# Patient Record
Sex: MALE | Race: WHITE | Employment: OTHER | URBAN - NONMETROPOLITAN AREA
[De-identification: names, ages, dates, MRNs, and addresses within clinical notes are randomized per-mention and may not be internally consistent; named-entity substitution may affect disease eponyms.]

---

## 2017-11-21 ENCOUNTER — HOSPITAL ENCOUNTER (INPATIENT)
Age: 52
LOS: 16 days | Discharge: NURSING, REHAB OR LONG-TERM CARE | DRG: 231 | End: 2017-12-07
Attending: INTERNAL MEDICINE | Admitting: INTERNAL MEDICINE
Payer: MEDICAID

## 2017-11-21 ENCOUNTER — APPOINTMENT (OUTPATIENT)
Dept: CT IMAGING | Age: 52
DRG: 231 | End: 2017-11-21
Payer: MEDICAID

## 2017-11-21 DIAGNOSIS — R77.8 ELEVATED TROPONIN: ICD-10-CM

## 2017-11-21 DIAGNOSIS — D64.9 ANEMIA, UNSPECIFIED TYPE: ICD-10-CM

## 2017-11-21 DIAGNOSIS — I10 ESSENTIAL HYPERTENSION: ICD-10-CM

## 2017-11-21 DIAGNOSIS — E87.5 HYPERKALEMIA: ICD-10-CM

## 2017-11-21 DIAGNOSIS — I10 HYPERTENSION, UNSPECIFIED TYPE: Primary | ICD-10-CM

## 2017-11-21 PROBLEM — I21.4 NSTEMI (NON-ST ELEVATED MYOCARDIAL INFARCTION) (HCC): Status: ACTIVE | Noted: 2017-11-21

## 2017-11-21 LAB
ABO: NORMAL
ALBUMIN SERPL-MCNC: 2.9 G/DL (ref 3.5–5.1)
ALP BLD-CCNC: 110 U/L (ref 38–126)
ALT SERPL-CCNC: 17 U/L (ref 11–66)
ANION GAP SERPL CALCULATED.3IONS-SCNC: 7 MEQ/L (ref 8–16)
ANISOCYTOSIS: ABNORMAL
ANTIBODY SCREEN: NORMAL
AST SERPL-CCNC: 14 U/L (ref 5–40)
BASOPHILIA: SLIGHT
BASOPHILS # BLD: 0.9 %
BASOPHILS ABSOLUTE: 0.1 THOU/MM3 (ref 0–0.1)
BILIRUB SERPL-MCNC: < 0.2 MG/DL (ref 0.3–1.2)
BUN BLDV-MCNC: 51 MG/DL (ref 7–22)
CALCIUM SERPL-MCNC: 7.8 MG/DL (ref 8.5–10.5)
CHLORIDE BLD-SCNC: 107 MEQ/L (ref 98–111)
CO2: 24 MEQ/L (ref 23–33)
CREAT SERPL-MCNC: 1.5 MG/DL (ref 0.4–1.2)
EOSINOPHIL # BLD: 1.8 %
EOSINOPHILS ABSOLUTE: 0.2 THOU/MM3 (ref 0–0.4)
GFR SERPL CREATININE-BSD FRML MDRD: 49 ML/MIN/1.73M2
GLUCOSE BLD-MCNC: 136 MG/DL (ref 70–108)
HCT VFR BLD CALC: 23.8 % (ref 42–52)
HEMOCCULT STL QL: NEGATIVE
HEMOGLOBIN: 7 GM/DL (ref 14–18)
HYPOCHROMIA: ABNORMAL
LYMPHOCYTES # BLD: 6.4 %
LYMPHOCYTES ABSOLUTE: 0.7 THOU/MM3 (ref 1–4.8)
MCH RBC QN AUTO: 20.7 PG (ref 27–31)
MCHC RBC AUTO-ENTMCNC: 29.4 GM/DL (ref 33–37)
MCV RBC AUTO: 70.5 FL (ref 80–94)
MICROCYTES: ABNORMAL
MONOCYTES # BLD: 5.7 %
MONOCYTES ABSOLUTE: 0.6 THOU/MM3 (ref 0.4–1.3)
NUCLEATED RED BLOOD CELLS: 0 /100 WBC
OSMOLALITY CALCULATION: 291.4 MOSMOL/KG (ref 275–300)
PDW BLD-RTO: 17 % (ref 11.5–14.5)
PLATELET # BLD: 200 THOU/MM3 (ref 130–400)
PLATELET ESTIMATE: ADEQUATE
PMV BLD AUTO: 9.3 MCM (ref 7.4–10.4)
POIKILOCYTES: ABNORMAL
POTASSIUM SERPL-SCNC: 5.6 MEQ/L (ref 3.5–5.2)
RBC # BLD: 3.37 MILL/MM3 (ref 4.7–6.1)
RETICULOCYTE ABSOLUTE COUNT: 1.3 % (ref 0.5–2)
RH FACTOR: NORMAL
SCAN OF BLOOD SMEAR: NORMAL
SEG NEUTROPHILS: 85.2 %
SEGMENTED NEUTROPHILS ABSOLUTE COUNT: 9.6 THOU/MM3 (ref 1.8–7.7)
SODIUM BLD-SCNC: 138 MEQ/L (ref 135–145)
TOTAL PROTEIN: 5.2 G/DL (ref 6.1–8)
TROPONIN T: 0.04 NG/ML
TROPONIN T: 0.04 NG/ML
TSH SERPL DL<=0.05 MIU/L-ACNC: 1.51 UIU/ML (ref 0.4–4.2)
WBC # BLD: 11.3 THOU/MM3 (ref 4.8–10.8)

## 2017-11-21 PROCEDURE — 84443 ASSAY THYROID STIM HORMONE: CPT

## 2017-11-21 PROCEDURE — 83540 ASSAY OF IRON: CPT

## 2017-11-21 PROCEDURE — 86900 BLOOD TYPING SEROLOGIC ABO: CPT

## 2017-11-21 PROCEDURE — 84466 ASSAY OF TRANSFERRIN: CPT

## 2017-11-21 PROCEDURE — 86850 RBC ANTIBODY SCREEN: CPT

## 2017-11-21 PROCEDURE — 2580000003 HC RX 258: Performed by: INTERNAL MEDICINE

## 2017-11-21 PROCEDURE — 86901 BLOOD TYPING SEROLOGIC RH(D): CPT

## 2017-11-21 PROCEDURE — 70450 CT HEAD/BRAIN W/O DYE: CPT

## 2017-11-21 PROCEDURE — 82272 OCCULT BLD FECES 1-3 TESTS: CPT

## 2017-11-21 PROCEDURE — 82728 ASSAY OF FERRITIN: CPT

## 2017-11-21 PROCEDURE — 84484 ASSAY OF TROPONIN QUANT: CPT

## 2017-11-21 PROCEDURE — 36430 TRANSFUSION BLD/BLD COMPNT: CPT

## 2017-11-21 PROCEDURE — 82746 ASSAY OF FOLIC ACID SERUM: CPT

## 2017-11-21 PROCEDURE — 99285 EMERGENCY DEPT VISIT HI MDM: CPT

## 2017-11-21 PROCEDURE — 93005 ELECTROCARDIOGRAM TRACING: CPT

## 2017-11-21 PROCEDURE — 36415 COLL VENOUS BLD VENIPUNCTURE: CPT

## 2017-11-21 PROCEDURE — 80053 COMPREHEN METABOLIC PANEL: CPT

## 2017-11-21 PROCEDURE — 84238 ASSAY NONENDOCRINE RECEPTOR: CPT

## 2017-11-21 PROCEDURE — 82607 VITAMIN B-12: CPT

## 2017-11-21 PROCEDURE — P9016 RBC LEUKOCYTES REDUCED: HCPCS

## 2017-11-21 PROCEDURE — 87804 INFLUENZA ASSAY W/OPTIC: CPT

## 2017-11-21 PROCEDURE — 85045 AUTOMATED RETICULOCYTE COUNT: CPT

## 2017-11-21 PROCEDURE — 86923 COMPATIBILITY TEST ELECTRIC: CPT

## 2017-11-21 PROCEDURE — 2140000000 HC CCU INTERMEDIATE R&B

## 2017-11-21 PROCEDURE — 85025 COMPLETE CBC W/AUTO DIFF WBC: CPT

## 2017-11-21 RX ORDER — ONDANSETRON 2 MG/ML
4 INJECTION INTRAMUSCULAR; INTRAVENOUS EVERY 6 HOURS PRN
Status: DISCONTINUED | OUTPATIENT
Start: 2017-11-21 | End: 2017-11-27

## 2017-11-21 RX ORDER — MORPHINE SULFATE 2 MG/ML
2 INJECTION, SOLUTION INTRAMUSCULAR; INTRAVENOUS
Status: ACTIVE | OUTPATIENT
Start: 2017-11-21 | End: 2017-11-22

## 2017-11-21 RX ORDER — METHYLPREDNISOLONE SODIUM SUCCINATE 40 MG/ML
40 INJECTION, POWDER, LYOPHILIZED, FOR SOLUTION INTRAMUSCULAR; INTRAVENOUS ONCE
Status: DISCONTINUED | OUTPATIENT
Start: 2017-11-21 | End: 2017-11-21

## 2017-11-21 RX ORDER — METOPROLOL TARTRATE 50 MG/1
50 TABLET, FILM COATED ORAL 2 TIMES DAILY
Status: DISCONTINUED | OUTPATIENT
Start: 2017-11-22 | End: 2017-11-22

## 2017-11-21 RX ORDER — ASPIRIN 325 MG
325 TABLET ORAL DAILY
Status: DISCONTINUED | OUTPATIENT
Start: 2017-11-22 | End: 2017-11-22

## 2017-11-21 RX ORDER — SIMVASTATIN 20 MG
20 TABLET ORAL NIGHTLY
Status: DISCONTINUED | OUTPATIENT
Start: 2017-11-22 | End: 2017-11-27

## 2017-11-21 RX ORDER — FAMOTIDINE 20 MG/1
20 TABLET, FILM COATED ORAL 2 TIMES DAILY
Status: DISCONTINUED | OUTPATIENT
Start: 2017-11-22 | End: 2017-11-27

## 2017-11-21 RX ORDER — NITROGLYCERIN 20 MG/100ML
5 INJECTION INTRAVENOUS CONTINUOUS
Status: DISCONTINUED | OUTPATIENT
Start: 2017-11-21 | End: 2017-11-22

## 2017-11-21 RX ORDER — SODIUM CHLORIDE 9 MG/ML
INJECTION, SOLUTION INTRAVENOUS CONTINUOUS
Status: DISCONTINUED | OUTPATIENT
Start: 2017-11-21 | End: 2017-11-21

## 2017-11-21 RX ORDER — POTASSIUM CHLORIDE 20 MEQ/1
40 TABLET, EXTENDED RELEASE ORAL PRN
Status: DISCONTINUED | OUTPATIENT
Start: 2017-11-21 | End: 2017-12-07 | Stop reason: HOSPADM

## 2017-11-21 RX ORDER — AMLODIPINE BESYLATE 10 MG/1
10 TABLET ORAL DAILY
Status: DISCONTINUED | OUTPATIENT
Start: 2017-11-22 | End: 2017-11-28

## 2017-11-21 RX ORDER — SODIUM CHLORIDE 0.9 % (FLUSH) 0.9 %
10 SYRINGE (ML) INJECTION PRN
Status: DISCONTINUED | OUTPATIENT
Start: 2017-11-21 | End: 2017-11-27 | Stop reason: SDUPTHER

## 2017-11-21 RX ORDER — POTASSIUM CHLORIDE 7.45 MG/ML
10 INJECTION INTRAVENOUS PRN
Status: DISCONTINUED | OUTPATIENT
Start: 2017-11-21 | End: 2017-12-07 | Stop reason: HOSPADM

## 2017-11-21 RX ORDER — POTASSIUM CHLORIDE 20MEQ/15ML
40 LIQUID (ML) ORAL PRN
Status: DISCONTINUED | OUTPATIENT
Start: 2017-11-21 | End: 2017-12-07 | Stop reason: HOSPADM

## 2017-11-21 RX ORDER — 0.9 % SODIUM CHLORIDE 0.9 %
250 INTRAVENOUS SOLUTION INTRAVENOUS ONCE
Status: COMPLETED | OUTPATIENT
Start: 2017-11-21 | End: 2017-11-22

## 2017-11-21 RX ORDER — DIPHENHYDRAMINE HYDROCHLORIDE 50 MG/ML
25 INJECTION INTRAMUSCULAR; INTRAVENOUS ONCE
Status: DISCONTINUED | OUTPATIENT
Start: 2017-11-21 | End: 2017-11-21

## 2017-11-21 RX ORDER — ACETAMINOPHEN 325 MG/1
650 TABLET ORAL EVERY 4 HOURS PRN
Status: DISCONTINUED | OUTPATIENT
Start: 2017-11-21 | End: 2017-11-27

## 2017-11-21 RX ORDER — LISINOPRIL 10 MG/1
10 TABLET ORAL DAILY
Status: ON HOLD | COMMUNITY
End: 2017-12-07 | Stop reason: HOSPADM

## 2017-11-21 RX ORDER — SODIUM CHLORIDE 0.9 % (FLUSH) 0.9 %
10 SYRINGE (ML) INJECTION EVERY 12 HOURS SCHEDULED
Status: DISCONTINUED | OUTPATIENT
Start: 2017-11-21 | End: 2017-11-27 | Stop reason: SDUPTHER

## 2017-11-21 RX ORDER — GLIMEPIRIDE 2 MG/1
2 TABLET ORAL 2 TIMES DAILY
Status: DISCONTINUED | OUTPATIENT
Start: 2017-11-22 | End: 2017-11-23

## 2017-11-21 RX ORDER — FUROSEMIDE 40 MG/1
40 TABLET ORAL DAILY
Status: ON HOLD | COMMUNITY
End: 2017-12-07

## 2017-11-21 RX ORDER — METOPROLOL TARTRATE 50 MG/1
50 TABLET, FILM COATED ORAL 2 TIMES DAILY
Status: ON HOLD | COMMUNITY
End: 2018-03-22 | Stop reason: HOSPADM

## 2017-11-21 RX ADMIN — SODIUM CHLORIDE: 9 INJECTION, SOLUTION INTRAVENOUS at 21:18

## 2017-11-21 RX ADMIN — Medication 10 ML: at 22:30

## 2017-11-21 RX ADMIN — SODIUM CHLORIDE 250 ML: 9 INJECTION, SOLUTION INTRAVENOUS at 20:45

## 2017-11-21 ASSESSMENT — ENCOUNTER SYMPTOMS
NAUSEA: 0
EYE REDNESS: 0
WHEEZING: 0
RHINORRHEA: 0
COUGH: 0
VOMITING: 0
DIARRHEA: 0
SHORTNESS OF BREATH: 0
BACK PAIN: 0
EYE DISCHARGE: 0
ABDOMINAL PAIN: 0
PHOTOPHOBIA: 0
EYE PAIN: 0
SORE THROAT: 0
BLOOD IN STOOL: 0

## 2017-11-21 NOTE — ED PROVIDER NOTES
[facility]  eMERGENCY dEPARTMENT eNCOUnter          CHIEF COMPLAINT       Chief Complaint   Patient presents with    Hypertension    Dizziness       Nurses Notes reviewed and I agree except as noted in the HPI. HISTORY OF PRESENT ILLNESS    Rafy De Oliveira is a 50 y.o. male who presents to the Emergency Department for the evaluation of hypertension. He has a history of hypertension for the last 3 years. Patient states he was given new blood pressure medication from his PCP. For the past 3 days, the patiens blood pressure remained elevated. The patient states that while he was at work today, he began feeling dizzy and clammy. He had his blood pressure tested in the nurse's station at work and the patient states that is was 207. His employer called EMS, which brought him to the emergency department. His blood pressure has decreased since arrival to the emergency department. He denies, wheezing, dizziness, chest pain, shortness of breath, black/tarry stools, hematuria, leg swelling and headache. He does state that he feels tired. The patient does not smoke. The patient has a history of diabetes. The HPI was provided by the patient. REVIEW OF SYSTEMS     Review of Systems   Constitutional: Positive for fatigue. Negative for appetite change, chills and fever. HENT: Negative for congestion, ear pain, rhinorrhea and sore throat. Eyes: Negative for photophobia, pain, discharge, redness and visual disturbance. Respiratory: Negative for cough, shortness of breath and wheezing. Cardiovascular: Negative for chest pain, palpitations and leg swelling. Hypertension. Gastrointestinal: Negative for abdominal pain, blood in stool, diarrhea, nausea and vomiting. Genitourinary: Negative for decreased urine volume, difficulty urinating, dysuria and hematuria. Musculoskeletal: Negative for arthralgias, back pain, joint swelling and neck pain. Skin: Negative for pallor and rash. Allergic/Immunologic: Negative for environmental allergies. Neurological: Negative for dizziness, seizures, syncope, weakness, light-headedness and headaches. Hematological: Negative for adenopathy. Psychiatric/Behavioral: Negative for agitation, confusion, dysphoric mood and suicidal ideas. The patient is not nervous/anxious. PAST MEDICAL HISTORY    has a past medical history of Diabetes mellitus ([address] Hyperlipidemia; Hypertension; and Thyroid disease. SURGICAL HISTORY      has no past surgical history on file. CURRENT MEDICATIONS       Previous Medications    AMLODIPINE (NORVASC) 10 MG TABLET    Take 1 tablet by mouth daily    BETAMETHASONE VALERATE (VALISONE) 0.1 % OINTMENT    Apply topically 3 times daily. Do not apply to face    BLOOD PRESSURE MONITOR KIT    CHECK BP TWICE DAILY IF SBP >140 CALL PCP    FAMOTIDINE (PEPCID) 20 MG TABLET    Take 1 tablet by mouth 2 times daily    GLIMEPIRIDE (AMARYL) 2 MG TABLET    Take 2 mg by mouth 2 times daily    HYDROCORTISONE 2.5 % CREAM    Apply topically 3 times daily. Apply to face    HYDROXYZINE (ATARAX) 50 MG TABLET    Take 1 tablet by mouth 4 times daily as needed for Itching (rash) Caution not at work will cause drowsiness    LABETALOL (NORMODYNE) 200 MG TABLET    Take 1 tablet by mouth every 12 hours    MUPIROCIN (BACTROBAN) 2 % OINTMENT    Apply topically 3 times daily. SIMVASTATIN (ZOCOR) 20 MG TABLET    Take 20 mg by mouth nightly       ALLERGIES     has No Known Allergies. FAMILY HISTORY     indicated that his mother is alive. He indicated that his father is alive. He indicated that his sister is alive. family history includes Cancer in his father; Heart Disease in his father; High Blood Pressure in his mother. SOCIAL HISTORY      reports that he has never smoked. He has never used smokeless tobacco. He reports that he does not drink alcohol or use drugs.     PHYSICAL EXAM     INITIAL VITALS:  oral temperature is 98.3 °F (36.8 °C). His pulse is 79. His respiration is 20 and oxygen saturation is 95%. Physical Exam   Constitutional: He is oriented to person, place, and time. Vital signs are normal. He appears well-developed and well-nourished. No distress. HENT:   Head: Normocephalic and atraumatic. Right Ear: External ear normal.   Left Ear: External ear normal.   Eyes: Conjunctivae are normal. Right eye exhibits no discharge. Left eye exhibits no discharge. No scleral icterus. Neck: Normal range of motion. Neck supple. No JVD present. Cardiovascular: Normal rate, regular rhythm and normal heart sounds. Exam reveals no gallop and no friction rub. No murmur heard. Pulmonary/Chest: Effort normal and breath sounds normal. No respiratory distress. He has no decreased breath sounds. He has no wheezes. He has no rhonchi. He has no rales. He exhibits no tenderness. Abdominal: Soft. He exhibits no distension and no mass. There is no tenderness. There is no rebound and no guarding. Musculoskeletal: Normal range of motion. He exhibits no edema. Right lower leg: He exhibits no swelling. Left lower leg: He exhibits no swelling. Neurological: He is alert and oriented to person, place, and time. He exhibits normal muscle tone. He displays no seizure activity. GCS eye subscore is 4. GCS verbal subscore is 5. GCS motor subscore is 6. Skin: Skin is warm and dry. No rash noted. He is not diaphoretic. Psychiatric: He has a normal mood and affect. His behavior is normal. Thought content normal.   Nursing note and vitals reviewed. DIAGNOSTIC RESULTS     EKG: All EKG's are interpreted by the Emergency Department Physician who either signs or Co-signs this chart in the absence of a cardiologist.  EKG interpreted by Marina Oliver MD:        RADIOLOGY: non-plain film images(s) such as CT, Ultrasound and MRI are read by the radiologist.    [address]   Final Result   1. Unremarkable noncontrast CT head.

## 2017-11-21 NOTE — ED NOTES
Bed: 012A  Expected date: 11/21/17  Expected time:   Means of arrival: [facility]  Comments:     Love Bodily  11/21/17 8225

## 2017-11-22 LAB
ANION GAP SERPL CALCULATED.3IONS-SCNC: 9 MEQ/L (ref 8–16)
AVERAGE GLUCOSE: 114 MG/DL (ref 70–126)
BUN BLDV-MCNC: 49 MG/DL (ref 7–22)
CALCIUM SERPL-MCNC: 7.9 MG/DL (ref 8.5–10.5)
CHLORIDE BLD-SCNC: 110 MEQ/L (ref 98–111)
CHOLESTEROL, TOTAL: 155 MG/DL (ref 100–199)
CO2: 21 MEQ/L (ref 23–33)
CREAT SERPL-MCNC: 1.5 MG/DL (ref 0.4–1.2)
EKG ATRIAL RATE: 69 BPM
EKG ATRIAL RATE: 80 BPM
EKG P AXIS: 51 DEGREES
EKG P AXIS: 58 DEGREES
EKG P-R INTERVAL: 116 MS
EKG P-R INTERVAL: 140 MS
EKG Q-T INTERVAL: 368 MS
EKG Q-T INTERVAL: 380 MS
EKG QRS DURATION: 88 MS
EKG QRS DURATION: 92 MS
EKG QTC CALCULATION (BAZETT): 407 MS
EKG QTC CALCULATION (BAZETT): 424 MS
EKG R AXIS: 23 DEGREES
EKG R AXIS: 4 DEGREES
EKG T AXIS: 45 DEGREES
EKG T AXIS: 98 DEGREES
EKG VENTRICULAR RATE: 69 BPM
EKG VENTRICULAR RATE: 80 BPM
FERRITIN: 19 NG/ML (ref 22–322)
FLU A ANTIGEN: NEGATIVE
FLU B ANTIGEN: NEGATIVE
FOLATE: 6.6 NG/ML (ref 4.8–24.2)
GFR SERPL CREATININE-BSD FRML MDRD: 49 ML/MIN/1.73M2
GLUCOSE BLD-MCNC: 111 MG/DL (ref 70–108)
GLUCOSE BLD-MCNC: 57 MG/DL (ref 70–108)
HBA1C MFR BLD: 5.8 % (ref 4.4–6.4)
HCT VFR BLD CALC: 24.3 % (ref 42–52)
HCT VFR BLD CALC: 25.8 % (ref 42–52)
HDLC SERPL-MCNC: 42 MG/DL
HEMOCCULT STL QL: POSITIVE
HEMOGLOBIN: 7.3 GM/DL (ref 14–18)
HEMOGLOBIN: 8 GM/DL (ref 14–18)
IRON: 16 UG/DL (ref 65–195)
LDL CHOLESTEROL CALCULATED: 99 MG/DL
LV EF: 50 %
LVEF MODALITY: NORMAL
MCH RBC QN AUTO: 21.7 PG (ref 27–31)
MCHC RBC AUTO-ENTMCNC: 30.2 GM/DL (ref 33–37)
MCV RBC AUTO: 71.9 FL (ref 80–94)
PDW BLD-RTO: 17.9 % (ref 11.5–14.5)
PLATELET # BLD: 188 THOU/MM3 (ref 130–400)
PMV BLD AUTO: 9.6 MCM (ref 7.4–10.4)
POTASSIUM SERPL-SCNC: 5.2 MEQ/L (ref 3.5–5.2)
RBC # BLD: 3.37 MILL/MM3 (ref 4.7–6.1)
SODIUM BLD-SCNC: 140 MEQ/L (ref 135–145)
TRIGL SERPL-MCNC: 71 MG/DL (ref 0–199)
TROPONIN T: 0.04 NG/ML
TROPONIN T: 0.05 NG/ML
VITAMIN B-12: 558 PG/ML (ref 211–911)
WBC # BLD: 10.2 THOU/MM3 (ref 4.8–10.8)

## 2017-11-22 PROCEDURE — 6370000000 HC RX 637 (ALT 250 FOR IP): Performed by: INTERNAL MEDICINE

## 2017-11-22 PROCEDURE — P9016 RBC LEUKOCYTES REDUCED: HCPCS

## 2017-11-22 PROCEDURE — 2140000000 HC CCU INTERMEDIATE R&B

## 2017-11-22 PROCEDURE — 82948 REAGENT STRIP/BLOOD GLUCOSE: CPT

## 2017-11-22 PROCEDURE — 93306 TTE W/DOPPLER COMPLETE: CPT

## 2017-11-22 PROCEDURE — 2500000003 HC RX 250 WO HCPCS: Performed by: INTERNAL MEDICINE

## 2017-11-22 PROCEDURE — 80048 BASIC METABOLIC PNL TOTAL CA: CPT

## 2017-11-22 PROCEDURE — 84484 ASSAY OF TROPONIN QUANT: CPT

## 2017-11-22 PROCEDURE — 2580000003 HC RX 258: Performed by: INTERNAL MEDICINE

## 2017-11-22 PROCEDURE — 99222 1ST HOSP IP/OBS MODERATE 55: CPT | Performed by: INTERNAL MEDICINE

## 2017-11-22 PROCEDURE — 80061 LIPID PANEL: CPT

## 2017-11-22 PROCEDURE — 93005 ELECTROCARDIOGRAM TRACING: CPT

## 2017-11-22 PROCEDURE — 85018 HEMOGLOBIN: CPT

## 2017-11-22 PROCEDURE — 82272 OCCULT BLD FECES 1-3 TESTS: CPT

## 2017-11-22 PROCEDURE — 85027 COMPLETE CBC AUTOMATED: CPT

## 2017-11-22 PROCEDURE — 85014 HEMATOCRIT: CPT

## 2017-11-22 PROCEDURE — 36415 COLL VENOUS BLD VENIPUNCTURE: CPT

## 2017-11-22 PROCEDURE — 36430 TRANSFUSION BLD/BLD COMPNT: CPT

## 2017-11-22 PROCEDURE — 93306 TTE W/DOPPLER COMPLETE: CPT | Performed by: INTERNAL MEDICINE

## 2017-11-22 PROCEDURE — 83036 HEMOGLOBIN GLYCOSYLATED A1C: CPT

## 2017-11-22 RX ORDER — FUROSEMIDE 40 MG/1
40 TABLET ORAL DAILY
Status: DISCONTINUED | OUTPATIENT
Start: 2017-11-22 | End: 2017-11-26

## 2017-11-22 RX ORDER — NICOTINE POLACRILEX 4 MG
15 LOZENGE BUCCAL PRN
Status: DISCONTINUED | OUTPATIENT
Start: 2017-11-22 | End: 2017-12-07 | Stop reason: HOSPADM

## 2017-11-22 RX ORDER — LISINOPRIL 10 MG/1
10 TABLET ORAL 2 TIMES DAILY
Status: DISCONTINUED | OUTPATIENT
Start: 2017-11-22 | End: 2017-11-26

## 2017-11-22 RX ORDER — 0.9 % SODIUM CHLORIDE 0.9 %
250 INTRAVENOUS SOLUTION INTRAVENOUS ONCE
Status: COMPLETED | OUTPATIENT
Start: 2017-11-22 | End: 2017-11-23

## 2017-11-22 RX ORDER — DEXTROSE MONOHYDRATE 50 MG/ML
100 INJECTION, SOLUTION INTRAVENOUS PRN
Status: DISCONTINUED | OUTPATIENT
Start: 2017-11-22 | End: 2017-12-07 | Stop reason: HOSPADM

## 2017-11-22 RX ORDER — HYDRALAZINE HYDROCHLORIDE 20 MG/ML
10 INJECTION INTRAMUSCULAR; INTRAVENOUS EVERY 4 HOURS PRN
Status: DISCONTINUED | OUTPATIENT
Start: 2017-11-22 | End: 2017-12-07 | Stop reason: HOSPADM

## 2017-11-22 RX ORDER — ISOSORBIDE MONONITRATE 30 MG/1
30 TABLET, EXTENDED RELEASE ORAL DAILY
Status: DISCONTINUED | OUTPATIENT
Start: 2017-11-22 | End: 2017-11-22

## 2017-11-22 RX ORDER — ASPIRIN 325 MG
325 TABLET ORAL DAILY
Status: DISCONTINUED | OUTPATIENT
Start: 2017-11-24 | End: 2017-11-27

## 2017-11-22 RX ORDER — NITROGLYCERIN 20 MG/100ML
5 INJECTION INTRAVENOUS CONTINUOUS
Status: DISCONTINUED | OUTPATIENT
Start: 2017-11-22 | End: 2017-11-27 | Stop reason: SDUPTHER

## 2017-11-22 RX ORDER — METOPROLOL TARTRATE 50 MG/1
50 TABLET, FILM COATED ORAL 3 TIMES DAILY
Status: DISCONTINUED | OUTPATIENT
Start: 2017-11-22 | End: 2017-11-25

## 2017-11-22 RX ORDER — DEXTROSE MONOHYDRATE 25 G/50ML
12.5 INJECTION, SOLUTION INTRAVENOUS PRN
Status: DISCONTINUED | OUTPATIENT
Start: 2017-11-22 | End: 2017-12-07 | Stop reason: HOSPADM

## 2017-11-22 RX ADMIN — GLIMEPIRIDE 2 MG: 2 TABLET ORAL at 20:10

## 2017-11-22 RX ADMIN — FAMOTIDINE 20 MG: 20 TABLET, FILM COATED ORAL at 09:18

## 2017-11-22 RX ADMIN — LISINOPRIL 10 MG: 10 TABLET ORAL at 20:10

## 2017-11-22 RX ADMIN — SIMVASTATIN 20 MG: 20 TABLET, FILM COATED ORAL at 00:53

## 2017-11-22 RX ADMIN — METOPROLOL TARTRATE 50 MG: 50 TABLET, FILM COATED ORAL at 15:43

## 2017-11-22 RX ADMIN — METOPROLOL TARTRATE 50 MG: 50 TABLET, FILM COATED ORAL at 09:18

## 2017-11-22 RX ADMIN — SODIUM CHLORIDE 250 ML: 9 INJECTION, SOLUTION INTRAVENOUS at 13:09

## 2017-11-22 RX ADMIN — NITROGLYCERIN 5 MCG/MIN: 20 INJECTION INTRAVENOUS at 17:08

## 2017-11-22 RX ADMIN — LISINOPRIL 10 MG: 10 TABLET ORAL at 13:14

## 2017-11-22 RX ADMIN — FUROSEMIDE 40 MG: 40 TABLET ORAL at 13:15

## 2017-11-22 RX ADMIN — SIMVASTATIN 20 MG: 20 TABLET, FILM COATED ORAL at 20:10

## 2017-11-22 RX ADMIN — METOPROLOL TARTRATE 50 MG: 50 TABLET, FILM COATED ORAL at 21:23

## 2017-11-22 RX ADMIN — FAMOTIDINE 20 MG: 20 TABLET, FILM COATED ORAL at 00:53

## 2017-11-22 RX ADMIN — FAMOTIDINE 20 MG: 20 TABLET, FILM COATED ORAL at 20:10

## 2017-11-22 RX ADMIN — AMLODIPINE BESYLATE 10 MG: 10 TABLET ORAL at 09:18

## 2017-11-22 RX ADMIN — ASPIRIN 325 MG: 325 TABLET, COATED ORAL at 09:18

## 2017-11-22 RX ADMIN — Medication 10 ML: at 20:08

## 2017-11-22 RX ADMIN — METOPROLOL TARTRATE 50 MG: 50 TABLET, FILM COATED ORAL at 00:52

## 2017-11-22 RX ADMIN — GLIMEPIRIDE 2 MG: 2 TABLET ORAL at 09:18

## 2017-11-22 RX ADMIN — Medication 10 ML: at 09:19

## 2017-11-22 ASSESSMENT — PAIN SCALES - GENERAL
PAINLEVEL_OUTOF10: 0

## 2017-11-22 NOTE — CONSULTS
above.   GENITOURINARY:  No increased frequency with urination. NEUROPSYCHIATRIC:   No mood change or depression. MUSCULOSKELETAL:  No kyphoscoliosis. SKIN:   Without desquamation. PHYSICAL EXAMINATION:  GENERAL:  The patient appears stated age, not in any acute distress. VITAL SIGNS:  Blood pressure was 204/91, heart rate was 78, respiration was  16, temperature 98.1, and saturation 96%. HEENT:  Head is normocephalic and atraumatic. Eyes: Without erythematous  changes. Mouth:  No thrush. Nose:  No polyps. Ears:  No cerumen is  noted. CHEST CAVITY:  No biventricular heave. No right ventricular heave. No  thrills. HEART:  Regular rate and rhythm. S1 and S2 are normal.  S3 and S4 heart  sounds are absent. ABDOMEN:  Nondistended and there is no organomegaly. EXTREMITIES:  Pulses are symmetric and intact for radial, carotid, and  femoral.  SKIN:  Moist without lesion. MUSCULOSKELETAL:  No kyphoscoliosis. NEUROLOGIC:  Cranial nerves II-XII intact. ASSESSMENT AND PLAN:  1. Mildly elevated troponin probably stress-induced myocardial infarction  in particular due to demand ischemia. 2.  Hypertension. 3.  Anemia. PLAN:  1. At this time, my goal is to offer the patient a stress test to exclude  ischemia and if the stress test is positive, further workup will be  recommended, if the stress test is negative may discharge from cardiology,  however, the patient notes that there is a more possibility of myocardial  infarction, so therefore if there is any change or symptom change to return  to be followed immediately. 2.  Get a 2-D echo. 3.  _____ 30 mg a day for antihypertension effects. 4.  Lopressor continue at 50 mg b.i.d.         Ranjith Barron D.O.    D: 11/22/2017 13:15:10       T: 11/22/2017 14:19:09     [email or web address]  Job#: [id number]     Doc#: [id number]    CC:

## 2017-11-22 NOTE — PLAN OF CARE
Problem: Safety:  Goal: Free from accidental physical injury  Free from accidental physical injury   Outcome: Ongoing  Patient reminded to use call light for assistance, call light within reach, non skid socks on, patient states understanding

## 2017-11-22 NOTE — FLOWSHEET NOTE
Sat with patient while giving him blood. Discussed that he should report any pain, pressure,sweating or shortness of breath, or just not feeling right to the nurse. Also discussed that he has lots of swelling in his legs and he can help prevent this by:  1. Taking his diuretic  2. Limiting the amount of salt that he eats  3. Putting his feet up when he is sitting in the chair      I discussed the importance of being compliant with his medications.

## 2017-11-22 NOTE — FLOWSHEET NOTE
Patient arrived per cart to 3B. Heart monitor applied and vitals taken. Admission paperwork completed. Explained to patient that [facility] is not responsible for any lost or stolen items. Patient verbalized understanding. Oriented to room and use of call light and bed controls. Patient denies pain or needs. No signs of distress noted. Bed locked & in low position, side-rails up x2. Call light in reach. Reminded patient to call nurse if any needs arise. Call in to physician for additional order clarification. Welcome to 3B folder given to patient which includes the following handouts:  1. Medication Side Effects  2. A Patient's Guide to Managing Pain  3. Patent Safety brochure  4. Welcome Letter  5. Employee Recognition Card    Spoke with patient's mom to discuss home meds. Mother and patient didn't have much understanding of patient's meds. Mother did state that patient has needed refills on: lisinopril, furosemide, amlodipine and victoza but he has not obtained the refills so he is not taking them. She wasn't sure of dosages or frequencies of any of these meds. She also states that \"sometimes he takes his medicine, sometimes he doesn't, he does what he wants. \"

## 2017-11-22 NOTE — FLOWSHEET NOTE
11/21/17 2125   Provider Notification   Reason for Communication New orders; Evaluate   Provider Name Dr. Grace Dorsey   Provider Notification Physician   Method of Communication Page   Response Waiting for response   Notification Time 2125

## 2017-11-22 NOTE — H&P
BP (!) 194/88   Pulse 79   Temp 98 °F (36.7 °C) (Oral)   Resp 16   Ht 5' 9\" (1.753 m)   Wt 230 lb 4.8 oz (104.5 kg)   SpO2 96%   BMI 34.01 kg/m²   CONSTITUTIONAL:  awake, alert, cooperative, no apparent distress, and appears stated age  EYES:  extra-ocular muscles intact and pallor  ENT:  normocepalic, without obvious abnormality  NECK:  supple, symmetrical, trachea midline  LUNGS:  No increased work of breathing, good air exchange, clear to auscultation bilaterally, no crackles or wheezing  CARDIOVASCULAR:  normal S1 and S2 and no edema  ABDOMEN:  No scars, normal bowel sounds, soft, non-distended, non-tender, no masses palpated, no hepatosplenomegally  MUSCULOSKELETAL:  there is no redness, warmth, or swelling of the joints  NEUROLOGIC:  Mental Status Exam:  Level of Alertness:   awake  Orientation:   person, place, time  Memory:   normal  Fund of Knowledge:  abnormal - limited-special learning capability  Cranial Nerves:  cranial nerves II-XII are grossly intact  Motor Exam:  Motor exam is symmetrical 5 out of 5 all extremities bilaterally  SKIN:  normal skin color, texture, turgor      CBC:   Recent Labs      11/21/17   1800  11/22/17   0453   WBC  11.3*  10.2   HGB  7.0*  7.3*   PLT  200  188     BMP:    Recent Labs      11/21/17 1858 11/22/17   0453   NA  138  140   K  5.6*  5.2   CL  107  110   CO2  24  21*   BUN  51*  49*   CREATININE  1.5*  1.5*   GLUCOSE  136*  111*     Hepatic:   Recent Labs      11/21/17 1858   AST  14   ALT  17   BILITOT  <0.2*   ALKPHOS  110     Lipids:   Recent Labs      11/22/17 0453   CHOL  155   HDL  42      Ref. Range 11/22/2017 04:53   LDL Calculated Latest Units: mg/dL 99        Ref. Range 11/22/2017 02:05 11/22/2017 02:40 11/22/2017 04:53 11/22/2017 05:43 11/22/2017 06:26   Troponin T Latest Units: ng/ml 0.041 (A)    0.051 (A)     EKG: NSR 80 bpm, AMADA, NI    Assessment and Plan   1. Acute blood loss anemia with hemoccult positive stool  2.  Elevated troponin prob

## 2017-11-22 NOTE — PLAN OF CARE
Problem: Safety:  Goal: Free from accidental physical injury  Free from accidental physical injury   Outcome: Ongoing  Assessment & interventions provided throughout shift. Bed locked & in low position, call light in reach, side-rails up x2, non-slip socks on when ambulating, bed alarm on at night, reminded patient to use call light to call for assistance. Problem: Daily Care:  Goal: Daily care needs are met  Daily care needs are met   Outcome: Ongoing  Patient able to complete ADLs. Assistance provided as needed. Problem: Pain:  Goal: Patient's pain/discomfort is manageable  Patient's pain/discomfort is manageable   Outcome: Ongoing  Patient denies pain so far this shift. Reminded patient to report any pain, pressure, or shortness of breath to the nurse. Will continue to monitor. Problem: Discharge Planning:  Goal: Patients continuum of care needs are met  Patients continuum of care needs are met   Outcome: Ongoing  Patient lives at home alone. He needs lots of teaching about meds and may need home health to be complaint with medications. His mother confirms that he hasn't refilled 4 medications and takes them \"when he feels like it\". Comments: Care plan reviewed with patient. Patient verbalizes understanding of the care plan and contributed to goal setting.

## 2017-11-23 LAB
ANION GAP SERPL CALCULATED.3IONS-SCNC: 11 MEQ/L (ref 8–16)
BUN BLDV-MCNC: 50 MG/DL (ref 7–22)
CALCIUM SERPL-MCNC: 8.3 MG/DL (ref 8.5–10.5)
CHLORIDE BLD-SCNC: 111 MEQ/L (ref 98–111)
CO2: 22 MEQ/L (ref 23–33)
CREAT SERPL-MCNC: 1.6 MG/DL (ref 0.4–1.2)
GFR SERPL CREATININE-BSD FRML MDRD: 46 ML/MIN/1.73M2
GLUCOSE BLD-MCNC: 148 MG/DL (ref 70–108)
GLUCOSE BLD-MCNC: 46 MG/DL (ref 70–108)
GLUCOSE BLD-MCNC: 49 MG/DL (ref 70–108)
GLUCOSE BLD-MCNC: 75 MG/DL (ref 70–108)
HCT VFR BLD CALC: 27.9 % (ref 42–52)
HCT VFR BLD CALC: 28 % (ref 42–52)
HEMOGLOBIN: 8.6 GM/DL (ref 14–18)
HEMOGLOBIN: 8.6 GM/DL (ref 14–18)
POTASSIUM SERPL-SCNC: 4.4 MEQ/L (ref 3.5–5.2)
SODIUM BLD-SCNC: 144 MEQ/L (ref 135–145)
SOLUBLE TRANSFERRIN RECEPT: 13.5 MG/L (ref 2.2–5)
TRANSFERRIN: 291 MG/DL (ref 200–400)

## 2017-11-23 PROCEDURE — 85014 HEMATOCRIT: CPT

## 2017-11-23 PROCEDURE — 85018 HEMOGLOBIN: CPT

## 2017-11-23 PROCEDURE — 86677 HELICOBACTER PYLORI ANTIBODY: CPT

## 2017-11-23 PROCEDURE — 82948 REAGENT STRIP/BLOOD GLUCOSE: CPT

## 2017-11-23 PROCEDURE — 6360000002 HC RX W HCPCS: Performed by: INTERNAL MEDICINE

## 2017-11-23 PROCEDURE — 6370000000 HC RX 637 (ALT 250 FOR IP): Performed by: INTERNAL MEDICINE

## 2017-11-23 PROCEDURE — 36415 COLL VENOUS BLD VENIPUNCTURE: CPT

## 2017-11-23 PROCEDURE — 88305 TISSUE EXAM BY PATHOLOGIST: CPT

## 2017-11-23 PROCEDURE — 2580000003 HC RX 258: Performed by: INTERNAL MEDICINE

## 2017-11-23 PROCEDURE — 3609012400 HC EGD TRANSORAL BIOPSY SINGLE/MULTIPLE: Performed by: INTERNAL MEDICINE

## 2017-11-23 PROCEDURE — 99152 MOD SED SAME PHYS/QHP 5/>YRS: CPT | Performed by: INTERNAL MEDICINE

## 2017-11-23 PROCEDURE — 80048 BASIC METABOLIC PNL TOTAL CA: CPT

## 2017-11-23 PROCEDURE — 2140000000 HC CCU INTERMEDIATE R&B

## 2017-11-23 RX ORDER — GLIMEPIRIDE 2 MG/1
2 TABLET ORAL 2 TIMES DAILY
Status: DISCONTINUED | OUTPATIENT
Start: 2017-11-25 | End: 2017-11-25

## 2017-11-23 RX ORDER — MIDAZOLAM HYDROCHLORIDE 1 MG/ML
INJECTION INTRAMUSCULAR; INTRAVENOUS PRN
Status: DISCONTINUED | OUTPATIENT
Start: 2017-11-23 | End: 2017-11-23 | Stop reason: HOSPADM

## 2017-11-23 RX ORDER — POLYETHYLENE GLYCOL 3350 17 G/17G
17 POWDER, FOR SOLUTION ORAL
Status: DISCONTINUED | OUTPATIENT
Start: 2017-11-23 | End: 2017-11-24

## 2017-11-23 RX ORDER — FENTANYL CITRATE 50 UG/ML
INJECTION, SOLUTION INTRAMUSCULAR; INTRAVENOUS PRN
Status: DISCONTINUED | OUTPATIENT
Start: 2017-11-23 | End: 2017-11-23 | Stop reason: HOSPADM

## 2017-11-23 RX ADMIN — POLYETHYLENE GLYCOL 3350 17 G: 17 POWDER, FOR SOLUTION ORAL at 20:18

## 2017-11-23 RX ADMIN — POLYETHYLENE GLYCOL 3350 17 G: 17 POWDER, FOR SOLUTION ORAL at 21:13

## 2017-11-23 RX ADMIN — POLYETHYLENE GLYCOL 3350 17 G: 17 POWDER, FOR SOLUTION ORAL at 19:36

## 2017-11-23 RX ADMIN — HYDRALAZINE HYDROCHLORIDE 10 MG: 20 INJECTION INTRAMUSCULAR; INTRAVENOUS at 17:36

## 2017-11-23 RX ADMIN — SIMVASTATIN 20 MG: 20 TABLET, FILM COATED ORAL at 20:18

## 2017-11-23 RX ADMIN — POLYETHYLENE GLYCOL 3350 17 G: 17 POWDER, FOR SOLUTION ORAL at 16:37

## 2017-11-23 RX ADMIN — BISACODYL 10 MG: 5 TABLET, DELAYED RELEASE ORAL at 13:45

## 2017-11-23 RX ADMIN — POLYETHYLENE GLYCOL 3350 17 G: 17 POWDER, FOR SOLUTION ORAL at 15:36

## 2017-11-23 RX ADMIN — IRON SUCROSE 300 MG: 20 INJECTION, SOLUTION INTRAVENOUS at 19:21

## 2017-11-23 RX ADMIN — POLYETHYLENE GLYCOL 3350 17 G: 17 POWDER, FOR SOLUTION ORAL at 23:07

## 2017-11-23 RX ADMIN — AMLODIPINE BESYLATE 10 MG: 10 TABLET ORAL at 07:54

## 2017-11-23 RX ADMIN — POLYETHYLENE GLYCOL 3350 17 G: 17 POWDER, FOR SOLUTION ORAL at 22:09

## 2017-11-23 RX ADMIN — Medication 10 ML: at 04:17

## 2017-11-23 RX ADMIN — FUROSEMIDE 40 MG: 40 TABLET ORAL at 07:54

## 2017-11-23 RX ADMIN — HYDRALAZINE HYDROCHLORIDE 10 MG: 20 INJECTION INTRAMUSCULAR; INTRAVENOUS at 04:15

## 2017-11-23 RX ADMIN — POLYETHYLENE GLYCOL 3350 17 G: 17 POWDER, FOR SOLUTION ORAL at 13:44

## 2017-11-23 RX ADMIN — FAMOTIDINE 20 MG: 20 TABLET, FILM COATED ORAL at 20:18

## 2017-11-23 RX ADMIN — POLYETHYLENE GLYCOL 3350 17 G: 17 POWDER, FOR SOLUTION ORAL at 14:33

## 2017-11-23 RX ADMIN — POLYETHYLENE GLYCOL 3350 17 G: 17 POWDER, FOR SOLUTION ORAL at 18:51

## 2017-11-23 RX ADMIN — POLYETHYLENE GLYCOL 3350 17 G: 17 POWDER, FOR SOLUTION ORAL at 17:33

## 2017-11-23 RX ADMIN — LISINOPRIL 10 MG: 10 TABLET ORAL at 07:54

## 2017-11-23 RX ADMIN — FAMOTIDINE 20 MG: 20 TABLET, FILM COATED ORAL at 13:44

## 2017-11-23 RX ADMIN — LISINOPRIL 10 MG: 10 TABLET ORAL at 20:18

## 2017-11-23 RX ADMIN — POLYETHYLENE GLYCOL 3350 17 G: 17 POWDER, FOR SOLUTION ORAL at 23:59

## 2017-11-23 RX ADMIN — METOPROLOL TARTRATE 50 MG: 50 TABLET, FILM COATED ORAL at 07:07

## 2017-11-23 RX ADMIN — METOPROLOL TARTRATE 50 MG: 50 TABLET, FILM COATED ORAL at 21:13

## 2017-11-23 RX ADMIN — Medication 10 ML: at 20:19

## 2017-11-23 RX ADMIN — METOPROLOL TARTRATE 50 MG: 50 TABLET, FILM COATED ORAL at 14:33

## 2017-11-23 ASSESSMENT — PAIN SCALES - GENERAL
PAINLEVEL_OUTOF10: 0

## 2017-11-23 ASSESSMENT — PAIN - FUNCTIONAL ASSESSMENT: PAIN_FUNCTIONAL_ASSESSMENT: 0-10

## 2017-11-23 NOTE — PROGRESS NOTES
INTERNAL MEDICINE Progress Note  11/23/2017 12:20 PM  Subjective:   Admit Date: 11/21/2017  PCP: [facility]  Interval History: no new c/o    Objective:   Vitals: BP (!) 157/78   Pulse 55   Temp 97.6 °F (36.4 °C) (Oral)   Resp 16   Ht 5' 9\" (1.753 m)   Wt 227 lb 3.2 oz (103.1 kg)   SpO2 94%   BMI 33.55 kg/m²   General appearance: alert and cooperative with exam, pallor  HEENT: Head: atraumatic  Neck: no adenopathy, no carotid bruit and no JVD  Lungs: clear to auscultation bilaterally  Heart: S1, S2 normal  Abdomen: soft, non-tender; bowel sounds normal; no masses,  no organomegaly  Extremities: extremities normal, atraumatic, no cyanosis or edema  Neurologic: Mental status: Alert, oriented, thought content appropriate      Medications:   Scheduled Meds:   [START ON 11/25/2017] glimepiride  2 mg Oral BID    polyethylene glycol  17 g Oral Q1H    bisacodyl  10 mg Oral Once    furosemide  40 mg Oral Daily    lisinopril  10 mg Oral BID    metoprolol tartrate  50 mg Oral TID    insulin lispro  0-6 Units Subcutaneous TID WC    insulin lispro  0-3 Units Subcutaneous Nightly    [START ON 11/24/2017] aspirin  325 mg Oral Daily    amLODIPine  10 mg Oral Daily    simvastatin  20 mg Oral Nightly    sodium chloride flush  10 mL Intravenous 2 times per day    famotidine  20 mg Oral BID     Continuous Infusions:   nitroGLYCERIN 5 mcg/min (11/22/17 1708)    dextrose         Lab Results:   CBC:   Recent Labs      11/21/17   1800  11/22/17   0453  11/22/17   1816  11/23/17   0002  11/23/17   0550   WBC  11.3*  10.2   --    --    --    HGB  7.0*  7.3*  8.0*  8.6*  8.6*   PLT  200  188   --    --    --      BMP:    Recent Labs      11/21/17   1858  11/22/17   0453  11/23/17   0550   NA  138  140  144   K  5.6*  5.2  4.4   CL  107  110  111   CO2  24  21*  22*   BUN  51*  49*  50*   CREATININE  1.5*  1.5*  1.6*   GLUCOSE  136*  111*  49*     Hepatic:   Recent Labs      11/21/17   1858   AST  14   ALT  17 BILITOT  <0.2*   ALKPHOS  110     Lipids:   Recent Labs      11/22/17   0453   CHOL  155   HDL  42     HgBA1c:    Lab Results   Component Value Date    LABA1C 5.8 11/22/2017     TSH:    Lab Results   Component Value Date    TSH 1.510 11/21/2017     FOLATE:    Lab Results   Component Value Date    FOLATE 6.6 11/21/2017     IRON:    Lab Results   Component Value Date    IRON 16 11/21/2017     FERRITIN:    Lab Results   Component Value Date    FERRITIN 19 11/21/2017           Assessment and Plan:   1. Acute blood loss anemia with hemoccult positive stool  2. Gastric ulcer, erosions s/p bx  3. Elevated troponin prob NSTEMI  4. CKD stage 3  5. HTN  6. DM 2  7. dyslipidemia      cont PPI.   Cont other meds  Beatris Stapleton MD

## 2017-11-23 NOTE — CONSULTS
[address] [id number]                                   CONSULTATION    PATIENT NAME: Adamaris Ruby                  :        1965  MED REC NO:   [id number]                           ROOM:       5368  ACCOUNT NO:   [de-identified]                           ADMIT DATE: 2017  PROVIDER:     CALIXTO Murrell DATE:  2017    REASON FOR CONSULTATION:  For further evaluation of severe symptomatic  anemia. HISTORY OF PRESENT ILLNESS:  The patient is a 57-year-old pleasant male who  has a past medical history significant for chronic kidney disease, diabetes  mellitus, hyperlipidemia, hypertension, hypothyroidism who presented to the  hospital with dizzy spell. The patient also having melenic stools in the  last couple of weeks, denied any bright red blood per rectum. Denied any  nausea, vomiting. Denied any abdominal pain. Denied any significant  weight changes. The patient has been feeling weak and tired. The patient  had difficulty doing activities of daily living, because of worsening of  symptoms, he presented to the emergency room. The patient had a blood  workup showed hemoglobin 7. The patient received 2 units of packed red  blood cells. The patient had Hemoccult stools positive and he is  undergoing further evaluation. PAST MEDICAL HISTORY:  Chronic kidney disease, diabetes mellitus,  hyperlipidemia, hypertension, hypothyroidism. PAST SURGICAL HISTORY:  Left eye laser surgery, skin biopsy.       ADMITTING MEDICATIONS:  Lopressor 50 mg by mouth 2 times daily, Victoza  injected into the skin every morning, lisinopril 10 mg by mouth 2 times  daily, furosemide 40 mg by mouth daily, hydrocortisone 2.5% cream applied  topically 3 times daily, betamethasone, Vaseline 0.1% apply topically 3  times daily, hydroxyzine 50 mg 1 tablet by mouth 4 times daily as needed  for itching, amlodipine 10 mg 1 tablet by mouth daily, labetalol 200 mg by  mouth every 12 hours, famotidine 20 mg by mouth 2 times daily, simvastatin  20 mg by mouth at night, glimepiride 2 mg by mouth 2 times daily, mupirocin  2% cream apply 3 times daily. ALLERGIES:  No known drug allergies. SOCIAL HISTORY:  He denied any tobacco.  No alcohol. No illicit drug use. FAMILY HISTORY:  Father had cancer. Father had heart disease. Mother had  high blood pressure. REVIEW OF SYSTEMS:  A 12-point review of systems was reviewed. Pertinent  positives was mentioned in HPI and the past medical history, otherwise  noncontributory. PHYSICAL EXAMINATION:  VITAL SIGNS:  Weight 227 pounds, BMI 33.6 kg per meter squared, blood  pressure 194/88, pulse 79, temperature 98, respiratory rate 16. GENERAL:  A 50year-old pleasant male lying in bed, well built, appears to  be in no acute distress. HEENT:  Normocephalic, atraumatic. Pupils are equal, round, reactive to  light. Anicteric sclerae. Pale conjunctivae. No erythema of oropharynx. NECK:  Supple. No JVD. No thyromegaly. CHEST:  No axillary or supraclavicular adenopathy. LUNGS:  Equal to expansion on inspection. Adequate air entry bilaterally  on percussion and auscultation. No added sounds. HEART:  Regular. Normal S1 and S2. No S3 or murmurs. No gallops or  venous sounds. ABDOMEN:  Soft, normoactive bowel sounds. Nontender. No palpable masses. No appreciable hernias. No rebound or guarding. RECTAL:  Deferred. EXTREMITIES:  No clubbing, cyanosis or edema. SKIN:  Pale. NEUROLOGIC:  Generalized weakness. DIAGNOSTIC DATA:  WBC 10.2, hemoglobin 7.3, platelet count [phone],751. Sodium  140, potassium 5.2, chloride 110, CO2 of 21, BUN 48, creatinine 1.5, blood  glucose 111, AST 14, ALT 17, total bilirubin less than 0.2, alkaline  phosphatase 110. IMPRESSION:  A 50year-old pleasant male who has,  1.   Severe anemia, Hemoccult positive stools, rule out peptic ulcer  disease, rule out neoplastic process, likely upper GI bleeding. 2.  Chronic kidney disease stage 3.  3.  Hypertension. 4.  Diabetes mellitus. 5.  Dyslipidemia. RECOMMENDATIONS:  Continue the Protonix IV. Proceed with EGD in the  morning. I have discussed with the patient regarding the  esophagogastroduodenoscopy, its indications and complications including but  not limited to perforation, bleeding, infection, adverse reaction to  medicine, very slight chance of missing significant lesions. The patient  expressed his understanding. If EGD is negative, then consider colonoscopy  after the patient cleared by Cardiology Services. Thank you Dr. Lela Leigh for letting me see the patient, do not hesitate to call  me if you have any questions. My recommendations are above. Cristina Arrington M.D.    D: 11/23/2017 9:47:34       T: 11/23/2017 9:49:13     [email or web address]  Job#: [id number]     Doc#: [id number]    CC:  CALIXTO Man M.D.

## 2017-11-24 ENCOUNTER — ANESTHESIA (OUTPATIENT)
Dept: ENDOSCOPY | Age: 52
DRG: 231 | End: 2017-11-24
Payer: MEDICAID

## 2017-11-24 ENCOUNTER — ANESTHESIA EVENT (OUTPATIENT)
Dept: ENDOSCOPY | Age: 52
DRG: 231 | End: 2017-11-24
Payer: MEDICAID

## 2017-11-24 LAB
ANION GAP SERPL CALCULATED.3IONS-SCNC: 9 MEQ/L (ref 8–16)
BUN BLDV-MCNC: 38 MG/DL (ref 7–22)
CALCIUM SERPL-MCNC: 8.1 MG/DL (ref 8.5–10.5)
CHLORIDE BLD-SCNC: 106 MEQ/L (ref 98–111)
CO2: 24 MEQ/L (ref 23–33)
CREAT SERPL-MCNC: 1.6 MG/DL (ref 0.4–1.2)
GFR SERPL CREATININE-BSD FRML MDRD: 46 ML/MIN/1.73M2
GLUCOSE BLD-MCNC: 101 MG/DL (ref 70–108)
GLUCOSE BLD-MCNC: 106 MG/DL (ref 70–108)
GLUCOSE BLD-MCNC: 108 MG/DL (ref 70–108)
GLUCOSE BLD-MCNC: 119 MG/DL (ref 70–108)
HCT VFR BLD CALC: 30 % (ref 42–52)
HEMOGLOBIN: 9.1 GM/DL (ref 14–18)
MCH RBC QN AUTO: 21.8 PG (ref 27–31)
MCHC RBC AUTO-ENTMCNC: 30.3 GM/DL (ref 33–37)
MCV RBC AUTO: 72.1 FL (ref 80–94)
PDW BLD-RTO: 19 % (ref 11.5–14.5)
PLATELET # BLD: 269 THOU/MM3 (ref 130–400)
PMV BLD AUTO: 9.4 MCM (ref 7.4–10.4)
POTASSIUM SERPL-SCNC: 4.6 MEQ/L (ref 3.5–5.2)
RBC # BLD: 4.16 MILL/MM3 (ref 4.7–6.1)
SODIUM BLD-SCNC: 139 MEQ/L (ref 135–145)
WBC # BLD: 11 THOU/MM3 (ref 4.8–10.8)

## 2017-11-24 PROCEDURE — 0DBN8ZX EXCISION OF SIGMOID COLON, VIA NATURAL OR ARTIFICIAL OPENING ENDOSCOPIC, DIAGNOSTIC: ICD-10-PCS | Performed by: INTERNAL MEDICINE

## 2017-11-24 PROCEDURE — 6360000002 HC RX W HCPCS: Performed by: INTERNAL MEDICINE

## 2017-11-24 PROCEDURE — 80048 BASIC METABOLIC PNL TOTAL CA: CPT

## 2017-11-24 PROCEDURE — 85027 COMPLETE CBC AUTOMATED: CPT

## 2017-11-24 PROCEDURE — 99152 MOD SED SAME PHYS/QHP 5/>YRS: CPT | Performed by: INTERNAL MEDICINE

## 2017-11-24 PROCEDURE — 2580000003 HC RX 258: Performed by: INTERNAL MEDICINE

## 2017-11-24 PROCEDURE — 99253 IP/OBS CNSLTJ NEW/EST LOW 45: CPT | Performed by: SURGERY

## 2017-11-24 PROCEDURE — 6370000000 HC RX 637 (ALT 250 FOR IP)

## 2017-11-24 PROCEDURE — 3609010300 HC COLONOSCOPY W/BIOPSY SINGLE/MULTIPLE: Performed by: INTERNAL MEDICINE

## 2017-11-24 PROCEDURE — 2140000000 HC CCU INTERMEDIATE R&B

## 2017-11-24 PROCEDURE — 3430000000 HC RX DIAGNOSTIC RADIOPHARMACEUTICAL: Performed by: INTERNAL MEDICINE

## 2017-11-24 PROCEDURE — 6360000002 HC RX W HCPCS

## 2017-11-24 PROCEDURE — 82948 REAGENT STRIP/BLOOD GLUCOSE: CPT

## 2017-11-24 PROCEDURE — 88305 TISSUE EXAM BY PATHOLOGIST: CPT

## 2017-11-24 PROCEDURE — 93017 CV STRESS TEST TRACING ONLY: CPT | Performed by: INTERNAL MEDICINE

## 2017-11-24 PROCEDURE — 36415 COLL VENOUS BLD VENIPUNCTURE: CPT

## 2017-11-24 PROCEDURE — A9500 TC99M SESTAMIBI: HCPCS | Performed by: INTERNAL MEDICINE

## 2017-11-24 PROCEDURE — 78452 HT MUSCLE IMAGE SPECT MULT: CPT

## 2017-11-24 PROCEDURE — 6370000000 HC RX 637 (ALT 250 FOR IP): Performed by: INTERNAL MEDICINE

## 2017-11-24 RX ORDER — FENTANYL CITRATE 50 UG/ML
INJECTION, SOLUTION INTRAMUSCULAR; INTRAVENOUS PRN
Status: DISCONTINUED | OUTPATIENT
Start: 2017-11-24 | End: 2017-11-24 | Stop reason: HOSPADM

## 2017-11-24 RX ORDER — MIDAZOLAM HYDROCHLORIDE 1 MG/ML
INJECTION INTRAMUSCULAR; INTRAVENOUS PRN
Status: DISCONTINUED | OUTPATIENT
Start: 2017-11-24 | End: 2017-11-24 | Stop reason: HOSPADM

## 2017-11-24 RX ADMIN — SIMVASTATIN 20 MG: 20 TABLET, FILM COATED ORAL at 19:53

## 2017-11-24 RX ADMIN — METOPROLOL TARTRATE 50 MG: 50 TABLET, FILM COATED ORAL at 12:37

## 2017-11-24 RX ADMIN — FAMOTIDINE 20 MG: 20 TABLET, FILM COATED ORAL at 09:26

## 2017-11-24 RX ADMIN — METOPROLOL TARTRATE 50 MG: 50 TABLET, FILM COATED ORAL at 05:07

## 2017-11-24 RX ADMIN — POLYETHYLENE GLYCOL 3350 17 G: 17 POWDER, FOR SOLUTION ORAL at 02:15

## 2017-11-24 RX ADMIN — AMLODIPINE BESYLATE 10 MG: 10 TABLET ORAL at 12:37

## 2017-11-24 RX ADMIN — POLYETHYLENE GLYCOL 3350 17 G: 17 POWDER, FOR SOLUTION ORAL at 04:15

## 2017-11-24 RX ADMIN — LISINOPRIL 10 MG: 10 TABLET ORAL at 19:53

## 2017-11-24 RX ADMIN — METOPROLOL TARTRATE 50 MG: 50 TABLET, FILM COATED ORAL at 21:47

## 2017-11-24 RX ADMIN — LISINOPRIL 10 MG: 10 TABLET ORAL at 12:37

## 2017-11-24 RX ADMIN — Medication 9.4 MILLICURIE: at 10:18

## 2017-11-24 RX ADMIN — POLYETHYLENE GLYCOL 3350 17 G: 17 POWDER, FOR SOLUTION ORAL at 01:06

## 2017-11-24 RX ADMIN — Medication 32.3 MILLICURIE: at 11:38

## 2017-11-24 RX ADMIN — FUROSEMIDE 40 MG: 40 TABLET ORAL at 09:26

## 2017-11-24 RX ADMIN — Medication 10 ML: at 19:53

## 2017-11-24 RX ADMIN — FAMOTIDINE 20 MG: 20 TABLET, FILM COATED ORAL at 19:53

## 2017-11-24 ASSESSMENT — PAIN SCALES - GENERAL
PAINLEVEL_OUTOF10: 0

## 2017-11-24 NOTE — ANESTHESIA PRE PROCEDURE
Department of Anesthesiology  Preprocedure Note       Name:  Lashaun Salazar   Age:  50 y.o.  :  1965                                          MRN:  [id number]         Date:  2017      Surgeon: Julia Ta):  Janell Donnelly MD    Procedure: Procedure(s):  COLONOSCOPY CONTROL HEMORRHAGE/STOMA    Medications prior to admission:   Prior to Admission medications    Medication Sig Start Date End Date Taking? Authorizing Provider   metoprolol tartrate (LOPRESSOR) 25 MG tablet Take 50 mg by mouth 2 times daily   Yes Historical Provider, MD   Liraglutide (VICTOZA SC) Inject into the skin every morning   Yes Historical Provider, MD   lisinopril (PRINIVIL;ZESTRIL) 10 MG tablet Take 10 mg by mouth 2 times daily    Yes Historical Provider, MD   furosemide (LASIX) 40 MG tablet Take 40 mg by mouth daily   Yes Historical Provider, MD   hydrocortisone 2.5 % cream Apply topically 3 times daily. Apply to face 16  Yes Harrison Pierce MD   betamethasone valerate (VALISONE) 0.1 % ointment Apply topically 3 times daily. Do not apply to face 16  Yes Harrison Pierce MD   hydrOXYzine (ATARAX) 50 MG tablet Take 1 tablet by mouth 4 times daily as needed for Itching (rash) Caution not at work will cause drowsiness 16  Yes Harrison Pierce MD   amLODIPine (NORVASC) 10 MG tablet Take 1 tablet by mouth daily 12/6/15  Yes Brenden Gonsales MD   labetalol (NORMODYNE) 200 MG tablet Take 1 tablet by mouth every 12 hours 12/6/15  Yes Brenden Gonsales MD   famotidine (PEPCID) 20 MG tablet Take 1 tablet by mouth 2 times daily 12/6/15  Yes Brenden Gonsales MD   Blood Pressure Monitor KIT CHECK BP TWICE DAILY IF SBP >140 CALL PCP 12/6/15  Yes Brenden Gonsales MD   simvastatin (ZOCOR) 20 MG tablet Take 20 mg by mouth nightly   Yes Historical Provider, MD   glimepiride (AMARYL) 2 MG tablet Take 2 mg by mouth 2 times daily   Yes Historical Provider, MD   mupirocin (BACTROBAN) 2 % ointment Apply topically 3 times daily.  5/26/15  Yes Heriberto Mcdonald  magnesium hydroxide (MILK OF MAGNESIA) 400 MG/5ML suspension 30 mL  30 mL Oral Daily PRN Jayro Dai MD        ondansetron (ZOFRAN) injection 4 mg  4 mg Intravenous Q6H PRN Jayro Dai MD        potassium chloride (KLOR-CON M) extended release tablet 40 mEq  40 mEq Oral PRN Jayro Dai MD        Or    potassium chloride 20 MEQ/15ML (10%) oral solution 40 mEq  40 mEq Oral PRN Jayro Dai MD        Or    potassium chloride 10 mEq/100 mL IVPB (Peripheral Line)  10 mEq Intravenous PRN Jayro Dai MD        magnesium sulfate 1 g in dextrose 5 % 100 mL IVPB  1 g Intravenous PRN Jayro Dai MD        famotidine (PEPCID) tablet 20 mg  20 mg Oral BID Jayro Dai MD   20 mg at 11/23/17 2018       Allergies:  No Known Allergies    Problem List:    Patient Active Problem List   Diagnosis Code    Accelerated hypertension I10    Hyperkalemia E87.5    NSTEMI (non-ST elevated myocardial infarction) ([facility] I21.4    Hypertension I10       Past Medical History:        Diagnosis Date    Chronic kidney disease     see's Caitlyn Soliman    Diabetes mellitus ([facility]     Hyperlipidemia     Hypertension     Thyroid disease        Past Surgical History:        Procedure Laterality Date    ENDOSCOPY, COLON, DIAGNOSTIC      EYE SURGERY Left 2013    laser surgery    SKIN BIOPSY      mole on nose removed       Social History:    Social History   Substance Use Topics    Smoking status: Never Smoker    Smokeless tobacco: Never Used    Alcohol use No                                Counseling given: Not Answered      Vital Signs (Current):   Vitals:    11/24/17 0415 11/24/17 0500 11/24/17 0559 11/24/17 0708   BP: (!) 182/88 (!) 187/92 (!) 160/78 (!) 176/84   Pulse: 65 69 65 62   Resp: 16   16   Temp: 37.1 °C (98.7 °F)      TempSrc: Oral      SpO2: 97%   96%   Weight:   226 lb 1.6 oz (102.6 kg)    Height:                                                  BP Readings from Last 3 Encounters:   11/24/17 (!) 176/84

## 2017-11-24 NOTE — PROGRESS NOTES
INTERNAL MEDICINE Progress Note  11/24/2017 3:06 PM  Subjective:   Admit Date: 11/21/2017  PCP: Paul Harris  Interval History:   Colonoscopy showed sigmoid colonic mass -obstructive    Objective:   Vitals: BP (!) 176/82   Pulse 71   Temp 97.5 °F (36.4 °C) (Oral)   Resp 16   Ht 5' 9\" (1.753 m)   Wt 226 lb 1.6 oz (102.6 kg)   SpO2 98%   BMI 33.39 kg/m²   General appearance: alert and cooperative with exam, pallor  HEENT: atraumatic  Neck:  no JVD  Lungs: clear to auscultation bilaterally  Heart: S1, S2 normal  Abdomen: soft, non-tender; bowel sounds normal; no masses,  no organomegaly  Extremities:  no cyanosis or edema  Neurologic:  Alert, oriented, thought content appropriate      Medications:   Scheduled Meds:   [START ON 11/25/2017] glimepiride  2 mg Oral BID    furosemide  40 mg Oral Daily    lisinopril  10 mg Oral BID    metoprolol tartrate  50 mg Oral TID    insulin lispro  0-6 Units Subcutaneous TID WC    insulin lispro  0-3 Units Subcutaneous Nightly    aspirin  325 mg Oral Daily    amLODIPine  10 mg Oral Daily    simvastatin  20 mg Oral Nightly    sodium chloride flush  10 mL Intravenous 2 times per day    famotidine  20 mg Oral BID     Continuous Infusions:   nitroGLYCERIN 10 mcg/min (11/23/17 1655)    dextrose         Lab Results:   CBC:   Recent Labs      11/21/17   1800  11/22/17   0453   11/23/17   0002  11/23/17   0550  11/24/17   1259   WBC  11.3*  10.2   --    --    --   11.0*   HGB  7.0*  7.3*   < >  8.6*  8.6*  9.1*   PLT  200  188   --    --    --   269    < > = values in this interval not displayed.      BMP:    Recent Labs      11/22/17   0453  11/23/17   0550  11/24/17   1259   NA  140  144  139   K  5.2  4.4  4.6   CL  110  111  106   CO2  21*  22*  24   BUN  49*  50*  38*   CREATININE  1.5*  1.6*  1.6*   GLUCOSE  111*  49*  119*     Hepatic:   Recent Labs      11/21/17   1858   AST  14   ALT  17   BILITOT  <0.2*   ALKPHOS  110     Lipids:   Recent Labs      11/22/17 0453   CHOL  155   HDL  42     HgBA1c:    Lab Results   Component Value Date    LABA1C 5.8 11/22/2017     TSH:    Lab Results   Component Value Date    TSH 1.510 11/21/2017     FOLATE:    Lab Results   Component Value Date    FOLATE 6.6 11/21/2017     IRON:    Lab Results   Component Value Date    IRON 16 11/21/2017     FERRITIN:    Lab Results   Component Value Date    FERRITIN 19 11/21/2017         Assessment and Plan:   1. Acute blood loss anemia with hemoccult positive stool  2. Gastric ulcer, erosions s/p bx  3. Sigmoid colonic mass  4. Elevated troponin prob NSTEMI  5. CKD stage 3  6. HTN  7. DM 2  8. dyslipidemia     F/up pathology. gen surgery / onc consults.   Cont other meds  Kat Holder MD

## 2017-11-24 NOTE — OP NOTE
intravenous and  adequate conscious sedation were given in incremental fashion to induce and  sustain conscious sedation, the GIF-190 gastroscope was inserted into the  oropharynx and the esophagus was intubated under direct vision. The scope  was advanced down through the stomach into second portion of duodenum and  careful inspection and on withdrawal of the scope, the duodenum looks  normal as shown in picture #A6. In the duodenum, small duodenal nodule  noted as shown in picture #A5. Biopsies obtained in the antrum of the  stomach. The patient had superficial erosions, small ulcers noted as shown  in picture #A2, #A3, and #A4. Biopsies obtained for EDWARD test.  Lesser and  greater curvature and body of the stomach looks normal as shown in picture  #A7 and #A8. On retroflex, fundus looks normal as shown in picture #A9. Distal and proximal esophagus looks normal as shown in picture #A1. Air  was withdrawn as the scope was removed. The patient tolerated the  procedure well without any immediate complications. Vitals including blood  pressure, heart rate, and pulse ox were stable during the procedure and  after the procedure. The patient was transferred to the recovery room in  stable condition. IMPRESSION:  Esophagogastroduodenoscopy to second portion of duodenum. Duodenal nodule. Multiple biopsies obtained from the duodenum to rule out  malabsorption syndrome. Antral superficial erosions and superficial  ulcers, biopsies obtained. The above findings may not explain the patient  had significant drop in hemoglobin. MY RECOMMENDATIONS:  Protonix 40 mg p.o. daily. Follow the biopsy results. Colonoscopy in a.m. Again, I have discussed with the patient the risks and  benefits. Thank you Dr. Corinne Rivera for letting me to do procedure on the patient. Do not  hesitate to call me if you have any questions. My recommendations are  above.         Zakiya Crooks M.D.    D: 11/23/2017 10:16:16       T:

## 2017-11-24 NOTE — PLAN OF CARE
Problem: Safety:  Goal: Free from accidental physical injury  Free from accidental physical injury   Outcome: Ongoing  Assessment & interventions provided throughout shift. Bed locked & in low position, call light in reach, side-rails up x2, non-slip socks on when ambulating, reminded patient to use call light to call for assistance. Bed alarm on. Problem: Daily Care:  Goal: Daily care needs are met  Daily care needs are met   Outcome: Ongoing      Problem: Pain:  Goal: Patient's pain/discomfort is manageable  Patient's pain/discomfort is manageable   Outcome: Ongoing  Denies pain this shift. Comments: Care plan reviewed with patient. Patient verbalized understanding of the plan of care and contribute to goal setting.

## 2017-11-24 NOTE — CARE COORDINATION
11/24/17, 2:03 PM  Iris Berryezra day: 3  Location: -26/026-A Reason for admit: NSTEMI (non-ST elevated myocardial infarction) ([facility] [I21.4]  NSTEMI (non-ST elevated myocardial infarction) [facility] [I21.4]   Clinical update: Pt continues on 3b, here for anemia, (+) OB stool, s/p PRBC. Cardiology and GI consulted, IV protonix given, EGD with bx done 11/23, colonoscopy done today; sigmoid mass found, biopsies taken. Surgery and oncology consulted. Possible NSTEMI documented. ASA:  325 mg  Beta Blocker:  lopressor  ACE/ARB if EF< 40%:  prinivil  Statin:  zocor   P2Y12 (Plavix, Brilinta, Effient):  Not on  MI education folder given (which includes cardiac rehab education information booklet with contact numbers for continued follow-up after discharge) ? Yes   Cardiac rehab ordered? Needs ordered or documentation that it would not be appropriate  Dietitian consult ordered? yes  Nitro SL:  Needs script at discharge    Discharge plan: Pt from home alone. We will follow for needs.

## 2017-11-25 ENCOUNTER — APPOINTMENT (OUTPATIENT)
Dept: CT IMAGING | Age: 52
DRG: 231 | End: 2017-11-25
Payer: MEDICAID

## 2017-11-25 LAB
CEA: 27.1 NG/ML (ref 0–5)
GLUCOSE BLD-MCNC: 108 MG/DL (ref 70–108)
GLUCOSE BLD-MCNC: 139 MG/DL (ref 70–108)
GLUCOSE BLD-MCNC: 92 MG/DL (ref 70–108)
GLUCOSE BLD-MCNC: 98 MG/DL (ref 70–108)
UREASE-CLO-C. PYLORI: NEGATIVE

## 2017-11-25 PROCEDURE — 74176 CT ABD & PELVIS W/O CONTRAST: CPT

## 2017-11-25 PROCEDURE — 71250 CT THORAX DX C-: CPT

## 2017-11-25 PROCEDURE — 2580000003 HC RX 258: Performed by: INTERNAL MEDICINE

## 2017-11-25 PROCEDURE — 6360000002 HC RX W HCPCS: Performed by: INTERNAL MEDICINE

## 2017-11-25 PROCEDURE — 36415 COLL VENOUS BLD VENIPUNCTURE: CPT

## 2017-11-25 PROCEDURE — 2140000000 HC CCU INTERMEDIATE R&B

## 2017-11-25 PROCEDURE — 6370000000 HC RX 637 (ALT 250 FOR IP): Performed by: INTERNAL MEDICINE

## 2017-11-25 PROCEDURE — 6370000000 HC RX 637 (ALT 250 FOR IP): Performed by: PHYSICIAN ASSISTANT

## 2017-11-25 PROCEDURE — 99232 SBSQ HOSP IP/OBS MODERATE 35: CPT | Performed by: PHYSICIAN ASSISTANT

## 2017-11-25 PROCEDURE — 82378 CARCINOEMBRYONIC ANTIGEN: CPT

## 2017-11-25 PROCEDURE — 82948 REAGENT STRIP/BLOOD GLUCOSE: CPT

## 2017-11-25 RX ADMIN — LISINOPRIL 10 MG: 10 TABLET ORAL at 19:47

## 2017-11-25 RX ADMIN — AMLODIPINE BESYLATE 10 MG: 10 TABLET ORAL at 09:16

## 2017-11-25 RX ADMIN — Medication 10 ML: at 19:48

## 2017-11-25 RX ADMIN — FUROSEMIDE 40 MG: 40 TABLET ORAL at 09:16

## 2017-11-25 RX ADMIN — FAMOTIDINE 20 MG: 20 TABLET, FILM COATED ORAL at 19:47

## 2017-11-25 RX ADMIN — LISINOPRIL 10 MG: 10 TABLET ORAL at 09:16

## 2017-11-25 RX ADMIN — METOPROLOL TARTRATE 75 MG: 25 TABLET ORAL at 19:47

## 2017-11-25 RX ADMIN — HYDRALAZINE HYDROCHLORIDE 10 MG: 20 INJECTION INTRAMUSCULAR; INTRAVENOUS at 12:33

## 2017-11-25 RX ADMIN — HYDRALAZINE HYDROCHLORIDE 10 MG: 20 INJECTION INTRAMUSCULAR; INTRAVENOUS at 17:24

## 2017-11-25 RX ADMIN — METOPROLOL TARTRATE 50 MG: 50 TABLET, FILM COATED ORAL at 05:35

## 2017-11-25 RX ADMIN — Medication 10 ML: at 09:17

## 2017-11-25 RX ADMIN — ASPIRIN 325 MG: 325 TABLET, COATED ORAL at 09:16

## 2017-11-25 RX ADMIN — SIMVASTATIN 20 MG: 20 TABLET, FILM COATED ORAL at 19:47

## 2017-11-25 RX ADMIN — FAMOTIDINE 20 MG: 20 TABLET, FILM COATED ORAL at 09:16

## 2017-11-25 ASSESSMENT — PAIN SCALES - GENERAL
PAINLEVEL_OUTOF10: 0

## 2017-11-25 NOTE — PLAN OF CARE
Problem: Safety:  Goal: Free from accidental physical injury  Free from accidental physical injury   Outcome: Ongoing  Monitoring patient for falls with each shift assessment and as needed. Bed alarm is activated and he is aware to call for assistance before getting up out of bed. Problem: Daily Care:  Goal: Daily care needs are met  Daily care needs are met   Outcome: Ongoing  Monitoring patient for needs with each shift assessment and hourly rounding. Patient is aware to call out for assistance if he needs anything. Problem: Pain:  Goal: Patient's pain/discomfort is manageable  Patient's pain/discomfort is manageable   Outcome: Ongoing  Monitoring patients pain with each shift assessment and hourly rounding. Medications per the mar. Problem: Discharge Planning:  Goal: Patients continuum of care needs are met  Patients continuum of care needs are met   Outcome: Ongoing      Problem: Pain Control  Goal: Maintain pain level at or below patient's acceptable level (or 5 if patient is unable to determine acceptable level)  Outcome: Ongoing  Patient denies any pain at this time and he is aware to let me know if he has pain and I will monitor every hour with hourly rounding. Problem: Falls - Risk of  Goal: Absence of falls  Outcome: Ongoing  Monitoring patient for falls with each shift assessment and hourly rounding. Comments: Care plan reviewed with patient. Patient verbalize understanding of the plan of care and contribute to goal setting.

## 2017-11-25 NOTE — PROGRESS NOTES
sodium chloride flush 10 mL PRN   acetaminophen 650 mg Q4H PRN   magnesium hydroxide 30 mL Daily PRN   ondansetron 4 mg Q6H PRN   potassium chloride 40 mEq PRN   Or     potassium chloride 40 mEq PRN   Or     potassium chloride 10 mEq PRN   magnesium sulfate 1 g PRN       Diagnostics:  EKG:  NSR    Echo:  11/22/2017  Conclusions      Summary   Mild concentric left ventricular hypertrophy.   Systolic function was low normal.   Ejection fraction is visually estimated at 50%.   Normal right ventricular size and function.   Right ventricular systolic pressure of 42 mm Hg consistent with mild   pulmonary hypertension.   Leaflets exhibited mild to moderately increased thickness and mildly   reduced cuspal separation of the aortic valve.   Mild to moderate aortic regurgitation is noted.   Aortic valve leaflets are Mildly calcified.   Small to moderate circumferential pericardial effusion without tamponade   physiology is noted .   and in subcostal view of 1.22 cm in upper region and 1.08 cm in lower   region.   May consider serial monitoring if clinically indicated   Mildly dilated left atrium.   Small to moderate circumferential pericardial effusion without tamponade   physiology is noted .   and in subcostal view of 1.22 cm in upper region and 1.08 cm in lower   region.   May consider serial monitoring if clinically indicated       Stress test: 11/24/2017  Conclusions      Summary   Lexiscan EKG stress test is not suggestive for ischemia.   The LVEF is calculated to be with moderate inferior apical hypokinesis .   There was a moderate infarct with NO AND OR VERY MINUTE viability in the   distribution of the right coronary and left circumflex arteries in the   inferior region and inferior apical wall . clinical correlation is   suggested . Lab Data:    Cardiac Enzymes:  No results for input(s): CKTOTAL, CKMB, CKMBINDEX, TROPONINI in the last 72 hours.     CBC:   Lab Results   Component Value Date    WBC 11.0 11/24/2017 RBC 4.16 11/24/2017    HGB 9.1 11/24/2017    HCT 30.0 11/24/2017     11/24/2017       CMP:  Lab Results   Component Value Date     11/24/2017    K 4.6 11/24/2017     11/24/2017    CO2 24 11/24/2017    BUN 38 11/24/2017    CREATININE 1.6 11/24/2017    LABGLOM 46 11/24/2017    GLUCOSE 119 11/24/2017    CALCIUM 8.1 11/24/2017       Hepatic Function Panel:  Lab Results   Component Value Date    ALKPHOS 110 11/21/2017    ALT 17 11/21/2017    AST 14 11/21/2017    PROT 5.2 11/21/2017    BILITOT <0.2 11/21/2017    LABALBU 2.9 11/21/2017       Magnesium:  No results found for: MG    PT/INR:  No results found for: PROTIME, INR    HgBA1c:    Lab Results   Component Value Date    LABA1C 5.8 11/22/2017       FLP:  Lab Results   Component Value Date    TRIG 71 11/22/2017    HDL 42 11/22/2017    LDLCALC 99 11/22/2017       TSH:    Lab Results   Component Value Date    TSH 1.510 11/21/2017         Assessment:  · Sigmoid Mass  · Elevated Troponins  · HTN      Plan:  · I discussed the findings of his stress test with Dr Hanna Martinez who states there is no ischemia on the stress test and the pt is clear to have his colon resection on Monday  · Increase Lopressor to 75 mg bid  · Continue other BP meds         Electronically signed by Javier Peña PA-C on 11/25/2017 at 11:03 AM

## 2017-11-25 NOTE — OP NOTE
patient had near luminal obstructing mass as shown in picture #1, #2,  and #3. It is difficult to advance the scope beyond the distal end of the  mass due to near luminal obstruction. The endoscopy findings highly  consistent with colonic neoplastic process. Biopsies obtained from the  distal end of the mass. Again, the distal end of mass noted as shown in  picture #4 and #5. The distal sigmoid colon looks normal as shown in  picture #6. The rectosigmoid colon junction looks normal as shown in  picture #7. On retroflex, rectum looks normal as shown in picture #8. Air  was withdrawn as the scope was removed. The patient tolerated the  procedure well without any immediate complications. Vitals including blood  pressure, heart rate and pulse ox were stable during the procedure and  after the procedure. The patient transferred to the recovery room in  stable condition. IMPRESSION:  Attempted colonoscopy to the sigmoid colon, near luminal  obstructing mass at 40 cm from the anorectal junction. Difficulty to  evaluate the proximal end of the mass due to luminal obstruction. Multiple  biopsies obtained. RECOMMENDATIONS:  Clear liquids, surgical consultation, follow the biopsy  results. Thank you Dr. Ramu Landeros for letting me to do procedure on the patient. Do not  hesitate to call me if you have any questions. My recommendations are  above. Maurice Young M.D.    D: 11/24/2017 8:21:41       T: 11/24/2017 8:23:12     [email or web address]  Job#: [id number]     Doc#: [id number]    CC:  Lalo Rios M.D. Kaycee Reid M.D.        Lissett Osborne, CNP

## 2017-11-25 NOTE — PROGRESS NOTES
(rDNA), dextrose, sodium chloride flush, acetaminophen, magnesium hydroxide, ondansetron, potassium chloride **OR** potassium chloride **OR** potassium chloride, magnesium sulfate      LABS:     CBC:   Recent Labs      11/23/17   0002  11/23/17   0550  11/24/17   1259   WBC   --    --   11.0*   HGB  8.6*  8.6*  9.1*   PLT   --    --   269     BMP:  Recent Labs      11/23/17   0550  11/24/17   1259   NA  144  139   K  4.4  4.6   CL  111  106   CO2  22*  24   BUN  50*  38*   CREATININE  1.6*  1.6*   GLUCOSE  49*  119*     Calcium:  Recent Labs      11/24/17   1259   CALCIUM  8.1*     Ionized Calcium:No results for input(s): IONCA in the last 72 hours. Magnesium:No results for input(s): MG in the last 72 hours. Phosphorus:No results for input(s): PHOS in the last 72 hours. BNP:No results for input(s): BNP in the last 72 hours. Glucose:  Recent Labs      11/24/17   1534  11/24/17   2026  11/25/17   0718   POCGLU  108  106  92     HgbA1C:   Recent Labs      11/22/17   1554   LABA1C  5.8     INR: No results for input(s): INR in the last 72 hours. Hepatic: No results for input(s): ALKPHOS, ALT, AST, PROT, BILITOT, BILIDIR, LABALBU in the last 72 hours. Amylase and Lipase:No results for input(s): LACTA, AMYLASE in the last 72 hours. Lactic Acid: No results for input(s): LACTA in the last 72 hours. Troponin: No results for input(s): CKTOTAL, CKMB, TROPONINI in the last 72 hours. BNP: No results for input(s): BNP in the last 72 hours. CULTURES:   UA: No results for input(s): SPECGRAV, PHUR, COLORU, CLARITYU, MUCUS, PROTEINU, BLOODU, RBCUA, WBCUA, BACTERIA, NITRU, GLUCOSEU, BILIRUBINUR, UROBILINOGEN, KETUA, LABCAST, LABCASTTY, AMORPHOS in the last 72 hours.     Invalid input(s): CRYSTALS  Micro: No results found for: BC      IMAGING:         Problem list of patient:     Patient Active Problem List   Diagnosis    Accelerated hypertension    Hyperkalemia    NSTEMI (non-ST elevated myocardial infarction) ([facility]   

## 2017-11-25 NOTE — CONSULTS
[address] [place] [address]                                   CONSULTATION    PATIENT NAME: Ramez Lai                  :        1965  MED REC NO:   [id number]                           ROOM:       4951  ACCOUNT NO:   [de-identified]                           ADMIT DATE: 2017  PROVIDER:     Jeremiah Bernabe. Nolvia Alan MD    CONSULT DATE:  2017    CHIEF COMPLAINT:  Nearly obstructing sigmoid mass. HISTORY OF PRESENT ILLNESS:  The patient is a 53-year-old white male,  somewhat of a poor historian, may have some developmental delay but  nonetheless was at work on the  and developed some dizziness and he  presented to the emergency room. He was noted have a hemoglobin of 7 and a  mild elevation of troponin at 0.045. The patient is somewhat vague but  apparently has had about a 2-month history of blood in the stool, but again  denies any change in bowel habits. Because of the anemia, he was admitted  by Dr. Shiva Das and Dr. Mary Rider was consulted and had a colonoscopy this  morning showing what appears to be a sigmoid mass per Dr. Bethany Catalan note. PAST MEDICAL HISTORY:  Positive for some chronic renal disease, diabetes,  hyperlipidemia, hypertension and some hypothyroidism. PAST SURGICAL HISTORY:  Includes the colonoscopy this morning. He has had  some left laser eye surgery and he has apparently had a mole in the nose  removed. He denies any other abdominal surgeries. ALLERGIES:  None. MEDICATIONS AT HOME:  Includes Lopressor, Victoza, Zestril, Lasix,  Valisone, Atarax, Norvasc, _____, Pepcid, Zocor, Amaryl. SOCIAL HISTORY:  The patient is a nonsmoker and nondrinker. FAMILY HISTORY:  Positive for cancer in the father but is not colon cancer  and also for heart disease and hypertension. He cannot remember the cancer  on father.     REVIEW OF SYSTEMS:  Constitutional:  The patient denies any recent weight loss, has been  recently fatigued and has malaise. Denies chills or weight loss as  mentioned. Head, Ears, Eyes, Nose, and Throat:  Denies any visual abnormalities. No  eye irritation. Denies any nasal congestion or chronic sinuses. Ears,  denies any tinnitus. Respirations:  Denies any shortness of breath or hemoptysis or chest pain. Cardiovascular:  Denies any arrhythmias or known palpitations. GI:  See history of present illness. :  Denies any hematuria or dysuria. Musculoskeletal:  Denies any chronic muscle pain. Neurologic:  Denies any headaches or neuropathies. Psychiatric:  Denies any history of depression. Skin:  Does have a history of some skin rashes. PHYSICAL EXAMINATION:  General:  The patient is a 77-year-old white male. He is sitting in his B3  bed. Head, Ears, Eyes, Nose and Throat:  He does wear glasses. The pupils are  equal.  EOMs are intact. Sclerae are clear. Neck:  Soft. No palpable masses. Cardiac:  S1, S2. The respirations are equal bilaterally. Abdomen:  Soft. Bowel sounds are positive. There is no pain to palpation. Extremities:  Upper extremities, he has good radial pulses with good   strength and no long bone deformities. Lower extremities, he has good  radial pulses with good  strength and no long bone deformities. No  edema of the lower legs with reasonable DP pulses. LABORATORY DATA:  Revealed sodium 139, potassium 4.6, BUN of 38, creatinine  1.6. His white count is 11,000, hemoglobin is 9.1 after 2 units of blood. ASSESSMENT AND PLAN:  Apparent sigmoid mass. We did review Dr. Karissa Jaramillo  notes. Apparently, he is going to be seen by Oncology. He will need CT  scans of the abdomen and chest.  He is going to have a stress test today. Assuming his medical is clear, we will consider a colon resection on  Monday. NONI CRAVEN [facility], MD    D: 11/24/2017 22:34:35       T: 11/24/2017 22:36:07     [email or web address]  Job#: [id number] Doc#: [id number]

## 2017-11-26 LAB
ABO: NORMAL
ANTIBODY SCREEN: NORMAL
GLUCOSE BLD-MCNC: 100 MG/DL (ref 70–108)
GLUCOSE BLD-MCNC: 104 MG/DL (ref 70–108)
GLUCOSE BLD-MCNC: 92 MG/DL (ref 70–108)
GLUCOSE BLD-MCNC: 95 MG/DL (ref 70–108)
HCT VFR BLD CALC: 25.1 % (ref 42–52)
HEMOGLOBIN: 7.7 GM/DL (ref 14–18)
RH FACTOR: NORMAL

## 2017-11-26 PROCEDURE — 82948 REAGENT STRIP/BLOOD GLUCOSE: CPT

## 2017-11-26 PROCEDURE — 86901 BLOOD TYPING SEROLOGIC RH(D): CPT

## 2017-11-26 PROCEDURE — 86900 BLOOD TYPING SEROLOGIC ABO: CPT

## 2017-11-26 PROCEDURE — 2580000003 HC RX 258: Performed by: SPECIALIST

## 2017-11-26 PROCEDURE — 6370000000 HC RX 637 (ALT 250 FOR IP): Performed by: PHYSICIAN ASSISTANT

## 2017-11-26 PROCEDURE — 36430 TRANSFUSION BLD/BLD COMPNT: CPT

## 2017-11-26 PROCEDURE — 2580000003 HC RX 258: Performed by: INTERNAL MEDICINE

## 2017-11-26 PROCEDURE — 86850 RBC ANTIBODY SCREEN: CPT

## 2017-11-26 PROCEDURE — 6370000000 HC RX 637 (ALT 250 FOR IP): Performed by: INTERNAL MEDICINE

## 2017-11-26 PROCEDURE — 85018 HEMOGLOBIN: CPT

## 2017-11-26 PROCEDURE — 2580000003 HC RX 258: Performed by: SURGERY

## 2017-11-26 PROCEDURE — 36415 COLL VENOUS BLD VENIPUNCTURE: CPT

## 2017-11-26 PROCEDURE — P9016 RBC LEUKOCYTES REDUCED: HCPCS

## 2017-11-26 PROCEDURE — 86923 COMPATIBILITY TEST ELECTRIC: CPT

## 2017-11-26 PROCEDURE — 6360000002 HC RX W HCPCS: Performed by: INTERNAL MEDICINE

## 2017-11-26 PROCEDURE — 99232 SBSQ HOSP IP/OBS MODERATE 35: CPT | Performed by: SURGERY

## 2017-11-26 PROCEDURE — 6360000002 HC RX W HCPCS: Performed by: SPECIALIST

## 2017-11-26 PROCEDURE — 2140000000 HC CCU INTERMEDIATE R&B

## 2017-11-26 PROCEDURE — 85014 HEMATOCRIT: CPT

## 2017-11-26 RX ORDER — SODIUM CHLORIDE 9 MG/ML
INJECTION, SOLUTION INTRAVENOUS CONTINUOUS
Status: ACTIVE | OUTPATIENT
Start: 2017-11-26 | End: 2017-11-27

## 2017-11-26 RX ORDER — HYDRALAZINE HYDROCHLORIDE 25 MG/1
25 TABLET, FILM COATED ORAL EVERY 8 HOURS SCHEDULED
Status: DISCONTINUED | OUTPATIENT
Start: 2017-11-26 | End: 2017-11-28

## 2017-11-26 RX ORDER — ACETYLCYSTEINE 200 MG/ML
1200 SOLUTION ORAL; RESPIRATORY (INHALATION) 2 TIMES DAILY
Status: DISCONTINUED | OUTPATIENT
Start: 2017-11-26 | End: 2017-11-26 | Stop reason: CLARIF

## 2017-11-26 RX ORDER — 0.9 % SODIUM CHLORIDE 0.9 %
250 INTRAVENOUS SOLUTION INTRAVENOUS ONCE
Status: COMPLETED | OUTPATIENT
Start: 2017-11-26 | End: 2017-11-27

## 2017-11-26 RX ORDER — ALPRAZOLAM 0.5 MG/1
0.5 TABLET ORAL 3 TIMES DAILY PRN
Status: DISCONTINUED | OUTPATIENT
Start: 2017-11-26 | End: 2017-11-27

## 2017-11-26 RX ORDER — ISOSORBIDE MONONITRATE 30 MG/1
30 TABLET, EXTENDED RELEASE ORAL DAILY
Status: DISCONTINUED | OUTPATIENT
Start: 2017-11-26 | End: 2017-11-28

## 2017-11-26 RX ADMIN — HYDRALAZINE HYDROCHLORIDE 25 MG: 25 TABLET, FILM COATED ORAL at 14:05

## 2017-11-26 RX ADMIN — ALPRAZOLAM 0.5 MG: 0.5 TABLET ORAL at 14:07

## 2017-11-26 RX ADMIN — SODIUM CHLORIDE: 9 INJECTION, SOLUTION INTRAVENOUS at 14:04

## 2017-11-26 RX ADMIN — HYDRALAZINE HYDROCHLORIDE 10 MG: 20 INJECTION INTRAMUSCULAR; INTRAVENOUS at 12:18

## 2017-11-26 RX ADMIN — AMLODIPINE BESYLATE 10 MG: 10 TABLET ORAL at 08:33

## 2017-11-26 RX ADMIN — HYDRALAZINE HYDROCHLORIDE 25 MG: 25 TABLET, FILM COATED ORAL at 23:06

## 2017-11-26 RX ADMIN — HYDRALAZINE HYDROCHLORIDE 10 MG: 20 INJECTION INTRAMUSCULAR; INTRAVENOUS at 05:44

## 2017-11-26 RX ADMIN — LISINOPRIL 10 MG: 10 TABLET ORAL at 06:21

## 2017-11-26 RX ADMIN — METOPROLOL TARTRATE 75 MG: 25 TABLET ORAL at 20:16

## 2017-11-26 RX ADMIN — ACETYLCYSTEINE 1000 MG: 500 TABLET, EFFERVESCENT ORAL at 20:15

## 2017-11-26 RX ADMIN — METOPROLOL TARTRATE 75 MG: 25 TABLET ORAL at 06:21

## 2017-11-26 RX ADMIN — IRON SUCROSE 200 MG: 20 INJECTION, SOLUTION INTRAVENOUS at 18:19

## 2017-11-26 RX ADMIN — ASPIRIN 325 MG: 325 TABLET, COATED ORAL at 08:32

## 2017-11-26 RX ADMIN — SIMVASTATIN 20 MG: 20 TABLET, FILM COATED ORAL at 20:16

## 2017-11-26 RX ADMIN — FAMOTIDINE 20 MG: 20 TABLET, FILM COATED ORAL at 20:16

## 2017-11-26 RX ADMIN — FAMOTIDINE 20 MG: 20 TABLET, FILM COATED ORAL at 08:33

## 2017-11-26 RX ADMIN — Medication 10 ML: at 20:16

## 2017-11-26 RX ADMIN — ACETYLCYSTEINE 1000 MG: 500 TABLET, EFFERVESCENT ORAL at 14:06

## 2017-11-26 RX ADMIN — Medication 10 ML: at 05:44

## 2017-11-26 RX ADMIN — ISOSORBIDE MONONITRATE 30 MG: 30 TABLET ORAL at 15:29

## 2017-11-26 RX ADMIN — SODIUM CHLORIDE 250 ML: 9 INJECTION, SOLUTION INTRAVENOUS at 23:04

## 2017-11-26 RX ADMIN — FUROSEMIDE 40 MG: 40 TABLET ORAL at 08:33

## 2017-11-26 RX ADMIN — Medication 10 ML: at 08:34

## 2017-11-26 ASSESSMENT — PAIN SCALES - GENERAL
PAINLEVEL_OUTOF10: 0

## 2017-11-26 NOTE — PROGRESS NOTES
INTERNAL MEDICINE Progress Note  11/26/2017 4:16 PM  Subjective:   Admit Date: 11/21/2017  PCP: Lucio Hanson  Interval History:   Colonoscopy showed sigmoid colonic mass -obstructive  Cardiology planning [facility] in am.    Objective:   Vitals: BP (!) 155/70   Pulse 66   Temp 97.7 °F (36.5 °C) (Oral)   Resp 18   Ht 5' 9\" (1.753 m)   Wt 217 lb (98.4 kg)   SpO2 93%   BMI 32.05 kg/m²   General appearance: alert and cooperative with exam, pallor  HEENT: atraumatic  Neck:  no JVD  Lungs: clear to auscultation bilaterally  Heart: S1, S2 normal  Abdomen: soft, non-tender; bowel sounds normal; no masses,  no organomegaly  Extremities:  no cyanosis or edema  Neurologic:  Alert, oriented, thought content appropriate      Medications:   Scheduled Meds:   isosorbide mononitrate  30 mg Oral Daily    hydrALAZINE  25 mg Oral 3 times per day    Acetylcysteine  1,000 mg Oral BID    metoprolol tartrate  75 mg Oral BID    insulin lispro  0-6 Units Subcutaneous TID WC    insulin lispro  0-3 Units Subcutaneous Nightly    aspirin  325 mg Oral Daily    amLODIPine  10 mg Oral Daily    simvastatin  20 mg Oral Nightly    sodium chloride flush  10 mL Intravenous 2 times per day    famotidine  20 mg Oral BID     Continuous Infusions:   sodium chloride 75 mL/hr at 11/26/17 1404    nitroGLYCERIN Stopped (11/24/17 1100)    dextrose         Lab Results:   CBC:   Recent Labs      11/24/17   1259   WBC  11.0*   HGB  9.1*   PLT  269     BMP:    Recent Labs      11/24/17   1259   NA  139   K  4.6   CL  106   CO2  24   BUN  38*   CREATININE  1.6*   GLUCOSE  119*     CEA 27.1 (H)       Assessment and Plan:   1. Acute blood loss anemia with hemoccult positive stool  2. Gastric ulcer, erosions s/p bx  3. Sigmoid colonic mass  4. Elevated troponin prob NSTEMI  5. CKD stage 3  6. HTN  7. DM 2  8. dyslipidemia     F/up pathology.   Cardiology planning [facility] in am.      Rojelio Bauman MD

## 2017-11-26 NOTE — PROGRESS NOTES
[facility]  Sedation/Analgesia History & Physical      Pt Name: Jake Zaidi  MRN: [id number]  YOB: 1965  Provider Performing Procedure: Lucinda Gold MD, Rosanna Vargas, [facility]  Primary Care Physician:  Found      PRE-PROCEDURE   DNR-CCA/DNR-CC []Yes [x]No      PLANNED PROCEDURE     [x]Cath  []PCI                []Pacemaker/AICD  []MISSAEL             []Cardioversion []Peripheral angiography/PTA  []Other:        Consent: I have discussed with the patient risks, benefits, and alternatives (and relevant risks, benefits, and side effects related to alternatives or not receiving care), and likelihood of the success. The patient and/or patient representative appear to understand and agree to proceed. The discussion encompasses risks, benefits, and side effects related to the alternatives and the risks related to not receiving the proposed care, treatment, and services. Indications for the Procedure:   Elevated troponin   Abn stress test  Pre op        MEDICAL HISTORY        Past Medical History:   Diagnosis Date    Chronic kidney disease     see's Johan Renner    Diabetes mellitus ([facility] [address]     Hyperlipidemia     Hypertension     Thyroid disease          Past Surgical History:   Procedure Laterality Date    ENDOSCOPY, COLON, DIAGNOSTIC      EYE SURGERY Left 2013    laser surgery    MS COLONOSCOPY W/BIOPSY SINGLE/MULTIPLE Left 11/24/2017    COLONOSCOPY WITH BIOPSY performed by Layo Faulkner MD at [facility]    MS EGD TRANSORAL BIOPSY SINGLE/MULTIPLE N/A 11/23/2017    EGD BIOPSY performed by Layo Faulkner MD at [address] [place] [address]      mole on nose removed           No Known Allergies      MEDICATIONS   Coumadin Use Last 5 Days [x]No []Yes  Antiplatelet drug therapy use last 5 days  []No [x]Yes  Other anticoagulant use last 5 days  [x]No []Yes      No current facility-administered medications on file prior to encounter.       Current Outpatient Prescriptions on 0-6 Units  0-6 Units Subcutaneous TID WC Jaymie Khanna MD        insulin lispro (HUMALOG) injection vial 0-3 Units  0-3 Units Subcutaneous Nightly Oluremi A Claudia Peabody, MD        glucose (GLUTOSE) 40 % oral gel 15 g  15 g Oral PRN Donavan Theodore MD        dextrose 50 % solution 12.5 g  12.5 g Intravenous PRN Donavan Theodore MD        glucagon (rDNA) injection 1 mg  1 mg Intramuscular PRN Donavan Theodore MD        dextrose 5 % solution  100 mL/hr Intravenous PRN Donavan Theodore MD        aspirin tablet 325 mg  325 mg Oral Daily Bridget Alvarado MD   325 mg at 11/26/17 0832    amLODIPine (NORVASC) tablet 10 mg  10 mg Oral Daily Donavan Theodore MD   10 mg at 11/26/17 0833    simvastatin (ZOCOR) tablet 20 mg  20 mg Oral Nightly Donavan Theodore MD   20 mg at 11/25/17 1947    sodium chloride flush 0.9 % injection 10 mL  10 mL Intravenous 2 times per day Donavan Theodore MD   10 mL at 11/26/17 0834    sodium chloride flush 0.9 % injection 10 mL  10 mL Intravenous PRN Donavan Theodore MD   10 mL at 11/26/17 0544    acetaminophen (TYLENOL) tablet 650 mg  650 mg Oral Q4H PRN Donavan Theodore MD        magnesium hydroxide (MILK OF MAGNESIA) 400 MG/5ML suspension 30 mL  30 mL Oral Daily PRN Donavan Theodore MD        ondansetron (ZOFRAN) injection 4 mg  4 mg Intravenous Q6H PRN Donavan Theodore MD        potassium chloride (KLOR-CON M) extended release tablet 40 mEq  40 mEq Oral PRN Donavan Theodore MD        Or    potassium chloride 20 MEQ/15ML (10%) oral solution 40 mEq  40 mEq Oral PRN Donavan Theodore MD        Or    potassium chloride 10 mEq/100 mL IVPB (Peripheral Line)  10 mEq Intravenous PRN Donavan Theodore MD        magnesium sulfate 1 g in dextrose 5 % 100 mL IVPB  1 g Intravenous PRN Donavan Theodore MD        famotidine (PEPCID) tablet 20 mg  20 mg Oral BID Donavan Theodore MD   20 mg at 11/26/17 0833             PHYSICAL:     BP (!) 180/81   Pulse 58   Temp 97.7 °F (36.5 °C) (Oral)   Resp 16   Ht 5' 9\" (1.753 m)   Wt 217 lb (98.4 kg)   SpO2 99%   BMI 32.05 kg/m²     Heart:  [x]Regular rate and rhythm  []Other:    Lungs:  [x]Clear    []Other:    Abdomen: [x]Soft    []Other:    Mental Status: [x]Alert & Oriented  []Other:   Ext:                [x]No edema       []Other:         No results for input(s): CKTOTAL, CKMB, CKMBINDEX, TROPONINI in the last 72 hours. Lab Results   Component Value Date    WBC 11.0 11/24/2017    RBC 4.16 11/24/2017    HGB 9.1 11/24/2017    HCT 30.0 11/24/2017    MCV 72.1 11/24/2017    MCH 21.8 11/24/2017    MCHC 30.3 11/24/2017    RDW 19.0 11/24/2017     11/24/2017    MPV 9.4 11/24/2017       Lab Results   Component Value Date     11/24/2017    K 4.6 11/24/2017     11/24/2017    CO2 24 11/24/2017    BUN 38 11/24/2017    LABALBU 2.9 11/21/2017    CREATININE 1.6 11/24/2017    CALCIUM 8.1 11/24/2017    LABGLOM 46 11/24/2017    GLUCOSE 119 11/24/2017       Lab Results   Component Value Date    ALKPHOS 110 11/21/2017    ALT 17 11/21/2017    AST 14 11/21/2017    PROT 5.2 11/21/2017    BILITOT <0.2 11/21/2017    LABALBU 2.9 11/21/2017       No results found for: MG    No components found for: Shawn Siemens    Lab Results   Component Value Date    LABA1C 5.8 11/22/2017       Lab Results   Component Value Date    TRIG 71 11/22/2017    HDL 42 11/22/2017    LDLCALC 99 11/22/2017       Lab Results   Component Value Date    TSH 1.510 11/21/2017            SEDATION  Planned agent:[x]Midazolam []Meperidine [x]Sublimaze []Morphine []Diazepam  []Other:         ASA Classification:  []1 [x]2 []3 []4 []5  Class 1: A normal healthy patient  Class 2: Pt with mild to moderate systemic disease  Class 3: Severe systemic disease or disturbance  Class 4: Severe systemic disorders that are already life threatening. Class 5: Moribund pt with little chances of survival, for more than 24 hours.     Mallampati I Airway Classification:   []1 [x]2 []3 []4      [x]Pre-procedure diagnostic studies complete and results available. Comment:    [x]Previous sedation/anesthesia experiences assessed. Comment:    [x]The patient is an appropriate candidate to undergo the planned procedure sedation and anesthesia. (Refer to nursing sedation/analgesia documentation record)  [x]Formulation and discussion of sedation/procedure plan, risks, and expectations with patient and/or responsible adult completed. [x]Patient examined immediately prior to the procedure.  (Refer to nursing sedation/analgesia documentation record)      Fernando Callowya MD, Serg Neely, [facility]  Electronically signed 11/26/2017 at 12:36 PM

## 2017-11-26 NOTE — PROGRESS NOTES
[address] [address]   Dr. Laura Conley  Daily Progress Note  Pt Name: Yoon Galindo Record Number: [id number]  Date of Birth 1965   Today's Date: 2017        CHIEF COMPLAINT obstructing sigmoid colon mass    SUBJECTIVE  Patient feels well. OBJECTIVE  CURRENT VITALS BP (!) 155/70   Pulse 66   Temp 97.7 °F (36.5 °C) (Oral)   Resp 18   Ht 5' 9\" (1.753 m)   Wt 217 lb (98.4 kg)   SpO2 93%   BMI 32.05 kg/m²   LUNGS: Lungs clear   ABDOMEN: Soft bowel sounds positive  WOUNDS: Not applicable  24 HR INTAKE/OUTPUT :   Intake/Output Summary (Last 24 hours) at 17 1623  Last data filed at 17 0356   Gross per 24 hour   Intake             1400 ml   Output              475 ml   Net              925 ml   I/O last 3 completed shifts: In: 1400 [P.O.:1400]  Out: 475 [Urine:475]  DRAIN/TUBE OUTPUT :      LABS  CBC :   Lab Results   Component Value Date    WBC 11.0 2017    HGB 9.1 2017    HCT 30.0 2017     2017     BMP: Lab Results   Component Value Date     2017    K 4.6 2017     2017    CO2 24 2017    BUN 38 2017    CREATININE 1.6 2017   [place] [id number]                                  OPERATIVE REPORT     PATIENT NAME: Dipti Mustafa                   :             1965  MED REC NO:   [id number]                            ROOM:           7916  ACCOUNT NO:   [de-identified]                            ADMISSION DATE:  2017  PROVIDER:     Les Flower M.D.     DATE OF PROCEDURE:  2017     PROCEDURE:  Colonoscopy.     INDICATION:  A 50years old pleasant male who presented to the hospital  with melenic stools with severe symptomatic anemia. The patient received  blood transfusion. He underwent EGD. He has mild gastritis and small  ulcers that does not account for his severe low hemoglobin.   For that  reason, he is undergoing colonoscopy today.     PHYSICAL EXAMINATION:  VITAL SIGNS:  Temperature 98.7, pulse 61, respiratory rate 12, blood  pressure 127/63. HEART:  Regular. Normal S1 and S2. No S3.  LUNGS:  Equal to expansion on inspection. ABDOMEN:  Soft, normoactive bowel sounds, nontender, not distended.     ASA CLASSIFICATION:  Class III.     PHOTOGRAPHS:  Yes.     BIOPSIES:  Yes.     DESCRIPTION OF PROCEDURE:  Procedure indications and complications  including but not limited to perforation, bleeding, infection, adverse  reaction to medicine, very slight chance of missing significant lesions  discussed with the patient and the patient expressed his understanding and  a written consent was obtained.     The patient was placed in the left lateral decubitus position in the Endo  room #11. The patient was placed, done appropriate monitoring of vitals  including blood pressure, heart rate and pulse ox. After the rectal  examination, after the adequate conscious sedation was given in incremental  fashion to induce and sustain conscious sedation, the colonoscope was  inserted into the rectum, advanced up to the 40 cm of the sigmoid colon. The patient had near luminal obstructing mass as shown in picture #1, #2,  and #3. It is difficult to advance the scope beyond the distal end of the  mass due to near luminal obstruction. The endoscopy findings highly  consistent with colonic neoplastic process. Biopsies obtained from the  distal end of the mass. Again, the distal end of mass noted as shown in  picture #4 and #5. The distal sigmoid colon looks normal as shown in  picture #6. The rectosigmoid colon junction looks normal as shown in  picture #7. On retroflex, rectum looks normal as shown in picture #8. Air  was withdrawn as the scope was removed. The patient tolerated the  procedure well without any immediate complications.   Vitals including blood  pressure, heart rate and pulse ox were stable during the procedure and  after the procedure. The patient transferred to the recovery room in  stable condition.     IMPRESSION:  Attempted colonoscopy to the sigmoid colon, near luminal  obstructing mass at 40 cm from the anorectal junction. Difficulty to  evaluate the proximal end of the mass due to luminal obstruction. Multiple  biopsies obtained.     RECOMMENDATIONS:  Clear liquids, surgical consultation, follow the biopsy  results.     Thank you Dr. Lela Leigh for letting me to do procedure on the patient. Do not  hesitate to call me if you have any questions. My recommendations are  above.  Christopher Rodriguez M.D.     D: 11/24/2017 8:21:41       T: 11/24/2017 8:23:12     [id number]  Job#: [id number]     Doc#: [id number]     CC:  Elvia Rodriguez M.D. Alan Campos M.D. Sharron Watkins, ALEAH             ASSESSMENT  1. Obstructing sigmoid colon mass at 40 cm as elucidated above. On rounds discussed with patient and mother the need for sigmoid colon resection and they voice understanding would like to proceed. Initial cardiology note indicated he was cleared for surgery, but then I talked to Hakeem CHO for the cardiology group who indicated he may need a cardiac cath apparently patient was seen by Alex Duong and cardiology is going to perform a cardiac cath in the morning. I discussed with the B 3 nurse it indicates unless it is a main artery that he will still be cleared for surgery however if a stent or something has to be placed it may be delayed. Also his hemoglobin was 9.12 days ago we will recheck and if not above 10 we'll transfuse 1 unit of blood as I feel with a recent MI and possibly proceeding with surgery would like to have reasonable amount of hemoglobin on board      PLAN  1.   A cardiac cath tomorrow still possible for sigmoid colon resection in the afternoon      Chance Fofana  Electronically signed 11/26/2017 at 4:23 PM

## 2017-11-26 NOTE — PLAN OF CARE
Problem: Safety:  Goal: Free from accidental physical injury  Free from accidental physical injury   Outcome: Ongoing  Monitoring patient for safety needs with each shift assessment and as needed. Bed alarm is activated when patient is in bed and he is aware to call for assistance before getting up out of bed. Problem: Daily Care:  Goal: Daily care needs are met  Daily care needs are met   Outcome: Ongoing  Patient was in the bathroom and bathed himself this a.m. Monitoring his daily needs with each shift assessment. Problem: Pain:  Goal: Patient's pain/discomfort is manageable  Patient's pain/discomfort is manageable   Outcome: Ongoing  Monitoring patients pain with each shift assessment and hourly rounding. Medications per the mar if needed. Problem: Discharge Planning:  Goal: Patients continuum of care needs are met  Patients continuum of care needs are met   Outcome: Ongoing      Problem: Pain Control  Goal: Maintain pain level at or below patient's acceptable level (or 5 if patient is unable to determine acceptable level)  Outcome: Ongoing      Problem: Falls - Risk of  Goal: Absence of falls  Outcome: Ongoing  Patients bed alarm is activated and he is aware to call for assistance before getting up out of bed. Comments: Care plan reviewed with patient. Patient verbalize understanding of the plan of care and contribute to goal setting.

## 2017-11-26 NOTE — PROGRESS NOTES
lisinopril  10 mg Oral BID    insulin lispro  0-6 Units Subcutaneous TID WC    insulin lispro  0-3 Units Subcutaneous Nightly    aspirin  325 mg Oral Daily    amLODIPine  10 mg Oral Daily    simvastatin  20 mg Oral Nightly    sodium chloride flush  10 mL Intravenous 2 times per day    famotidine  20 mg Oral BID      nitroGLYCERIN Stopped (11/24/17 1100)    dextrose         hydrALAZINE 10 mg Q4H PRN   glucose 15 g PRN   dextrose 12.5 g PRN   glucagon (rDNA) 1 mg PRN   dextrose 100 mL/hr PRN   sodium chloride flush 10 mL PRN   acetaminophen 650 mg Q4H PRN   magnesium hydroxide 30 mL Daily PRN   ondansetron 4 mg Q6H PRN   potassium chloride 40 mEq PRN   Or     potassium chloride 40 mEq PRN   Or     potassium chloride 10 mEq PRN   magnesium sulfate 1 g PRN       Diagnostics:      Lab Data:    Cardiac Enzymes:  No results for input(s): CKTOTAL, CKMB, CKMBINDEX, TROPONINI in the last 72 hours.     CBC:   Lab Results   Component Value Date    WBC 11.0 11/24/2017    RBC 4.16 11/24/2017    HGB 9.1 11/24/2017    HCT 30.0 11/24/2017     11/24/2017       CMP:  Lab Results   Component Value Date     11/24/2017    K 4.6 11/24/2017     11/24/2017    CO2 24 11/24/2017    BUN 38 11/24/2017    CREATININE 1.6 11/24/2017    LABGLOM 46 11/24/2017    GLUCOSE 119 11/24/2017    CALCIUM 8.1 11/24/2017       Hepatic Function Panel:  Lab Results   Component Value Date    ALKPHOS 110 11/21/2017    ALT 17 11/21/2017    AST 14 11/21/2017    PROT 5.2 11/21/2017    BILITOT <0.2 11/21/2017    LABALBU 2.9 11/21/2017       Magnesium:  No results found for: MG    PT/INR:  No results found for: PROTIME, INR    HgBA1c:    Lab Results   Component Value Date    LABA1C 5.8 11/22/2017       FLP:  Lab Results   Component Value Date    TRIG 71 11/22/2017    HDL 42 11/22/2017    LDLCALC 99 11/22/2017       TSH:    Lab Results   Component Value Date    TSH 1.510 11/21/2017         Assessment:  preoperative work up  Need for colon mass

## 2017-11-27 ENCOUNTER — ANESTHESIA (OUTPATIENT)
Dept: OPERATING ROOM | Age: 52
DRG: 231 | End: 2017-11-27
Payer: MEDICAID

## 2017-11-27 ENCOUNTER — APPOINTMENT (OUTPATIENT)
Dept: CARDIAC CATH/INVASIVE PROCEDURES | Age: 52
DRG: 231 | End: 2017-11-27
Payer: MEDICAID

## 2017-11-27 ENCOUNTER — ANESTHESIA EVENT (OUTPATIENT)
Dept: OPERATING ROOM | Age: 52
DRG: 231 | End: 2017-11-27
Payer: MEDICAID

## 2017-11-27 VITALS — OXYGEN SATURATION: 85 % | TEMPERATURE: 98.2 F

## 2017-11-27 LAB
ANION GAP SERPL CALCULATED.3IONS-SCNC: 9 MEQ/L (ref 8–16)
APTT: 31.9 SECONDS (ref 22–38)
BUN BLDV-MCNC: 24 MG/DL (ref 7–22)
CALCIUM SERPL-MCNC: 8 MG/DL (ref 8.5–10.5)
CHLORIDE BLD-SCNC: 109 MEQ/L (ref 98–111)
CO2: 25 MEQ/L (ref 23–33)
CREAT SERPL-MCNC: 1.6 MG/DL (ref 0.4–1.2)
EKG ATRIAL RATE: 68 BPM
EKG P AXIS: 67 DEGREES
EKG P-R INTERVAL: 122 MS
EKG Q-T INTERVAL: 450 MS
EKG QRS DURATION: 92 MS
EKG QTC CALCULATION (BAZETT): 478 MS
EKG R AXIS: -33 DEGREES
EKG T AXIS: 95 DEGREES
EKG VENTRICULAR RATE: 68 BPM
GFR SERPL CREATININE-BSD FRML MDRD: 46 ML/MIN/1.73M2
GLUCOSE BLD-MCNC: 100 MG/DL (ref 70–108)
GLUCOSE BLD-MCNC: 101 MG/DL (ref 70–108)
GLUCOSE BLD-MCNC: 108 MG/DL (ref 70–108)
GLUCOSE BLD-MCNC: 153 MG/DL (ref 70–108)
GLUCOSE BLD-MCNC: 187 MG/DL (ref 70–108)
HCT VFR BLD CALC: 31.5 % (ref 42–52)
HCT VFR BLD CALC: 33.2 % (ref 42–52)
HEMOGLOBIN: 10.6 GM/DL (ref 14–18)
HEMOGLOBIN: 9.8 GM/DL (ref 14–18)
INR BLD: 1.09 (ref 0.85–1.13)
MCH RBC QN AUTO: 23.4 PG (ref 27–31)
MCHC RBC AUTO-ENTMCNC: 31.3 GM/DL (ref 33–37)
MCV RBC AUTO: 75 FL (ref 80–94)
PDW BLD-RTO: 23 % (ref 11.5–14.5)
PLATELET # BLD: 201 THOU/MM3 (ref 130–400)
PMV BLD AUTO: 11.1 MCM (ref 7.4–10.4)
POTASSIUM SERPL-SCNC: 4.6 MEQ/L (ref 3.5–5.2)
RBC # BLD: 4.19 MILL/MM3 (ref 4.7–6.1)
SODIUM BLD-SCNC: 143 MEQ/L (ref 135–145)
TROPONIN T: 0.07 NG/ML
WBC # BLD: 6.3 THOU/MM3 (ref 4.8–10.8)

## 2017-11-27 PROCEDURE — 6370000000 HC RX 637 (ALT 250 FOR IP): Performed by: PHYSICIAN ASSISTANT

## 2017-11-27 PROCEDURE — 6360000002 HC RX W HCPCS: Performed by: INTERNAL MEDICINE

## 2017-11-27 PROCEDURE — 80048 BASIC METABOLIC PNL TOTAL CA: CPT

## 2017-11-27 PROCEDURE — 6370000000 HC RX 637 (ALT 250 FOR IP): Performed by: INTERNAL MEDICINE

## 2017-11-27 PROCEDURE — 85018 HEMOGLOBIN: CPT

## 2017-11-27 PROCEDURE — 2580000003 HC RX 258: Performed by: NURSE ANESTHETIST, CERTIFIED REGISTERED

## 2017-11-27 PROCEDURE — 6360000002 HC RX W HCPCS: Performed by: SURGERY

## 2017-11-27 PROCEDURE — 6360000002 HC RX W HCPCS

## 2017-11-27 PROCEDURE — 0DTJ0ZZ RESECTION OF APPENDIX, OPEN APPROACH: ICD-10-PCS | Performed by: SURGERY

## 2017-11-27 PROCEDURE — 2500000003 HC RX 250 WO HCPCS: Performed by: NURSE ANESTHETIST, CERTIFIED REGISTERED

## 2017-11-27 PROCEDURE — 85027 COMPLETE CBC AUTOMATED: CPT

## 2017-11-27 PROCEDURE — 93458 L HRT ARTERY/VENTRICLE ANGIO: CPT | Performed by: INTERNAL MEDICINE

## 2017-11-27 PROCEDURE — 2500000003 HC RX 250 WO HCPCS

## 2017-11-27 PROCEDURE — 88342 IMHCHEM/IMCYTCHM 1ST ANTB: CPT

## 2017-11-27 PROCEDURE — 2100000000 HC CCU R&B

## 2017-11-27 PROCEDURE — 0FB00ZX EXCISION OF LIVER, OPEN APPROACH, DIAGNOSTIC: ICD-10-PCS | Performed by: SURGERY

## 2017-11-27 PROCEDURE — 2720000010 HC SURG SUPPLY STERILE: Performed by: SURGERY

## 2017-11-27 PROCEDURE — 2580000003 HC RX 258: Performed by: INTERNAL MEDICINE

## 2017-11-27 PROCEDURE — 0DTN0ZZ RESECTION OF SIGMOID COLON, OPEN APPROACH: ICD-10-PCS | Performed by: SURGERY

## 2017-11-27 PROCEDURE — 6360000002 HC RX W HCPCS: Performed by: ANESTHESIOLOGY

## 2017-11-27 PROCEDURE — 87081 CULTURE SCREEN ONLY: CPT

## 2017-11-27 PROCEDURE — 3600000004 HC SURGERY LEVEL 4 BASE: Performed by: SURGERY

## 2017-11-27 PROCEDURE — 36430 TRANSFUSION BLD/BLD COMPNT: CPT

## 2017-11-27 PROCEDURE — 36415 COLL VENOUS BLD VENIPUNCTURE: CPT

## 2017-11-27 PROCEDURE — B2111ZZ FLUOROSCOPY OF MULTIPLE CORONARY ARTERIES USING LOW OSMOLAR CONTRAST: ICD-10-PCS | Performed by: INTERNAL MEDICINE

## 2017-11-27 PROCEDURE — 3700000001 HC ADD 15 MINUTES (ANESTHESIA): Performed by: SURGERY

## 2017-11-27 PROCEDURE — A6252 ABSORPT DRG >16 <=48 W/O BDR: HCPCS | Performed by: SURGERY

## 2017-11-27 PROCEDURE — C1769 GUIDE WIRE: HCPCS

## 2017-11-27 PROCEDURE — 82948 REAGENT STRIP/BLOOD GLUCOSE: CPT

## 2017-11-27 PROCEDURE — 47001 NDL BIOPSY LVR TM OTH MAJ PX: CPT | Performed by: SURGERY

## 2017-11-27 PROCEDURE — P9016 RBC LEUKOCYTES REDUCED: HCPCS

## 2017-11-27 PROCEDURE — 88381 MICRODISSECTION MANUAL: CPT

## 2017-11-27 PROCEDURE — 85610 PROTHROMBIN TIME: CPT

## 2017-11-27 PROCEDURE — 88304 TISSUE EXAM BY PATHOLOGIST: CPT

## 2017-11-27 PROCEDURE — 88341 IMHCHEM/IMCYTCHM EA ADD ANTB: CPT

## 2017-11-27 PROCEDURE — 44955 APPENDECTOMY ADD-ON: CPT | Performed by: SURGERY

## 2017-11-27 PROCEDURE — 93005 ELECTROCARDIOGRAM TRACING: CPT

## 2017-11-27 PROCEDURE — 81210 BRAF GENE: CPT

## 2017-11-27 PROCEDURE — 85014 HEMATOCRIT: CPT

## 2017-11-27 PROCEDURE — 3600000014 HC SURGERY LEVEL 4 ADDTL 15MIN: Performed by: SURGERY

## 2017-11-27 PROCEDURE — 87641 MR-STAPH DNA AMP PROBE: CPT

## 2017-11-27 PROCEDURE — 81311 NRAS GENE VARIANTS EXON 2&3: CPT

## 2017-11-27 PROCEDURE — 44140 PARTIAL REMOVAL OF COLON: CPT | Performed by: SURGERY

## 2017-11-27 PROCEDURE — 3700000000 HC ANESTHESIA ATTENDED CARE: Performed by: SURGERY

## 2017-11-27 PROCEDURE — B2151ZZ FLUOROSCOPY OF LEFT HEART USING LOW OSMOLAR CONTRAST: ICD-10-PCS | Performed by: INTERNAL MEDICINE

## 2017-11-27 PROCEDURE — C9250 ARTISS FIBRIN SEALANT: HCPCS | Performed by: SURGERY

## 2017-11-27 PROCEDURE — 81276 KRAS GENE ADDL VARIANTS: CPT

## 2017-11-27 PROCEDURE — 84484 ASSAY OF TROPONIN QUANT: CPT

## 2017-11-27 PROCEDURE — 88309 TISSUE EXAM BY PATHOLOGIST: CPT

## 2017-11-27 PROCEDURE — 81275 KRAS GENE VARIANTS EXON 2: CPT

## 2017-11-27 PROCEDURE — A4649 SURGICAL SUPPLIES: HCPCS | Performed by: SURGERY

## 2017-11-27 PROCEDURE — 88307 TISSUE EXAM BY PATHOLOGIST: CPT

## 2017-11-27 PROCEDURE — 85730 THROMBOPLASTIN TIME PARTIAL: CPT

## 2017-11-27 PROCEDURE — 4A023N7 MEASUREMENT OF CARDIAC SAMPLING AND PRESSURE, LEFT HEART, PERCUTANEOUS APPROACH: ICD-10-PCS | Performed by: INTERNAL MEDICINE

## 2017-11-27 PROCEDURE — C1894 INTRO/SHEATH, NON-LASER: HCPCS

## 2017-11-27 PROCEDURE — 7100000001 HC PACU RECOVERY - ADDTL 15 MIN: Performed by: SURGERY

## 2017-11-27 PROCEDURE — 7100000000 HC PACU RECOVERY - FIRST 15 MIN: Performed by: SURGERY

## 2017-11-27 PROCEDURE — 6360000002 HC RX W HCPCS: Performed by: NURSE ANESTHETIST, CERTIFIED REGISTERED

## 2017-11-27 RX ORDER — SODIUM CHLORIDE 9 MG/ML
INJECTION, SOLUTION INTRAVENOUS CONTINUOUS
Status: DISCONTINUED | OUTPATIENT
Start: 2017-11-27 | End: 2017-11-29

## 2017-11-27 RX ORDER — HYDROMORPHONE HYDROCHLORIDE 1 MG/ML
1 INJECTION, SOLUTION INTRAMUSCULAR; INTRAVENOUS; SUBCUTANEOUS
Status: DISCONTINUED | OUTPATIENT
Start: 2017-11-27 | End: 2017-11-27 | Stop reason: SDUPTHER

## 2017-11-27 RX ORDER — LORAZEPAM 2 MG/ML
0.5 INJECTION INTRAMUSCULAR EVERY 5 MIN PRN
Status: DISCONTINUED | OUTPATIENT
Start: 2017-11-27 | End: 2017-11-27

## 2017-11-27 RX ORDER — LABETALOL HYDROCHLORIDE 5 MG/ML
10 INJECTION, SOLUTION INTRAVENOUS EVERY 10 MIN PRN
Status: DISCONTINUED | OUTPATIENT
Start: 2017-11-27 | End: 2017-11-27 | Stop reason: HOSPADM

## 2017-11-27 RX ORDER — MIDAZOLAM HYDROCHLORIDE 1 MG/ML
INJECTION INTRAMUSCULAR; INTRAVENOUS
Status: COMPLETED
Start: 2017-11-27 | End: 2017-11-27

## 2017-11-27 RX ORDER — ACETAMINOPHEN 325 MG/1
650 TABLET ORAL EVERY 4 HOURS PRN
Status: DISCONTINUED | OUTPATIENT
Start: 2017-11-27 | End: 2017-11-27

## 2017-11-27 RX ORDER — HALOPERIDOL 5 MG/ML
2 INJECTION INTRAMUSCULAR EVERY 6 HOURS PRN
Status: DISCONTINUED | OUTPATIENT
Start: 2017-11-27 | End: 2017-11-27

## 2017-11-27 RX ORDER — LABETALOL HYDROCHLORIDE 5 MG/ML
INJECTION, SOLUTION INTRAVENOUS
Status: COMPLETED
Start: 2017-11-27 | End: 2017-11-27

## 2017-11-27 RX ORDER — SODIUM CHLORIDE 0.9 % (FLUSH) 0.9 %
10 SYRINGE (ML) INJECTION EVERY 12 HOURS SCHEDULED
Status: DISCONTINUED | OUTPATIENT
Start: 2017-11-27 | End: 2017-12-07 | Stop reason: HOSPADM

## 2017-11-27 RX ORDER — SODIUM CHLORIDE 9 MG/ML
INJECTION, SOLUTION INTRAVENOUS CONTINUOUS
Status: DISCONTINUED | OUTPATIENT
Start: 2017-11-27 | End: 2017-11-27 | Stop reason: SDUPTHER

## 2017-11-27 RX ORDER — ROCURONIUM BROMIDE 10 MG/ML
INJECTION, SOLUTION INTRAVENOUS PRN
Status: DISCONTINUED | OUTPATIENT
Start: 2017-11-27 | End: 2017-11-27 | Stop reason: SDUPTHER

## 2017-11-27 RX ORDER — SODIUM CHLORIDE 0.9 % (FLUSH) 0.9 %
10 SYRINGE (ML) INJECTION PRN
Status: DISCONTINUED | OUTPATIENT
Start: 2017-11-27 | End: 2017-11-27

## 2017-11-27 RX ORDER — MIDAZOLAM HYDROCHLORIDE 1 MG/ML
1 INJECTION INTRAMUSCULAR; INTRAVENOUS EVERY 10 MIN PRN
Status: DISCONTINUED | OUTPATIENT
Start: 2017-11-27 | End: 2017-11-27 | Stop reason: HOSPADM

## 2017-11-27 RX ORDER — SODIUM CHLORIDE 9 MG/ML
INJECTION, SOLUTION INTRAVENOUS CONTINUOUS PRN
Status: DISCONTINUED | OUTPATIENT
Start: 2017-11-27 | End: 2017-11-27 | Stop reason: SDUPTHER

## 2017-11-27 RX ORDER — SODIUM CHLORIDE 0.9 % (FLUSH) 0.9 %
10 SYRINGE (ML) INJECTION EVERY 12 HOURS SCHEDULED
Status: DISCONTINUED | OUTPATIENT
Start: 2017-11-27 | End: 2017-11-27

## 2017-11-27 RX ORDER — DEXAMETHASONE SODIUM PHOSPHATE 4 MG/ML
INJECTION, SOLUTION INTRA-ARTICULAR; INTRALESIONAL; INTRAMUSCULAR; INTRAVENOUS; SOFT TISSUE PRN
Status: DISCONTINUED | OUTPATIENT
Start: 2017-11-27 | End: 2017-11-27 | Stop reason: SDUPTHER

## 2017-11-27 RX ORDER — SODIUM CHLORIDE 0.9 % (FLUSH) 0.9 %
10 SYRINGE (ML) INJECTION PRN
Status: DISCONTINUED | OUTPATIENT
Start: 2017-11-27 | End: 2017-12-07 | Stop reason: HOSPADM

## 2017-11-27 RX ORDER — NITROGLYCERIN 0.4 MG/1
0.4 TABLET SUBLINGUAL EVERY 5 MIN PRN
Status: DISCONTINUED | OUTPATIENT
Start: 2017-11-27 | End: 2017-11-27

## 2017-11-27 RX ORDER — NITROGLYCERIN 20 MG/100ML
5 INJECTION INTRAVENOUS CONTINUOUS
Status: DISCONTINUED | OUTPATIENT
Start: 2017-11-27 | End: 2017-11-30

## 2017-11-27 RX ORDER — FENTANYL CITRATE 50 UG/ML
100 INJECTION, SOLUTION INTRAMUSCULAR; INTRAVENOUS ONCE
Status: COMPLETED | OUTPATIENT
Start: 2017-11-27 | End: 2017-11-27

## 2017-11-27 RX ORDER — LORAZEPAM 2 MG/ML
1 INJECTION INTRAMUSCULAR EVERY 10 MIN PRN
Status: COMPLETED | OUTPATIENT
Start: 2017-11-27 | End: 2017-11-27

## 2017-11-27 RX ORDER — FENTANYL CITRATE 50 UG/ML
INJECTION, SOLUTION INTRAMUSCULAR; INTRAVENOUS PRN
Status: DISCONTINUED | OUTPATIENT
Start: 2017-11-27 | End: 2017-11-27 | Stop reason: SDUPTHER

## 2017-11-27 RX ORDER — ONDANSETRON 2 MG/ML
4 INJECTION INTRAMUSCULAR; INTRAVENOUS EVERY 6 HOURS PRN
Status: DISCONTINUED | OUTPATIENT
Start: 2017-11-27 | End: 2017-12-07 | Stop reason: HOSPADM

## 2017-11-27 RX ORDER — ETOMIDATE 2 MG/ML
INJECTION INTRAVENOUS PRN
Status: DISCONTINUED | OUTPATIENT
Start: 2017-11-27 | End: 2017-11-27 | Stop reason: SDUPTHER

## 2017-11-27 RX ORDER — HALOPERIDOL 2 MG/ML
2 SOLUTION ORAL EVERY 6 HOURS PRN
Status: DISCONTINUED | OUTPATIENT
Start: 2017-11-27 | End: 2017-11-27 | Stop reason: SDUPTHER

## 2017-11-27 RX ORDER — ERTAPENEM 1 G/1
INJECTION, POWDER, LYOPHILIZED, FOR SOLUTION INTRAMUSCULAR; INTRAVENOUS PRN
Status: DISCONTINUED | OUTPATIENT
Start: 2017-11-27 | End: 2017-11-27 | Stop reason: SDUPTHER

## 2017-11-27 RX ORDER — LIDOCAINE HYDROCHLORIDE 20 MG/ML
INJECTION, SOLUTION INFILTRATION; PERINEURAL PRN
Status: DISCONTINUED | OUTPATIENT
Start: 2017-11-27 | End: 2017-11-27 | Stop reason: SDUPTHER

## 2017-11-27 RX ORDER — SODIUM CHLORIDE, SODIUM LACTATE, POTASSIUM CHLORIDE, CALCIUM CHLORIDE 600; 310; 30; 20 MG/100ML; MG/100ML; MG/100ML; MG/100ML
INJECTION, SOLUTION INTRAVENOUS CONTINUOUS PRN
Status: DISCONTINUED | OUTPATIENT
Start: 2017-11-27 | End: 2017-11-27 | Stop reason: SDUPTHER

## 2017-11-27 RX ORDER — ASPIRIN 325 MG
325 TABLET ORAL ONCE
Status: DISCONTINUED | OUTPATIENT
Start: 2017-11-27 | End: 2017-11-27

## 2017-11-27 RX ORDER — FENTANYL CITRATE 50 UG/ML
INJECTION, SOLUTION INTRAMUSCULAR; INTRAVENOUS
Status: COMPLETED
Start: 2017-11-27 | End: 2017-11-27

## 2017-11-27 RX ADMIN — HYDRALAZINE HYDROCHLORIDE 10 MG: 20 INJECTION INTRAMUSCULAR; INTRAVENOUS at 04:45

## 2017-11-27 RX ADMIN — ONDANSETRON 4 MG: 2 INJECTION INTRAMUSCULAR; INTRAVENOUS at 21:33

## 2017-11-27 RX ADMIN — LABETALOL HYDROCHLORIDE 10 MG: 5 INJECTION, SOLUTION INTRAVENOUS at 18:52

## 2017-11-27 RX ADMIN — FENTANYL CITRATE 100 MCG: 50 INJECTION, SOLUTION INTRAMUSCULAR; INTRAVENOUS at 18:50

## 2017-11-27 RX ADMIN — DEXAMETHASONE SODIUM PHOSPHATE 4 MG: 4 INJECTION, SOLUTION INTRAMUSCULAR; INTRAVENOUS at 15:52

## 2017-11-27 RX ADMIN — ALPRAZOLAM 0.5 MG: 0.5 TABLET ORAL at 04:45

## 2017-11-27 RX ADMIN — METOPROLOL TARTRATE 75 MG: 25 TABLET ORAL at 07:58

## 2017-11-27 RX ADMIN — SODIUM CHLORIDE: 9 INJECTION, SOLUTION INTRAVENOUS at 21:07

## 2017-11-27 RX ADMIN — LORAZEPAM 1 MG: 2 INJECTION, SOLUTION INTRAMUSCULAR; INTRAVENOUS at 18:10

## 2017-11-27 RX ADMIN — HYDRALAZINE HYDROCHLORIDE 25 MG: 25 TABLET, FILM COATED ORAL at 14:15

## 2017-11-27 RX ADMIN — FENTANYL CITRATE 50 MCG: 50 INJECTION INTRAMUSCULAR; INTRAVENOUS at 18:10

## 2017-11-27 RX ADMIN — Medication 10 ML: at 21:34

## 2017-11-27 RX ADMIN — FENTANYL CITRATE 100 MCG: 50 INJECTION INTRAMUSCULAR; INTRAVENOUS at 15:48

## 2017-11-27 RX ADMIN — ACETYLCYSTEINE 1000 MG: 500 TABLET, EFFERVESCENT ORAL at 07:58

## 2017-11-27 RX ADMIN — ETOMIDATE 20 MG: 2 INJECTION INTRAVENOUS at 15:48

## 2017-11-27 RX ADMIN — MIDAZOLAM HYDROCHLORIDE 1 MG: 1 INJECTION, SOLUTION INTRAMUSCULAR; INTRAVENOUS at 19:38

## 2017-11-27 RX ADMIN — FAMOTIDINE 20 MG: 20 TABLET, FILM COATED ORAL at 07:58

## 2017-11-27 RX ADMIN — FENTANYL CITRATE 100 MCG: 50 INJECTION INTRAMUSCULAR; INTRAVENOUS at 18:50

## 2017-11-27 RX ADMIN — Medication 50 MG: at 15:48

## 2017-11-27 RX ADMIN — ASPIRIN 325 MG: 325 TABLET, COATED ORAL at 07:58

## 2017-11-27 RX ADMIN — ISOSORBIDE MONONITRATE 30 MG: 30 TABLET ORAL at 07:58

## 2017-11-27 RX ADMIN — AMLODIPINE BESYLATE 10 MG: 10 TABLET ORAL at 07:58

## 2017-11-27 RX ADMIN — LORAZEPAM 1 MG: 2 INJECTION, SOLUTION INTRAMUSCULAR; INTRAVENOUS at 18:20

## 2017-11-27 RX ADMIN — MIDAZOLAM HYDROCHLORIDE 1 MG: 1 INJECTION, SOLUTION INTRAMUSCULAR; INTRAVENOUS at 20:15

## 2017-11-27 RX ADMIN — SODIUM CHLORIDE: 9 INJECTION, SOLUTION INTRAVENOUS at 15:48

## 2017-11-27 RX ADMIN — FENTANYL CITRATE 100 MCG: 50 INJECTION INTRAMUSCULAR; INTRAVENOUS at 17:28

## 2017-11-27 RX ADMIN — MIDAZOLAM 1 MG: 1 INJECTION INTRAMUSCULAR; INTRAVENOUS at 19:38

## 2017-11-27 RX ADMIN — LIDOCAINE HYDROCHLORIDE 100 MG: 20 INJECTION, SOLUTION INFILTRATION; PERINEURAL at 15:48

## 2017-11-27 RX ADMIN — Medication 10 MG: at 17:00

## 2017-11-27 RX ADMIN — SODIUM CHLORIDE, POTASSIUM CHLORIDE, SODIUM LACTATE AND CALCIUM CHLORIDE: 600; 310; 30; 20 INJECTION, SOLUTION INTRAVENOUS at 16:30

## 2017-11-27 RX ADMIN — SODIUM CHLORIDE: 9 INJECTION, SOLUTION INTRAVENOUS at 17:13

## 2017-11-27 RX ADMIN — HYDROMORPHONE HYDROCHLORIDE 1 MG: 1 INJECTION, SOLUTION INTRAMUSCULAR; INTRAVENOUS; SUBCUTANEOUS at 21:33

## 2017-11-27 RX ADMIN — Medication 20 MG: at 16:03

## 2017-11-27 RX ADMIN — LABETALOL HYDROCHLORIDE 10 MG: 5 INJECTION INTRAVENOUS at 18:52

## 2017-11-27 RX ADMIN — SODIUM CHLORIDE: 9 INJECTION, SOLUTION INTRAVENOUS at 11:23

## 2017-11-27 RX ADMIN — HYDRALAZINE HYDROCHLORIDE 25 MG: 25 TABLET, FILM COATED ORAL at 06:23

## 2017-11-27 RX ADMIN — ERTAPENEM SODIUM 1000 MG: 1 INJECTION, POWDER, LYOPHILIZED, FOR SOLUTION INTRAMUSCULAR; INTRAVENOUS at 16:15

## 2017-11-27 RX ADMIN — FENTANYL CITRATE 50 MCG: 50 INJECTION INTRAMUSCULAR; INTRAVENOUS at 18:01

## 2017-11-27 ASSESSMENT — PULMONARY FUNCTION TESTS
PIF_VALUE: 22
PIF_VALUE: 23
PIF_VALUE: 22
PIF_VALUE: 19
PIF_VALUE: 24
PIF_VALUE: 24
PIF_VALUE: 1
PIF_VALUE: 19
PIF_VALUE: 2
PIF_VALUE: 24
PIF_VALUE: 23
PIF_VALUE: 27
PIF_VALUE: 20
PIF_VALUE: 0
PIF_VALUE: 22
PIF_VALUE: 21
PIF_VALUE: 23
PIF_VALUE: 20
PIF_VALUE: 22
PIF_VALUE: 1
PIF_VALUE: 20
PIF_VALUE: 1
PIF_VALUE: 21
PIF_VALUE: 16
PIF_VALUE: 23
PIF_VALUE: 23
PIF_VALUE: 24
PIF_VALUE: 20
PIF_VALUE: 5
PIF_VALUE: 25
PIF_VALUE: 20
PIF_VALUE: 23
PIF_VALUE: 2
PIF_VALUE: 23
PIF_VALUE: 24
PIF_VALUE: 23
PIF_VALUE: 25
PIF_VALUE: 23
PIF_VALUE: 24
PIF_VALUE: 20
PIF_VALUE: 24
PIF_VALUE: 23
PIF_VALUE: 23
PIF_VALUE: 0
PIF_VALUE: 20
PIF_VALUE: 22
PIF_VALUE: 22
PIF_VALUE: 24
PIF_VALUE: 20
PIF_VALUE: 23
PIF_VALUE: 25
PIF_VALUE: 0
PIF_VALUE: 2
PIF_VALUE: 22
PIF_VALUE: 21
PIF_VALUE: 24
PIF_VALUE: 24
PIF_VALUE: 23
PIF_VALUE: 24
PIF_VALUE: 21
PIF_VALUE: 1
PIF_VALUE: 19
PIF_VALUE: 20
PIF_VALUE: 24
PIF_VALUE: 24
PIF_VALUE: 20
PIF_VALUE: 24
PIF_VALUE: 2
PIF_VALUE: 20
PIF_VALUE: 20
PIF_VALUE: 24
PIF_VALUE: 22
PIF_VALUE: 21
PIF_VALUE: 26
PIF_VALUE: 0
PIF_VALUE: 21
PIF_VALUE: 25
PIF_VALUE: 0
PIF_VALUE: 20
PIF_VALUE: 21
PIF_VALUE: 22
PIF_VALUE: 24
PIF_VALUE: 19
PIF_VALUE: 20
PIF_VALUE: 25
PIF_VALUE: 1
PIF_VALUE: 23
PIF_VALUE: 23
PIF_VALUE: 21
PIF_VALUE: 25
PIF_VALUE: 24
PIF_VALUE: 26
PIF_VALUE: 24
PIF_VALUE: 22
PIF_VALUE: 20
PIF_VALUE: 23
PIF_VALUE: 25
PIF_VALUE: 24
PIF_VALUE: 25
PIF_VALUE: 23
PIF_VALUE: 21
PIF_VALUE: 24
PIF_VALUE: 20
PIF_VALUE: 23
PIF_VALUE: 24
PIF_VALUE: 25
PIF_VALUE: 21
PIF_VALUE: 23
PIF_VALUE: 20
PIF_VALUE: 19
PIF_VALUE: 1
PIF_VALUE: 21
PIF_VALUE: 25
PIF_VALUE: 22
PIF_VALUE: 24
PIF_VALUE: 0
PIF_VALUE: 24
PIF_VALUE: 1
PIF_VALUE: 23
PIF_VALUE: 20
PIF_VALUE: 23
PIF_VALUE: 25
PIF_VALUE: 13
PIF_VALUE: 21
PIF_VALUE: 0
PIF_VALUE: 20
PIF_VALUE: 1
PIF_VALUE: 24
PIF_VALUE: 21
PIF_VALUE: 20
PIF_VALUE: 23
PIF_VALUE: 22
PIF_VALUE: 24
PIF_VALUE: 30
PIF_VALUE: 23
PIF_VALUE: 20
PIF_VALUE: 20
PIF_VALUE: 24

## 2017-11-27 ASSESSMENT — PAIN DESCRIPTION - LOCATION
LOCATION: ABDOMEN

## 2017-11-27 ASSESSMENT — PAIN SCALES - GENERAL
PAINLEVEL_OUTOF10: 0
PAINLEVEL_OUTOF10: 8
PAINLEVEL_OUTOF10: 0
PAINLEVEL_OUTOF10: 7

## 2017-11-27 ASSESSMENT — PAIN DESCRIPTION - PAIN TYPE
TYPE: SURGICAL PAIN

## 2017-11-27 ASSESSMENT — PAIN SCALES - WONG BAKER
WONGBAKER_NUMERICALRESPONSE: 6
WONGBAKER_NUMERICALRESPONSE: 8

## 2017-11-27 NOTE — PLAN OF CARE
Problem: Safety:  Goal: Free from accidental physical injury  Free from accidental physical injury   Outcome: Ongoing  Remains free from accidental injury and from falls. He is alert and oriented and verbalizes understanding of fall and safety precautions; demonstrates understanding of use of call light. Problem: Daily Care:  Goal: Daily care needs are met  Daily care needs are met   Outcome: Ongoing  Able to complete daily cares per self. Problem: Pain:  Goal: Patient's pain/discomfort is manageable  Patient's pain/discomfort is manageable   Outcome: Ongoing  Denies pain and discomfort. Reinforced use of 0-10 pain scale and emphasized importance of communicating pain/discomfort. Pain goal is 0. Problem: Discharge Planning:  Goal: Patients continuum of care needs are met  Patients continuum of care needs are met   Outcome: Ongoing  Will monitor for discharge needs. Patient says he is planning on staying with his mom immediately after his colon resection. Problem: Pain Control  Goal: Maintain pain level at or below patient's acceptable level (or 5 if patient is unable to determine acceptable level)  Outcome: Ongoing  Denies pain and discomfort. Reinforced use of 0-10 pain scale and emphasized importance of communicating pain/discomfort. Pain goal is 0. Problem: Falls - Risk of  Goal: Absence of falls  Outcome: Ongoing  Remains free from accidental injury and from falls. He is alert and oriented and verbalizes understanding of fall and safety precautions; demonstrates understanding of use of call light. Comments: Care plan reviewed with patient. Patient verbalizes understanding of the care plan and contributed to goal setting.

## 2017-11-27 NOTE — CONSULTS
[address] [place] [address]                                   CONSULTATION    PATIENT NAME: Per Galvan                  :        1965  MED REC NO:   [id number]                           ROOM:       8923  ACCOUNT NO:   [de-identified]                           ADMIT DATE: 2017  PROVIDER:     CALIXTO Donato DATE:  2017    HISTORY OF PRESENT ILLNESS:  The patient is a 73-year-old gentleman, who  was admitted with complaint of feeling dizzy and weak. The patient was  seen in the emergency room. The patient was found to have a hemoglobin of  7 gm. The patient also was found to have abnormal EKG. The patient was  transfused with 2 units of packed RBCs. The patient underwent colonoscopy  and the patient was found to have obstructing sigmoid mass. The patient  had a biopsy. The patient was seen by Surgical service planning for  surgery. He does need clearance from Cardiology. The patient denies any  rectal bleeding. He denies any history of colon cancer in his family. He  never had a colonoscopy before. PAST MEDICAL HISTORY:  Positive for:  1. Chronic kidney disease. 2.  Diabetes mellitus. 3.  Hyperlipidemia. 4.  Hypertension. 5.  Thyroid disease. PAST SURGICAL HISTORY:  1. Endoscopy. 2.  Eye surgery. 3.  Skin biopsy from wart from the nose. FAMILY HISTORY:  Father has cancer. He does not know what kind of cancer. Father also has a heart disease. SOCIAL HISTORY:  He does not smoke. He does not drink alcohol. He works  in a factory. CURRENT MEDICATIONS:  Please see medication list.    REVIEW OF SYSTEMS:  A 12-system was reviewed. Pertinent findings were as  mentioned in the history of present illness. PHYSICAL EXAMINATION:  GENERAL:  This is a pleasant gentleman, in no respiratory distress.   VITAL SIGNS:  Weight is 217 pounds, temperature 97.7, respirations 18,  pulse 66, blood pressure 155/70. HEENT:  Normocephalic and atraumatic. NECK:  Supple. No JVD. No bruits. No thyromegaly. No lymphadenopathy. CHEST:  Bilateral air entry. No rales. No rhonchi. HEART:  S1, S2. No S3. No S4. No murmur. ABDOMEN:  Soft. No organomegaly. EXTREMITIES:  No clubbing, no cyanosis, no edema. LABORATORY DATA:  On admission, BUN 51, creatinine 1.5, albumin 2.9. His  liver enzymes are normal.  CBC on admission, white blood cell 11.3,  hemoglobin 7 gm, hematocrit 23.8%, and platelet 441. Today, his hemoglobin  is 9.1 gm. He was transfused with 2 units of blood. His ferritin is low  at 19. His vitamin B12 is normal.  CA level is 27.1. DIAGNOSTIC DATA:  CT scan of the abdomen without contrast, a nonobstructive  infrarenal stone. Colonic wall thickening or chronic mass. No metastatic  disease of the liver. No metastatic disease to the lung. IMPRESSION AND PLAN:  This is a 53-year-old gentleman with sigmoid mass  obstructing. He is going to undergo resection. A biopsy was taken, but it  is pending. CA level is elevated suggesting colon cancer. CAT scan  without contrast is free of metastatic disease. The patient does have  iron-deficiency anemia. I am going to give him iron. The patient will go  for surgery and follow up with the surgery. I will be following him.         Adelita Wu M.D.    D: 11/26/2017 17:30:40       T: 11/26/2017 20:14:31     [id number]  Job#: [id number]     Doc#: [id number]    CC:

## 2017-11-27 NOTE — PROGRESS NOTES
Patient/Family Education    See Adult Nutrition Doc Flowsheet for more detail.      Electronically signed by Kelly Joy RD, MASSIMO on 11/27/17 at 1:49 PM    Contact Number: [phone]

## 2017-11-27 NOTE — ANESTHESIA PRE PROCEDURE
Department of Anesthesiology  Preprocedure Note       Name:  Mary Garcia   Age:  50 y.o.  :  1965                                          MRN:  [id number]         Date:  2017      Surgeon: Viktor Castaneda):  Laura Madsen MD    Procedure: Procedure(s):  sigmond colon RESECTION    Medications prior to admission:   Prior to Admission medications    Medication Sig Start Date End Date Taking? Authorizing Provider   metoprolol tartrate (LOPRESSOR) 25 MG tablet Take 50 mg by mouth 2 times daily   Yes Historical Provider, MD   Liraglutide (VICTOZA SC) Inject into the skin every morning   Yes Historical Provider, MD   lisinopril (PRINIVIL;ZESTRIL) 10 MG tablet Take 10 mg by mouth 2 times daily    Yes Historical Provider, MD   furosemide (LASIX) 40 MG tablet Take 40 mg by mouth daily   Yes Historical Provider, MD   hydrocortisone 2.5 % cream Apply topically 3 times daily. Apply to face 16  Yes Geo Handy MD   betamethasone valerate (VALISONE) 0.1 % ointment Apply topically 3 times daily. Do not apply to face 16  Yes Geo Handy MD   hydrOXYzine (ATARAX) 50 MG tablet Take 1 tablet by mouth 4 times daily as needed for Itching (rash) Caution not at work will cause drowsiness 16  Yes Geo Handy MD   amLODIPine (NORVASC) 10 MG tablet Take 1 tablet by mouth daily 12/6/15  Yes Lord Kenneth MD   labetalol (NORMODYNE) 200 MG tablet Take 1 tablet by mouth every 12 hours 12/6/15  Yes Lord Kenneth MD   famotidine (PEPCID) 20 MG tablet Take 1 tablet by mouth 2 times daily 12/6/15  Yes Lord Kenneth MD   Blood Pressure Monitor KIT CHECK BP TWICE DAILY IF SBP >140 CALL PCP 12/6/15  Yes Lord Kenneth MD   simvastatin (ZOCOR) 20 MG tablet Take 20 mg by mouth nightly   Yes Historical Provider, MD   glimepiride (AMARYL) 2 MG tablet Take 2 mg by mouth 2 times daily   Yes Historical Provider, MD   mupirocin (BACTROBAN) 2 % ointment Apply topically 3 times daily.  5/26/15  Yes Isabela Younger, LAMIN Current medications:    Current Facility-Administered Medications   Medication Dose Route Frequency Provider Last Rate Last Dose    0.9 % sodium chloride infusion   Intravenous Continuous Deandra Chisholm  mL/hr at 11/27/17 1123      sodium chloride flush 0.9 % injection 10 mL  10 mL Intravenous 2 times per day Deandra Chisholm MD        sodium chloride flush 0.9 % injection 10 mL  10 mL Intravenous PRN Deandra Chisholm MD        acetaminophen (TYLENOL) tablet 650 mg  650 mg Oral Q4H PRN Deandra Chisholm MD        magnesium hydroxide (MILK OF MAGNESIA) 400 MG/5ML suspension 30 mL  30 mL Oral Daily PRN Deandra Chisholm MD        ondansetron (ZOFRAN) injection 4 mg  4 mg Intravenous Q6H PRN Deandra Chisholm MD        isosorbide mononitrate (IMDUR) extended release tablet 30 mg  30 mg Oral Daily Deandra Chisholm MD   30 mg at 11/27/17 0758    hydrALAZINE (APRESOLINE) tablet 25 mg  25 mg Oral 3 times per day Deandra Chisholm MD   25 mg at 11/27/17 1415    ALPRAZolam (XANAX) tablet 0.5 mg  0.5 mg Oral TID PRN Deandra Chisholm MD   0.5 mg at 11/27/17 0445    Acetylcysteine TBEF 1,000 mg  1,000 mg Oral BID Deandra Chisholm MD   1,000 mg at 11/27/17 0758    metoprolol tartrate (LOPRESSOR) tablet 75 mg  75 mg Oral BID Thao Márquez PA-C   75 mg at 11/27/17 0758    nitroGLYCERIN 50 mg in dextrose 5% 250 mL infusion  5 mcg/min Intravenous Continuous Suyapa Nguyen MD   Stopped at 11/24/17 1100    hydrALAZINE (APRESOLINE) injection 10 mg  10 mg Intravenous Q4H PRN Suyapa Nguyen MD   10 mg at 11/27/17 0445    insulin lispro (HUMALOG) injection vial 0-6 Units  0-6 Units Subcutaneous TID  Jaymie Olson MD        insulin lispro (HUMALOG) injection vial 0-3 Units  0-3 Units Subcutaneous Nightly Jaymie Ibrahim MD        glucose (GLUTOSE) 40 % oral gel 15 g  15 g Oral PRN Suyapa Nguyen MD        dextrose 50 % solution 12.5 g  12.5 g Intravenous PRN Suyapa Nguyen MD        glucagon 155/76 (!) 156/75 (!) 160/66 (!) 186/83   Pulse: 59 60 67 67   Resp:       Temp:       TempSrc:       SpO2: 92% 92% 93% 92%   Weight:       Height:                                                  BP Readings from Last 3 Encounters:   11/27/17 (!) 186/83   09/02/16 183/90   12/06/15 153/61       NPO Status: Time of last liquid consumption: 0500                        Time of last solid consumption: 2130                        Date of last liquid consumption: 11/24/17                        Date of last solid food consumption: 11/22/17    BMI:   Wt Readings from Last 3 Encounters:   11/26/17 217 lb (98.4 kg)   09/02/16 250 lb (113.4 kg)   12/06/15 245 lb (111.1 kg)     Body mass index is 32.05 kg/m².     CBC:   Lab Results   Component Value Date    WBC 6.3 11/27/2017    RBC 4.19 11/27/2017    HGB 10.6 11/27/2017    HCT 33.2 11/27/2017    MCV 75.0 11/27/2017    RDW 23.0 11/27/2017     11/27/2017       CMP:   Lab Results   Component Value Date     11/27/2017    K 4.6 11/27/2017     11/27/2017    CO2 25 11/27/2017    BUN 24 11/27/2017    CREATININE 1.6 11/27/2017    LABGLOM 46 11/27/2017    GLUCOSE 108 11/27/2017    PROT 5.2 11/21/2017    CALCIUM 8.0 11/27/2017    BILITOT <0.2 11/21/2017    ALKPHOS 110 11/21/2017    AST 14 11/21/2017    ALT 17 11/21/2017       POC Tests:   Recent Labs      11/27/17   1041   POCGLU  100       Coags:   Lab Results   Component Value Date    INR 1.09 11/27/2017    APTT 31.9 11/27/2017       HCG (If Applicable): No results found for: PREGTESTUR, PREGSERUM, HCG, HCGQUANT     ABGs: No results found for: PHART, PO2ART, XYB7NMA, XVO0LGK, BEART, V3FYUYRI     Type & Screen (If Applicable):  Lab Results   Component Value Date    [address] POS 11/26/2017       Anesthesia Evaluation  Patient summary reviewed and Nursing notes reviewed  Airway: Mallampati: II  TM distance: >3 FB   Neck ROM: full  Mouth opening: > = 3 FB Dental: normal exam         Pulmonary:Negative Pulmonary ROS and

## 2017-11-27 NOTE — CARE COORDINATION
11/27/17, 3:06 PM  Sourav Ran day: 6  Location: [facility]26/026-A Reason for admit: NSTEMI (non-ST elevated myocardial infarction) [facility]) [I21.4]  NSTEMI (non-ST elevated myocardial infarction) [facility]) [I21.4]   Clinical update: Pt admitted for anemia, (+) OB stool, s/p PRBC.  11/23 EGD done  11/24 colonoscopy done - sigmoid mass found  11/27 cardiac cath done, official report pending. Plan is for sigmoid colon resection this afternoon possibly, surgeon following. MI core measures: dietary and cardiac rehab following. Discharge plan: Pt from home alone. Planned to touch base with patient again regarding discharge needs but he was off the floor. We will follow.

## 2017-11-27 NOTE — PLAN OF CARE
Problem: Safety:  Goal: Free from accidental physical injury  Free from accidental physical injury   Outcome: Completed Date Met: 11/27/17  Pt free of harm. Pt up with assistance. No issues or concerns. Problem: Daily Care:  Goal: Daily care needs are met  Daily care needs are met   Outcome: Completed Date Met: 11/27/17      Problem: Pain:  Goal: Patient's pain/discomfort is manageable  Patient's pain/discomfort is manageable   Outcome: Ongoing  No complaints of pain. Will continue to monitor. Problem: Discharge Planning:  Goal: Patients continuum of care needs are met  Patients continuum of care needs are met   Outcome: Ongoing  Pt from home alone. Plans to return home with mother. Will continue to monitor. Problem: Pain Control  Goal: Maintain pain level at or below patient's acceptable level (or 5 if patient is unable to determine acceptable level)  Outcome: Completed Date Met: 11/27/17  No complaints of pain. Problem: Falls - Risk of  Goal: Absence of falls  Outcome: Ongoing  Pt free of falls. Bed locked in lowest position. Call light within reach. Pt agrees to use when needing assistance. Will continue to monitor. Problem: Serum Glucose Level - Abnormal:  Goal: Ability to maintain appropriate glucose levels will improve  Ability to maintain appropriate glucose levels will improve  Outcome: Ongoing  Monitoring blood glucose levels. Humalog sliding scale ordered. Problem: Tissue Perfusion - Cardiopulmonary, Altered:  Goal: Circulatory function within specified parameters  Circulatory function within specified parameters  Outcome: Ongoing  Slight edema noted in lower extremities at times. 0.9 infusing post cath to help with kidney function. Heart cath showed multi vessel disease. Plans for high risk stenting. Pt awaiting bowel resection. Will continue to monitor.      Problem: Tissue Perfusion - Peripheral, Altered:  Goal: Absence of hematoma at arterial access site  Absence of hematoma at

## 2017-11-27 NOTE — OP NOTE
[facility]  Sedation/Analgesia Post Sedation Record        Pt Name: Nadine Cedillo  MRN: [id number]  YOB: 1965  Procedure Performed By: Pradeep Waite MD, Tanika Bravo, [facility]  Primary Care Physician: Helen Lopez        POST-PROCEDURE    Physicians/Assistants: Pradeep Waite MD, FACJANETTE, [facility], FCCP    Procedure Performed:  Left heart cath                                  Sedation/Anesthesia:  Local Anesthesia and IV Conscious Sedation with continuous O2 monitoring    Estimated Blood Loss: 10 cc     Specimens Removed:  [x]None []Other:      Disposition of Specimen:  []Pathology []Other        Complications:   [x]None Immediate []Other:       Post Procedure Diagnosis/Findings:  Coronary Artery Disease          Recommendations:  Medical treatment and review films.                Pradeep Waite MD, BOLA Siu  Electronically signed 11/27/2017 at 9:02 AM

## 2017-11-27 NOTE — CARE COORDINATION
11/27/17, 4:52 PM    DISCHARGE BARRIERS  Attempted to complete SW consult, but patient was not in his room.

## 2017-11-28 LAB
ANION GAP SERPL CALCULATED.3IONS-SCNC: 11 MEQ/L (ref 8–16)
ANION GAP SERPL CALCULATED.3IONS-SCNC: 16 MEQ/L (ref 8–16)
BUN BLDV-MCNC: 28 MG/DL (ref 7–22)
BUN BLDV-MCNC: 30 MG/DL (ref 7–22)
CALCIUM IONIZED: 1.12 MMOL/L (ref 1.12–1.32)
CALCIUM SERPL-MCNC: 7.9 MG/DL (ref 8.5–10.5)
CALCIUM SERPL-MCNC: 8.2 MG/DL (ref 8.5–10.5)
CHLORIDE BLD-SCNC: 110 MEQ/L (ref 98–111)
CHLORIDE BLD-SCNC: 111 MEQ/L (ref 98–111)
CO2: 19 MEQ/L (ref 23–33)
CO2: 23 MEQ/L (ref 23–33)
CREAT SERPL-MCNC: 2.1 MG/DL (ref 0.4–1.2)
CREAT SERPL-MCNC: 2.3 MG/DL (ref 0.4–1.2)
EKG ATRIAL RATE: 80 BPM
EKG ATRIAL RATE: 81 BPM
EKG P AXIS: 49 DEGREES
EKG P AXIS: 54 DEGREES
EKG P-R INTERVAL: 138 MS
EKG P-R INTERVAL: 142 MS
EKG Q-T INTERVAL: 422 MS
EKG Q-T INTERVAL: 456 MS
EKG QRS DURATION: 88 MS
EKG QRS DURATION: 98 MS
EKG QTC CALCULATION (BAZETT): 490 MS
EKG QTC CALCULATION (BAZETT): 525 MS
EKG R AXIS: 9 DEGREES
EKG R AXIS: 9 DEGREES
EKG T AXIS: 47 DEGREES
EKG T AXIS: 96 DEGREES
EKG VENTRICULAR RATE: 80 BPM
EKG VENTRICULAR RATE: 81 BPM
EOSINOPHIL SMEAR: NORMAL
GFR SERPL CREATININE-BSD FRML MDRD: 30 ML/MIN/1.73M2
GFR SERPL CREATININE-BSD FRML MDRD: 33 ML/MIN/1.73M2
GLUCOSE BLD-MCNC: 171 MG/DL (ref 70–108)
GLUCOSE BLD-MCNC: 183 MG/DL (ref 70–108)
GLUCOSE BLD-MCNC: 210 MG/DL (ref 70–108)
GLUCOSE BLD-MCNC: 213 MG/DL (ref 70–108)
GLUCOSE BLD-MCNC: 218 MG/DL (ref 70–108)
GLUCOSE BLD-MCNC: 239 MG/DL (ref 70–108)
GLUCOSE BLD-MCNC: 242 MG/DL (ref 70–108)
HCT VFR BLD CALC: 34.2 % (ref 42–52)
HEMOGLOBIN: 10.7 GM/DL (ref 14–18)
MCH RBC QN AUTO: 24.3 PG (ref 27–31)
MCHC RBC AUTO-ENTMCNC: 31.2 GM/DL (ref 33–37)
MCV RBC AUTO: 77.7 FL (ref 80–94)
MRSA SCREEN RT-PCR: NEGATIVE
PDW BLD-RTO: 22.9 % (ref 11.5–14.5)
PLATELET # BLD: 187 THOU/MM3 (ref 130–400)
PMV BLD AUTO: 10.5 MCM (ref 7.4–10.4)
POTASSIUM SERPL-SCNC: 5.3 MEQ/L (ref 3.5–5.2)
POTASSIUM SERPL-SCNC: 5.5 MEQ/L (ref 3.5–5.2)
RBC # BLD: 4.4 MILL/MM3 (ref 4.7–6.1)
SODIUM BLD-SCNC: 145 MEQ/L (ref 135–145)
SODIUM BLD-SCNC: 145 MEQ/L (ref 135–145)
SPECIMEN: NORMAL
WBC # BLD: 20.1 THOU/MM3 (ref 4.8–10.8)

## 2017-11-28 PROCEDURE — 87040 BLOOD CULTURE FOR BACTERIA: CPT

## 2017-11-28 PROCEDURE — 82948 REAGENT STRIP/BLOOD GLUCOSE: CPT

## 2017-11-28 PROCEDURE — 6360000002 HC RX W HCPCS: Performed by: SPECIALIST

## 2017-11-28 PROCEDURE — 99024 POSTOP FOLLOW-UP VISIT: CPT | Performed by: SURGERY

## 2017-11-28 PROCEDURE — 82330 ASSAY OF CALCIUM: CPT

## 2017-11-28 PROCEDURE — 6360000002 HC RX W HCPCS: Performed by: INTERNAL MEDICINE

## 2017-11-28 PROCEDURE — 85027 COMPLETE CBC AUTOMATED: CPT

## 2017-11-28 PROCEDURE — 2100000000 HC CCU R&B

## 2017-11-28 PROCEDURE — 80048 BASIC METABOLIC PNL TOTAL CA: CPT

## 2017-11-28 PROCEDURE — 93005 ELECTROCARDIOGRAM TRACING: CPT

## 2017-11-28 PROCEDURE — 2700000000 HC OXYGEN THERAPY PER DAY

## 2017-11-28 PROCEDURE — 36415 COLL VENOUS BLD VENIPUNCTURE: CPT

## 2017-11-28 PROCEDURE — 2500000003 HC RX 250 WO HCPCS: Performed by: INTERNAL MEDICINE

## 2017-11-28 PROCEDURE — S0028 INJECTION, FAMOTIDINE, 20 MG: HCPCS | Performed by: INTERNAL MEDICINE

## 2017-11-28 PROCEDURE — 89190 NASAL SMEAR FOR EOSINOPHILS: CPT

## 2017-11-28 PROCEDURE — 2580000003 HC RX 258: Performed by: INTERNAL MEDICINE

## 2017-11-28 PROCEDURE — 99253 IP/OBS CNSLTJ NEW/EST LOW 45: CPT | Performed by: INTERNAL MEDICINE

## 2017-11-28 PROCEDURE — 2580000003 HC RX 258: Performed by: SPECIALIST

## 2017-11-28 PROCEDURE — 6360000002 HC RX W HCPCS: Performed by: SURGERY

## 2017-11-28 PROCEDURE — 99232 SBSQ HOSP IP/OBS MODERATE 35: CPT | Performed by: PHYSICIAN ASSISTANT

## 2017-11-28 PROCEDURE — A6252 ABSORPT DRG >16 <=48 W/O BDR: HCPCS

## 2017-11-28 RX ORDER — METOPROLOL TARTRATE 5 MG/5ML
5 INJECTION INTRAVENOUS EVERY 6 HOURS
Status: DISCONTINUED | OUTPATIENT
Start: 2017-11-28 | End: 2017-11-29

## 2017-11-28 RX ORDER — HYDRALAZINE HYDROCHLORIDE 20 MG/ML
10 INJECTION INTRAMUSCULAR; INTRAVENOUS EVERY 4 HOURS
Status: DISCONTINUED | OUTPATIENT
Start: 2017-11-28 | End: 2017-11-29

## 2017-11-28 RX ORDER — 0.9 % SODIUM CHLORIDE 0.9 %
250 INTRAVENOUS SOLUTION INTRAVENOUS ONCE
Status: DISCONTINUED | OUTPATIENT
Start: 2017-11-28 | End: 2017-12-07 | Stop reason: HOSPADM

## 2017-11-28 RX ADMIN — FAMOTIDINE 20 MG: 10 INJECTION, SOLUTION INTRAVENOUS at 11:17

## 2017-11-28 RX ADMIN — TAZOBACTAM SODIUM AND PIPERACILLIN SODIUM 2.25 G: 250; 2 INJECTION, SOLUTION INTRAVENOUS at 08:15

## 2017-11-28 RX ADMIN — Medication 2 UNITS: at 08:17

## 2017-11-28 RX ADMIN — FAMOTIDINE 20 MG: 10 INJECTION, SOLUTION INTRAVENOUS at 21:17

## 2017-11-28 RX ADMIN — Medication 10 ML: at 21:20

## 2017-11-28 RX ADMIN — SODIUM CHLORIDE: 9 INJECTION, SOLUTION INTRAVENOUS at 18:05

## 2017-11-28 RX ADMIN — Medication 1 UNITS: at 00:16

## 2017-11-28 RX ADMIN — METOPROLOL TARTRATE 5 MG: 5 INJECTION INTRAVENOUS at 18:19

## 2017-11-28 RX ADMIN — TAZOBACTAM SODIUM AND PIPERACILLIN SODIUM 2.25 G: 250; 2 INJECTION, SOLUTION INTRAVENOUS at 15:21

## 2017-11-28 RX ADMIN — Medication 2 UNITS: at 12:41

## 2017-11-28 RX ADMIN — HYDRALAZINE HYDROCHLORIDE 10 MG: 20 INJECTION INTRAMUSCULAR; INTRAVENOUS at 11:15

## 2017-11-28 RX ADMIN — Medication 1 UNITS: at 21:19

## 2017-11-28 RX ADMIN — HYDRALAZINE HYDROCHLORIDE 10 MG: 20 INJECTION INTRAMUSCULAR; INTRAVENOUS at 22:37

## 2017-11-28 RX ADMIN — HYDRALAZINE HYDROCHLORIDE 10 MG: 20 INJECTION INTRAMUSCULAR; INTRAVENOUS at 15:19

## 2017-11-28 RX ADMIN — IRON SUCROSE 200 MG: 20 INJECTION, SOLUTION INTRAVENOUS at 15:16

## 2017-11-28 RX ADMIN — Medication 1 UNITS: at 17:56

## 2017-11-28 RX ADMIN — HYDROMORPHONE HYDROCHLORIDE 1 MG: 1 INJECTION, SOLUTION INTRAMUSCULAR; INTRAVENOUS; SUBCUTANEOUS at 21:19

## 2017-11-28 RX ADMIN — HYDROMORPHONE HYDROCHLORIDE 1 MG: 1 INJECTION, SOLUTION INTRAMUSCULAR; INTRAVENOUS; SUBCUTANEOUS at 01:25

## 2017-11-28 RX ADMIN — NITROGLYCERIN 5 MCG/MIN: 20 INJECTION INTRAVENOUS at 00:11

## 2017-11-28 RX ADMIN — METOPROLOL TARTRATE 5 MG: 5 INJECTION INTRAVENOUS at 12:30

## 2017-11-28 RX ADMIN — METOPROLOL TARTRATE 5 MG: 5 INJECTION INTRAVENOUS at 06:17

## 2017-11-28 RX ADMIN — Medication 10 ML: at 00:12

## 2017-11-28 RX ADMIN — HYDRALAZINE HYDROCHLORIDE 10 MG: 20 INJECTION INTRAMUSCULAR; INTRAVENOUS at 18:16

## 2017-11-28 ASSESSMENT — PAIN DESCRIPTION - PAIN TYPE
TYPE: SURGICAL PAIN
TYPE: SURGICAL PAIN

## 2017-11-28 ASSESSMENT — PAIN SCALES - GENERAL
PAINLEVEL_OUTOF10: 0
PAINLEVEL_OUTOF10: 7
PAINLEVEL_OUTOF10: 0
PAINLEVEL_OUTOF10: 5
PAINLEVEL_OUTOF10: 8
PAINLEVEL_OUTOF10: 0
PAINLEVEL_OUTOF10: 0
PAINLEVEL_OUTOF10: 7
PAINLEVEL_OUTOF10: 0

## 2017-11-28 ASSESSMENT — PAIN SCALES - WONG BAKER: WONGBAKER_NUMERICALRESPONSE: 2

## 2017-11-28 ASSESSMENT — PAIN DESCRIPTION - ORIENTATION: ORIENTATION: MID

## 2017-11-28 ASSESSMENT — PAIN DESCRIPTION - LOCATION
LOCATION: ABDOMEN
LOCATION: ABDOMEN
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
## 2017-11-28 NOTE — OP NOTE
[address] [place] [address]                                 OPERATIVE REPORT    PATIENT NAME: Ashwin Daniels                  :        1965  MED REC NO:   [id number]                           ROOM:       0010  ACCOUNT NO:   [de-identified]                           ADMIT DATE: 2017  PROVIDER:     Elizabeth Dexter. Medardo Gold MD      DATE OF PROCEDURE:  2017. PREOPERATIVE DIAGNOSIS:  Obstructing sigmoid colon mass. POSTOPERATIVE DIAGNOSES:  Redundant loop of sigmoid with mass palpated. A  large redundant sigmoid loop to include the mass was removed. We palpated  the rectum and could feel no other masses. The dilators easily went up  through the anus to the stapled off rectal stump prior to the anastomosis  including a 25 dilator, 29 dilator, 33 dilator without obstruction. There  was also a small mass in the right lobe of the liver palpated and it was  biopsied. Otherwise no evidence of any spread of carcinoma. PROCEDURE:  1. Sigmoid colon resection. 2.  Incidental appendectomy. 3.  Biopsy of right lobe of the liver, core needle. DESCRIPTION OF PROCEDURE:  The patient was brought to the operating suite,  placed supine on the operating room table. After adequate inhalational  anesthesia was administered, the patient was placed up in stirrups. A  Willis catheter was placed. SCDs were in place and the perineum and abdomen  were prepped and draped in the usual sterile fashion. Incision was made in  the midline, taken down through the subcutaneous tissue down to the linea  alba and there was a fair amount of bleeding in the subcutaneous tissue,  but this was cauterized and the abdominal cavity was entered. There was  noted to be a very, very large redundant sigmoid loop and it was also  attached to the peritoneum, almost in a twisted fashion and this was freed  up as the peritoneal reflection was taken down. used a pursestring  suture to suture the head of the EEA in place. We then easily placed the  33 stapling device up through the rectum to the stapled off rectosigmoid  segment, opened the instrument, brought the male end out through the rectal  stump,  it to the proximal sigmoid head, closed the instruments,  fired the instrument and then easily rotated it out of the rectum. We  passed Betadine up through the anastomosis twice, there was no leakage. We  then placed fluid in the pelvis and blew air up through the rectum with no  bubbling. It should be noted we had packed the small bowel away and it  easily came down and then it went back up and felt the right lobe of the  liver mass using a Monopty prostate biopsy needle. I took three biopsies  core needle of the right lobe of the liver. There was no bleeding and then  closure was begun. Pack count was correct as was instrument count and  closure was begun. The fascia was closed with #1 Vicryl sutures. It  should be noted prior to this fascial closing, we did place Evicel over the  anastomosis. We finished closing the fascia and then the staples were used  to close the skin. The patient tolerated the procedure well. NONI CRAVEN [facility], MD    D: 11/27/2017 19:57:58       T: 11/27/2017 20:00:08     [email or web address]  Job#: [id number]     Doc#: [id number]    CC:

## 2017-11-28 NOTE — PROGRESS NOTES
Inpatient Cardiac Rehabilitation Consult    Received consult for Phase II Cardiac Rehabilitation. We will follow pt - no cath report back yet.

## 2017-11-28 NOTE — FLOWSHEET NOTE
11/28/17 0550   Provider Notification   Reason for Communication Evaluate   Provider Name Dr. iCerra Calderon   Provider Notification Physician   Method of Communication Secure Message   Response No new orders   Notification Time [phone]     Spoke with Dr. Cierra Calderon about consult for patient. Dr. Cierra Calderon stated he will see patient.

## 2017-11-28 NOTE — ANESTHESIA POSTPROCEDURE EVALUATION
Department of Anesthesiology  Postprocedure Note    Patient: Tatyana Davis  MRN: [id number]  YOB: 1965  Date of evaluation: 11/28/2017  Time:  2:11 AM     Procedure Summary     Date:  11/27/17 Room / Location:  [address] [place]    Anesthesia Start:  1542 Anesthesia Stop:  1817    Procedure:  SIGMOID COLON RESECTION, APPENDECTOMY, LIVER BIOPSY (N/A ) Diagnosis:  (cancer )    Surgeon: Sd Chagn MD Responsible Provider:  Dianelys Larson MD    Anesthesia Type:  general ASA Status:  4          Anesthesia Type: general    Alex Phase I: Alex Score: 8    Alex Phase II: Alex Score: 9    Last vitals: Reviewed and per EMR flowsheets. Anesthesia Post Evaluation   Katrina Overton 62  POST-ANESTHESIA NOTE       Name:  Tatyana Davis                                         Age:  50 y.o.   MRN:  [id number]      Last Vitals:  BP (!) 156/69   Pulse 83   Temp 98.4 °F (36.9 °C) (Rectal)   Resp 13   Ht 5' 9\" (1.753 m)   Wt 218 lb 14.7 oz (99.3 kg)   SpO2 97%   BMI 32.33 kg/m²   Patient Vitals for the past 4 hrs:   BP Pulse Resp SpO2   11/28/17 0200 (!) 156/69 83 13 97 %   11/28/17 0120 (!) 142/60 82 16 96 %   11/28/17 0105 (!) 145/64 82 16 97 %   11/28/17 0100 - 81 16 97 %   11/28/17 0055 (!) 148/64 82 16 97 %   11/28/17 0050 (!) 148/64 83 17 96 %   11/28/17 0035 (!) 152/61 81 16 96 %   11/28/17 0000 - 83 17 97 %       Level of Consciousness:  Awake    Respiratory:  Stable    Oxygen Saturation:  Stable    Cardiovascular:  Stable    Hydration:  Adequate    PONV:  Stable    Post-op Pain:  Adequate analgesia    Post-op Assessment:  No apparent anesthetic complications    Additional Follow-Up / Treatment / Comment:  None    Dianelys Larson MD  November 28, 2017   2:11 AM

## 2017-11-28 NOTE — PLAN OF CARE
Problem: Safety:  Goal: Free from accidental physical injury  Free from accidental physical injury    Outcome: Ongoing  No injury thus far. Sitter at bedside to maintain pt safety    Problem: Daily Care:  Goal: Daily care needs are met  Daily care needs are met    Outcome: Ongoing  Pt needs met via nursing care and sitter at bedside    Problem: Pain:  Goal: Patient's pain/discomfort is manageable  Patient's pain/discomfort is manageable   Outcome: Ongoing  Pt reports pain 7/10 in back. Medicated with dilaudid which seems to relieve pain as pt has gone to sleep    Problem: Discharge Planning:  Goal: Patients continuum of care needs are met  Patients continuum of care needs are met   Outcome: Ongoing  Pt to return to home at time of discharge    Problem: Pain Control  Goal: Maintain pain level at or below patient's acceptable level (or 5 if patient is unable to determine acceptable level)  Outcome: Ongoing  Pain needs met with dilaudid    Problem: Falls - Risk of  Goal: Absence of falls  Outcome: Ongoing  No falls this admission. Fall risk assessment completed and fall prevention interventions in place to prevent fall. Sitter at bedside for pt confusion/agitation    Problem: Serum Glucose Level - Abnormal:  Goal: Ability to maintain appropriate glucose levels will improve  Ability to maintain appropriate glucose levels will improve   Outcome: Ongoing  Pt's blood sugars being checked 4x/day. Insulin per sliding scale    Problem: Tissue Perfusion - Cardiopulmonary, Altered:  Goal: Circulatory function within specified parameters  Circulatory function within specified parameters   Outcome: Ongoing  Pt remains htn. Stable per rhythm. Pulses 2/1/1. Problem: Tissue Perfusion - Peripheral, Altered:  Goal: Absence of hematoma at arterial access site  Absence of hematoma at arterial access site   Outcome: Ongoing  No hematoma Rt radial cath site.     Goal: Circulatory function of lower extremities is within

## 2017-11-28 NOTE — PROGRESS NOTES
None. INTRAORBITAL CONTENTS: Unremarkable. OTHER: None. SKULL/SCALP: Unremarkable. PARANASAL SINUSES: Unremarkable. 1. Unremarkable noncontrast CT head. **This report has been created using voice recognition software. It may contain minor errors which are inherent in voice recognition technology. ** Final report electronically signed by Dr. Kristal Perry on 11/21/2017 6:22 PM       Physical Exam:  Vitals: BP (!) 165/63   Pulse 86   Temp 98.7 °F (37.1 °C) (Oral)   Resp 16   Ht 5' 9\" (1.753 m)   Wt 219 lb 9.3 oz (99.6 kg)   SpO2 97%   BMI 32.43 kg/m²   24 hour intake/output:    Intake/Output Summary (Last 24 hours) at 11/28/17 1538  Last data filed at 11/28/17 1342   Gross per 24 hour   Intake          3859.33 ml   Output              825 ml   Net          3034.33 ml     Last 3 weights: Wt Readings from Last 3 Encounters:   11/28/17 219 lb 9.3 oz (99.6 kg)   09/02/16 250 lb (113.4 kg)   12/06/15 245 lb (111.1 kg)       General appearance - sleepy, arouses easily  HEENT: Normocephalic and Atraumatic  Chest - clear to auscultation, no wheezes, rales or rhonchi, symmetric air entry  Cardiovascular - normal rate and regular rhythm  Abdomen - tenderness noted as expected, bowel sounds hypoactive, soft  Neurological - Alert and oriented and Normal speech  Integumentary - Skin color, texture, turgor normal. No Rashes or lesions  Musculoskeletal -Full ROM times 4 extremities  Surgery Incision: looks good, well appx, bright red bloody drainage    DVT prophylaxis: [] Lovenox                                 [x] SCDs                                 [] SQ Heparin                                 [] Encourage ambulation           [] Already on Anticoagulation                 ASSESSMENT:  S/p PROCEDURE:  1. Sigmoid colon resection. 2.  Incidental appendectomy. 3.  Biopsy of right lobe of the liver, core needle. POD# 1  1. Acute postoperative pain  2. Hyperkalemia  3. Leukocytosis  4. Surgical pathology pending   5.  NAVEEN

## 2017-11-28 NOTE — CONSULTS
Seen and examined   Seeing for acute kidney injury   IV fluid ok  Urine eosinophils   Consult to follow

## 2017-11-28 NOTE — PROGRESS NOTES
S/P partial colectomy for obstructing colon cancer. I will be following pathology. IV iron for iron deficiency anemia.

## 2017-11-28 NOTE — CONSULTS
Nephrology Consult Note  Patient's Name: Lashaun Salazar  1:29 PM  11/28/2017    Nephrologist: Andres Carolina    Reason for Consult:  Acute kidney injury with a low urine output  Requesting Physician:  Theresa Duffy    Chief Complaint:  None  Assessment  1-acute kidney injury likely due to volume sequestration from recent abdominal surgery though possibility of antibiotic induced interstitial nephritis is also considered  2-hypertension  3. Stage IIIB chronic kidney disease  4. Diabetes mellitus type 2  5. Right moderate pleural effusion  6. Left small pleural effusion  7. Pericardial effusion  8. Bilateral nephrolithiasis without obstruction  9. Status post colon resection  10. Status post cardiac cath  11. Coronary artery disease  12. Status post appendectomy and liver biopsy  13. Dr Wyatt Dozier will resume care  14. Discussed with staff  15. Discussed with Dr Nahomy Ortiz  1-labs reviewed  2-medications reviewed  3-operative report reviewed  4. Continue current intravenous fluid  5. CAT scan of the abdomen and pelvis report reviewed  6. CAT scan of the thorax report reviewed  7.  Echocardiogram report reviewed  8. Irrigate Willis catheter  9. Urine eosinophils  10. Labs in the morning  11. Discussed with the staff    History Obtained From:  Staff and medical record  History of Present Ilness:    Lashaun Salazar is a 50 y.o. male with history of diabetes mellitus, hypertension, chronic kidney disease, admitted through the emergency department after he presented there with dizziness. He had been having black tarry stool for several weeks prior to presentation. Hemoglobin was noted to be low at 7 g. Was admitted for evaluation and management. Review of his record revealed a serum creatinine that runs mostly between 1.5-1.6 mg/dL. Today it is 2.0 mg/dL. As a result I was asked to see him.   I just realized now  that he has seen Dr. Archie Herman in the past.  The patient will be transferred appropriately back to him. .  He had a CAT scan of abdomen pelvis done that revealed  colonic mass. He had colon resection. He also had  cardiac catheterization done yesterday with 3 vessel disease. PCI and stenting is anticipated soon. Very sleepy when I saw him. He was not able to give good history. History is mostly from staff and medical record. Transferred back to him. Past Medical History:   Diagnosis Date    Chronic kidney disease     see's Juanita Haste    Diabetes mellitus ([address]     Hyperlipidemia     Hypertension     Thyroid disease        Past Surgical History:   Procedure Laterality Date    ENDOSCOPY, COLON, DIAGNOSTIC      EYE SURGERY Left 2013    laser surgery    [facility]INGLE[facility]MULTIPLE Left 11/24/2017    COLONOSCOPY WITH BIOPSY performed by Bridget Alvarado MD at [facility]    [facility] EGD TRANSORAL BIOPSY SINGLE/MULTIPLE N/A 11/23/2017    EGD BIOPSY performed by Bridget Alvarado MD at [address]      mole on nose removed       Family History   Problem Relation Age of Onset    Cancer Father     Heart Disease Father     High Blood Pressure Mother         reports that he has never smoked. He has never used smokeless tobacco. He reports that he does not drink alcohol or use drugs. Allergies:  Review of patient's allergies indicates no known allergies.     Current Medications:      piperacillin-tazobactam (ZOSYN) 2.25 g in dextrose 50 mL IVPB (premix) Q8H   metoprolol (LOPRESSOR) injection 5 mg Q6H   hydrALAZINE (APRESOLINE) injection 10 mg Q4H   famotidine (PEPCID) injection 20 mg BID   iron sucrose (VENOFER) 200 mg in sodium chloride 0.9 % 100 mL IVPB Once   0.9 % sodium chloride infusion Continuous   sodium chloride flush 0.9 % injection 10 mL 2 times per day   sodium chloride flush 0.9 % injection 10 mL PRN   ondansetron (ZOFRAN) injection 4 mg Q6H PRN   HYDROmorphone (DILAUDID) injection 1 mg Q2H PRN   nitroGLYCERIN 50 mg in dextrose 5% 250 mL infusion Continuous hydrALAZINE (APRESOLINE) injection 10 mg Q4H PRN   insulin lispro (HUMALOG) injection vial 0-6 Units TID WC   insulin lispro (HUMALOG) injection vial 0-3 Units Nightly   glucose (GLUTOSE) 40 % oral gel 15 g PRN   dextrose 50 % solution 12.5 g PRN   glucagon (rDNA) injection 1 mg PRN   dextrose 5 % solution PRN   potassium chloride (KLOR-CON M) extended release tablet 40 mEq PRN   Or    potassium chloride 20 MEQ/15ML (10%) oral solution 40 mEq PRN   Or    potassium chloride 10 mEq/100 mL IVPB (Peripheral Line) PRN       Review of Systems:   Pertinent positives stated above in HPI. All other systems were reviewed and were negative. ROS:Constitutional: negative  Eyes: negative  Ears, nose, mouth, throat, and face: negative  Respiratory: negative  Cardiovascular: negative  Gastrointestinal: positive for change in bowel habits and melena  Genitourinary:negative  Integument/breast: negative  Hematologic/lymphatic: negative  Musculoskeletal:negative  Neurological: negative  Behavioral/Psych: negative  Endocrine: negative  Allergic/Immunologic: negative    Physical exam:   Constitutional:  Well-developed middle-aged gentleman in no distress  Vitals:   Vitals:    11/28/17 1230   BP:    Pulse:    Resp:    Temp: 98.7 °F (37.1 °C)   SpO2:        Skin: no rash, turgor wnl  Heent:  The posterior reactive to light and accommodation. Throat is clear. Oral mucosa is moist.  Neck: no bruits or jvd noted  Cardiovascular:  Regular sinus rhythm without any murmur, rubs or gallops. Respiratory: Clear to auscultation in the anterior with no wheezes, rhonchi or rales  Abdomen:  Soft. Surgical incision scar is noted. The area is dressed with surgical gauze. ExtNo lower extremity edema  : Normal male. Willis catheter is noted. Urine is clear.   Psychiatric: Deferred  Musculoskeletal:   Intact  CNS: Deferred    Data:   Labs:  Lab Results   Component Value Date     11/28/2017     11/28/2017     11/27/2017    K 5.3 (H) 11/28/2017    K 5.5 (H) 11/28/2017    K 4.6 11/27/2017     11/28/2017    CO2 23 11/28/2017    CO2 19 (L) 11/28/2017    CO2 25 11/27/2017    CREATININE 2.3 (H) 11/28/2017    CREATININE 2.1 (H) 11/28/2017    CREATININE 1.6 (H) 11/27/2017    BUN 30 (H) 11/28/2017    BUN 28 (H) 11/28/2017    BUN 24 (H) 11/27/2017    GLUCOSE 218 (H) 11/28/2017    GLUCOSE 210 (H) 11/28/2017    GLUCOSE 108 11/27/2017    WBC 20.1 (H) 11/28/2017    WBC 6.3 11/27/2017    WBC 11.0 (H) 11/24/2017    HGB 10.7 (L) 11/28/2017    HGB 10.6 (L) 11/27/2017    HGB 9.8 (L) 11/27/2017    HCT 34.2 (L) 11/28/2017    HCT 33.2 (L) 11/27/2017    HCT 31.5 (L) 11/27/2017    MCV 77.7 (L) 11/28/2017     11/28/2017     Labs reviewed    Imaging:  CXR results: Diagnostic images report reviewed        Thank you Dr. July Villeda for allowing us to participate in care of Bia Byrd       Electronically signed by Shwetha Odom MD on 11/28/2017 at 1:29 PM

## 2017-11-29 LAB
ANION GAP SERPL CALCULATED.3IONS-SCNC: 14 MEQ/L (ref 8–16)
BUN BLDV-MCNC: 34 MG/DL (ref 7–22)
CALCIUM SERPL-MCNC: 8 MG/DL (ref 8.5–10.5)
CHLORIDE BLD-SCNC: 113 MEQ/L (ref 98–111)
CO2: 21 MEQ/L (ref 23–33)
CREAT SERPL-MCNC: 2.7 MG/DL (ref 0.4–1.2)
EKG ATRIAL RATE: 85 BPM
EKG P AXIS: 46 DEGREES
EKG P-R INTERVAL: 110 MS
EKG Q-T INTERVAL: 414 MS
EKG QRS DURATION: 96 MS
EKG QTC CALCULATION (BAZETT): 492 MS
EKG R AXIS: 10 DEGREES
EKG T AXIS: 104 DEGREES
EKG VENTRICULAR RATE: 85 BPM
GFR SERPL CREATININE-BSD FRML MDRD: 25 ML/MIN/1.73M2
GLUCOSE BLD-MCNC: 148 MG/DL (ref 70–108)
GLUCOSE BLD-MCNC: 151 MG/DL (ref 70–108)
GLUCOSE BLD-MCNC: 155 MG/DL (ref 70–108)
GLUCOSE BLD-MCNC: 160 MG/DL (ref 70–108)
GLUCOSE BLD-MCNC: 161 MG/DL (ref 70–108)
GLUCOSE BLD-MCNC: 162 MG/DL (ref 70–108)
HCT VFR BLD CALC: 35.7 % (ref 42–52)
HEMOGLOBIN: 11.1 GM/DL (ref 14–18)
MCH RBC QN AUTO: 24.3 PG (ref 27–31)
MCHC RBC AUTO-ENTMCNC: 31.2 GM/DL (ref 33–37)
MCV RBC AUTO: 77.9 FL (ref 80–94)
PDW BLD-RTO: 24.1 % (ref 11.5–14.5)
PLATELET # BLD: 219 THOU/MM3 (ref 130–400)
PMV BLD AUTO: 10.1 MCM (ref 7.4–10.4)
POTASSIUM SERPL-SCNC: 5 MEQ/L (ref 3.5–5.2)
RBC # BLD: 4.58 MILL/MM3 (ref 4.7–6.1)
SODIUM BLD-SCNC: 148 MEQ/L (ref 135–145)
VRE CULTURE: NORMAL
WBC # BLD: 21.5 THOU/MM3 (ref 4.8–10.8)

## 2017-11-29 PROCEDURE — 2580000003 HC RX 258: Performed by: INTERNAL MEDICINE

## 2017-11-29 PROCEDURE — 51798 US URINE CAPACITY MEASURE: CPT

## 2017-11-29 PROCEDURE — A6252 ABSORPT DRG >16 <=48 W/O BDR: HCPCS

## 2017-11-29 PROCEDURE — S0028 INJECTION, FAMOTIDINE, 20 MG: HCPCS | Performed by: INTERNAL MEDICINE

## 2017-11-29 PROCEDURE — A6212 FOAM DRG <=16 SQ IN W/BORDER: HCPCS

## 2017-11-29 PROCEDURE — 36415 COLL VENOUS BLD VENIPUNCTURE: CPT

## 2017-11-29 PROCEDURE — 6370000000 HC RX 637 (ALT 250 FOR IP): Performed by: PHYSICIAN ASSISTANT

## 2017-11-29 PROCEDURE — 99024 POSTOP FOLLOW-UP VISIT: CPT | Performed by: SURGERY

## 2017-11-29 PROCEDURE — 6360000002 HC RX W HCPCS: Performed by: INTERNAL MEDICINE

## 2017-11-29 PROCEDURE — 2500000003 HC RX 250 WO HCPCS: Performed by: INTERNAL MEDICINE

## 2017-11-29 PROCEDURE — 99024 POSTOP FOLLOW-UP VISIT: CPT | Performed by: NURSE PRACTITIONER

## 2017-11-29 PROCEDURE — 6360000002 HC RX W HCPCS: Performed by: PHYSICIAN ASSISTANT

## 2017-11-29 PROCEDURE — 85027 COMPLETE CBC AUTOMATED: CPT

## 2017-11-29 PROCEDURE — 6370000000 HC RX 637 (ALT 250 FOR IP): Performed by: INTERNAL MEDICINE

## 2017-11-29 PROCEDURE — 80048 BASIC METABOLIC PNL TOTAL CA: CPT

## 2017-11-29 PROCEDURE — 6360000002 HC RX W HCPCS: Performed by: SURGERY

## 2017-11-29 PROCEDURE — 99231 SBSQ HOSP IP/OBS SF/LOW 25: CPT | Performed by: PHYSICIAN ASSISTANT

## 2017-11-29 PROCEDURE — A6402 STERILE GAUZE <= 16 SQ IN: HCPCS

## 2017-11-29 PROCEDURE — 2140000000 HC CCU INTERMEDIATE R&B

## 2017-11-29 PROCEDURE — 87086 URINE CULTURE/COLONY COUNT: CPT

## 2017-11-29 PROCEDURE — 82948 REAGENT STRIP/BLOOD GLUCOSE: CPT

## 2017-11-29 PROCEDURE — 93005 ELECTROCARDIOGRAM TRACING: CPT

## 2017-11-29 PROCEDURE — 2700000000 HC OXYGEN THERAPY PER DAY

## 2017-11-29 RX ORDER — HYDRALAZINE HYDROCHLORIDE 25 MG/1
25 TABLET, FILM COATED ORAL EVERY 8 HOURS SCHEDULED
Status: DISCONTINUED | OUTPATIENT
Start: 2017-11-29 | End: 2017-11-30

## 2017-11-29 RX ORDER — METOPROLOL TARTRATE 50 MG/1
50 TABLET, FILM COATED ORAL 2 TIMES DAILY
Status: DISCONTINUED | OUTPATIENT
Start: 2017-11-29 | End: 2017-12-07 | Stop reason: HOSPADM

## 2017-11-29 RX ORDER — SODIUM CHLORIDE 450 MG/100ML
INJECTION, SOLUTION INTRAVENOUS CONTINUOUS
Status: DISCONTINUED | OUTPATIENT
Start: 2017-11-29 | End: 2017-12-01

## 2017-11-29 RX ORDER — LORAZEPAM 2 MG/ML
0.5 INJECTION INTRAMUSCULAR EVERY 6 HOURS PRN
Status: DISCONTINUED | OUTPATIENT
Start: 2017-11-29 | End: 2017-11-29

## 2017-11-29 RX ORDER — METOPROLOL TARTRATE 5 MG/5ML
5 INJECTION INTRAVENOUS EVERY 6 HOURS PRN
Status: DISCONTINUED | OUTPATIENT
Start: 2017-11-29 | End: 2017-11-29

## 2017-11-29 RX ADMIN — METOPROLOL TARTRATE 5 MG: 5 INJECTION INTRAVENOUS at 06:07

## 2017-11-29 RX ADMIN — Medication 1 UNITS: at 08:59

## 2017-11-29 RX ADMIN — TAZOBACTAM SODIUM AND PIPERACILLIN SODIUM 2.25 G: 250; 2 INJECTION, SOLUTION INTRAVENOUS at 08:54

## 2017-11-29 RX ADMIN — METOPROLOL TARTRATE 5 MG: 5 INJECTION INTRAVENOUS at 12:15

## 2017-11-29 RX ADMIN — FAMOTIDINE 20 MG: 10 INJECTION, SOLUTION INTRAVENOUS at 08:51

## 2017-11-29 RX ADMIN — METOPROLOL TARTRATE 50 MG: 50 TABLET, FILM COATED ORAL at 15:09

## 2017-11-29 RX ADMIN — SODIUM CHLORIDE: 9 INJECTION, SOLUTION INTRAVENOUS at 04:33

## 2017-11-29 RX ADMIN — Medication 10 ML: at 20:30

## 2017-11-29 RX ADMIN — SODIUM CHLORIDE: 4.5 INJECTION, SOLUTION INTRAVENOUS at 13:47

## 2017-11-29 RX ADMIN — LORAZEPAM 0.5 MG: 2 INJECTION INTRAMUSCULAR; INTRAVENOUS at 06:06

## 2017-11-29 RX ADMIN — Medication 1 UNITS: at 12:15

## 2017-11-29 RX ADMIN — HYDROMORPHONE HYDROCHLORIDE 1 MG: 1 INJECTION, SOLUTION INTRAMUSCULAR; INTRAVENOUS; SUBCUTANEOUS at 09:11

## 2017-11-29 RX ADMIN — HYDRALAZINE HYDROCHLORIDE 25 MG: 25 TABLET, FILM COATED ORAL at 15:13

## 2017-11-29 RX ADMIN — TAZOBACTAM SODIUM AND PIPERACILLIN SODIUM 2.25 G: 250; 2 INJECTION, SOLUTION INTRAVENOUS at 23:27

## 2017-11-29 RX ADMIN — METOPROLOL TARTRATE 5 MG: 5 INJECTION INTRAVENOUS at 00:33

## 2017-11-29 RX ADMIN — HYDRALAZINE HYDROCHLORIDE 10 MG: 20 INJECTION INTRAMUSCULAR; INTRAVENOUS at 04:09

## 2017-11-29 RX ADMIN — HYDRALAZINE HYDROCHLORIDE 25 MG: 25 TABLET, FILM COATED ORAL at 21:57

## 2017-11-29 RX ADMIN — Medication 10 ML: at 08:51

## 2017-11-29 RX ADMIN — TAZOBACTAM SODIUM AND PIPERACILLIN SODIUM 2.25 G: 250; 2 INJECTION, SOLUTION INTRAVENOUS at 01:08

## 2017-11-29 RX ADMIN — HYDRALAZINE HYDROCHLORIDE 10 MG: 20 INJECTION INTRAMUSCULAR; INTRAVENOUS at 08:50

## 2017-11-29 RX ADMIN — METOPROLOL TARTRATE 50 MG: 50 TABLET, FILM COATED ORAL at 20:30

## 2017-11-29 RX ADMIN — NITROGLYCERIN 50 MCG/MIN: 20 INJECTION INTRAVENOUS at 09:17

## 2017-11-29 RX ADMIN — FAMOTIDINE 20 MG: 10 INJECTION, SOLUTION INTRAVENOUS at 20:30

## 2017-11-29 RX ADMIN — TAZOBACTAM SODIUM AND PIPERACILLIN SODIUM 2.25 G: 250; 2 INJECTION, SOLUTION INTRAVENOUS at 16:23

## 2017-11-29 ASSESSMENT — PAIN SCALES - GENERAL
PAINLEVEL_OUTOF10: 0
PAINLEVEL_OUTOF10: 10
PAINLEVEL_OUTOF10: 0

## 2017-11-29 NOTE — PROGRESS NOTES
INTERNAL MEDICINE Progress Note  11/29/2017 1:06 PM  Subjective:   Admit Date: 11/21/2017  PCP: Speedy Tejada  Interval History:     11/27/17, had LHC- multivessel CAD.  11/27/17, he had laparotomy / bowel resection with reanastomosis    Objective:   Vitals: BP (!) 152/69   Pulse 76   Temp 97.5 °F (36.4 °C)   Resp 14   Ht 5' 9\" (1.753 m)   Wt 219 lb 9.3 oz (99.6 kg)   SpO2 92%   BMI 32.43 kg/m²   General appearance: somnolent, arouses, pallor  HEENT: atraumatic  Neck:  no JVD  Lungs: clear to auscultation bilaterally  Heart: S1, S2 normal  Abdomen: soft, hypoactive BS  Extremities:  no cyanosis or edema  Neurologic:  Non focal      Medications:   Scheduled Meds:   piperacillin-tazobactam  2.25 g Intravenous Q8H    metoprolol  5 mg Intravenous Q6H    hydrALAZINE  10 mg Intravenous Q4H    famotidine (PEPCID) injection  20 mg Intravenous BID    sodium chloride  250 mL Intravenous Once    sodium chloride flush  10 mL Intravenous 2 times per day    insulin lispro  0-6 Units Subcutaneous TID WC    insulin lispro  0-3 Units Subcutaneous Nightly     Continuous Infusions:   sodium chloride 125 mL/hr at 11/29/17 0433    nitroGLYCERIN 50 mcg/min (11/29/17 0917)    dextrose         Lab Results:   CBC:   Recent Labs      11/27/17   0430  11/27/17   1011  11/28/17   0359  11/29/17   0514   WBC  6.3   --   20.1*  21.5*   HGB  9.8*  10.6*  10.7*  11.1*   PLT  201   --   187  219     BMP:    Recent Labs      11/28/17   0359  11/28/17   1020  11/29/17   0514   NA  145  145  148*   K  5.5*  5.3*  5.0   CL  110  111  113*   CO2  19*  23  21*   BUN  28*  30*  34*   CREATININE  2.1*  2.3*  2.7*   GLUCOSE  210*  218*  160*     CEA 27.1 (H)     Pathology:  FINAL DIAGNOSIS:  A - Appendix, appendectomy:   Luminal fibrous obliteration.   B - Colon, sigmoid, segmental resection:   Moderately differentiated invasive adenocarcinoma.   Tumor size:  3.2 cm.      Tumor extension:  Tumor invades through the muscularis propria into      the subserosal adipose tissue.   Margins:  Negative for malignancy.   Lymph nodes:  Four of fifteen lymph nodes are positive for metastatic  carcinoma.   Pathologic stage:  pT3, pN2a, pM1a  C - Liver, right lobe, core needle biopsies:   Metastatic adenocarcinoma consistent with colon primary. Assessment and Plan:   1. Acute blood loss anemia with hemoccult positive stool  2. Gastric ulcer, erosions s/p bx  3. Metastatic colon cancer with liver deposit. 4. Elevated troponin / NSTEMI / multivessel CAD s/p LHC  5. NAVEEN prob related to contrast  6. CKD stage 3  7. HTN  8. DM 2  9. dyslipidemia       Cont IVF. Cont lopressor, NTG,  Am labs.     Maldonado Lord MD

## 2017-11-29 NOTE — PROGRESS NOTES
Acct: [de-identified]  Admit Date:  11/21/2017  Primary Cardiologist:     Chief Complaint/Reason for Consultation: Elevated Troponin    Subjective (Events in last 24 hours): Some abdominal pain. Denies any CP, SOB or palpitations.         Objective:   BP (!) 165/63   Pulse 86   Temp 98.7 °F (37.1 °C) (Oral)   Resp 16   Ht 5' 9\" (1.753 m)   Wt 219 lb 9.3 oz (99.6 kg)   SpO2 97%   BMI 32.43 kg/m²        TELEMETRY: NSR    Physical Exam:  General Appearance: alert and oriented to person, place and time, in no acute distress  Cardiovascular: normal rate, regular rhythm, normal S1 and S2, no murmurs, rubs, clicks, or gallops, distal pulses intact, no carotid bruits, no JVD  Pulmonary/Chest: clear to auscultation bilaterally- no wheezes, rales or rhonchi, normal air movement, no respiratory distress  Abdomen: soft, non-tender, non-distended, normal bowel sounds, no masses Extremities: no cyanosis, clubbing or edema, pulse   Skin: warm and dry, no rash or erythema  Head: normocephalic and atraumatic  Eyes: pupils equal, round, and reactive to light  Neck: supple and non-tender without mass, no thyromegaly   Musculoskeletal: normal range of motion, no joint swelling, deformity or tenderness  Neurological: alert, oriented, normal speech, no focal findings or movement disorder noted    Medications:    piperacillin-tazobactam  2.25 g Intravenous Q8H    metoprolol  5 mg Intravenous Q6H    hydrALAZINE  10 mg Intravenous Q4H    famotidine (PEPCID) injection  20 mg Intravenous BID    sodium chloride  250 mL Intravenous Once    sodium chloride flush  10 mL Intravenous 2 times per day    insulin lispro  0-6 Units Subcutaneous TID     insulin lispro  0-3 Units Subcutaneous Nightly      sodium chloride 125 mL/hr at 11/28/17 1805    nitroGLYCERIN 30 mcg/min (11/28/17 2110)    dextrose         sodium chloride flush 10 mL PRN   ondansetron 4 mg Q6H PRN   HYDROmorphone 1 mg Q2H PRN   hydrALAZINE 10 mg Q4H PRN   glucose

## 2017-11-29 NOTE — CARE COORDINATION
DISCHARGE BARRIERS  11/29/17, 2:06 PM    Reason for Referral: discharge planning; discharge planning, mom wanting different arrangements when discharged. Mental Status: Oriented, sleepy and would not stay awake  Decision Making: Yes  Family/Social/Home Environment: Spoke with patient's mother, Constance Donahue. Patient resides on a third floor apartment without elevator access. Mother reports patient was independent at home however would work 3p=11p, stay up until 1-2a and then sleep until he went back to work. Mother reports that patient was in special education classes growing up. Current Services: None  Current Equipment: None  Payment Source: [facility] Medicaid  Concerns or Barriers to Discharge: None noted at this time  Collabrative List of ECF/HH were provided: Not at this time, discussed [facility] as it was close to mom since she is at [address] used with Constance Donahue regarding care plan and discharge plans. Patient unable to verbalize understanding of the plan of care and contribute to goal setting. Anticipated Needs/Discharge Plan: Potential ECF for rehab, pending recovery and treatment options.      Electronically signed by SILVIA Reese, GINA on 11/29/2017 at 2:06 PM

## 2017-11-29 NOTE — FLOWSHEET NOTE
1900--pt rested most of the day. Arouses easily. \"i'm so tired. \"  C/o of pain with repositioning but when asked if he wanted something for it he states \"no, I'll just sleep. \" appropriate.

## 2017-11-29 NOTE — PROGRESS NOTES
glucose 15 g PRN   dextrose 12.5 g PRN   glucagon (rDNA) 1 mg PRN   dextrose 100 mL/hr PRN   potassium chloride 40 mEq PRN   Or     potassium chloride 40 mEq PRN   Or     potassium chloride 10 mEq PRN       Diagnostics:  EKG:  NSR        Cardiac catheterization 11/27/2017  MV CAD  Lab Data:    Cardiac Enzymes:  No results for input(s): CKTOTAL, CKMB, CKMBINDEX, TROPONINI in the last 72 hours.     CBC:   Lab Results   Component Value Date    WBC 21.5 11/29/2017    RBC 4.58 11/29/2017    HGB 11.1 11/29/2017    HCT 35.7 11/29/2017     11/29/2017       CMP:    Lab Results   Component Value Date     11/29/2017    K 5.0 11/29/2017     11/29/2017    CO2 21 11/29/2017    BUN 34 11/29/2017    CREATININE 2.7 11/29/2017    LABGLOM 25 11/29/2017    GLUCOSE 160 11/29/2017    CALCIUM 8.0 11/29/2017       Hepatic Function Panel:    Lab Results   Component Value Date    ALKPHOS 110 11/21/2017    ALT 17 11/21/2017    AST 14 11/21/2017    PROT 5.2 11/21/2017    BILITOT <0.2 11/21/2017    LABALBU 2.9 11/21/2017       Magnesium:  No results found for: MG    PT/INR:    Lab Results   Component Value Date    INR 1.09 11/27/2017       HgBA1c:    Lab Results   Component Value Date    LABA1C 5.8 11/22/2017       FLP:    Lab Results   Component Value Date    TRIG 71 11/22/2017    HDL 42 11/22/2017    LDLCALC 99 11/22/2017       TSH:    Lab Results   Component Value Date    TSH 1.510 11/21/2017         Assessment:  S/P Sigmoid Colon resection/Appy/Liver Bx  MV CAD  HTN  HLD  Type II DM      Plan:  · Continue Current cardiac meds  · Possible PCI this admission           Electronically signed by Sang Monson PA-C on 11/29/2017 at 7:49 PM

## 2017-11-29 NOTE — PROGRESS NOTES
[facility]  Post Operative Progress Note  Dr Nhi Vincent    Pt Name: Venkat Bobby Record Number: [id number]  Date of Birth 1965   Today's Date: 11/29/2017    Hospital day # 8   POD # 2  S/p PROCEDURE:  1. Sigmoid colon resection. 2.  Incidental appendectomy. 3.  Biopsy of right lobe of the liver, core needle. Chief complaint: Obstructing sigmoid colon mass. Patient got restless through the night,  Chart reviewed. Updated by nursing staff. Denies chest discomfort or dyspnea. No N/V; (-) belching, flatus and BM. NPO will advance to DIET CLEAR LIQUID; diet. Pain controlled with analgesia. Up with assistance Surgical incision looks good. Patient awakens easily, has been non verbal, unknown reasoning, mother present in room     Past, Family, Social History unchanged from admission.     Diet:  DIET CLEAR LIQUID;    Medications:  Scheduled Meds:   piperacillin-tazobactam  2.25 g Intravenous Q8H    metoprolol  5 mg Intravenous Q6H    hydrALAZINE  10 mg Intravenous Q4H    famotidine (PEPCID) injection  20 mg Intravenous BID    sodium chloride  250 mL Intravenous Once    sodium chloride flush  10 mL Intravenous 2 times per day    insulin lispro  0-6 Units Subcutaneous TID WC    insulin lispro  0-3 Units Subcutaneous Nightly     Continuous Infusions:   sodium chloride 125 mL/hr at 11/29/17 0433    nitroGLYCERIN 50 mcg/min (11/29/17 0917)    dextrose       PRN Meds:LORazepam, sodium chloride flush, ondansetron, HYDROmorphone, hydrALAZINE, glucose, dextrose, glucagon (rDNA), dextrose, potassium chloride **OR** potassium chloride **OR** potassium chloride    Objective:    CBC:   Recent Labs      11/27/17   0430  11/27/17   1011  11/28/17   0359  11/29/17   0514   WBC  6.3   --   20.1*  21.5*   HGB  9.8*  10.6*  10.7*  11.1*   PLT  201   --   187  219     BMP:    Recent Labs      11/28/17   0359  11/28/17   1020  11/29/17   0514   NA  145  145  148*   K  5.5*  5.3*  5.0   CL INTRAORBITAL CONTENTS: Unremarkable. OTHER: None. SKULL/SCALP: Unremarkable. PARANASAL SINUSES: Unremarkable. 1. Unremarkable noncontrast CT head. **This report has been created using voice recognition software. It may contain minor errors which are inherent in voice recognition technology. ** Final report electronically signed by Dr. Shoshana Rincon on 11/21/2017 6:22 PM       Physical Exam:  Vitals: BP (!) 179/75   Pulse 79   Temp 98.1 °F (36.7 °C) (Oral)   Resp 11   Ht 5' 9\" (1.753 m)   Wt 219 lb 9.3 oz (99.6 kg)   SpO2 94%   BMI 32.43 kg/m²   24 hour intake/output:    Intake/Output Summary (Last 24 hours) at 11/29/17 1229  Last data filed at 11/29/17 0440   Gross per 24 hour   Intake          3210.62 ml   Output              625 ml   Net          2585.62 ml     Last 3 weights: Wt Readings from Last 3 Encounters:   11/29/17 219 lb 9.3 oz (99.6 kg)   09/02/16 250 lb (113.4 kg)   12/06/15 245 lb (111.1 kg)       General appearance - sleepy, arouses easily, non verbal   HEENT: Normocephalic and Atraumatic  Chest - clear to auscultation, no wheezes, rales or rhonchi, symmetric air entry  Cardiovascular - normal rate and regular rhythm  Abdomen - tenderness noted as expected, bowel sounds hypoactive, soft  Neurological - Alert and oriented and Normal speech  Integumentary - Skin color, texture, turgor normal. No Rashes or lesions  Musculoskeletal -Full ROM times 4 extremities  Surgery Incision: looks good, well appx, no new drainage noted    DVT prophylaxis: [] Lovenox                                 [x] SCDs                                 [] SQ Heparin                                 [] Encourage ambulation           [] Already on Anticoagulation                 ASSESSMENT:  S/p PROCEDURE:  1. Sigmoid colon resection. 2.  Incidental appendectomy. 3.  Biopsy of right lobe of the liver, core needle. POD# 2  1. Acute postoperative pain  2. Hyperkalemia  3. hypertensive  4.  Leukocytosis trending up 21.5

## 2017-11-30 LAB
AMORPHOUS: ABNORMAL
ANION GAP SERPL CALCULATED.3IONS-SCNC: 14 MEQ/L (ref 8–16)
BACTERIA: ABNORMAL /HPF
BILIRUBIN URINE: NEGATIVE
BLOOD, URINE: ABNORMAL
BUN BLDV-MCNC: 38 MG/DL (ref 7–22)
CALCIUM SERPL-MCNC: 8.3 MG/DL (ref 8.5–10.5)
CASTS 2: ABNORMAL /LPF
CASTS UA: ABNORMAL /LPF
CHARACTER, URINE: ABNORMAL
CHLORIDE BLD-SCNC: 112 MEQ/L (ref 98–111)
CO2: 19 MEQ/L (ref 23–33)
COLOR: YELLOW
CREAT SERPL-MCNC: 2.7 MG/DL (ref 0.4–1.2)
CRYSTALS, UA: ABNORMAL
EPITHELIAL CELLS, UA: ABNORMAL /HPF
GFR SERPL CREATININE-BSD FRML MDRD: 25 ML/MIN/1.73M2
GLUCOSE BLD-MCNC: 126 MG/DL (ref 70–108)
GLUCOSE BLD-MCNC: 126 MG/DL (ref 70–108)
GLUCOSE BLD-MCNC: 147 MG/DL (ref 70–108)
GLUCOSE BLD-MCNC: 158 MG/DL (ref 70–108)
GLUCOSE BLD-MCNC: 163 MG/DL (ref 70–108)
GLUCOSE URINE: 250 MG/DL
HAV IGM SER IA-ACNC: NEGATIVE
HCT VFR BLD CALC: 34 % (ref 42–52)
HEMOGLOBIN: 10.5 GM/DL (ref 14–18)
HEPATITIS B CORE IGM ANTIBODY: NEGATIVE
HEPATITIS B SURFACE ANTIGEN: NEGATIVE
HEPATITIS C ANTIBODY: NEGATIVE
KETONES, URINE: NEGATIVE
LEUKOCYTE ESTERASE, URINE: ABNORMAL
MCH RBC QN AUTO: 24.5 PG (ref 27–31)
MCHC RBC AUTO-ENTMCNC: 30.8 GM/DL (ref 33–37)
MCV RBC AUTO: 79.6 FL (ref 80–94)
MISCELLANEOUS 2: ABNORMAL
NITRITE, URINE: NEGATIVE
PDW BLD-RTO: 25.2 % (ref 11.5–14.5)
PH UA: 5
PLATELET # BLD: 203 THOU/MM3 (ref 130–400)
PMV BLD AUTO: 10.4 MCM (ref 7.4–10.4)
POTASSIUM SERPL-SCNC: 4.8 MEQ/L (ref 3.5–5.2)
PROTEIN UA: 300
RBC # BLD: 4.27 MILL/MM3 (ref 4.7–6.1)
RBC URINE: ABNORMAL /HPF
RENAL EPITHELIAL, UA: ABNORMAL
SODIUM BLD-SCNC: 145 MEQ/L (ref 135–145)
SODIUM URINE: 32 MEQ/L
SPECIFIC GRAVITY, URINE: 1.02 (ref 1–1.03)
UROBILINOGEN, URINE: 0.2 EU/DL
WBC # BLD: 15.9 THOU/MM3 (ref 4.8–10.8)
WBC UA: ABNORMAL /HPF
YEAST: ABNORMAL

## 2017-11-30 PROCEDURE — 82948 REAGENT STRIP/BLOOD GLUCOSE: CPT

## 2017-11-30 PROCEDURE — 2580000003 HC RX 258: Performed by: INTERNAL MEDICINE

## 2017-11-30 PROCEDURE — 6370000000 HC RX 637 (ALT 250 FOR IP): Performed by: PHYSICIAN ASSISTANT

## 2017-11-30 PROCEDURE — 81001 URINALYSIS AUTO W/SCOPE: CPT

## 2017-11-30 PROCEDURE — 2500000003 HC RX 250 WO HCPCS: Performed by: INTERNAL MEDICINE

## 2017-11-30 PROCEDURE — 86038 ANTINUCLEAR ANTIBODIES: CPT

## 2017-11-30 PROCEDURE — 84300 ASSAY OF URINE SODIUM: CPT

## 2017-11-30 PROCEDURE — S0028 INJECTION, FAMOTIDINE, 20 MG: HCPCS | Performed by: INTERNAL MEDICINE

## 2017-11-30 PROCEDURE — 6370000000 HC RX 637 (ALT 250 FOR IP): Performed by: INTERNAL MEDICINE

## 2017-11-30 PROCEDURE — 80074 ACUTE HEPATITIS PANEL: CPT

## 2017-11-30 PROCEDURE — 85027 COMPLETE CBC AUTOMATED: CPT

## 2017-11-30 PROCEDURE — 83516 IMMUNOASSAY NONANTIBODY: CPT

## 2017-11-30 PROCEDURE — 99024 POSTOP FOLLOW-UP VISIT: CPT | Performed by: NURSE PRACTITIONER

## 2017-11-30 PROCEDURE — 80048 BASIC METABOLIC PNL TOTAL CA: CPT

## 2017-11-30 PROCEDURE — 99231 SBSQ HOSP IP/OBS SF/LOW 25: CPT | Performed by: PHYSICIAN ASSISTANT

## 2017-11-30 PROCEDURE — 2140000000 HC CCU INTERMEDIATE R&B

## 2017-11-30 PROCEDURE — 51798 US URINE CAPACITY MEASURE: CPT

## 2017-11-30 PROCEDURE — 86255 FLUORESCENT ANTIBODY SCREEN: CPT

## 2017-11-30 PROCEDURE — 36415 COLL VENOUS BLD VENIPUNCTURE: CPT

## 2017-11-30 PROCEDURE — 6360000002 HC RX W HCPCS: Performed by: INTERNAL MEDICINE

## 2017-11-30 PROCEDURE — 99024 POSTOP FOLLOW-UP VISIT: CPT | Performed by: SURGERY

## 2017-11-30 PROCEDURE — 6370000000 HC RX 637 (ALT 250 FOR IP): Performed by: NURSE PRACTITIONER

## 2017-11-30 RX ORDER — ISOSORBIDE MONONITRATE 60 MG/1
60 TABLET, EXTENDED RELEASE ORAL DAILY
Status: DISCONTINUED | OUTPATIENT
Start: 2017-11-30 | End: 2017-12-02

## 2017-11-30 RX ORDER — HEPARIN SODIUM 5000 [USP'U]/ML
5000 INJECTION, SOLUTION INTRAVENOUS; SUBCUTANEOUS EVERY 8 HOURS SCHEDULED
Status: DISCONTINUED | OUTPATIENT
Start: 2017-11-30 | End: 2017-12-04

## 2017-11-30 RX ORDER — HYDROCODONE BITARTRATE AND ACETAMINOPHEN 5; 325 MG/1; MG/1
1 TABLET ORAL EVERY 6 HOURS PRN
Status: DISCONTINUED | OUTPATIENT
Start: 2017-11-30 | End: 2017-12-07 | Stop reason: HOSPADM

## 2017-11-30 RX ORDER — METOPROLOL TARTRATE 50 MG/1
TABLET, FILM COATED ORAL
Refills: 3 | Status: ON HOLD | COMMUNITY
Start: 2017-10-10 | End: 2017-11-30 | Stop reason: SDUPTHER

## 2017-11-30 RX ORDER — HYDRALAZINE HYDROCHLORIDE 50 MG/1
50 TABLET, FILM COATED ORAL EVERY 8 HOURS SCHEDULED
Status: DISCONTINUED | OUTPATIENT
Start: 2017-11-30 | End: 2017-12-01

## 2017-11-30 RX ORDER — AMLODIPINE BESYLATE 5 MG/1
5 TABLET ORAL DAILY
Status: DISCONTINUED | OUTPATIENT
Start: 2017-11-30 | End: 2017-11-30

## 2017-11-30 RX ORDER — ISOSORBIDE MONONITRATE 30 MG/1
30 TABLET, EXTENDED RELEASE ORAL DAILY
Status: DISCONTINUED | OUTPATIENT
Start: 2017-11-30 | End: 2017-11-30

## 2017-11-30 RX ORDER — PERMETHRIN 50 MG/G
CREAM TOPICAL
Refills: 1 | Status: ON HOLD | COMMUNITY
Start: 2017-10-10 | End: 2017-12-07 | Stop reason: HOSPADM

## 2017-11-30 RX ADMIN — METOPROLOL TARTRATE 50 MG: 50 TABLET, FILM COATED ORAL at 21:22

## 2017-11-30 RX ADMIN — HYDRALAZINE HYDROCHLORIDE 50 MG: 50 TABLET, FILM COATED ORAL at 21:22

## 2017-11-30 RX ADMIN — TAZOBACTAM SODIUM AND PIPERACILLIN SODIUM 2.25 G: 250; 2 INJECTION, SOLUTION INTRAVENOUS at 17:56

## 2017-11-30 RX ADMIN — FAMOTIDINE 20 MG: 10 INJECTION, SOLUTION INTRAVENOUS at 21:22

## 2017-11-30 RX ADMIN — ASPIRIN 325 MG: 325 TABLET, DELAYED RELEASE ORAL at 17:57

## 2017-11-30 RX ADMIN — SODIUM CHLORIDE: 4.5 INJECTION, SOLUTION INTRAVENOUS at 03:24

## 2017-11-30 RX ADMIN — Medication 1 UNITS: at 10:34

## 2017-11-30 RX ADMIN — Medication 10 ML: at 10:48

## 2017-11-30 RX ADMIN — Medication 10 ML: at 03:11

## 2017-11-30 RX ADMIN — FAMOTIDINE 20 MG: 10 INJECTION, SOLUTION INTRAVENOUS at 10:31

## 2017-11-30 RX ADMIN — Medication 10 ML: at 21:22

## 2017-11-30 RX ADMIN — HYDRALAZINE HYDROCHLORIDE 10 MG: 20 INJECTION INTRAMUSCULAR; INTRAVENOUS at 03:11

## 2017-11-30 RX ADMIN — NITROGLYCERIN 50 MCG/MIN: 20 INJECTION INTRAVENOUS at 03:12

## 2017-11-30 RX ADMIN — TAZOBACTAM SODIUM AND PIPERACILLIN SODIUM 2.25 G: 250; 2 INJECTION, SOLUTION INTRAVENOUS at 10:42

## 2017-11-30 RX ADMIN — METOPROLOL TARTRATE 50 MG: 50 TABLET, FILM COATED ORAL at 10:31

## 2017-11-30 RX ADMIN — HYDROCODONE BITARTRATE AND ACETAMINOPHEN 1 TABLET: 5; 325 TABLET ORAL at 17:57

## 2017-11-30 RX ADMIN — AMLODIPINE BESYLATE 5 MG: 5 TABLET ORAL at 17:57

## 2017-11-30 RX ADMIN — Medication 1 UNITS: at 22:09

## 2017-11-30 RX ADMIN — DILTIAZEM HYDROCHLORIDE 30 MG: 30 TABLET, FILM COATED ORAL at 22:10

## 2017-11-30 RX ADMIN — Medication 1 UNITS: at 17:58

## 2017-11-30 RX ADMIN — HEPARIN SODIUM 5000 UNITS: 5000 INJECTION, SOLUTION INTRAVENOUS; SUBCUTANEOUS at 17:57

## 2017-11-30 RX ADMIN — HYDROCODONE BITARTRATE AND ACETAMINOPHEN 1 TABLET: 5; 325 TABLET ORAL at 10:43

## 2017-11-30 RX ADMIN — ISOSORBIDE MONONITRATE 60 MG: 60 TABLET ORAL at 17:57

## 2017-11-30 RX ADMIN — HYDRALAZINE HYDROCHLORIDE 25 MG: 25 TABLET, FILM COATED ORAL at 05:13

## 2017-11-30 RX ADMIN — HYDRALAZINE HYDROCHLORIDE 10 MG: 20 INJECTION INTRAMUSCULAR; INTRAVENOUS at 10:13

## 2017-11-30 ASSESSMENT — PAIN SCALES - GENERAL
PAINLEVEL_OUTOF10: 0
PAINLEVEL_OUTOF10: 10
PAINLEVEL_OUTOF10: 6
PAINLEVEL_OUTOF10: 0
PAINLEVEL_OUTOF10: 0
PAINLEVEL_OUTOF10: 9
PAINLEVEL_OUTOF10: 0

## 2017-11-30 NOTE — PROCEDURES
A Bladder scan was performed at 1050 . The patient's last void was at ? Preston Rodrigo The residual amount was measured to be 319 ML. Report of results was given to [facility].

## 2017-11-30 NOTE — CARE COORDINATION
11/30/17, [address] day: 9  Location: -25/025-A Reason for admit: NSTEMI (non-ST elevated myocardial infarction) [facility]) [I21.4]  NSTEMI (non-ST elevated myocardial infarction) [facility]) [I21.4]   Clinical update:    11/23 EGD done  11/24 colonoscopy done - sigmoid mass found  11/27 cardiac cath done, S/P sigmoid colon resection, appe and bx of liver  11/29 Pt needs PCI, ok for this in 2 weeks per Dr. Lissett Taylor. Plan PET scan as outpatient per Dr. Ginna Cosby  11/30 IV zosyn continues, nitro gtt and IV fluids. Pt up in chair today. Discharge plan: Pt from home in a 3rd floor apartment, pt lives alone. Pt feels like he will not be able to climb the stairs, and there is no elevator. Pt's mom feels pt needs ECF. Pt in pain this morning; did not discuss discharge plans further in depth with patient. Discussed case with .

## 2017-11-30 NOTE — PROGRESS NOTES
Inpatient Cardiac Rehabilitation Consult    Received consult for Phase II Cardiac Rehabilitation. Given the patients' multiple issues this hospital stay, we will discuss cardiac rehab with him when he returns for his PCI.

## 2017-11-30 NOTE — PROGRESS NOTES
Unremarkable. INTRACRANIAL HEMORRHAGE: None. MASS/MASS EFFECT/MIDLINE SHIFT: None. INTRA-AXIAL/EXTRA-AXIAL FLUID COLLECTIONS: None. INTRAORBITAL CONTENTS: Unremarkable. OTHER: None. SKULL/SCALP: Unremarkable. PARANASAL SINUSES: Unremarkable. 1. Unremarkable noncontrast CT head. **This report has been created using voice recognition software. It may contain minor errors which are inherent in voice recognition technology. ** Final report electronically signed by Dr. Emre Neff on 11/21/2017 6:22 PM       Physical Exam:  Vitals: BP (!) 179/77   Pulse 77   Temp 98.6 °F (37 °C) (Oral)   Resp 18   Ht 5' 9\" (1.753 m)   Wt 226 lb 8 oz (102.7 kg)   SpO2 92%   BMI 33.45 kg/m²   24 hour intake/output:    Intake/Output Summary (Last 24 hours) at 11/30/17 1557  Last data filed at 11/30/17 1459   Gross per 24 hour   Intake          3417.62 ml   Output              350 ml   Net          3067.62 ml     Last 3 weights: Wt Readings from Last 3 Encounters:   11/30/17 226 lb 8 oz (102.7 kg)   09/02/16 250 lb (113.4 kg)   12/06/15 245 lb (111.1 kg)       General appearance - sleepy, arouses easily, verbal communication improved  HEENT: Normocephalic and Atraumatic  Chest - clear to auscultation, no wheezes, rales or rhonchi, symmetric air entry  Cardiovascular - normal rate and regular rhythm  Abdomen - tenderness noted as expected, bowel sounds hypoactive, soft  Neurological - Alert and oriented and Normal speech  Integumentary - Skin color, texture, turgor normal. No Rashes or lesions  Musculoskeletal -Full ROM times 4 extremities  Surgery Incision: looks good, well appx, no new drainage noted    DVT prophylaxis: [] Lovenox                                 [x] SCDs                                 [x] SQ Heparin                                 [] Encourage ambulation           [] Already on Anticoagulation                 ASSESSMENT:  S/p PROCEDURE:  Sigmoid colon resection. Incidental appendectomy.   Biopsy of right lobe of the liver, core needle. 1. POD# 3  2. Acute postoperative pain  3. Hyperkalemia improved  4. hypertensive  5. Leukocytosis improving 15.9  6. NAVEEN likely r/t contrast values trending up  7. Gastric ulcer, erosions s/p bx  8. Acute blood loss anemia 10.5  9. Surgical Pathology    FINAL DIAGNOSIS:  A - Appendix, appendectomy:   Luminal fibrous obliteration. B - Colon, sigmoid, segmental resection:   Moderately differentiated invasive adenocarcinoma.   Tumor size:  3.2 cm.      Tumor extension:  Tumor invades through the muscularis propria into      the subserosal adipose tissue.   Margins:  Negative for malignancy.   Lymph nodes:  Four of fifteen lymph nodes are positive for metastatic  carcinoma.   Pathologic stage:  pT3, pN2a, pM1a  C - Liver, right lobe, core needle biopsies:   Metastatic adenocarcinoma consistent with colon primary. has a past medical history of Chronic kidney disease; Coronary artery disease involving native coronary artery of native heart without angina pectoris; Diabetes mellitus ([address] Hyperlipidemia; Hypertension; and Thyroid disease. PLAN:  1. Incision care daily, may leave open to air  2. Monitor labs, replace lytes per protocol  3. Advance to full liquids   4. Iv hydration  5. I&O  6. DVT scd's and GI Prophylaxis  7. Activity - ambulate per cardiology discretion   8. OOB to chair, C&DB,  IS  9. Analgesia and antiemetics as needed  10. Antibiotic Therapy - zosyn  11. Aspirin and sub q heparin initiated today per cardiology   12.  needs Cardiac stent placement, okay from surgical standpoint in two weeks  13.  Will discuss pathology with patient, will need oncology consult        Electronically signed by Lenin Rivero CNP on 11/30/2017 at 3:57 PM

## 2017-11-30 NOTE — PROGRESS NOTES
6908    dextrose         HYDROcodone 5 mg - acetaminophen 1 tablet Q6H PRN   sodium chloride flush 10 mL PRN   ondansetron 4 mg Q6H PRN   hydrALAZINE 10 mg Q4H PRN   glucose 15 g PRN   dextrose 12.5 g PRN   glucagon (rDNA) 1 mg PRN   dextrose 100 mL/hr PRN   potassium chloride 40 mEq PRN   Or     potassium chloride 40 mEq PRN   Or     potassium chloride 10 mEq PRN       Diagnostics:  EKG:  NSR        Cardiac catheterization 11/27/2017  MV CAD  Lab Data:    Cardiac Enzymes:  No results for input(s): CKTOTAL, CKMB, CKMBINDEX, TROPONINI in the last 72 hours.     CBC:   Lab Results   Component Value Date    WBC 15.9 11/30/2017    RBC 4.27 11/30/2017    HGB 10.5 11/30/2017    HCT 34.0 11/30/2017     11/30/2017       CMP:    Lab Results   Component Value Date     11/30/2017    K 4.8 11/30/2017     11/30/2017    CO2 19 11/30/2017    BUN 38 11/30/2017    CREATININE 2.7 11/30/2017    LABGLOM 25 11/30/2017    GLUCOSE 126 11/30/2017    CALCIUM 8.3 11/30/2017       Hepatic Function Panel:    Lab Results   Component Value Date    ALKPHOS 110 11/21/2017    ALT 17 11/21/2017    AST 14 11/21/2017    PROT 5.2 11/21/2017    BILITOT <0.2 11/21/2017    LABALBU 2.9 11/21/2017       Magnesium:  No results found for: MG    PT/INR:    Lab Results   Component Value Date    INR 1.09 11/27/2017       HgBA1c:    Lab Results   Component Value Date    LABA1C 5.8 11/22/2017       FLP:    Lab Results   Component Value Date    TRIG 71 11/22/2017    HDL 42 11/22/2017    LDLCALC 99 11/22/2017       TSH:    Lab Results   Component Value Date    TSH 1.510 11/21/2017         Assessment:  S/P Sigmoid Colon resection/Appy/Liver Bx  MV CAD  HTN  HLD  Type II DM      Plan:  · Continue Current cardiac meds  · PCI when CR improved           Electronically signed by Karyn Arrington PA-C on 11/30/2017 at 7:09 PM

## 2017-11-30 NOTE — PROGRESS NOTES
INTERNAL MEDICINE Progress Note  11/30/2017 4:03 PM  Subjective:   Admit Date: 11/21/2017  PCP: July Villeda  Interval History:     11/27/17, had LHC- multivessel CAD.  11/27/17, he had laparotomy / bowel resection with reanastomosis  Improving UO. Objective:   Vitals: BP (!) 179/77   Pulse 77   Temp 98.6 °F (37 °C) (Oral)   Resp 18   Ht 5' 9\" (1.753 m)   Wt 226 lb 8 oz (102.7 kg)   SpO2 92%   BMI 33.45 kg/m²   General appearance: alert, calm  HEENT: atraumatic  Neck:  no JVD  Lungs: clear to auscultation bilaterally  Heart: S1, S2 normal  Abdomen: soft, ++BS  Extremities:  no cyanosis or edema  Neurologic:  Non focal exam      Medications:   Scheduled Meds:   hydrALAZINE  50 mg Oral 3 times per day    isosorbide mononitrate  60 mg Oral Daily    amLODIPine  5 mg Oral Daily    aspirin  325 mg Oral Daily    heparin (porcine)  5,000 Units Subcutaneous 3 times per day    metoprolol tartrate  50 mg Oral BID    piperacillin-tazobactam  2.25 g Intravenous Q8H    famotidine (PEPCID) injection  20 mg Intravenous BID    sodium chloride  250 mL Intravenous Once    sodium chloride flush  10 mL Intravenous 2 times per day    insulin lispro  0-6 Units Subcutaneous TID WC    insulin lispro  0-3 Units Subcutaneous Nightly     Continuous Infusions:   sodium chloride 75 mL/hr at 11/30/17 0324    dextrose         Lab Results:   CBC:   Recent Labs      11/28/17   0359  11/29/17   0514  11/30/17   0508   WBC  20.1*  21.5*  15.9*   HGB  10.7*  11.1*  10.5*   PLT  187  219  203     BMP:    Recent Labs      11/28/17   1020  11/29/17   0514  11/30/17   0508   NA  145  148*  145   K  5.3*  5.0  4.8   CL  111  113*  112*   CO2  23  21*  19*   BUN  30*  34*  38*   CREATININE  2.3*  2.7*  2.7*   GLUCOSE  218*  160*  126*     CEA 27.1 (H)     Pathology:  FINAL DIAGNOSIS:  A - Appendix, appendectomy:   Luminal fibrous obliteration.   B - Colon, sigmoid, segmental resection:   Moderately differentiated invasive

## 2017-12-01 LAB
ANION GAP SERPL CALCULATED.3IONS-SCNC: 10 MEQ/L (ref 8–16)
BUN BLDV-MCNC: 35 MG/DL (ref 7–22)
CALCIUM SERPL-MCNC: 8 MG/DL (ref 8.5–10.5)
CHLORIDE BLD-SCNC: 112 MEQ/L (ref 98–111)
CO2: 21 MEQ/L (ref 23–33)
CREAT SERPL-MCNC: 2.5 MG/DL (ref 0.4–1.2)
CREATININE URINE: 116.1 MG/DL
GFR SERPL CREATININE-BSD FRML MDRD: 27 ML/MIN/1.73M2
GLUCOSE BLD-MCNC: 104 MG/DL (ref 70–108)
GLUCOSE BLD-MCNC: 115 MG/DL (ref 70–108)
GLUCOSE BLD-MCNC: 149 MG/DL (ref 70–108)
GLUCOSE BLD-MCNC: 150 MG/DL (ref 70–108)
GLUCOSE BLD-MCNC: 99 MG/DL (ref 70–108)
HCT VFR BLD CALC: 31.7 % (ref 42–52)
HEMOGLOBIN: 10 GM/DL (ref 14–18)
MCH RBC QN AUTO: 24.7 PG (ref 27–31)
MCHC RBC AUTO-ENTMCNC: 31.7 GM/DL (ref 33–37)
MCV RBC AUTO: 78.2 FL (ref 80–94)
PDW BLD-RTO: 24.9 % (ref 11.5–14.5)
PLATELET # BLD: 174 THOU/MM3 (ref 130–400)
PMV BLD AUTO: 9.9 MCM (ref 7.4–10.4)
POTASSIUM SERPL-SCNC: 4.5 MEQ/L (ref 3.5–5.2)
PROT/CREAT RATIO, UR: 2.59
PROTEIN, URINE: 300.4 MG/DL
RBC # BLD: 4.06 MILL/MM3 (ref 4.7–6.1)
SODIUM BLD-SCNC: 143 MEQ/L (ref 135–145)
WBC # BLD: 9.8 THOU/MM3 (ref 4.8–10.8)

## 2017-12-01 PROCEDURE — 97161 PT EVAL LOW COMPLEX 20 MIN: CPT

## 2017-12-01 PROCEDURE — 99024 POSTOP FOLLOW-UP VISIT: CPT | Performed by: SURGERY

## 2017-12-01 PROCEDURE — G8978 MOBILITY CURRENT STATUS: HCPCS

## 2017-12-01 PROCEDURE — G8987 SELF CARE CURRENT STATUS: HCPCS

## 2017-12-01 PROCEDURE — G8988 SELF CARE GOAL STATUS: HCPCS

## 2017-12-01 PROCEDURE — 82948 REAGENT STRIP/BLOOD GLUCOSE: CPT

## 2017-12-01 PROCEDURE — 6360000002 HC RX W HCPCS: Performed by: INTERNAL MEDICINE

## 2017-12-01 PROCEDURE — 84165 PROTEIN E-PHORESIS SERUM: CPT

## 2017-12-01 PROCEDURE — G8979 MOBILITY GOAL STATUS: HCPCS

## 2017-12-01 PROCEDURE — S0028 INJECTION, FAMOTIDINE, 20 MG: HCPCS | Performed by: INTERNAL MEDICINE

## 2017-12-01 PROCEDURE — 2500000003 HC RX 250 WO HCPCS: Performed by: INTERNAL MEDICINE

## 2017-12-01 PROCEDURE — 6370000000 HC RX 637 (ALT 250 FOR IP): Performed by: INTERNAL MEDICINE

## 2017-12-01 PROCEDURE — 99024 POSTOP FOLLOW-UP VISIT: CPT | Performed by: NURSE PRACTITIONER

## 2017-12-01 PROCEDURE — 84160 ASSAY OF PROTEIN ANY SOURCE: CPT

## 2017-12-01 PROCEDURE — 2140000000 HC CCU INTERMEDIATE R&B

## 2017-12-01 PROCEDURE — 82570 ASSAY OF URINE CREATININE: CPT

## 2017-12-01 PROCEDURE — 6370000000 HC RX 637 (ALT 250 FOR IP): Performed by: NURSE PRACTITIONER

## 2017-12-01 PROCEDURE — 97110 THERAPEUTIC EXERCISES: CPT

## 2017-12-01 PROCEDURE — 97166 OT EVAL MOD COMPLEX 45 MIN: CPT

## 2017-12-01 PROCEDURE — 80048 BASIC METABOLIC PNL TOTAL CA: CPT

## 2017-12-01 PROCEDURE — 36415 COLL VENOUS BLD VENIPUNCTURE: CPT

## 2017-12-01 PROCEDURE — 2580000003 HC RX 258: Performed by: INTERNAL MEDICINE

## 2017-12-01 PROCEDURE — 85027 COMPLETE CBC AUTOMATED: CPT

## 2017-12-01 PROCEDURE — 84156 ASSAY OF PROTEIN URINE: CPT

## 2017-12-01 RX ORDER — HYDRALAZINE HYDROCHLORIDE 50 MG/1
50 TABLET, FILM COATED ORAL ONCE
Status: COMPLETED | OUTPATIENT
Start: 2017-12-01 | End: 2017-12-01

## 2017-12-01 RX ORDER — HYDRALAZINE HYDROCHLORIDE 50 MG/1
100 TABLET, FILM COATED ORAL EVERY 8 HOURS SCHEDULED
Status: DISCONTINUED | OUTPATIENT
Start: 2017-12-01 | End: 2017-12-07 | Stop reason: HOSPADM

## 2017-12-01 RX ORDER — DOXAZOSIN MESYLATE 4 MG/1
4 TABLET ORAL DAILY
Status: DISCONTINUED | OUTPATIENT
Start: 2017-12-01 | End: 2017-12-03

## 2017-12-01 RX ORDER — DILTIAZEM HYDROCHLORIDE 60 MG/1
60 TABLET, FILM COATED ORAL EVERY 6 HOURS SCHEDULED
Status: DISCONTINUED | OUTPATIENT
Start: 2017-12-01 | End: 2017-12-04

## 2017-12-01 RX ADMIN — TAZOBACTAM SODIUM AND PIPERACILLIN SODIUM 2.25 G: 250; 2 INJECTION, SOLUTION INTRAVENOUS at 15:39

## 2017-12-01 RX ADMIN — FAMOTIDINE 20 MG: 10 INJECTION, SOLUTION INTRAVENOUS at 21:18

## 2017-12-01 RX ADMIN — DILTIAZEM HYDROCHLORIDE 30 MG: 30 TABLET, FILM COATED ORAL at 11:48

## 2017-12-01 RX ADMIN — Medication 10 ML: at 21:19

## 2017-12-01 RX ADMIN — ASPIRIN 325 MG: 325 TABLET, DELAYED RELEASE ORAL at 09:28

## 2017-12-01 RX ADMIN — DOXAZOSIN 4 MG: 4 TABLET ORAL at 16:39

## 2017-12-01 RX ADMIN — HYDROCODONE BITARTRATE AND ACETAMINOPHEN 1 TABLET: 5; 325 TABLET ORAL at 09:33

## 2017-12-01 RX ADMIN — HYDRALAZINE HYDROCHLORIDE 50 MG: 50 TABLET, FILM COATED ORAL at 13:22

## 2017-12-01 RX ADMIN — METOPROLOL TARTRATE 50 MG: 50 TABLET, FILM COATED ORAL at 09:28

## 2017-12-01 RX ADMIN — DILTIAZEM HYDROCHLORIDE 60 MG: 60 TABLET, FILM COATED ORAL at 17:57

## 2017-12-01 RX ADMIN — TAZOBACTAM SODIUM AND PIPERACILLIN SODIUM 2.25 G: 250; 2 INJECTION, SOLUTION INTRAVENOUS at 09:33

## 2017-12-01 RX ADMIN — HYDROCODONE BITARTRATE AND ACETAMINOPHEN 1 TABLET: 5; 325 TABLET ORAL at 17:57

## 2017-12-01 RX ADMIN — Medication 10 ML: at 09:33

## 2017-12-01 RX ADMIN — HYDRALAZINE HYDROCHLORIDE 10 MG: 20 INJECTION INTRAMUSCULAR; INTRAVENOUS at 13:58

## 2017-12-01 RX ADMIN — Medication 1 UNITS: at 16:39

## 2017-12-01 RX ADMIN — HEPARIN SODIUM 5000 UNITS: 5000 INJECTION, SOLUTION INTRAVENOUS; SUBCUTANEOUS at 13:22

## 2017-12-01 RX ADMIN — HYDRALAZINE HYDROCHLORIDE 50 MG: 50 TABLET, FILM COATED ORAL at 14:08

## 2017-12-01 RX ADMIN — FAMOTIDINE 20 MG: 10 INJECTION, SOLUTION INTRAVENOUS at 09:32

## 2017-12-01 RX ADMIN — METOPROLOL TARTRATE 50 MG: 50 TABLET, FILM COATED ORAL at 21:17

## 2017-12-01 RX ADMIN — HYDRALAZINE HYDROCHLORIDE 50 MG: 50 TABLET, FILM COATED ORAL at 05:34

## 2017-12-01 RX ADMIN — DILTIAZEM HYDROCHLORIDE 30 MG: 30 TABLET, FILM COATED ORAL at 05:34

## 2017-12-01 RX ADMIN — HEPARIN SODIUM 5000 UNITS: 5000 INJECTION, SOLUTION INTRAVENOUS; SUBCUTANEOUS at 05:34

## 2017-12-01 RX ADMIN — HEPARIN SODIUM 5000 UNITS: 5000 INJECTION, SOLUTION INTRAVENOUS; SUBCUTANEOUS at 21:22

## 2017-12-01 RX ADMIN — ISOSORBIDE MONONITRATE 60 MG: 60 TABLET ORAL at 09:28

## 2017-12-01 RX ADMIN — TAZOBACTAM SODIUM AND PIPERACILLIN SODIUM 2.25 G: 250; 2 INJECTION, SOLUTION INTRAVENOUS at 00:50

## 2017-12-01 ASSESSMENT — PAIN SCALES - GENERAL
PAINLEVEL_OUTOF10: 4
PAINLEVEL_OUTOF10: 6
PAINLEVEL_OUTOF10: 4
PAINLEVEL_OUTOF10: 5
PAINLEVEL_OUTOF10: 6
PAINLEVEL_OUTOF10: 0
PAINLEVEL_OUTOF10: 3

## 2017-12-01 ASSESSMENT — PAIN DESCRIPTION - PAIN TYPE
TYPE: SURGICAL PAIN
TYPE: SURGICAL PAIN

## 2017-12-01 ASSESSMENT — PAIN DESCRIPTION - LOCATION
LOCATION: ABDOMEN
LOCATION: ABDOMEN

## 2017-12-01 ASSESSMENT — PAIN DESCRIPTION - ORIENTATION: ORIENTATION: MID

## 2017-12-01 NOTE — FLOWSHEET NOTE
Dr. Viji Neal on the unit and informed him of the blood pressures being high, he stated he would add something.

## 2017-12-01 NOTE — PROGRESS NOTES
COLON RESECTION, APPENDECTOMY, LIVER BIOPSY performed by Sukhdev Manuel MD at [facility]       Restrictions/Precautions:  Restrictions/Precautions: General Precautions, Fall Risk        Position Activity Restriction  Other position/activity restrictions: abdominal surgery       Subjective:  Chart Reviewed: Yes  Patient assessed for rehabilitation services?: Yes  Comments: Pt is s/p Sigmoid Colon Resection, Appendectomy, and Liver bx core needle on 11/27/17 by Dr. Austyn Betts  Subjective: Pt up in bathroom and is ready to come back to bed and agrees to therapy before lying down. General:  Overall Orientation Status: Within Functional Limits    Vision: Impaired  Vision Exceptions: Wears glasses at all times    Hearing: Exceptions to [facility]  Hearing Exceptions: Hard of hearing/hearing concerns       Pain:  Yes. Pain Assessment  Pain Assessment: 0-10  Pain Level: 4  Pain Type: Surgical pain  Pain Location: Abdomen       Social/Functional History:    Lives With: Alone  Type of Home: Apartment  Home Layout: One level  Home Access: Stairs to enter with rails  Entrance Stairs - Number of Steps: 3 SHANNON     Bathroom Shower/Tub: Tub/Shower unit  Bathroom Toilet: Standard  IADL Comments: completes minimal cooking.  eats out often       ADL Assistance: Independent  Homemaking Assistance: Independent  Ambulation Assistance: Independent  Transfer Assistance: Independent    Active : Yes  Occupation: Full time employment  Additional Comments: pt I PTA    Objective:     RLE AROM: WFL     LLE AROM : WFL       Strength RLE: WFL  Comment: grossly 4/5 throughout    Strength LLE: WFL  Comment: grossly 4/5 throughout       Sensation  Overall Sensation Status: WFL      Sit to Supine: Stand by assistance    Transfers  Sit to Stand: Stand by assistance  Stand to sit: Stand by assistance       Ambulation 1  Surface: level tile  Device:  (IV pole with 1 UE and other UE supporting abdomen with blanket)  Assistance: Contact guard assistance  Quality of Gait: slow pace, short steps, mild waddle type gait pattern, no LOB but mild unsteadiness  Distance: 12 ft and additional distance noted in Treatment below      Exercises:  Comments: Pt was guided through completion of supine heelslides, hip abd/add, quads sets, ankle pumps, and straight leg raises. Each x 10-12 reps for strengthening to improve functional mobility. Activity Tolerance:  Activity Tolerance: Patient Tolerated treatment well;Patient limited by pain; Patient limited by endurance    Treatment Initiated: See exercises above and pt ambulated 50 ft with IV pole and CGA to close SBA with slow pace and shorter step lengths and mild unsteadiness    Assessment: Body structures, Functions, Activity limitations: Decreased functional mobility , Decreased endurance, Decreased strength, Decreased balance  Assessment: Pt is 50 y.o. male that has a mild developmental delay. Pt lives alone and recognizes that he needs to continue with therapy before returning home. Pt would benefit from continued skilled PT to improve functional mobility. Prognosis: Excellent    Clinical Presentation: Low - Stable and Uncomplicated:      Decision Making: Moderate Complexitybased on patient assessment and decision making process of determining plan of care and establishing reasonable expectations for measurable functional outcomes    REQUIRES PT FOLLOW UP: Yes  Discharge Recommendations: Continue to assess pending progress, Subacute/Skilled Nursing Facility, Patient would benefit from continued therapy after discharge    Patient Education:  Patient Education: plan of care and LE exercises    Equipment Recommendations:  Equipment Needed: No    Safety:  Type of devices:  All fall risk precautions in place, Left in bed, Call light within reach, Chair alarm in place, Nurse notified    Plan:  Times per week: 5x GM  Times per day: Daily  Current Treatment Recommendations: Strengthening, Balance Training, Functional Mobility Training, Transfer Training, Endurance Training, Gait Training, Home Exercise Program, Safety Education & Training, Patient/Caregiver Education & Training    Goals:  Patient goals : go home  Short term goals  Time Frame for Short term goals: 3 days  Short term goal 1: Pt to be Supervision for supine <> sit to get in/out of bed  Short term goal 2: Pt to be Supervision for sit <> stand to get up to ambulate  Short term goal 3: Pt to ambulate > 80 ft with AD with Supervision for household distances  Long term goals  Time Frame for Long term goals : not set due to short ELOS    Evaluation Complexity: Based on the findings of patient history, examination, clinical presentation, and decision making during this evaluation, the evaluation of Ellen Mcgovern  is of low complexity. PT G-Codes  Functional Limitation: Mobility: Walking and moving around  Mobility: Walking and Moving Around Current Status (): At least 40 percent but less than 60 percent impaired, limited or restricted  Mobility: Walking and Moving Around Goal Status (): At least 40 percent but less than 60 percent impaired, limited or restricted    AM-PAC Inpatient Mobility without Stair Climbing Raw Score : 16  AM-PAC Inpatient without Stair Climbing T-Scale Score : 45.54  Mobility Inpatient CMS 0-100% Score: 40.64  Mobility Inpatient without Stair CMS G-Code Modifier : Madelyn Bailey [place] 8

## 2017-12-01 NOTE — PLAN OF CARE
Problem: DISCHARGE BARRIERS  Goal: Patient's continuum of care needs are met  Outcome: Ongoing  ECF at discharge for rehab. See SW note for details.

## 2017-12-01 NOTE — PROGRESS NOTES
Recent Labs      11/29/17   0514  11/30/17   0508  12/01/17   0448   WBC  21.5*  15.9*  9.8   HGB  11.1*  10.5*  10.0*   PLT  219  203  174     BMP:    Recent Labs      11/29/17   0514  11/30/17   0508  12/01/17   0448   NA  148*  145  143   K  5.0  4.8  4.5   CL  113*  112*  112*   CO2  21*  19*  21*   BUN  34*  38*  35*   CREATININE  2.7*  2.7*  2.5*   GLUCOSE  160*  126*  99     Calcium:  Recent Labs      12/01/17   0448   CALCIUM  8.0*     Ionized Calcium:No results for input(s): IONCA in the last 72 hours. Magnesium:No results for input(s): MG in the last 72 hours. Phosphorus:No results for input(s): PHOS in the last 72 hours. BNP:No results for input(s): BNP in the last 72 hours. Glucose:  Recent Labs      11/30/17 2020 12/01/17   0612  12/01/17   1129   POCGLU  158*  104  115*     HgbA1C: No results for input(s): LABA1C in the last 72 hours. INR:   No results for input(s): INR in the last 72 hours. Hepatic: No results for input(s): ALKPHOS, ALT, AST, PROT, BILITOT, BILIDIR, LABALBU in the last 72 hours. Amylase and Lipase:No results for input(s): LACTA, AMYLASE in the last 72 hours. Lactic Acid: No results for input(s): LACTA in the last 72 hours. Troponin:   No results for input(s): CKTOTAL, CKMB, TROPONINT in the last 72 hours. BNP: No results for input(s): BNP in the last 72 hours. Lipids: No results for input(s): CHOL, TRIG, HDL, LDLCALC in the last 72 hours. Invalid input(s): LDL  ABGs: No results found for: PH, PCO2, PO2, HCO3, O2SAT    Radiology reports as per the Radiologist  Radiology: Ct Head Wo Contrast    Result Date: 11/21/2017  PROCEDURE: CT HEAD WO CONTRAST CLINICAL INFORMATION: Headache. COMPARISON: 12/3/2015. TECHNIQUE: 5 mm axial imaging through the head without IV contrast. All CT scans at this facility use dose modulation, iterative reconstruction, and/or weight based dosing when appropriate to reduce the radiation dose to as low as reasonably achievable.  FINDINGS: POSTOPERATIVE CHANGES: None. BRAIN VOLUME: 1. Normal for the patient's age. VENTRICLES/CISTERNS: 1. Normal for the patient's age. INFARCT/WHITE MATTER DISEASE: Unremarkable. INTRACRANIAL HEMORRHAGE: None. MASS/MASS EFFECT/MIDLINE SHIFT: None. INTRA-AXIAL/EXTRA-AXIAL FLUID COLLECTIONS: None. INTRAORBITAL CONTENTS: Unremarkable. OTHER: None. SKULL/SCALP: Unremarkable. PARANASAL SINUSES: Unremarkable. 1. Unremarkable noncontrast CT head. **This report has been created using voice recognition software. It may contain minor errors which are inherent in voice recognition technology. ** Final report electronically signed by Dr. Nataliia Mclean on 11/21/2017 6:22 PM       Physical Exam:  Vitals: BP (!) 198/94   Pulse 67   Temp 97.7 °F (36.5 °C) (Oral)   Resp 18   Ht 5' 9\" (1.753 m)   Wt 228 lb 11.2 oz (103.7 kg)   SpO2 94%   BMI 33.77 kg/m²   24 hour intake/output:    Intake/Output Summary (Last 24 hours) at 12/01/17 1208  Last data filed at 12/01/17 0404   Gross per 24 hour   Intake          3132.94 ml   Output              775 ml   Net          2357.94 ml     Last 3 weights:   Wt Readings from Last 3 Encounters:   12/01/17 228 lb 11.2 oz (103.7 kg)   09/02/16 250 lb (113.4 kg)   12/06/15 245 lb (111.1 kg)       General appearance - awake and alert in bed, mom in room   HEENT: Normocephalic and Atraumatic  Chest - clear to auscultation, no wheezes, rales or rhonchi, symmetric air entry  Cardiovascular - normal rate and regular rhythm  Abdomen - tenderness noted as expected, bowel sounds hypoactive, soft  Neurological - Alert and oriented and Normal speech  Integumentary - Skin color, texture, turgor normal. No Rashes or lesions  Musculoskeletal -Full ROM times 4 extremities  Surgery Incision: looks good, well appx, no new drainage noted    DVT prophylaxis: [] Lovenox                                 [x] SCDs                                 [x] SQ Heparin                                 [] Encourage ambulation discussed and I agree with CNP. Labs, cultures, and radiographs where available were reviewed. See orders for the updated patient care plan.     Sd Chang MD, patient more talkative today abdomen is soft he is having bowel movements catheter is still in place as they needed a coudé-tip to replace the Willis after he could not void will continue Willis catheter and monitor oral in  12/1/2017   2:45 PM

## 2017-12-01 NOTE — PROGRESS NOTES
Katrina Overton 62  INPATIENT OCCUPATIONAL THERAPY  [facility] CCU-STEPDOWN 3B  EVALUATION    Time:  Time In: 1400  Time Out: 1415  Timed Code Treatment Minutes: 0 Minutes  Minutes: 15          Date: 2017  Patient Name: Aide Matthew,   Gender: male      MRN: [id number]  : 1965  (50 y.o.)  Referring Practitioner: Dr. Alexsander Hsu  Diagnosis: NSTEMI  Additional Pertinent Hx: The patient states that while he was at work today, he began feeling dizzy and clammy. He had his blood pressure tested in the nurse's station at work and the patient states that is was 207. His employer called EMS, which brought him to the emergency department.   pt s/p Sigmoid Colon Resection , Appendectomy , and Liver bx core needle on     Restrictions/Precautions:  Restrictions/Precautions: General Precautions, Fall Risk            Position Activity Restriction  Other position/activity restrictions: abdominal surgery         Past Medical History:   Diagnosis Date    Chronic kidney disease     see's Connie Whittaker Coronary artery disease involving native coronary artery of native heart without angina pectoris     Diabetes mellitus ([facility] [address]     Hyperlipidemia     Hypertension     Thyroid disease      Past Surgical History:   Procedure Laterality Date    ENDOSCOPY, COLON, DIAGNOSTIC      EYE SURGERY Left     laser surgery    MT COLONOSCOPY W/BIOPSY SINGLE/MULTIPLE Left 2017    COLONOSCOPY WITH BIOPSY performed by Talat Wing MD at [facility]    MT EGD TRANSORAL BIOPSY SINGLE/MULTIPLE N/A 2017    EGD BIOPSY performed by Talat Wing MD at [address] [place] [address] on nose removed    SMALL INTESTINE SURGERY N/A 2017    SIGMOID COLON RESECTION, APPENDECTOMY, LIVER BIOPSY performed by Kelsie Batista MD at [facility] 8  Chart Reviewed: Yes (H&P, orders, RN notes, images, )    Subjective: Rn okayed limited session this date d/t high BP, pt lethargic agreeble to eval General:       Vision: Within Functional Limits (glasses)    Hearing: Exceptions to [facility]  Hearing Exceptions: Hard of hearing/hearing concerns         Pain:  Pain Assessment  Patient Currently in Pain: Denies       Social/Functional:  Lives With: Alone  Type of Home: Apartment  Home Layout: One level  Home Access: Stairs to enter with rails  Entrance Stairs - Number of Steps: 3 SHANNON     Bathroom Shower/Tub: Tub/Shower unit  Bathroom Toilet: Standard  IADL Comments: completes minimal cooking. eats out often       ADL Assistance: Independent  Homemaking Assistance: Independent       Ambulation Assistance: Independent  Transfer Assistance: Independent    Active : Yes  Occupation: Full time employment  Additional Comments: pt I PTA    Objective        Cognition Comment: slow processing noted         Sensation  Overall Sensation Status: WFL                           LUE AROM (degrees)  LUE AROM : WFL          RUE AROM (degrees)  RUE AROM : WFL       LUE Strength  L Hand Grasp: 4/5  LUE Strength Comment: BUE not tested d/t abdominal pain and discomfort , general deconditioning noted                RUE Strength  R Hand Grasp: 4/5                     RUE Tone: Normotonic  LUE Tone: Normotonic                    ADL  Additional Comments: anticipate max A with LB ADLs, pt declined to complete any ADLs at this time          Transfers  Sit to stand: Contact guard assistance (from recliner)  Stand to sit: Contact guard assistance (to recliner)    Balance  Sitting Balance: Stand by assistance  Standing Balance: Contact guard assistance     Time: 2 minutes  Activity: prep for mobiltiy, slightly unsteady with no UE support, improvement noted with 1 UE support on IV pole     Functional Mobility  Functional - Mobility Device: No device  Activity: Other  Assist Level: Contact guard assistance  Functional Mobility Comments: in room, slow pace, slightly unsteady no LOB.                Activity Tolerance:  Activity Tolerance: Patient limited by fatigue, Patient limited by pain  Activity Tolerance: and high BP,     Treatment Initiated:  OT shaila completed this date, see above for more details    Assessment:  Assessment: Pt tolerated treatment fair this date d/t high fatiuge levels, overall session limited d/t high BP at this time. Pt presents with above deficits and unsteadiness with mobiltiy and static standing. Pt will continue to benefit from OT sevices to address deficits and return pt to PLOF  Performance deficits / Impairments: Decreased functional mobility , Decreased ADL status, Decreased endurance, Decreased balance, Decreased strength, Decreased safe awareness, Decreased high-level IADLs  Prognosis: Good  Discharge Recommendations: Subacute/Skilled Nursing Facility    Clinical Decision Making: Clinical Decision making was of Moderate Complexity as the result of analysis of data from a detailed assessment, a consideration of several treatment options, the presence of comorbidities affecting the plan of care and the need for minimal to moderate modifications or assistance required to complete the evaluation. Patient Education:  Patient Education: OT POC, role of OT, standing balance    Equipment Recommendations:  Equipment Needed: Yes  Other: continue to assess    Safety:  Safety Devices in place: Yes  Type of devices:  All fall risk precautions in place, Left in chair, Chair alarm in place, Nurse notified, Call light within reach    Plan:  Times per week: 5x  Current Treatment Recommendations: Strengthening, Balance Training, Functional Mobility Training, Endurance Training, Patient/Caregiver Education & Training, Self-Care / ADL, Safety Education & Training    Goals:  Patient goals : to go to rehab facility    Short term goals  Time Frame for Short term goals: 1 week  Short term goal 1: Pt will complete LB ADLs wtih min A and LHAE prn with no cues for safety to increase I with dressing routine  Short term goal 2: Pt will

## 2017-12-01 NOTE — PLAN OF CARE
Problem: Nutrition  Goal: Optimal nutrition therapy  Outcome: Ongoing  Nutrition Problem: Increased nutrient needs  Intervention: Food and/or Nutrient Delivery:  (diet progression as status allows)  Nutritional Goals: Patient will consume 75% or greater of meals  during LOS.

## 2017-12-01 NOTE — PLAN OF CARE
Problem: Safety:  Goal: Free from accidental physical injury  Free from accidental physical injury    Outcome: Ongoing  No falls today. The pt. Was more alert. He was able to get up with standby assistance. Problem: Daily Care:  Goal: Daily care needs are met  Daily care needs are met    The pt. Was able to eat advanced full liquids today. No nausea. He was able to pass 3 small BM's today. Minimal tenderness in abdominal area. Problem: Pain:  Goal: Pain level will decrease  Pain level will decrease   The pt. Did request Carson Evens today for pain. He stated that he was satisfied    Problem: Discharge Planning:  Goal: Patients continuum of care needs are met  Patients continuum of care needs are met   Outcome: Ongoing   was consulted today for placement after discharge. The pt.agrees that he will not be able to climb stairs to 3rd floor apartment. Problem: Serum Glucose Level - Abnormal:  Goal: Ability to maintain appropriate glucose levels will improve  Ability to maintain appropriate glucose levels will improve   Outcome: Ongoing  Continue to check glucose levels. Problem: Tissue Perfusion - Cardiopulmonary, Altered:  Goal: Circulatory function within specified parameters  Circulatory function within specified parameters   Outcome: Ongoing  Med changes made today. I reported to Dr. Cindi Villafana, that the pt. Was still on nitro IV. I reported elevated BP today with use of IV hydralazine. I reported that bladder scan was completed and strong catheter had to be placed today  Blood pressure began to respond to treatment.     Problem: Nutrition  Goal: Optimal nutrition therapy  Outcome: Met This Shift

## 2017-12-01 NOTE — PROGRESS NOTES
resection:   Moderately differentiated invasive adenocarcinoma.   Tumor size:  3.2 cm.      Tumor extension:  Tumor invades through the muscularis propria into      the subserosal adipose tissue.   Margins:  Negative for malignancy.   Lymph nodes:  Four of fifteen lymph nodes are positive for metastatic  carcinoma.   Pathologic stage:  pT3, pN2a, pM1a  C - Liver, right lobe, core needle biopsies:   Metastatic adenocarcinoma consistent with colon primary. Assessment and Plan:   1. NAVEEN on CKD [id number]  2. Hypernatremia  3. Acute blood loss anemia with hemoccult positive stool  4. Gastric ulcer, erosions s/p bx  5. Metastatic colon cancer with liver deposit. 6. Elevated troponin / NSTEMI / multivessel CAD s/p LHC  7. HTN  8. DM 2  9. 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  Underlying malignancy         Spot urine protein/Cr ratio is 2.8 gram   Pending  SPEP/UPEP , JAMES , ANCA , HEP  B  and C panel   Stop  1/2 NS  Very close attention to his electrolytes , acid/base and volume status   Cont Free water 200 cc q 6 hours   Depends on above result , we may consider kidney biopsy   Cont Non-dihydropyridine CCBs ( Cardizim)  to help reduce proteinuria and better control of his blood pressure   Add Cardura 4 mg po daily   Increase hydralazine to 100 mg po TID   Strict Is/OS  Daily Wt   Avoid Nephrotoxic medications   No IV contrast exposure unless it is absolute necessity   Cont lopressor, NTG per cardiology expertise   Am labs.     No indication for urgent RRT     Rosalio Falcon MD  Board Certified Nephrologist

## 2017-12-01 NOTE — PROGRESS NOTES
INTERNAL MEDICINE Progress Note  12/1/2017 12:09 PM  Subjective:   Admit Date: 11/21/2017  PCP: Ashley Easley  Interval History:     11/27/17, had LHC- multivessel CAD.  11/27/17, he had laparotomy / bowel resection with reanastomosis  Improving UO. Objective:   Vitals: BP (!) 198/94   Pulse 67   Temp 97.7 °F (36.5 °C) (Oral)   Resp 18   Ht 5' 9\" (1.753 m)   Wt 228 lb 11.2 oz (103.7 kg)   SpO2 94%   BMI 33.77 kg/m²   General appearance: alert, calm  HEENT: atraumatic  Neck:  no JVD  Lungs: clear to auscultation bilaterally  Heart: S1, S2 normal  Abdomen: soft, ++BS  Extremities:  no cyanosis or edema  Neurologic:  Non focal exam      Medications:   Scheduled Meds:   hydrALAZINE  50 mg Oral 3 times per day    isosorbide mononitrate  60 mg Oral Daily    aspirin  325 mg Oral Daily    heparin (porcine)  5,000 Units Subcutaneous 3 times per day    diltiazem  30 mg Oral 4 times per day    metoprolol tartrate  50 mg Oral BID    piperacillin-tazobactam  2.25 g Intravenous Q8H    famotidine (PEPCID) injection  20 mg Intravenous BID    sodium chloride  250 mL Intravenous Once    sodium chloride flush  10 mL Intravenous 2 times per day    insulin lispro  0-6 Units Subcutaneous TID WC    insulin lispro  0-3 Units Subcutaneous Nightly     Continuous Infusions:   sodium chloride 45 mL/hr at 11/30/17 2122    dextrose         Lab Results:   CBC:   Recent Labs      11/29/17   0514  11/30/17   0508  12/01/17   0448   WBC  21.5*  15.9*  9.8   HGB  11.1*  10.5*  10.0*   PLT  219  203  174     BMP:    Recent Labs      11/29/17   0514  11/30/17   0508  12/01/17   0448   NA  148*  145  143   K  5.0  4.8  4.5   CL  113*  112*  112*   CO2  21*  19*  21*   BUN  34*  38*  35*   CREATININE  2.7*  2.7*  2.5*   GLUCOSE  160*  126*  99     CEA 27.1 (H)     Pathology:  FINAL DIAGNOSIS:  A - Appendix, appendectomy:   Luminal fibrous obliteration.   B - Colon, sigmoid, segmental resection:   Moderately differentiated invasive adenocarcinoma.   Tumor size:  3.2 cm.      Tumor extension:  Tumor invades through the muscularis propria into      the subserosal adipose tissue.   Margins:  Negative for malignancy.   Lymph nodes:  Four of fifteen lymph nodes are positive for metastatic  carcinoma.   Pathologic stage:  pT3, pN2a, pM1a  C - Liver, right lobe, core needle biopsies:   Metastatic adenocarcinoma consistent with colon primary. Assessment and Plan:   1. Acute blood loss anemia with hemoccult positive stool  2. Gastric ulcer, erosions s/p bx  3. Metastatic colon cancer with liver deposit. 4. Elevated troponin / NSTEMI / multivessel CAD s/p LHC  5. NAVEEN prob related to contrast  6. CKD stage 3  7. HTN  8. DM 2  9. dyslipidemia     Cont hydration. Optimize / cont lopressor, Imdur, hydralazine  Am labs.     Sophia Perry MD

## 2017-12-01 NOTE — CARE COORDINATION
12/1/17, 12:08 PM  Blu Ibarra day: 10  Location: [facility] Reason for admit: NSTEMI (non-ST elevated myocardial infarction) [facility]) [I21.4]  NSTEMI (non-ST elevated myocardial infarction) [facility]) [I21.4]   Discharge plan: SS met with mother and patient, plan is for ECF at discharge. PT/OT order obtained so that an insurance pre-cert can be started.

## 2017-12-01 NOTE — PROGRESS NOTES
Nephrology  Progress Note  11/30/2017 8:19 PM  Subjective:   Admit Date: 11/21/2017  PCP: Lissett Ear  No SOB , no chest pain   11/27/17, had LHC- multivessel CAD.  11/27/17, he had laparotomy / bowel resection with reanastomosis    Objective:   Vitals: BP (!) 161/73   Pulse 81   Temp 98.6 °F (37 °C) (Oral)   Resp 16   Ht 5' 9\" (1.753 m)   Wt 226 lb 8 oz (102.7 kg)   SpO2 93%   BMI 33.45 kg/m²   General appearance: somnolent, arouses, pallor  HEENT: atraumatic  Neck:  no JVD  Lungs: clear to auscultation bilaterally  Heart: S1, S2 normal  Abdomen: soft, hypoactive BS  Extremities:  no cyanosis or edema  Neurologic:  Non focal      Medications:   Scheduled Meds:   hydrALAZINE  50 mg Oral 3 times per day    isosorbide mononitrate  60 mg Oral Daily    amLODIPine  5 mg Oral Daily    aspirin  325 mg Oral Daily    heparin (porcine)  5,000 Units Subcutaneous 3 times per day    metoprolol tartrate  50 mg Oral BID    piperacillin-tazobactam  2.25 g Intravenous Q8H    famotidine (PEPCID) injection  20 mg Intravenous BID    sodium chloride  250 mL Intravenous Once    sodium chloride flush  10 mL Intravenous 2 times per day    insulin lispro  0-6 Units Subcutaneous TID WC    insulin lispro  0-3 Units Subcutaneous Nightly     Continuous Infusions:   sodium chloride 75 mL/hr at 11/30/17 0324    dextrose         Lab Results:   CBC:   Recent Labs      11/28/17   0359  11/29/17   0514  11/30/17   0508   WBC  20.1*  21.5*  15.9*   HGB  10.7*  11.1*  10.5*   PLT  187  219  203     BMP:    Recent Labs      11/28/17   1020  11/29/17   0514  11/30/17   0508   NA  145  148*  145   K  5.3*  5.0  4.8   CL  111  113*  112*   CO2  23  21*  19*   BUN  30*  34*  38*   CREATININE  2.3*  2.7*  2.7*   GLUCOSE  218*  160*  126*     CEA 27.1 (H)     Pathology:  FINAL DIAGNOSIS:  A - Appendix, appendectomy:   Luminal fibrous obliteration.   B - Colon, sigmoid, segmental resection:   Moderately differentiated invasive adenocarcinoma.   Tumor size:  3.2 cm.      Tumor extension:  Tumor invades through the muscularis propria into      the subserosal adipose tissue.   Margins:  Negative for malignancy.   Lymph nodes:  Four of fifteen lymph nodes are positive for metastatic  carcinoma.   Pathologic stage:  pT3, pN2a, pM1a  C - Liver, right lobe, core needle biopsies:   Metastatic adenocarcinoma consistent with colon primary. Assessment and Plan:   1. NAVEEN on CKD [id number]  2. Hypernatremia  3. Acute blood loss anemia with hemoccult positive stool  4. Gastric ulcer, erosions s/p bx  5. Metastatic colon cancer with liver deposit. 6. Elevated troponin / NSTEMI / multivessel CAD s/p LHC  7. HTN  8. DM 2  9. Mixed hyperlipdemia     Oliguric NAVEEN most likely  Secondary  to ischemic tubular necrosis and Contrast induce nephropathy  , Cr is  Stable 2.8 , serum Na is going down to 145   Urine sediment is significant for proteinuria and hematuria  , concerning of GN disease     Spot urine protein/Cr ratio  Send SPEP/UPEP , JAMES , ANCA , HEP  B  and C panel   Cont 1/2 NS at 75 cc/hour   Very close attention to his electrolytes , acid/base and volume status   Cont Free water 200 cc q 6 hours   Depends on above result , we may consider kidney biopsy   Due to his significant proteinuria , I would suggest stop Norvasc , and start Non-dihydropyridine CCBs like cardizim to help reduce proteinuria and better control of his blood pressure   Increase hydralazine to 50 mg po TID   Strict Is/OS  Daily Wt   Avoid Nephrotoxic medications   No IV contrast exposure unless it is absolute necessity   Cont lopressor, NTG per cardiology expertise   Am labs.     No indication for urgent RRT     Shelby Schaefer MD

## 2017-12-01 NOTE — PROGRESS NOTES
· Evaluation: Goals set   · Goals: Patient will consume 75% or greater of meals  during LOS. · Monitoring: Diet Progression, Meal Intake, Supplement Intake, Diet Tolerance, Weight, Pertinent Labs    See Adult Nutrition Doc Flowsheet for more detail.      Electronically signed by More Montoya RD, MASSIMO on 12/1/17 at 11:06 AM    Contact Number: [phone]

## 2017-12-02 LAB
ANION GAP SERPL CALCULATED.3IONS-SCNC: 11 MEQ/L (ref 8–16)
BUN BLDV-MCNC: 30 MG/DL (ref 7–22)
CALCIUM SERPL-MCNC: 8 MG/DL (ref 8.5–10.5)
CHLORIDE BLD-SCNC: 111 MEQ/L (ref 98–111)
CO2: 22 MEQ/L (ref 23–33)
CREAT SERPL-MCNC: 2.2 MG/DL (ref 0.4–1.2)
GFR SERPL CREATININE-BSD FRML MDRD: 32 ML/MIN/1.73M2
GLUCOSE BLD-MCNC: 124 MG/DL (ref 70–108)
GLUCOSE BLD-MCNC: 125 MG/DL (ref 70–108)
GLUCOSE BLD-MCNC: 184 MG/DL (ref 70–108)
GLUCOSE BLD-MCNC: 196 MG/DL (ref 70–108)
GLUCOSE BLD-MCNC: 216 MG/DL (ref 70–108)
HCT VFR BLD CALC: 32.2 % (ref 42–52)
HEMOGLOBIN: 10.1 GM/DL (ref 14–18)
MCH RBC QN AUTO: 24.6 PG (ref 27–31)
MCHC RBC AUTO-ENTMCNC: 31.4 GM/DL (ref 33–37)
MCV RBC AUTO: 78.2 FL (ref 80–94)
PDW BLD-RTO: 24.9 % (ref 11.5–14.5)
PLATELET # BLD: 134 THOU/MM3 (ref 130–400)
PMV BLD AUTO: 9.9 MCM (ref 7.4–10.4)
POTASSIUM SERPL-SCNC: 4.2 MEQ/L (ref 3.5–5.2)
RBC # BLD: 4.12 MILL/MM3 (ref 4.7–6.1)
SODIUM BLD-SCNC: 144 MEQ/L (ref 135–145)
URINE CULTURE REFLEX: NORMAL
WBC # BLD: 5.9 THOU/MM3 (ref 4.8–10.8)

## 2017-12-02 PROCEDURE — A6402 STERILE GAUZE <= 16 SQ IN: HCPCS

## 2017-12-02 PROCEDURE — 2580000003 HC RX 258: Performed by: INTERNAL MEDICINE

## 2017-12-02 PROCEDURE — 82948 REAGENT STRIP/BLOOD GLUCOSE: CPT

## 2017-12-02 PROCEDURE — 85027 COMPLETE CBC AUTOMATED: CPT

## 2017-12-02 PROCEDURE — 6370000000 HC RX 637 (ALT 250 FOR IP): Performed by: INTERNAL MEDICINE

## 2017-12-02 PROCEDURE — 2500000003 HC RX 250 WO HCPCS: Performed by: INTERNAL MEDICINE

## 2017-12-02 PROCEDURE — 6360000002 HC RX W HCPCS: Performed by: INTERNAL MEDICINE

## 2017-12-02 PROCEDURE — 99231 SBSQ HOSP IP/OBS SF/LOW 25: CPT | Performed by: PHYSICIAN ASSISTANT

## 2017-12-02 PROCEDURE — S0028 INJECTION, FAMOTIDINE, 20 MG: HCPCS | Performed by: INTERNAL MEDICINE

## 2017-12-02 PROCEDURE — 2140000000 HC CCU INTERMEDIATE R&B

## 2017-12-02 PROCEDURE — 36415 COLL VENOUS BLD VENIPUNCTURE: CPT

## 2017-12-02 PROCEDURE — 80048 BASIC METABOLIC PNL TOTAL CA: CPT

## 2017-12-02 RX ORDER — ISOSORBIDE MONONITRATE 60 MG/1
60 TABLET, EXTENDED RELEASE ORAL 2 TIMES DAILY
Status: DISCONTINUED | OUTPATIENT
Start: 2017-12-02 | End: 2017-12-07 | Stop reason: HOSPADM

## 2017-12-02 RX ADMIN — ISOSORBIDE MONONITRATE 60 MG: 60 TABLET ORAL at 08:21

## 2017-12-02 RX ADMIN — HEPARIN SODIUM 5000 UNITS: 5000 INJECTION, SOLUTION INTRAVENOUS; SUBCUTANEOUS at 14:21

## 2017-12-02 RX ADMIN — DILTIAZEM HYDROCHLORIDE 60 MG: 60 TABLET, FILM COATED ORAL at 11:45

## 2017-12-02 RX ADMIN — HEPARIN SODIUM 5000 UNITS: 5000 INJECTION, SOLUTION INTRAVENOUS; SUBCUTANEOUS at 05:19

## 2017-12-02 RX ADMIN — DILTIAZEM HYDROCHLORIDE 60 MG: 60 TABLET, FILM COATED ORAL at 17:13

## 2017-12-02 RX ADMIN — TAZOBACTAM SODIUM AND PIPERACILLIN SODIUM 2.25 G: 250; 2 INJECTION, SOLUTION INTRAVENOUS at 16:34

## 2017-12-02 RX ADMIN — HYDRALAZINE HYDROCHLORIDE 100 MG: 50 TABLET, FILM COATED ORAL at 05:19

## 2017-12-02 RX ADMIN — DOXAZOSIN 4 MG: 4 TABLET ORAL at 19:54

## 2017-12-02 RX ADMIN — Medication 10 ML: at 08:21

## 2017-12-02 RX ADMIN — TAZOBACTAM SODIUM AND PIPERACILLIN SODIUM 2.25 G: 250; 2 INJECTION, SOLUTION INTRAVENOUS at 00:37

## 2017-12-02 RX ADMIN — Medication 2 UNITS: at 16:33

## 2017-12-02 RX ADMIN — ASPIRIN 325 MG: 325 TABLET, DELAYED RELEASE ORAL at 08:21

## 2017-12-02 RX ADMIN — METOPROLOL TARTRATE 50 MG: 50 TABLET, FILM COATED ORAL at 08:21

## 2017-12-02 RX ADMIN — Medication 1 UNITS: at 11:46

## 2017-12-02 RX ADMIN — Medication 10 ML: at 16:35

## 2017-12-02 RX ADMIN — HYDRALAZINE HYDROCHLORIDE 100 MG: 50 TABLET, FILM COATED ORAL at 14:21

## 2017-12-02 RX ADMIN — FAMOTIDINE 20 MG: 10 INJECTION, SOLUTION INTRAVENOUS at 11:56

## 2017-12-02 RX ADMIN — TAZOBACTAM SODIUM AND PIPERACILLIN SODIUM 2.25 G: 250; 2 INJECTION, SOLUTION INTRAVENOUS at 08:21

## 2017-12-02 RX ADMIN — Medication 1 UNITS: at 19:56

## 2017-12-02 RX ADMIN — DILTIAZEM HYDROCHLORIDE 60 MG: 60 TABLET, FILM COATED ORAL at 06:21

## 2017-12-02 ASSESSMENT — PAIN SCALES - GENERAL
PAINLEVEL_OUTOF10: 0
PAINLEVEL_OUTOF10: 0

## 2017-12-02 NOTE — PROGRESS NOTES
adenocarcinoma.   Tumor size:  3.2 cm.      Tumor extension:  Tumor invades through the muscularis propria into      the subserosal adipose tissue.   Margins:  Negative for malignancy.   Lymph nodes:  Four of fifteen lymph nodes are positive for metastatic  carcinoma.   Pathologic stage:  pT3, pN2a, pM1a  C - Liver, right lobe, core needle biopsies:   Metastatic adenocarcinoma consistent with colon primary. Assessment and Plan:   1. Acute blood loss anemia with hemoccult positive stool  2. Gastric ulcer, erosions s/p bx  3. Metastatic colon cancer with liver deposit. s/p laparotomy, bowel resection  4. Elevated troponin / NSTEMI / multivessel CAD s/p LHC  5. NAVEEN prob related to contrast  6. CKD stage 3  7. HTN  8. DM 2  9. dyslipidemia     Cont hydration. cont lopressor, Imdur, hydralazine  Am labs.   PT/OT    Jennifer Todd MD

## 2017-12-02 NOTE — FLOWSHEET NOTE
12/01/17 2030   Encounter Summary   Services provided to: Patient   Referral/Consult From: Nicole   Continue Visiting Yes  (12/2/17 Pt was sleeping)   Complexity of Encounter Low   Length of Encounter 15 minutes   Spiritual Assessment Completed Yes   Routine   Type Follow up   Assessment Sleeping   Intervention Prayer   Outcome Did not respond   Spiritual/Baptism   Type Spiritual support   During my encounter with this patient this staff found patient asleep. As a result, staff offered prayer for healing and hope for patient and family. It will be helpful for Spiritual care staff  to follow up with continue emotional and spiritual support for patient and family in the days following.

## 2017-12-03 LAB
ANA SCREEN: NORMAL
ANION GAP SERPL CALCULATED.3IONS-SCNC: 11 MEQ/L (ref 8–16)
BLOOD CULTURE, ROUTINE: NORMAL
BLOOD CULTURE, ROUTINE: NORMAL
BUN BLDV-MCNC: 29 MG/DL (ref 7–22)
CALCIUM SERPL-MCNC: 7.8 MG/DL (ref 8.5–10.5)
CHLORIDE BLD-SCNC: 113 MEQ/L (ref 98–111)
CO2: 21 MEQ/L (ref 23–33)
CREAT SERPL-MCNC: 2.2 MG/DL (ref 0.4–1.2)
GFR SERPL CREATININE-BSD FRML MDRD: 32 ML/MIN/1.73M2
GLUCOSE BLD-MCNC: 123 MG/DL (ref 70–108)
GLUCOSE BLD-MCNC: 128 MG/DL (ref 70–108)
GLUCOSE BLD-MCNC: 162 MG/DL (ref 70–108)
GLUCOSE BLD-MCNC: 163 MG/DL (ref 70–108)
GLUCOSE BLD-MCNC: 254 MG/DL (ref 70–108)
HCT VFR BLD CALC: 31.9 % (ref 42–52)
HEMOGLOBIN: 9.8 GM/DL (ref 14–18)
MCH RBC QN AUTO: 24.2 PG (ref 27–31)
MCHC RBC AUTO-ENTMCNC: 30.7 GM/DL (ref 33–37)
MCV RBC AUTO: 78.9 FL (ref 80–94)
NEUTROPHIL CYTOPLASMIC AB IGG: < 1
PDW BLD-RTO: 24.5 % (ref 11.5–14.5)
PLATELET # BLD: 133 THOU/MM3 (ref 130–400)
PMV BLD AUTO: 10.4 MCM (ref 7.4–10.4)
POTASSIUM SERPL-SCNC: 3.9 MEQ/L (ref 3.5–5.2)
RBC # BLD: 4.04 MILL/MM3 (ref 4.7–6.1)
SODIUM BLD-SCNC: 145 MEQ/L (ref 135–145)
WBC # BLD: 5.2 THOU/MM3 (ref 4.8–10.8)

## 2017-12-03 PROCEDURE — 6360000002 HC RX W HCPCS: Performed by: INTERNAL MEDICINE

## 2017-12-03 PROCEDURE — 2140000000 HC CCU INTERMEDIATE R&B

## 2017-12-03 PROCEDURE — 99024 POSTOP FOLLOW-UP VISIT: CPT | Performed by: SURGERY

## 2017-12-03 PROCEDURE — 2580000003 HC RX 258: Performed by: INTERNAL MEDICINE

## 2017-12-03 PROCEDURE — 85027 COMPLETE CBC AUTOMATED: CPT

## 2017-12-03 PROCEDURE — 2500000003 HC RX 250 WO HCPCS: Performed by: INTERNAL MEDICINE

## 2017-12-03 PROCEDURE — P9046 ALBUMIN (HUMAN), 25%, 20 ML: HCPCS | Performed by: INTERNAL MEDICINE

## 2017-12-03 PROCEDURE — 80048 BASIC METABOLIC PNL TOTAL CA: CPT

## 2017-12-03 PROCEDURE — 82948 REAGENT STRIP/BLOOD GLUCOSE: CPT

## 2017-12-03 PROCEDURE — 36415 COLL VENOUS BLD VENIPUNCTURE: CPT

## 2017-12-03 PROCEDURE — S0028 INJECTION, FAMOTIDINE, 20 MG: HCPCS | Performed by: INTERNAL MEDICINE

## 2017-12-03 PROCEDURE — 6370000000 HC RX 637 (ALT 250 FOR IP): Performed by: INTERNAL MEDICINE

## 2017-12-03 RX ORDER — DOXAZOSIN MESYLATE 4 MG/1
8 TABLET ORAL DAILY
Status: DISCONTINUED | OUTPATIENT
Start: 2017-12-03 | End: 2017-12-07 | Stop reason: HOSPADM

## 2017-12-03 RX ORDER — BUMETANIDE 0.25 MG/ML
1 INJECTION, SOLUTION INTRAMUSCULAR; INTRAVENOUS ONCE
Status: COMPLETED | OUTPATIENT
Start: 2017-12-03 | End: 2017-12-04

## 2017-12-03 RX ORDER — ALBUMIN (HUMAN) 12.5 G/50ML
25 SOLUTION INTRAVENOUS ONCE
Status: COMPLETED | OUTPATIENT
Start: 2017-12-03 | End: 2017-12-03

## 2017-12-03 RX ORDER — BUMETANIDE 0.25 MG/ML
2 INJECTION, SOLUTION INTRAMUSCULAR; INTRAVENOUS ONCE
Status: COMPLETED | OUTPATIENT
Start: 2017-12-03 | End: 2017-12-03

## 2017-12-03 RX ADMIN — FAMOTIDINE 20 MG: 10 INJECTION, SOLUTION INTRAVENOUS at 08:33

## 2017-12-03 RX ADMIN — HYDRALAZINE HYDROCHLORIDE 100 MG: 50 TABLET, FILM COATED ORAL at 00:28

## 2017-12-03 RX ADMIN — ISOSORBIDE MONONITRATE 60 MG: 60 TABLET ORAL at 08:32

## 2017-12-03 RX ADMIN — Medication 10 ML: at 08:41

## 2017-12-03 RX ADMIN — Medication 10 ML: at 21:06

## 2017-12-03 RX ADMIN — DILTIAZEM HYDROCHLORIDE 60 MG: 60 TABLET, FILM COATED ORAL at 17:11

## 2017-12-03 RX ADMIN — HEPARIN SODIUM 5000 UNITS: 5000 INJECTION, SOLUTION INTRAVENOUS; SUBCUTANEOUS at 14:23

## 2017-12-03 RX ADMIN — Medication 10 ML: at 00:37

## 2017-12-03 RX ADMIN — Medication 10 ML: at 21:00

## 2017-12-03 RX ADMIN — METOPROLOL TARTRATE 50 MG: 50 TABLET, FILM COATED ORAL at 00:27

## 2017-12-03 RX ADMIN — HYDRALAZINE HYDROCHLORIDE 100 MG: 50 TABLET, FILM COATED ORAL at 21:01

## 2017-12-03 RX ADMIN — TAZOBACTAM SODIUM AND PIPERACILLIN SODIUM 2.25 G: 250; 2 INJECTION, SOLUTION INTRAVENOUS at 08:33

## 2017-12-03 RX ADMIN — HEPARIN SODIUM 5000 UNITS: 5000 INJECTION, SOLUTION INTRAVENOUS; SUBCUTANEOUS at 00:30

## 2017-12-03 RX ADMIN — HEPARIN SODIUM 5000 UNITS: 5000 INJECTION, SOLUTION INTRAVENOUS; SUBCUTANEOUS at 22:34

## 2017-12-03 RX ADMIN — HEPARIN SODIUM 5000 UNITS: 5000 INJECTION, SOLUTION INTRAVENOUS; SUBCUTANEOUS at 06:45

## 2017-12-03 RX ADMIN — FAMOTIDINE 20 MG: 10 INJECTION, SOLUTION INTRAVENOUS at 00:36

## 2017-12-03 RX ADMIN — Medication 10 ML: at 08:36

## 2017-12-03 RX ADMIN — FAMOTIDINE 20 MG: 10 INJECTION, SOLUTION INTRAVENOUS at 21:06

## 2017-12-03 RX ADMIN — Medication 10 ML: at 17:12

## 2017-12-03 RX ADMIN — DOXAZOSIN 8 MG: 4 TABLET ORAL at 22:33

## 2017-12-03 RX ADMIN — DILTIAZEM HYDROCHLORIDE 60 MG: 60 TABLET, FILM COATED ORAL at 11:58

## 2017-12-03 RX ADMIN — ISOSORBIDE MONONITRATE 60 MG: 60 TABLET ORAL at 21:01

## 2017-12-03 RX ADMIN — METOPROLOL TARTRATE 50 MG: 50 TABLET, FILM COATED ORAL at 08:33

## 2017-12-03 RX ADMIN — TAZOBACTAM SODIUM AND PIPERACILLIN SODIUM 2.25 G: 250; 2 INJECTION, SOLUTION INTRAVENOUS at 17:12

## 2017-12-03 RX ADMIN — ASPIRIN 325 MG: 325 TABLET, DELAYED RELEASE ORAL at 08:32

## 2017-12-03 RX ADMIN — Medication 1 UNITS: at 17:11

## 2017-12-03 RX ADMIN — HYDRALAZINE HYDROCHLORIDE 100 MG: 50 TABLET, FILM COATED ORAL at 06:57

## 2017-12-03 RX ADMIN — DILTIAZEM HYDROCHLORIDE 60 MG: 60 TABLET, FILM COATED ORAL at 01:39

## 2017-12-03 RX ADMIN — Medication 10 ML: at 11:58

## 2017-12-03 RX ADMIN — TAZOBACTAM SODIUM AND PIPERACILLIN SODIUM 2.25 G: 250; 2 INJECTION, SOLUTION INTRAVENOUS at 00:42

## 2017-12-03 RX ADMIN — ALBUMIN (HUMAN) 25 G: 0.25 INJECTION, SOLUTION INTRAVENOUS at 22:38

## 2017-12-03 RX ADMIN — ISOSORBIDE MONONITRATE 60 MG: 60 TABLET ORAL at 00:29

## 2017-12-03 RX ADMIN — METOPROLOL TARTRATE 50 MG: 50 TABLET, FILM COATED ORAL at 21:01

## 2017-12-03 RX ADMIN — Medication 3 UNITS: at 11:59

## 2017-12-03 RX ADMIN — DILTIAZEM HYDROCHLORIDE 60 MG: 60 TABLET, FILM COATED ORAL at 06:58

## 2017-12-03 RX ADMIN — Medication 1 UNITS: at 21:08

## 2017-12-03 RX ADMIN — Medication 10 ML: at 00:27

## 2017-12-03 RX ADMIN — BUMETANIDE 2 MG: 0.25 INJECTION INTRAMUSCULAR; INTRAVENOUS at 11:58

## 2017-12-03 RX ADMIN — HYDRALAZINE HYDROCHLORIDE 100 MG: 50 TABLET, FILM COATED ORAL at 14:23

## 2017-12-03 ASSESSMENT — PAIN SCALES - GENERAL
PAINLEVEL_OUTOF10: 0

## 2017-12-03 NOTE — FLOWSHEET NOTE
Informed Dr. Linden Montague that Dr. Jax Dumas wanted him to place a mediport. Dr. Linden Montague stated he could do this tomorrow afternoon, the patient could have breakfast before 7 and then NPO after that. He stated he wanted the consent signed for insertion of mediport.

## 2017-12-03 NOTE — FLOWSHEET NOTE
Spoke with Dr. Roman Bello him we were thinking d/c jami and to get his plan. He stated he planned to see the patient tomorrow before he goes home and he wanted Dr. Catherine Pereira to place a mediport jami.

## 2017-12-03 NOTE — PROGRESS NOTES
Surg POD#6  abd soft wound good gissel diet + BM  Hgb9.8  WBC nl  Will need mediport  Can add on tomorrow  stable

## 2017-12-03 NOTE — PROGRESS NOTES
size:  3.2 cm.      Tumor extension:  Tumor invades through the muscularis propria into      the subserosal adipose tissue.   Margins:  Negative for malignancy.   Lymph nodes:  Four of fifteen lymph nodes are positive for metastatic  carcinoma.   Pathologic stage:  pT3, pN2a, pM1a  C - Liver, right lobe, core needle biopsies:   Metastatic adenocarcinoma consistent with colon primary. Assessment and Plan:   1. Acute blood loss anemia with hemoccult positive stool  2. Gastric ulcer, erosions  3. Metastatic colon cancer with liver deposit. s/p laparotomy, bowel resection  4. Elevated troponin / NSTEMI / multivessel CAD s/p LHC  5. NAVEEN   6. CKD stage 3  7. HTN  8. DM 2  9. dyslipidemia     cont lopressor, Imdur, hydralazine  Am labs.   PT/OT/ ambulate    Jennifer Sotomayor MD

## 2017-12-04 ENCOUNTER — APPOINTMENT (OUTPATIENT)
Dept: GENERAL RADIOLOGY | Age: 52
DRG: 231 | End: 2017-12-04
Payer: MEDICAID

## 2017-12-04 ENCOUNTER — APPOINTMENT (OUTPATIENT)
Dept: ULTRASOUND IMAGING | Age: 52
DRG: 231 | End: 2017-12-04
Payer: MEDICAID

## 2017-12-04 ENCOUNTER — APPOINTMENT (OUTPATIENT)
Dept: INTERVENTIONAL RADIOLOGY/VASCULAR | Age: 52
DRG: 231 | End: 2017-12-04
Payer: MEDICAID

## 2017-12-04 ENCOUNTER — ANESTHESIA (OUTPATIENT)
Dept: OPERATING ROOM | Age: 52
DRG: 231 | End: 2017-12-04
Payer: MEDICAID

## 2017-12-04 ENCOUNTER — ANESTHESIA EVENT (OUTPATIENT)
Dept: OPERATING ROOM | Age: 52
DRG: 231 | End: 2017-12-04
Payer: MEDICAID

## 2017-12-04 VITALS
RESPIRATION RATE: 1 BRPM | DIASTOLIC BLOOD PRESSURE: 54 MMHG | SYSTOLIC BLOOD PRESSURE: 136 MMHG | OXYGEN SATURATION: 94 %

## 2017-12-04 LAB
ANION GAP SERPL CALCULATED.3IONS-SCNC: 11 MEQ/L (ref 8–16)
APTT: 30.5 SECONDS (ref 22–38)
BUN BLDV-MCNC: 27 MG/DL (ref 7–22)
CALCIUM SERPL-MCNC: 7.9 MG/DL (ref 8.5–10.5)
CHLORIDE BLD-SCNC: 111 MEQ/L (ref 98–111)
CO2: 22 MEQ/L (ref 23–33)
CREAT SERPL-MCNC: 2.2 MG/DL (ref 0.4–1.2)
GFR SERPL CREATININE-BSD FRML MDRD: 32 ML/MIN/1.73M2
GLOMERULAR BASEMENT MEMBRANE ANTIBODY: NORMAL
GLUCOSE BLD-MCNC: 118 MG/DL (ref 70–108)
GLUCOSE BLD-MCNC: 133 MG/DL (ref 70–108)
GLUCOSE BLD-MCNC: 160 MG/DL (ref 70–108)
GLUCOSE BLD-MCNC: 211 MG/DL (ref 70–108)
GLUCOSE BLD-MCNC: 215 MG/DL (ref 70–108)
HCT VFR BLD CALC: 29.9 % (ref 42–52)
HEMOGLOBIN: 9.3 GM/DL (ref 14–18)
MCH RBC QN AUTO: 24.2 PG (ref 27–31)
MCHC RBC AUTO-ENTMCNC: 31.3 GM/DL (ref 33–37)
MCV RBC AUTO: 77.5 FL (ref 80–94)
PDW BLD-RTO: 25.6 % (ref 11.5–14.5)
PLATELET # BLD: 149 THOU/MM3 (ref 130–400)
PMV BLD AUTO: 11.5 MCM (ref 7.4–10.4)
POTASSIUM SERPL-SCNC: 3.6 MEQ/L (ref 3.5–5.2)
PROTEIN ELECTROPHORESIS, SERUM: NORMAL
RBC # BLD: 3.85 MILL/MM3 (ref 4.7–6.1)
SODIUM BLD-SCNC: 144 MEQ/L (ref 135–145)
WBC # BLD: 5.5 THOU/MM3 (ref 4.8–10.8)

## 2017-12-04 PROCEDURE — 3600000002 HC SURGERY LEVEL 2 BASE: Performed by: SURGERY

## 2017-12-04 PROCEDURE — C1788 PORT, INDWELLING, IMP: HCPCS | Performed by: SURGERY

## 2017-12-04 PROCEDURE — A6258 TRANSPARENT FILM >16<=48 IN: HCPCS | Performed by: SURGERY

## 2017-12-04 PROCEDURE — 93970 EXTREMITY STUDY: CPT

## 2017-12-04 PROCEDURE — 2500000003 HC RX 250 WO HCPCS: Performed by: INTERNAL MEDICINE

## 2017-12-04 PROCEDURE — 85730 THROMBOPLASTIN TIME PARTIAL: CPT

## 2017-12-04 PROCEDURE — A4450 NON-WATERPROOF TAPE: HCPCS | Performed by: SURGERY

## 2017-12-04 PROCEDURE — 6360000002 HC RX W HCPCS: Performed by: INTERNAL MEDICINE

## 2017-12-04 PROCEDURE — 6370000000 HC RX 637 (ALT 250 FOR IP): Performed by: INTERNAL MEDICINE

## 2017-12-04 PROCEDURE — 2140000000 HC CCU INTERMEDIATE R&B

## 2017-12-04 PROCEDURE — 6360000002 HC RX W HCPCS: Performed by: SURGERY

## 2017-12-04 PROCEDURE — 85027 COMPLETE CBC AUTOMATED: CPT

## 2017-12-04 PROCEDURE — 3700000000 HC ANESTHESIA ATTENDED CARE: Performed by: SURGERY

## 2017-12-04 PROCEDURE — 76770 US EXAM ABDO BACK WALL COMP: CPT

## 2017-12-04 PROCEDURE — 97530 THERAPEUTIC ACTIVITIES: CPT

## 2017-12-04 PROCEDURE — 0JH60WZ INSERTION OF TOTALLY IMPLANTABLE VASCULAR ACCESS DEVICE INTO CHEST SUBCUTANEOUS TISSUE AND FASCIA, OPEN APPROACH: ICD-10-PCS | Performed by: SURGERY

## 2017-12-04 PROCEDURE — 36415 COLL VENOUS BLD VENIPUNCTURE: CPT

## 2017-12-04 PROCEDURE — 82948 REAGENT STRIP/BLOOD GLUCOSE: CPT

## 2017-12-04 PROCEDURE — A6402 STERILE GAUZE <= 16 SQ IN: HCPCS | Performed by: SURGERY

## 2017-12-04 PROCEDURE — 71010 XR CHEST PORTABLE: CPT

## 2017-12-04 PROCEDURE — 6360000002 HC RX W HCPCS: Performed by: NURSE ANESTHETIST, CERTIFIED REGISTERED

## 2017-12-04 PROCEDURE — 2580000003 HC RX 258: Performed by: INTERNAL MEDICINE

## 2017-12-04 PROCEDURE — 3209999900 FLUORO FOR SURGICAL PROCEDURES

## 2017-12-04 PROCEDURE — 97116 GAIT TRAINING THERAPY: CPT

## 2017-12-04 PROCEDURE — S0028 INJECTION, FAMOTIDINE, 20 MG: HCPCS | Performed by: INTERNAL MEDICINE

## 2017-12-04 PROCEDURE — 36556 INSERT NON-TUNNEL CV CATH: CPT | Performed by: SURGERY

## 2017-12-04 PROCEDURE — 3600000012 HC SURGERY LEVEL 2 ADDTL 15MIN: Performed by: SURGERY

## 2017-12-04 PROCEDURE — 77001 FLUOROGUIDE FOR VEIN DEVICE: CPT | Performed by: SURGERY

## 2017-12-04 PROCEDURE — 99024 POSTOP FOLLOW-UP VISIT: CPT | Performed by: NURSE PRACTITIONER

## 2017-12-04 PROCEDURE — 3700000001 HC ADD 15 MINUTES (ANESTHESIA): Performed by: SURGERY

## 2017-12-04 PROCEDURE — 2500000003 HC RX 250 WO HCPCS: Performed by: SURGERY

## 2017-12-04 PROCEDURE — 80048 BASIC METABOLIC PNL TOTAL CA: CPT

## 2017-12-04 PROCEDURE — 97535 SELF CARE MNGMENT TRAINING: CPT

## 2017-12-04 PROCEDURE — 2580000003 HC RX 258: Performed by: NURSE ANESTHETIST, CERTIFIED REGISTERED

## 2017-12-04 DEVICE — PORT INFUS OD2.7MM ID1.5MM INTRO 8FR TI POLYUR CATH DETACH CT80STPD] ANGIODYNAMICS INC]: Type: IMPLANTABLE DEVICE | Site: CHEST | Status: FUNCTIONAL

## 2017-12-04 RX ORDER — MIDAZOLAM HYDROCHLORIDE 1 MG/ML
INJECTION INTRAMUSCULAR; INTRAVENOUS PRN
Status: DISCONTINUED | OUTPATIENT
Start: 2017-12-04 | End: 2017-12-04 | Stop reason: SDUPTHER

## 2017-12-04 RX ORDER — FUROSEMIDE 10 MG/ML
80 INJECTION INTRAMUSCULAR; INTRAVENOUS ONCE
Status: COMPLETED | OUTPATIENT
Start: 2017-12-04 | End: 2017-12-04

## 2017-12-04 RX ORDER — LIDOCAINE HYDROCHLORIDE AND EPINEPHRINE 10; 10 MG/ML; UG/ML
INJECTION, SOLUTION INFILTRATION; PERINEURAL PRN
Status: DISCONTINUED | OUTPATIENT
Start: 2017-12-04 | End: 2017-12-04 | Stop reason: HOSPADM

## 2017-12-04 RX ORDER — SODIUM CHLORIDE 9 MG/ML
INJECTION, SOLUTION INTRAVENOUS CONTINUOUS PRN
Status: DISCONTINUED | OUTPATIENT
Start: 2017-12-04 | End: 2017-12-04 | Stop reason: SDUPTHER

## 2017-12-04 RX ORDER — CEFAZOLIN SODIUM 1 G/3ML
INJECTION, POWDER, FOR SOLUTION INTRAMUSCULAR; INTRAVENOUS PRN
Status: DISCONTINUED | OUTPATIENT
Start: 2017-12-04 | End: 2017-12-04 | Stop reason: SDUPTHER

## 2017-12-04 RX ORDER — PROPOFOL 10 MG/ML
INJECTION, EMULSION INTRAVENOUS CONTINUOUS PRN
Status: DISCONTINUED | OUTPATIENT
Start: 2017-12-04 | End: 2017-12-04 | Stop reason: SDUPTHER

## 2017-12-04 RX ADMIN — MIDAZOLAM HYDROCHLORIDE 2 MG: 1 INJECTION, SOLUTION INTRAMUSCULAR; INTRAVENOUS at 15:55

## 2017-12-04 RX ADMIN — CHLOROTHIAZIDE SODIUM 500 MG: 500 INJECTION, POWDER, LYOPHILIZED, FOR SOLUTION INTRAVENOUS at 15:04

## 2017-12-04 RX ADMIN — METOPROLOL TARTRATE 50 MG: 50 TABLET, FILM COATED ORAL at 20:45

## 2017-12-04 RX ADMIN — FAMOTIDINE 20 MG: 10 INJECTION, SOLUTION INTRAVENOUS at 20:55

## 2017-12-04 RX ADMIN — TAZOBACTAM SODIUM AND PIPERACILLIN SODIUM 2.25 G: 250; 2 INJECTION, SOLUTION INTRAVENOUS at 17:16

## 2017-12-04 RX ADMIN — TAZOBACTAM SODIUM AND PIPERACILLIN SODIUM 2.25 G: 250; 2 INJECTION, SOLUTION INTRAVENOUS at 10:27

## 2017-12-04 RX ADMIN — PROPOFOL 100 MCG/KG/MIN: 10 INJECTION, EMULSION INTRAVENOUS at 15:55

## 2017-12-04 RX ADMIN — METOPROLOL TARTRATE 50 MG: 50 TABLET, FILM COATED ORAL at 10:12

## 2017-12-04 RX ADMIN — Medication 10 ML: at 20:45

## 2017-12-04 RX ADMIN — Medication 1 UNITS: at 20:46

## 2017-12-04 RX ADMIN — Medication 1 UNITS: at 17:17

## 2017-12-04 RX ADMIN — DILTIAZEM HYDROCHLORIDE 60 MG: 60 TABLET, FILM COATED ORAL at 01:18

## 2017-12-04 RX ADMIN — FUROSEMIDE 80 MG: 10 INJECTION, SOLUTION INTRAMUSCULAR; INTRAVENOUS at 14:07

## 2017-12-04 RX ADMIN — FAMOTIDINE 20 MG: 10 INJECTION, SOLUTION INTRAVENOUS at 10:11

## 2017-12-04 RX ADMIN — HYDRALAZINE HYDROCHLORIDE 100 MG: 50 TABLET, FILM COATED ORAL at 15:01

## 2017-12-04 RX ADMIN — DILTIAZEM HYDROCHLORIDE 60 MG: 60 TABLET, FILM COATED ORAL at 17:24

## 2017-12-04 RX ADMIN — Medication 10 ML: at 01:21

## 2017-12-04 RX ADMIN — ISOSORBIDE MONONITRATE 60 MG: 60 TABLET ORAL at 20:45

## 2017-12-04 RX ADMIN — DILTIAZEM HYDROCHLORIDE 60 MG: 60 TABLET, FILM COATED ORAL at 05:54

## 2017-12-04 RX ADMIN — ASPIRIN 325 MG: 325 TABLET, DELAYED RELEASE ORAL at 10:11

## 2017-12-04 RX ADMIN — BUMETANIDE 1 MG: 0.25 INJECTION, SOLUTION INTRAMUSCULAR; INTRAVENOUS at 01:21

## 2017-12-04 RX ADMIN — DILTIAZEM HYDROCHLORIDE 60 MG: 60 TABLET, FILM COATED ORAL at 12:05

## 2017-12-04 RX ADMIN — Medication 10 ML: at 10:12

## 2017-12-04 RX ADMIN — ISOSORBIDE MONONITRATE 60 MG: 60 TABLET ORAL at 10:11

## 2017-12-04 RX ADMIN — SODIUM CHLORIDE: 9 INJECTION, SOLUTION INTRAVENOUS at 15:51

## 2017-12-04 RX ADMIN — TAZOBACTAM SODIUM AND PIPERACILLIN SODIUM 2.25 G: 250; 2 INJECTION, SOLUTION INTRAVENOUS at 01:16

## 2017-12-04 RX ADMIN — HEPARIN SODIUM 5000 UNITS: 5000 INJECTION, SOLUTION INTRAVENOUS; SUBCUTANEOUS at 05:55

## 2017-12-04 RX ADMIN — HYDRALAZINE HYDROCHLORIDE 100 MG: 50 TABLET, FILM COATED ORAL at 05:54

## 2017-12-04 RX ADMIN — DOXAZOSIN 8 MG: 4 TABLET ORAL at 20:45

## 2017-12-04 RX ADMIN — CEFAZOLIN 1000 MG: 1 INJECTION, POWDER, FOR SOLUTION INTRAMUSCULAR; INTRAVENOUS; PARENTERAL at 16:09

## 2017-12-04 ASSESSMENT — PULMONARY FUNCTION TESTS
PIF_VALUE: 0

## 2017-12-04 ASSESSMENT — PAIN SCALES - GENERAL
PAINLEVEL_OUTOF10: 0

## 2017-12-04 NOTE — PROGRESS NOTES
CO2  22*  21*  22*   BUN  30*  29*  27*   CREATININE  2.2*  2.2*  2.2*   GLUCOSE  125*  123*  118*     Calcium:  Recent Labs      12/04/17   0514   CALCIUM  7.9*     Ionized Calcium:No results for input(s): IONCA in the last 72 hours. Magnesium:No results for input(s): MG in the last 72 hours. Phosphorus:No results for input(s): PHOS in the last 72 hours. BNP:No results for input(s): BNP in the last 72 hours. Glucose:  Recent Labs      12/03/17   1605  12/03/17 2003 12/04/17   0617   POCGLU  162*  163*  133*     HgbA1C: No results for input(s): LABA1C in the last 72 hours. INR:   No results for input(s): INR in the last 72 hours. Hepatic: No results for input(s): ALKPHOS, ALT, AST, PROT, BILITOT, BILIDIR, LABALBU in the last 72 hours. Amylase and Lipase:No results for input(s): LACTA, AMYLASE in the last 72 hours. Lactic Acid: No results for input(s): LACTA in the last 72 hours. Troponin:   No results for input(s): CKTOTAL, CKMB, TROPONINT in the last 72 hours. BNP: No results for input(s): BNP in the last 72 hours. Lipids: No results for input(s): CHOL, TRIG, HDL, LDLCALC in the last 72 hours. Invalid input(s): LDL  ABGs: No results found for: PH, PCO2, PO2, HCO3, O2SAT    Radiology reports as per the Radiologist  Radiology: Ct Head Wo Contrast    Result Date: 11/21/2017  PROCEDURE: CT HEAD WO CONTRAST CLINICAL INFORMATION: Headache. COMPARISON: 12/3/2015. TECHNIQUE: 5 mm axial imaging through the head without IV contrast. All CT scans at this facility use dose modulation, iterative reconstruction, and/or weight based dosing when appropriate to reduce the radiation dose to as low as reasonably achievable. FINDINGS: POSTOPERATIVE CHANGES: None. BRAIN VOLUME: 1. Normal for the patient's age. VENTRICLES/CISTERNS: 1. Normal for the patient's age. INFARCT/WHITE MATTER DISEASE: Unremarkable. INTRACRANIAL HEMORRHAGE: None. MASS/MASS EFFECT/MIDLINE SHIFT: None.  INTRA-AXIAL/EXTRA-AXIAL FLUID COLLECTIONS:

## 2017-12-04 NOTE — ANESTHESIA PRE PROCEDURE
MD        potassium chloride (KLOR-CON M) extended release tablet 40 mEq  40 mEq Oral PRN Kala Culp MD        Or    potassium chloride 20 MEQ/15ML (10%) oral solution 40 mEq  40 mEq Oral PRN Kala Culp MD        Or    potassium chloride 10 mEq/100 mL IVPB (Peripheral Line)  10 mEq Intravenous PRN Kala Culp MD           Allergies:  No Known Allergies    Problem List:    Patient Active Problem List   Diagnosis Code    Accelerated hypertension I10    Hyperkalemia E87.5    NSTEMI (non-ST elevated myocardial infarction) ([address] I21.4    Hypertension I10    Elevated troponin R74.8    Coronary artery disease involving native coronary artery of native heart without angina pectoris I25.10       Past Medical History:        Diagnosis Date    Chronic kidney disease     see's Alexandro Anna Coronary artery disease involving native coronary artery of native heart without angina pectoris     Diabetes mellitus ([address]     Hyperlipidemia     Hypertension     Thyroid disease        Past Surgical History:        Procedure Laterality Date    ENDOSCOPY, COLON, DIAGNOSTIC      EYE SURGERY Left 2013    laser surgery    [place] COLONOSCOPY W/BIOPSY SINGLE/MULTIPLE Left 11/24/2017    COLONOSCOPY WITH BIOPSY performed by Renan Dietrich MD at [facility]    [place] EGD TRANSORAL BIOPSY SINGLE/MULTIPLE N/A 11/23/2017    EGD BIOPSY performed by Renan Dietrich MD at [address] [place] [address] on nose removed    SMALL INTESTINE SURGERY N/A 11/27/2017    SIGMOID COLON RESECTION, APPENDECTOMY, LIVER BIOPSY performed by Ruben Payan MD at [facility] History:    Social History   Substance Use Topics    Smoking status: Never Smoker    Smokeless tobacco: Never Used    Alcohol use No                                Counseling given: Not Answered      Vital Signs (Current):   Vitals:    12/04/17 1030 12/04/17 1205 12/04/17 1400 12/04/17 1459   BP: (!) 155/78 (!) 167/78 (!) 178/80 (!) 180/82   Pulse:

## 2017-12-04 NOTE — PLAN OF CARE
Problem: Safety:  Goal: Free from accidental physical injury  Free from accidental physical injury    Outcome: Ongoing  Monitoring patients safety with each shift assessment and hourly rounding. Problem: Daily Care:  Goal: Daily care needs are met  Daily care needs are met    Outcome: Ongoing      Problem: Pain:  Goal: Patient's pain/discomfort is manageable  Patient's pain/discomfort is manageable   Outcome: Ongoing  Monitoring patients pain with each shift assessment and hourly rounding and will give medications per the mar as ordered. Problem: Discharge Planning:  Goal: Patients continuum of care needs are met  Patients continuum of care needs are met   Outcome: Ongoing      Problem: Falls - Risk of  Goal: Absence of falls  Outcome: Ongoing  Monitoring patient for falls with each shift assessment and bed alarm is activated when he is up in bed. He is aware to call for assistance before getting out of bed. Problem: Serum Glucose Level - Abnormal:  Goal: Ability to maintain appropriate glucose levels will improve  Ability to maintain appropriate glucose levels will improve   Outcome: Ongoing  Monitoring patients blood sugar before each meal and before bedtime. Insulin per the mar as indicated and will continue to monitor. Problem: Tissue Perfusion - Cardiopulmonary, Altered:  Goal: Circulatory function within specified parameters  Circulatory function within specified parameters   Outcome: Ongoing      Problem: Tissue Perfusion - Peripheral, Altered:  Goal: Absence of hematoma at arterial access site  Absence of hematoma at arterial access site   Outcome: Ongoing  Right groin cath site is soft dry and intact. Will continue to monitor groin cath site. Problem: Nutrition  Goal: Optimal nutrition therapy  Outcome: Ongoing  Monitoring patients intake and output every 8 hours and with each meal and snack. Comments: Care plan reviewed with patient.   Patient  verbalize understanding of the plan

## 2017-12-04 NOTE — BRIEF OP NOTE
Brief Postoperative Note    Erwin Hughes  YOB: 1965  [id number]    Pre-operative Diagnosis: Colon Ca     Post-operative Diagnosis: Same    Procedure: 1.R SCV Mediport  2. Intra-Op Fluroscopy    Anesthesia: Deep Sedation    Surgeons/Assistants:  James Fofana    Estimated Blood Loss: less than 50     Complications: None    Specimens: Was Obtained:     Findings: see op note    Electronically signed by Paul Grover MD on 12/4/2017 at 4:28 PM

## 2017-12-04 NOTE — ANESTHESIA POSTPROCEDURE EVALUATION
Department of Anesthesiology  Postprocedure Note    Patient: Josh Kaba  MRN: [id number]  YOB: 1965  Date of evaluation: 12/4/2017  Time:  4:32 PM     Procedure Summary     Date:  12/04/17 Room / [facility]:  [facility]    Anesthesia Start:  3253 Anesthesia Stop:  0591    Procedure:  MEDIPORT INSERTION (Right Chest) Diagnosis:  (COLON CANCER)    Surgeon: Vernon Gordon MD Responsible Provider:  Brad Martin MD    Anesthesia Type:  MAC ASA Status:  2          Anesthesia Type: MAC    Alex Phase I: Alex Score: 8    Alex Phase II: Alex Score: 9    Last vitals: Reviewed and per EMR flowsheets.        Anesthesia Post Evaluation    Patient location during evaluation: bedside  Patient participation: complete - patient participated  Level of consciousness: awake  Pain score: 0  Airway patency: patent  Nausea & Vomiting: no nausea and no vomiting  Complications: no  Cardiovascular status: hemodynamically stable  Respiratory status: acceptable  Hydration status: euvolemic

## 2017-12-04 NOTE — PROGRESS NOTES
differentiated invasive adenocarcinoma.   Tumor size:  3.2 cm.      Tumor extension:  Tumor invades through the muscularis propria into      the subserosal adipose tissue.   Margins:  Negative for malignancy.   Lymph nodes:  Four of fifteen lymph nodes are positive for metastatic  carcinoma.   Pathologic stage:  pT3, pN2a, pM1a  C - Liver, right lobe, core needle biopsies:   Metastatic adenocarcinoma consistent with colon primary. Assessment and Plan:   1. NAVEEN on CKD [id number]  2. Acute blood loss anemia with hemoccult positive stool  3. Gastric ulcer, erosions s/p bx  4. Metastatic colon cancer with liver deposit. 5. Elevated troponin / NSTEMI / multivessel CAD s/p LHC  6. HTN  7. DM 2  8. Mixed hyperlipdemia     Non oliguric NAVEEN most likely  Secondary  to ischemic tubular necrosis and Contrast induce nephropathy  , Cr  Is stable  Urine sediment is significant for proteinuria and hematuria  , concerning of secondary GN/membranouse nephropathy due to  Underlying malignancy  , pt has significant edema in his lower extremity         25 gram Albumin , then 1 mg IV Bumex now   Spot urine protein/Cr ratio is 2.8 gram   Pending  SPEP/UPEP    JAMES , ANCA , HEP  B  and C panel  are  Negative   Very close attention to his electrolytes , acid/base and volume status    Depends on above result , we may consider kidney biopsy   Cont Non-dihydropyridine CCBs ( Cardizim)  to help reduce proteinuria and better control of his blood pressure    increase Cardura 8 mg po daily   Cont hydralazine to 100 mg po TID   Strict Is/OS  Daily Wt   Avoid Nephrotoxic medications   No IV contrast exposure unless it is absolute necessity   Cont lopressor, NTG per cardiology expertise   Am labs.     No indication for urgent RRT     Wilbur Flood MD  Board Certified Nephrologist

## 2017-12-04 NOTE — PROGRESS NOTES
Patient resting in bed comfortably. Denies needs at this time. Call light within reach. Mother is visiting. Abdominal dressing changed. Site cleaned with soap and water and betadine. Covered with two four by four gauze pads. Incision is clean, dry, intact. Site free of erythema, and drainage. Incision well approximated. Pepe RAHMAN ONU.

## 2017-12-04 NOTE — PROGRESS NOTES
Department of Internal Medicine  Nephrology [facility] III Progress Note        SUBJECTIVE:  We are following this patient for NAVEEN/CKD, proteinuria, edema. The patient is doing well. He is edematous from ischemic CM, CKD, and proteinuria. Weight up. Serum Cr. 2.2. Going for mediport placement today. Will eventually be discharged to follow up from chemotherapy and PCI. Need to be aware of renal function as outpatient and follow carefully. Discussed in detail with patient.     Physical Exam:    VITALS:  BP (!) 167/78   Pulse 68   Temp 98.7 °F (37.1 °C) (Oral)   Resp 20   Ht 5' 9\" (1.753 m)   Wt 217 lb 3.2 oz (98.5 kg)   SpO2 95%   BMI 32.07 kg/m²     Constitutional:  Resting  Access Exam:  N/A   Cardiovascular/Edema:  rrr 3 + edema  Respiratory:  clear  Gastrointestinal:  soft  Other:  N/A    DATA:    CBC:   Lab Results   Component Value Date    WBC 5.5 12/04/2017    RBC 3.85 12/04/2017    HGB 9.3 12/04/2017    HCT 29.9 12/04/2017    MCV 77.5 12/04/2017    MCH 24.2 12/04/2017    MCHC 31.3 12/04/2017    RDW 25.6 12/04/2017     12/04/2017    MPV 11.5 12/04/2017     CBC with Differential:    Lab Results   Component Value Date    WBC 5.5 12/04/2017    RBC 3.85 12/04/2017    HGB 9.3 12/04/2017    HCT 29.9 12/04/2017     12/04/2017    MCV 77.5 12/04/2017    MCH 24.2 12/04/2017    MCHC 31.3 12/04/2017    RDW 25.6 12/04/2017    NRBC 0 11/21/2017    SEGSPCT 85.2 11/21/2017    MONOPCT 5.7 11/21/2017    MONOSABS 0.6 11/21/2017    LYMPHSABS 0.7 11/21/2017    EOSABS 0.2 11/21/2017    BASOSABS 0.1 11/21/2017     CMP:    Lab Results   Component Value Date     12/04/2017    K 3.6 12/04/2017     12/04/2017    CO2 22 12/04/2017    BUN 27 12/04/2017    CREATININE 2.2 12/04/2017    LABGLOM 32 12/04/2017    GLUCOSE 118 12/04/2017    PROT 5.2 11/21/2017    LABALBU 2.9 11/21/2017    CALCIUM 7.9 12/04/2017    BILITOT <0.2 11/21/2017    ALKPHOS 110 11/21/2017    AST 14 11/21/2017    ALT 17 11/21/2017 BMP:    Lab Results   Component Value Date     12/04/2017    K 3.6 12/04/2017     12/04/2017    CO2 22 12/04/2017    BUN 27 12/04/2017    LABALBU 2.9 11/21/2017    CREATININE 2.2 12/04/2017    CALCIUM 7.9 12/04/2017    LABGLOM 32 12/04/2017    GLUCOSE 118 12/04/2017       IMPRESSION/RECOMMENDATIONS:      NAVEEN/CKD non-oliguric multifactorial. Feeling better. Metastatic colon CA. Proteinuria non-nephrotic. ?? Secondary membranous from colon CA. S/P surgery. CAD requiring PCI as outpatient. Going for mediport placement today. Still quite edematous from above reasons. Suggest the following plan    1) Diuril 500 IV and Lasix 80 IV x 1  2) Avoid NSAID, ACE/ARB  3) Mediport placement  4) Close monitoring of renal function prior to chemo and PCI  OK for discharge from renal standpoint. Addressed these issues with patient and family. All questions answered. Please call with questions.  Thanks

## 2017-12-05 LAB
ANION GAP SERPL CALCULATED.3IONS-SCNC: 11 MEQ/L (ref 8–16)
BUN BLDV-MCNC: 24 MG/DL (ref 7–22)
CALCIUM SERPL-MCNC: 8.2 MG/DL (ref 8.5–10.5)
CHLORIDE BLD-SCNC: 107 MEQ/L (ref 98–111)
CO2: 24 MEQ/L (ref 23–33)
CREAT SERPL-MCNC: 2.1 MG/DL (ref 0.4–1.2)
GFR SERPL CREATININE-BSD FRML MDRD: 33 ML/MIN/1.73M2
GLUCOSE BLD-MCNC: 117 MG/DL (ref 70–108)
GLUCOSE BLD-MCNC: 126 MG/DL (ref 70–108)
GLUCOSE BLD-MCNC: 169 MG/DL (ref 70–108)
GLUCOSE BLD-MCNC: 185 MG/DL (ref 70–108)
GLUCOSE BLD-MCNC: 216 MG/DL (ref 70–108)
HCT VFR BLD CALC: 30.4 % (ref 42–52)
HEMOGLOBIN: 9.7 GM/DL (ref 14–18)
MCH RBC QN AUTO: 24.7 PG (ref 27–31)
MCHC RBC AUTO-ENTMCNC: 32 GM/DL (ref 33–37)
MCV RBC AUTO: 77.1 FL (ref 80–94)
PDW BLD-RTO: 26.2 % (ref 11.5–14.5)
PLATELET # BLD: 170 THOU/MM3 (ref 130–400)
PMV BLD AUTO: 12.3 MCM (ref 7.4–10.4)
POTASSIUM SERPL-SCNC: 3.6 MEQ/L (ref 3.5–5.2)
RBC # BLD: 3.95 MILL/MM3 (ref 4.7–6.1)
SODIUM BLD-SCNC: 142 MEQ/L (ref 135–145)
WBC # BLD: 7.1 THOU/MM3 (ref 4.8–10.8)

## 2017-12-05 PROCEDURE — 99024 POSTOP FOLLOW-UP VISIT: CPT | Performed by: NURSE PRACTITIONER

## 2017-12-05 PROCEDURE — 85027 COMPLETE CBC AUTOMATED: CPT

## 2017-12-05 PROCEDURE — 6370000000 HC RX 637 (ALT 250 FOR IP): Performed by: INTERNAL MEDICINE

## 2017-12-05 PROCEDURE — 97116 GAIT TRAINING THERAPY: CPT

## 2017-12-05 PROCEDURE — 2500000003 HC RX 250 WO HCPCS: Performed by: INTERNAL MEDICINE

## 2017-12-05 PROCEDURE — 2140000000 HC CCU INTERMEDIATE R&B

## 2017-12-05 PROCEDURE — 82948 REAGENT STRIP/BLOOD GLUCOSE: CPT

## 2017-12-05 PROCEDURE — 97110 THERAPEUTIC EXERCISES: CPT

## 2017-12-05 PROCEDURE — 6360000002 HC RX W HCPCS: Performed by: INTERNAL MEDICINE

## 2017-12-05 PROCEDURE — 80048 BASIC METABOLIC PNL TOTAL CA: CPT

## 2017-12-05 PROCEDURE — 36415 COLL VENOUS BLD VENIPUNCTURE: CPT

## 2017-12-05 PROCEDURE — 97530 THERAPEUTIC ACTIVITIES: CPT

## 2017-12-05 PROCEDURE — 2580000003 HC RX 258: Performed by: INTERNAL MEDICINE

## 2017-12-05 PROCEDURE — S0028 INJECTION, FAMOTIDINE, 20 MG: HCPCS | Performed by: INTERNAL MEDICINE

## 2017-12-05 PROCEDURE — 6370000000 HC RX 637 (ALT 250 FOR IP): Performed by: NURSE PRACTITIONER

## 2017-12-05 RX ORDER — CLOPIDOGREL BISULFATE 75 MG/1
75 TABLET ORAL DAILY
Status: DISCONTINUED | OUTPATIENT
Start: 2017-12-05 | End: 2017-12-07 | Stop reason: HOSPADM

## 2017-12-05 RX ORDER — FUROSEMIDE 40 MG/1
40 TABLET ORAL 2 TIMES DAILY
Status: DISCONTINUED | OUTPATIENT
Start: 2017-12-05 | End: 2017-12-07 | Stop reason: HOSPADM

## 2017-12-05 RX ADMIN — TAZOBACTAM SODIUM AND PIPERACILLIN SODIUM 2.25 G: 250; 2 INJECTION, SOLUTION INTRAVENOUS at 09:59

## 2017-12-05 RX ADMIN — Medication 10 ML: at 21:11

## 2017-12-05 RX ADMIN — HYDRALAZINE HYDROCHLORIDE 100 MG: 50 TABLET, FILM COATED ORAL at 15:14

## 2017-12-05 RX ADMIN — HYDRALAZINE HYDROCHLORIDE 100 MG: 50 TABLET, FILM COATED ORAL at 22:26

## 2017-12-05 RX ADMIN — DILTIAZEM HYDROCHLORIDE 90 MG: 60 TABLET, FILM COATED ORAL at 05:33

## 2017-12-05 RX ADMIN — ASPIRIN 325 MG: 325 TABLET, DELAYED RELEASE ORAL at 08:49

## 2017-12-05 RX ADMIN — DILTIAZEM HYDROCHLORIDE 90 MG: 60 TABLET, FILM COATED ORAL at 00:44

## 2017-12-05 RX ADMIN — METOPROLOL TARTRATE 50 MG: 50 TABLET, FILM COATED ORAL at 21:04

## 2017-12-05 RX ADMIN — TAZOBACTAM SODIUM AND PIPERACILLIN SODIUM 2.25 G: 250; 2 INJECTION, SOLUTION INTRAVENOUS at 17:26

## 2017-12-05 RX ADMIN — FAMOTIDINE 20 MG: 10 INJECTION, SOLUTION INTRAVENOUS at 21:06

## 2017-12-05 RX ADMIN — CLOPIDOGREL 75 MG: 75 TABLET, FILM COATED ORAL at 17:26

## 2017-12-05 RX ADMIN — HYDRALAZINE HYDROCHLORIDE 100 MG: 50 TABLET, FILM COATED ORAL at 05:33

## 2017-12-05 RX ADMIN — Medication 10 ML: at 08:49

## 2017-12-05 RX ADMIN — Medication 2 UNITS: at 12:31

## 2017-12-05 RX ADMIN — ISOSORBIDE MONONITRATE 60 MG: 60 TABLET ORAL at 08:49

## 2017-12-05 RX ADMIN — Medication 1 UNITS: at 21:06

## 2017-12-05 RX ADMIN — ISOSORBIDE MONONITRATE 60 MG: 60 TABLET ORAL at 21:04

## 2017-12-05 RX ADMIN — METOPROLOL TARTRATE 50 MG: 50 TABLET, FILM COATED ORAL at 08:49

## 2017-12-05 RX ADMIN — TAZOBACTAM SODIUM AND PIPERACILLIN SODIUM 2.25 G: 250; 2 INJECTION, SOLUTION INTRAVENOUS at 00:44

## 2017-12-05 RX ADMIN — DILTIAZEM HYDROCHLORIDE 90 MG: 60 TABLET, FILM COATED ORAL at 17:26

## 2017-12-05 RX ADMIN — HYDRALAZINE HYDROCHLORIDE 100 MG: 50 TABLET, FILM COATED ORAL at 00:44

## 2017-12-05 RX ADMIN — FAMOTIDINE 20 MG: 10 INJECTION, SOLUTION INTRAVENOUS at 08:49

## 2017-12-05 RX ADMIN — Medication 1 UNITS: at 17:22

## 2017-12-05 RX ADMIN — DILTIAZEM HYDROCHLORIDE 90 MG: 60 TABLET, FILM COATED ORAL at 12:34

## 2017-12-05 RX ADMIN — FUROSEMIDE 40 MG: 40 TABLET ORAL at 18:29

## 2017-12-05 RX ADMIN — DOXAZOSIN 8 MG: 4 TABLET ORAL at 21:04

## 2017-12-05 ASSESSMENT — PAIN SCALES - GENERAL: PAINLEVEL_OUTOF10: 0

## 2017-12-05 NOTE — PROGRESS NOTES
[facility]  INPATIENT PHYSICAL THERAPY  DAILY NOTE  STRZ CCU-STEPDOWN 3B    Time In: 0813  Time Out: 0840  Timed Code Treatment Minutes: 27 Minutes  Minutes: 27          Date: 2017  Patient Name: Lashaun Salazar,  Gender:  male        MRN: [id number]  : 1965  (50 y.o.)     Referring Practitioner: Chari Amaya MD  Diagnosis: NSTEMI  Additional Pertinent Hx: Per ED note, pt is a 50 y.o. male who presents to the Emergency Department for the evaluation of hypertension. He has a history of hypertension for the last 3 years. Patient states he was given new blood pressure medication from his PCP. For the past 3 days, the patiens blood pressure remained elevated. The patient states that while he was at work today, he began feeling dizzy and clammy. He had his blood pressure tested in the nurse's station at work and the patient states that is was 207. His employer called EMS, which brought him to the emergency department. His blood pressure has decreased since arrival to the emergency department. He denies, wheezing, dizziness, chest pain, shortness of breath, black/tarry stools, hematuria, leg swelling and headache. He does state that he feels tired.      Past Medical History:   Diagnosis Date    Chronic kidney disease     see's Creed Christiano Coronary artery disease involving native coronary artery of native heart without angina pectoris     Diabetes mellitus ([facility] [address]     Hyperlipidemia     Hypertension     Thyroid disease      Past Surgical History:   Procedure Laterality Date    ENDOSCOPY, COLON, DIAGNOSTIC      EYE SURGERY Left     laser surgery    INSERTION / REMOVAL / REPLACEMENT VENOUS ACCESS CATHETER Right 2017    MEDIPORT INSERTION performed by Edin Grimm MD at [address] Left 2017    COLONOSCOPY WITH BIOPSY performed by Janell Donnelly MD at [facility]    PA EGD TRANSORAL BIOPSY SINGLE/MULTIPLE N/A 2017    EGD BIOPSY performed by Minal Guerrero MD at [address] [place] [address] on nose removed    SMALL INTESTINE SURGERY N/A 11/27/2017    SIGMOID COLON RESECTION, APPENDECTOMY, LIVER BIOPSY performed by Viral Sharp MD at [facility]       Restrictions/Precautions:  Restrictions/Precautions: General Precautions, Fall Risk        Position Activity Restriction  Other position/activity restrictions: abdominal surgery     Subjective:     Subjective: RN approved session. pt in bed upon arrival and agrees to therapy. pleasant and cooperative. Pt needed may cues for correct completion and muscle contraction with therex. pt has many jerky movements throughout sesstion. Pain:  No.  Pain Assessment  Pain Assessment: 0-10  Pain Level: 0       Social/Functional:  Lives With: Alone  Type of Home: Apartment  Home Layout: One level  Home Access: Stairs to enter with rails  Entrance Stairs - Number of Steps: 3 SHANNON     Objective:  Rolling to Right: Stand by assistance  Supine to Sit: Stand by assistance (bed eleavted ~30 degrees)  Scooting: Stand by assistance (toward EOB)    Transfers  Sit to Stand: Contact guard assistance (EOB x1)  Stand to sit: Stand by assistance (toilet x1, cue for hand placement)       Ambulation 1  Surface: level tile  Device: No Device  Assistance: Contact guard assistance  Quality of Gait: slow pace, decreased step length, heel strike, mild forward flexed posture, waddling, pt stops completely when cued to turn around- turns in a very jerky/uncoordinated movement  Distance: 100'  Comments: pt may benefit from trial with RW       Other Activities  Comment: pt requests to use commode at conclusion of tx session for BM- RN aware    Exercises:  Exercises  Comments: Supine B LE therex and coordination exercises to increase strength and independence with functional mobility. pt completed 1x10 reps ankle pumps, alternating ankle pumps with decreased coordination.  pt needed manycues for correct technique and muslce contraction- pt substituting with knee flexion. quad sets, IR/ER, pt unable to perform glute sets correctly despite cueing, heel slides, hip ab/add, SAQ, SLR, heel to knee with slides down tibia with decreased control and coordination. increaed time to complete all with multiple cues or technique     Activity Tolerance:  Activity Tolerance: Patient Tolerated treatment well;Patient limited by endurance; Patient limited by fatigue    Assessment: Body structures, Functions, Activity limitations: Decreased functional mobility , Decreased strength, Decreased endurance, Decreased ROM, Decreased balance, Decreased coordination, Decreased fine motor control  Assessment: Pt is 50 y.o. male that has a mild developmental delay. Pt lives alone and recognizes that he needs to continue with therapy before returning home. pt demonstrated decreased strength, balance, and independence with mobility. Pt would benefit from continued skilled PT to improve functional mobility. Prognosis: Excellent  REQUIRES PT FOLLOW UP: Yes  Discharge Recommendations: Continue to assess pending progress, Patient would benefit from continued therapy after discharge    Patient Education:  Patient Education: therex, transfers, gait, bed mobility/logroll technique    Equipment Recommendations:  Equipment Needed: No    Safety:  Type of devices:  All fall risk precautions in place, Call light within reach, Chair alarm in place, Nurse notified, Left in chair    Plan:  Times per week: 5x GM  Times per day: Daily  Specific instructions for Next Treatment: mobility and therex  Current Treatment Recommendations: Strengthening, Balance Training, Functional Mobility Training, Transfer Training, Endurance Training, Gait Training, Home Exercise Program, Safety Education & Training, Patient/Caregiver Education & Training    Goals:  Patient goals : go home    Short term goals  Time Frame for Short term goals: 3 days  Short term goal 1: Pt to be Supervision for supine <> sit to get in/out of bed  Short term goal 2: Pt to be Supervision for sit <> stand to get up to ambulate  Short term goal 3: Pt to ambulate > 80 ft with AD with Supervision for household distances    Long term goals  Time Frame for Long term goals : not set due to short ELOS        AM-PAC Inpatient Mobility without Stair Climbing Raw Score : 15  AM-PAC Inpatient without Stair Climbing T-Scale Score : 43.03  Mobility Inpatient CMS 0-100% Score: 47.43  Mobility Inpatient without Stair CMS G-Code Modifier : CK

## 2017-12-05 NOTE — PLAN OF CARE
Problem: Safety:  Goal: Free from accidental physical injury  Free from accidental physical injury    Outcome: Met This Shift      Problem: Daily Care:  Goal: Daily care needs are met  Daily care needs are met    Outcome: Met This Shift      Problem: Pain:  Goal: Patient's pain/discomfort is manageable  Patient's pain/discomfort is manageable   Outcome: Met This Shift  Denies pain  Goal: Pain level will decrease  Pain level will decrease   Outcome: Met This Shift  Denies pain  Goal: Control of acute pain  Control of acute pain   Outcome: Met This Shift    Goal: Control of chronic pain  Control of chronic pain   Outcome: Met This Shift      Problem: Discharge Planning:  Goal: Patients continuum of care needs are met  Patients continuum of care needs are met   Outcome: Ongoing  Continue with discharge planning. Planning ECF at discharge, precert pending. Problem: Pain Control  Goal: Maintain pain level at or below patient's acceptable level (or 5 if patient is unable to determine acceptable level)  Outcome: Met This Shift      Problem: Falls - Risk of  Goal: Absence of falls  Outcome: Met This Shift  Continue with fall precautions. Problem: Serum Glucose Level - Abnormal:  Goal: Ability to maintain appropriate glucose levels will improve  Ability to maintain appropriate glucose levels will improve   Outcome: Ongoing  Continue with chemsticks, sliding scale insulin.      Problem: Tissue Perfusion - Cardiopulmonary, Altered:  Goal: Circulatory function within specified parameters  Circulatory function within specified parameters   Outcome: Met This Shift      Problem: Tissue Perfusion - Peripheral, Altered:  Goal: Absence of hematoma at arterial access site  Absence of hematoma at arterial access site   Outcome: Met This Shift    Goal: Circulatory function of lower extremities is within specified parameters  Circulatory function of lower extremities is within specified parameters   Outcome: Met This Shift      Problem: Nutrition  Goal: Optimal nutrition therapy  Outcome: Met This Shift      Problem: DISCHARGE BARRIERS  Goal: Patient's continuum of care needs are met  Outcome: Ongoing  Planning ECF at discharge. Problem: Risk for Impaired Skin Integrity  Goal: Tissue integrity - skin and mucous membranes  Structural intactness and normal physiological function of skin and  mucous membranes. Outcome: Ongoing  Continue with assessment of incision, staples and mediport dressing. Comments: Care plan reviewed with patient. Patient verbalize understanding of the plan of care and contribute to goal setting.

## 2017-12-05 NOTE — PROGRESS NOTES
Tegaderm removed from mediport site, dressing from abdominal incision removed. Small amount of drainage noted. May leave open to air. May have anticoagulation now. Follow up in office next week as scheduled. Call the office if any concerns. POD #8 Procedure: 1. Sigmoid Colon Resection  2. Appendectomy 3. Liver bx core needle  POD#1 Mediport placement. Okay for discharge home.   Surgery signing off

## 2017-12-05 NOTE — CARE COORDINATION
12/5/17, 10:25 AM    DISCHARGE BARRIERS  Spoke with Pedro Pablo Monreal in admissions at [facility]. She is unsure where they are at with the precert for Nadia Warren. She will look into it and call SW back.

## 2017-12-05 NOTE — PROGRESS NOTES
rehab facility    Short term goals  Time Frame for Short term goals: 1 week  Short term goal 1: Pt will complete LB ADLs wtih min A and LHAE prn with no cues for safety to increase I with dressing routine  Short term goal 2: Pt will complete dynamic standing task wtih SBA for > 3 minutes with no LOB or vc for safety to increase I with sink side grooming  Short term goal 3: Pt will complete BUE light strengthening exercises toincrease strength and endurance needed for ease with ADLs  Short term goal 4: Pt will complete functional transfers with SBA and no cues for safety to increase I with toileting routine  Short term goal 5: pt will demonstrate functional mobility HH distances with SBA and least restrictive device with no vc for safety to increase I with HH ambulation  Long term goals  Time Frame for Long term goals : No LTG established d/t short ELOS         AM-PAC Inpatient Daily Activity Raw Score: 19  AM-PAC Inpatient ADL T-Scale Score : 40.22  ADL Inpatient CMS 0-100% Score: 42.8  ADL Inpatient CMS G-Code Modifier : CK

## 2017-12-05 NOTE — PROGRESS NOTES
adenocarcinoma.   Tumor size:  3.2 cm.      Tumor extension:  Tumor invades through the muscularis propria into      the subserosal adipose tissue.   Margins:  Negative for malignancy.   Lymph nodes:  Four of fifteen lymph nodes are positive for metastatic  carcinoma.   Pathologic stage:  pT3, pN2a, pM1a  C - Liver, right lobe, core needle biopsies:   Metastatic adenocarcinoma consistent with colon primary. Assessment and Plan:   1. Acute blood loss anemia with hemoccult positive stool  2. Gastric ulcer, erosions  3. Metastatic colon cancer with liver deposit. s/p laparotomy, bowel resection  4. Elevated troponin / NSTEMI / multivessel CAD s/p LHC  5. NAVEEN   6. CKD stage 3  7. HTN  8. DM 2  9. dyslipidemia     cont lopressor, Imdur, hydralazine  Am labs. ambulate  Doppler USS legs.     Caleb Alvarez MD

## 2017-12-05 NOTE — PLAN OF CARE
Problem: Safety:  Goal: Free from accidental physical injury  Free from accidental physical injury    Outcome: Ongoing  Ambulates with assistance, gait steady, calls for assist with activity    Problem: Daily Care:  Goal: Daily care needs are met  Daily care needs are met    Outcome: Ongoing  Care completed with assist    Problem: Pain:  Goal: Patient's pain/discomfort is manageable  Patient's pain/discomfort is manageable   Outcome: Ongoing  Patient denies pain    Problem: Discharge Planning:  Goal: Patients continuum of care needs are met  Patients continuum of care needs are met   Outcome: Ongoing  Patient participates in discharge planning    Problem: Pain Control  Goal: Maintain pain level at or below patient's acceptable level (or 5 if patient is unable to determine acceptable level)  Outcome: Ongoing  Denies pain    Problem: Falls - Risk of  Goal: Absence of falls  Outcome: Ongoing  No falls    Problem: Serum Glucose Level - Abnormal:  Goal: Ability to maintain appropriate glucose levels will improve  Ability to maintain appropriate glucose levels will improve   Outcome: Ongoing  Continue to monitor    Problem: Tissue Perfusion - Cardiopulmonary, Altered:  Goal: Circulatory function within specified parameters  Circulatory function within specified parameters   Outcome: Ongoing  Edema noted to BLE and penile/scrotal edema noted    Problem: Tissue Perfusion - Peripheral, Altered:  Goal: Absence of hematoma at arterial access site  Absence of hematoma at arterial access site   Outcome: Met This Shift  Area soft   Goal: Circulatory function of lower extremities is within specified parameters  Circulatory function of lower extremities is within specified parameters   Outcome: Ongoing  Edema to ble continues    Problem: Nutrition  Goal: Optimal nutrition therapy  Outcome: Ongoing  Appetite good    Problem: DISCHARGE BARRIERS  Goal: Patient's continuum of care needs are met  Outcome: Ongoing  Patient participates in discharge plan     Problem: Risk for Impaired Skin Integrity  Goal: Tissue integrity - skin and mucous membranes  Structural intactness and normal physiological function of skin and  mucous membranes. Outcome: Ongoing  No new skin issues noted    Comments: Care plan reviewed with patient. Patient verbalizes understanding of the care plan and contributed to goal setting.

## 2017-12-05 NOTE — PROGRESS NOTES
Department of Internal Medicine  Nephrology MARY Dalton III Progress Note        SUBJECTIVE:  We are following this patient for NAVEEN/CKD. The patient is doing well. OK to get strong out. Place on po lasix and work on discharge planning.     Physical Exam:    VITALS:  BP (!) 161/75   Pulse 65   Temp 98.6 °F (37 °C) (Oral)   Resp 18   Ht 5' 9\" (1.753 m)   Wt 229 lb 12.8 oz (104.2 kg)   SpO2 93%   BMI 33.94 kg/m²     Constitutional:  Resting  Access Exam:  N/A   Cardiovascular/Edema:  RRR 2 + edema  Respiratory:  clear  Gastrointestinal:  Soft, nt  Other:  N/A    DATA:    CBC:   Lab Results   Component Value Date    WBC 7.1 12/05/2017    RBC 3.95 12/05/2017    HGB 9.7 12/05/2017    HCT 30.4 12/05/2017    MCV 77.1 12/05/2017    MCH 24.7 12/05/2017    MCHC 32.0 12/05/2017    RDW 26.2 12/05/2017     12/05/2017    MPV 12.3 12/05/2017     CBC with Differential:    Lab Results   Component Value Date    WBC 7.1 12/05/2017    RBC 3.95 12/05/2017    HGB 9.7 12/05/2017    HCT 30.4 12/05/2017     12/05/2017    MCV 77.1 12/05/2017    MCH 24.7 12/05/2017    MCHC 32.0 12/05/2017    RDW 26.2 12/05/2017    NRBC 0 11/21/2017    SEGSPCT 85.2 11/21/2017    MONOPCT 5.7 11/21/2017    MONOSABS 0.6 11/21/2017    LYMPHSABS 0.7 11/21/2017    EOSABS 0.2 11/21/2017    BASOSABS 0.1 11/21/2017     CMP:    Lab Results   Component Value Date     12/05/2017    K 3.6 12/05/2017     12/05/2017    CO2 24 12/05/2017    BUN 24 12/05/2017    CREATININE 2.1 12/05/2017    LABGLOM 33 12/05/2017    GLUCOSE 126 12/05/2017    PROT 5.2 11/21/2017    LABALBU 2.9 11/21/2017    CALCIUM 8.2 12/05/2017    BILITOT <0.2 11/21/2017    ALKPHOS 110 11/21/2017    AST 14 11/21/2017    ALT 17 11/21/2017     BMP:    Lab Results   Component Value Date     12/05/2017    K 3.6 12/05/2017     12/05/2017    CO2 24 12/05/2017    BUN 24 12/05/2017    LABALBU 2.9 11/21/2017    CREATININE 2.1 12/05/2017    CALCIUM 8.2 12/05/2017    LABGLOM

## 2017-12-06 LAB
GLUCOSE BLD-MCNC: 136 MG/DL (ref 70–108)
GLUCOSE BLD-MCNC: 151 MG/DL (ref 70–108)
GLUCOSE BLD-MCNC: 190 MG/DL (ref 70–108)
GLUCOSE BLD-MCNC: 214 MG/DL (ref 70–108)
HCT VFR BLD CALC: 28.3 % (ref 42–52)
HEMOGLOBIN: 8.9 GM/DL (ref 14–18)
MCH RBC QN AUTO: 24.2 PG (ref 27–31)
MCHC RBC AUTO-ENTMCNC: 31.4 GM/DL (ref 33–37)
MCV RBC AUTO: 77 FL (ref 80–94)
PDW BLD-RTO: 26.3 % (ref 11.5–14.5)
PLATELET # BLD: 157 THOU/MM3 (ref 130–400)
PMV BLD AUTO: 12.3 MCM (ref 7.4–10.4)
RBC # BLD: 3.68 MILL/MM3 (ref 4.7–6.1)
WBC # BLD: 7.3 THOU/MM3 (ref 4.8–10.8)

## 2017-12-06 PROCEDURE — 6370000000 HC RX 637 (ALT 250 FOR IP): Performed by: INTERNAL MEDICINE

## 2017-12-06 PROCEDURE — 82948 REAGENT STRIP/BLOOD GLUCOSE: CPT

## 2017-12-06 PROCEDURE — 2140000000 HC CCU INTERMEDIATE R&B

## 2017-12-06 PROCEDURE — 36415 COLL VENOUS BLD VENIPUNCTURE: CPT

## 2017-12-06 PROCEDURE — 6360000002 HC RX W HCPCS: Performed by: INTERNAL MEDICINE

## 2017-12-06 PROCEDURE — 97116 GAIT TRAINING THERAPY: CPT

## 2017-12-06 PROCEDURE — S0028 INJECTION, FAMOTIDINE, 20 MG: HCPCS | Performed by: INTERNAL MEDICINE

## 2017-12-06 PROCEDURE — A6252 ABSORPT DRG >16 <=48 W/O BDR: HCPCS

## 2017-12-06 PROCEDURE — 2500000003 HC RX 250 WO HCPCS: Performed by: INTERNAL MEDICINE

## 2017-12-06 PROCEDURE — 85027 COMPLETE CBC AUTOMATED: CPT

## 2017-12-06 PROCEDURE — 2580000003 HC RX 258: Performed by: INTERNAL MEDICINE

## 2017-12-06 PROCEDURE — 6370000000 HC RX 637 (ALT 250 FOR IP): Performed by: NURSE PRACTITIONER

## 2017-12-06 RX ADMIN — FAMOTIDINE 20 MG: 10 INJECTION, SOLUTION INTRAVENOUS at 20:19

## 2017-12-06 RX ADMIN — TAZOBACTAM SODIUM AND PIPERACILLIN SODIUM 2.25 G: 250; 2 INJECTION, SOLUTION INTRAVENOUS at 08:47

## 2017-12-06 RX ADMIN — Medication 1 UNITS: at 17:05

## 2017-12-06 RX ADMIN — Medication 10 ML: at 20:19

## 2017-12-06 RX ADMIN — Medication 10 ML: at 08:50

## 2017-12-06 RX ADMIN — HYDRALAZINE HYDROCHLORIDE 100 MG: 50 TABLET, FILM COATED ORAL at 06:46

## 2017-12-06 RX ADMIN — METOPROLOL TARTRATE 50 MG: 50 TABLET, FILM COATED ORAL at 20:19

## 2017-12-06 RX ADMIN — Medication 2 UNITS: at 11:43

## 2017-12-06 RX ADMIN — HYDRALAZINE HYDROCHLORIDE 100 MG: 50 TABLET, FILM COATED ORAL at 22:31

## 2017-12-06 RX ADMIN — TAZOBACTAM SODIUM AND PIPERACILLIN SODIUM 2.25 G: 250; 2 INJECTION, SOLUTION INTRAVENOUS at 23:51

## 2017-12-06 RX ADMIN — HYDRALAZINE HYDROCHLORIDE 100 MG: 50 TABLET, FILM COATED ORAL at 15:39

## 2017-12-06 RX ADMIN — FUROSEMIDE 40 MG: 40 TABLET ORAL at 17:04

## 2017-12-06 RX ADMIN — CLOPIDOGREL 75 MG: 75 TABLET, FILM COATED ORAL at 10:17

## 2017-12-06 RX ADMIN — DILTIAZEM HYDROCHLORIDE 90 MG: 60 TABLET, FILM COATED ORAL at 06:46

## 2017-12-06 RX ADMIN — ISOSORBIDE MONONITRATE 60 MG: 60 TABLET ORAL at 20:19

## 2017-12-06 RX ADMIN — FAMOTIDINE 20 MG: 10 INJECTION, SOLUTION INTRAVENOUS at 08:42

## 2017-12-06 RX ADMIN — ASPIRIN 325 MG: 325 TABLET, DELAYED RELEASE ORAL at 08:37

## 2017-12-06 RX ADMIN — Medication 1 UNITS: at 20:19

## 2017-12-06 RX ADMIN — DILTIAZEM HYDROCHLORIDE 90 MG: 60 TABLET, FILM COATED ORAL at 23:51

## 2017-12-06 RX ADMIN — DILTIAZEM HYDROCHLORIDE 90 MG: 60 TABLET, FILM COATED ORAL at 01:09

## 2017-12-06 RX ADMIN — DILTIAZEM HYDROCHLORIDE 90 MG: 60 TABLET, FILM COATED ORAL at 17:04

## 2017-12-06 RX ADMIN — METOPROLOL TARTRATE 50 MG: 50 TABLET, FILM COATED ORAL at 08:37

## 2017-12-06 RX ADMIN — FUROSEMIDE 40 MG: 40 TABLET ORAL at 08:37

## 2017-12-06 RX ADMIN — ISOSORBIDE MONONITRATE 60 MG: 60 TABLET ORAL at 08:37

## 2017-12-06 RX ADMIN — TAZOBACTAM SODIUM AND PIPERACILLIN SODIUM 2.25 G: 250; 2 INJECTION, SOLUTION INTRAVENOUS at 01:05

## 2017-12-06 RX ADMIN — DOXAZOSIN 8 MG: 4 TABLET ORAL at 20:19

## 2017-12-06 RX ADMIN — TAZOBACTAM SODIUM AND PIPERACILLIN SODIUM 2.25 G: 250; 2 INJECTION, SOLUTION INTRAVENOUS at 15:51

## 2017-12-06 ASSESSMENT — PAIN SCALES - GENERAL: PAINLEVEL_OUTOF10: 0

## 2017-12-06 NOTE — PROGRESS NOTES
INTERNAL MEDICINE Progress Note  12/6/2017 11:21 AM  Subjective:   Admit Date: 11/21/2017  PCP: Sylvie Jara  Interval History:   11/27/17, had C- multivessel CAD.  11/27/17, he had laparotomy / bowel resection with reanastomosis  Had a mediport inserted 12/4    No new c/o    Objective:   Vitals: BP (!) 150/68   Pulse 66   Temp 98.8 °F (37.1 °C) (Oral)   Resp 16   Ht 5' 9\" (1.753 m)   Wt 229 lb 12.8 oz (104.2 kg)   SpO2 92%   BMI 33.94 kg/m²   General appearance: alert, calm  HEENT: atraumatic  Neck:  no JVD  Lungs: clear to auscultation bilaterally  Heart: S1, S2 normal  Abdomen: soft, ++BS, penile / scrotal edema better  Extremities: trace edema  Neurologic:  Non focal exam      Medications:   Scheduled Meds:   furosemide  40 mg Oral BID    clopidogrel  75 mg Oral Daily    diltiazem  90 mg Oral 4 times per day    doxazosin  8 mg Oral Daily    isosorbide mononitrate  60 mg Oral BID    hydrALAZINE  100 mg Oral 3 times per day    aspirin  325 mg Oral Daily    metoprolol tartrate  50 mg Oral BID    piperacillin-tazobactam  2.25 g Intravenous Q8H    famotidine (PEPCID) injection  20 mg Intravenous BID    sodium chloride  250 mL Intravenous Once    sodium chloride flush  10 mL Intravenous 2 times per day    insulin lispro  0-6 Units Subcutaneous TID WC    insulin lispro  0-3 Units Subcutaneous Nightly     Continuous Infusions:   dextrose         Lab Results:   CBC:   Recent Labs      12/04/17   0514  12/05/17   0354  12/06/17   0347   WBC  5.5  7.1  7.3   HGB  9.3*  9.7*  8.9*   PLT  149  170  157     BMP:    Recent Labs      12/04/17   0514  12/05/17   0354   NA  144  142   K  3.6  3.6   CL  111  107   CO2  22*  24   BUN  27*  24*   CREATININE  2.2*  2.1*   GLUCOSE  118*  126*     CEA 27.1 (H)     Pathology:  FINAL DIAGNOSIS:  A - Appendix, appendectomy:   Luminal fibrous obliteration.   B - Colon, sigmoid, segmental resection:   Moderately differentiated invasive adenocarcinoma.   Tumor size:  3.2 cm.      Tumor extension:  Tumor invades through the muscularis propria into      the subserosal adipose tissue.   Margins:  Negative for malignancy.   Lymph nodes:  Four of fifteen lymph nodes are positive for metastatic  carcinoma.   Pathologic stage:  pT3, pN2a, pM1a  C - Liver, right lobe, core needle biopsies:   Metastatic adenocarcinoma consistent with colon primary. Doppler legs: no dvt. Renal USS: no obstructive lesion / process    Assessment and Plan:   1. Acute blood loss anemia with hemoccult positive stool  2. Gastric ulcer, erosions  3. Metastatic colon cancer with liver deposit. s/p laparotomy, bowel resection  4. Elevated troponin / NSTEMI / multivessel CAD s/p LHC  5. NAVEEN   6. CKD stage 3, stable  7. HTN  8. DM 2  9. dyslipidemia     cont lopressor, Imdur, hydralazine  Am labs. ambulate  Discharge planning, awaiting insurance precert.     Danica Jimenez MD

## 2017-12-06 NOTE — PROGRESS NOTES
Department of Internal Medicine  Nephrology MARY Berman III Progress Note        SUBJECTIVE:  We are following this patient for CKD/Proteinuria. The patient is doing better. Awaiting pre-certification for ECF placement. Edema is better. Willis out. Creatinine is 2.1 mg/dL. Back on po diuretic. Overall, stable feeling better. OK for discharge from renal standpoint.     Physical Exam:    VITALS:  BP (!) 150/68   Pulse 66   Temp 98.8 °F (37.1 °C) (Oral)   Resp 16   Ht 5' 9\" (1.753 m)   Wt 229 lb 12.8 oz (104.2 kg)   SpO2 92%   BMI 33.94 kg/m²     Constitutional:  Resting visiting with family  Access Exam:  mediport   Cardiovascular/Edema:  rrr 1 + edema which is better  Respiratory:  clear  Gastrointestinal:  Soft, nt  Other:  N/A    DATA:    CBC:   Lab Results   Component Value Date    WBC 7.3 12/06/2017    RBC 3.68 12/06/2017    HGB 8.9 12/06/2017    HCT 28.3 12/06/2017    MCV 77.0 12/06/2017    MCH 24.2 12/06/2017    MCHC 31.4 12/06/2017    RDW 26.3 12/06/2017     12/06/2017    MPV 12.3 12/06/2017     CBC with Differential:    Lab Results   Component Value Date    WBC 7.3 12/06/2017    RBC 3.68 12/06/2017    HGB 8.9 12/06/2017    HCT 28.3 12/06/2017     12/06/2017    MCV 77.0 12/06/2017    MCH 24.2 12/06/2017    MCHC 31.4 12/06/2017    RDW 26.3 12/06/2017    NRBC 0 11/21/2017    SEGSPCT 85.2 11/21/2017    MONOPCT 5.7 11/21/2017    MONOSABS 0.6 11/21/2017    LYMPHSABS 0.7 11/21/2017    EOSABS 0.2 11/21/2017    BASOSABS 0.1 11/21/2017     CMP:    Lab Results   Component Value Date     12/05/2017    K 3.6 12/05/2017     12/05/2017    CO2 24 12/05/2017    BUN 24 12/05/2017    CREATININE 2.1 12/05/2017    LABGLOM 33 12/05/2017    GLUCOSE 126 12/05/2017    PROT 5.2 11/21/2017    LABALBU 2.9 11/21/2017    CALCIUM 8.2 12/05/2017    BILITOT <0.2 11/21/2017    ALKPHOS 110 11/21/2017    AST 14 11/21/2017    ALT 17 11/21/2017     BMP:    Lab Results   Component Value Date     12/05/2017

## 2017-12-06 NOTE — OP NOTE
peel-away sheath as it was peeled away. Fluoroscopy  again verified good placement in the superior vena cava and then closure  was begun. It was irrigated with heparin and aspirated appropriately with  good blood return. Then irrigated with heparin. Interrupted 4-0 Vicryl  was used to close the subcutaneous and running 4-0 Vicryl was used to close  the skin. The patient tolerated the procedure well. NONI CRAVEN [facility] TREATMENT [facility], MD    D: 12/06/2017 8:48:58       T: 12/06/2017 8:51:28     [email or web address]  Job#: [id number]     Doc#: [id number]    CC:

## 2017-12-06 NOTE — PROGRESS NOTES
[facility] 62  OCCUPATIONAL THERAPY MISSED TREATMENT NOTE  STRZ CCU-STEPDOWN 3B  3B-25/025-A      Date: 2017  Patient Name: Rafy De Oliveira        CSN: [id number]   : 1965  (50 y.o.)  Gender: male   Referring Practitioner: Dr. Marla Mccormick  Diagnosis: NSTEMI         REASON FOR MISSED TREATMENT:  Missed Treat.   Pt with RN on first attempt and then received lunch, will try later as time permits

## 2017-12-06 NOTE — PLAN OF CARE
Shift      Problem: Nutrition  Goal: Optimal nutrition therapy  Outcome: Met This Shift  Patient eats 100% of meals    Problem: DISCHARGE BARRIERS  Goal: Patient's continuum of care needs are met  Outcome: Ongoing  Continue with discharge planning. Pre-cert pending for the ECF. Problem: Risk for Impaired Skin Integrity  Goal: Tissue integrity - skin and mucous membranes  Structural intactness and normal physiological function of skin and  mucous membranes. Outcome: Ongoing  Continue to assess abdominal incision. Every other staple removed today. Comments: Care plan reviewed with patient. Patient verbalize understanding of the plan of care and contribute to goal setting.

## 2017-12-07 VITALS
WEIGHT: 229.8 LBS | SYSTOLIC BLOOD PRESSURE: 167 MMHG | RESPIRATION RATE: 18 BRPM | OXYGEN SATURATION: 96 % | TEMPERATURE: 97.9 F | HEIGHT: 69 IN | DIASTOLIC BLOOD PRESSURE: 74 MMHG | BODY MASS INDEX: 34.04 KG/M2 | HEART RATE: 59 BPM

## 2017-12-07 LAB
ANION GAP SERPL CALCULATED.3IONS-SCNC: 10 MEQ/L (ref 8–16)
BUN BLDV-MCNC: 28 MG/DL (ref 7–22)
CALCIUM SERPL-MCNC: 8.2 MG/DL (ref 8.5–10.5)
CHLORIDE BLD-SCNC: 110 MEQ/L (ref 98–111)
CO2: 25 MEQ/L (ref 23–33)
CREAT SERPL-MCNC: 2.2 MG/DL (ref 0.4–1.2)
GFR SERPL CREATININE-BSD FRML MDRD: 32 ML/MIN/1.73M2
GLUCOSE BLD-MCNC: 119 MG/DL (ref 70–108)
GLUCOSE BLD-MCNC: 130 MG/DL (ref 70–108)
GLUCOSE BLD-MCNC: 166 MG/DL (ref 70–108)
GLUCOSE BLD-MCNC: 170 MG/DL (ref 70–108)
HCT VFR BLD CALC: 30.1 % (ref 42–52)
HEMOGLOBIN: 9.5 GM/DL (ref 14–18)
MCH RBC QN AUTO: 24.6 PG (ref 27–31)
MCHC RBC AUTO-ENTMCNC: 31.6 GM/DL (ref 33–37)
MCV RBC AUTO: 77.9 FL (ref 80–94)
PDW BLD-RTO: 25.5 % (ref 11.5–14.5)
PLATELET # BLD: 146 THOU/MM3 (ref 130–400)
PMV BLD AUTO: 11.1 MCM (ref 7.4–10.4)
POTASSIUM SERPL-SCNC: 3.8 MEQ/L (ref 3.5–5.2)
RBC # BLD: 3.87 MILL/MM3 (ref 4.7–6.1)
SODIUM BLD-SCNC: 145 MEQ/L (ref 135–145)
WBC # BLD: 6.5 THOU/MM3 (ref 4.8–10.8)

## 2017-12-07 PROCEDURE — 85027 COMPLETE CBC AUTOMATED: CPT

## 2017-12-07 PROCEDURE — 36415 COLL VENOUS BLD VENIPUNCTURE: CPT

## 2017-12-07 PROCEDURE — 97116 GAIT TRAINING THERAPY: CPT

## 2017-12-07 PROCEDURE — 6370000000 HC RX 637 (ALT 250 FOR IP): Performed by: NURSE PRACTITIONER

## 2017-12-07 PROCEDURE — 80048 BASIC METABOLIC PNL TOTAL CA: CPT

## 2017-12-07 PROCEDURE — 97110 THERAPEUTIC EXERCISES: CPT

## 2017-12-07 PROCEDURE — 6370000000 HC RX 637 (ALT 250 FOR IP): Performed by: INTERNAL MEDICINE

## 2017-12-07 PROCEDURE — 6360000002 HC RX W HCPCS: Performed by: INTERNAL MEDICINE

## 2017-12-07 PROCEDURE — 82948 REAGENT STRIP/BLOOD GLUCOSE: CPT

## 2017-12-07 PROCEDURE — 2580000003 HC RX 258: Performed by: INTERNAL MEDICINE

## 2017-12-07 PROCEDURE — 97530 THERAPEUTIC ACTIVITIES: CPT

## 2017-12-07 RX ORDER — ISOSORBIDE MONONITRATE 60 MG/1
60 TABLET, EXTENDED RELEASE ORAL 2 TIMES DAILY
Qty: 30 TABLET | Refills: 3 | Status: ON HOLD | OUTPATIENT
Start: 2017-12-07 | End: 2018-02-03

## 2017-12-07 RX ORDER — DOXAZOSIN 8 MG/1
8 TABLET ORAL DAILY
Qty: 30 TABLET | Refills: 3 | Status: ON HOLD | OUTPATIENT
Start: 2017-12-07 | End: 2018-02-03

## 2017-12-07 RX ORDER — HYDRALAZINE HYDROCHLORIDE 100 MG/1
100 TABLET, FILM COATED ORAL 3 TIMES DAILY
Qty: 90 TABLET | Refills: 3 | Status: SHIPPED | OUTPATIENT
Start: 2017-12-07

## 2017-12-07 RX ORDER — CLOPIDOGREL BISULFATE 75 MG/1
75 TABLET ORAL DAILY
Qty: 30 TABLET | Refills: 3 | Status: SHIPPED | OUTPATIENT
Start: 2017-12-08

## 2017-12-07 RX ORDER — DILTIAZEM HCL 90 MG
90 TABLET ORAL 4 TIMES DAILY
Qty: 120 TABLET | Refills: 3 | Status: ON HOLD | OUTPATIENT
Start: 2017-12-07 | End: 2018-03-22 | Stop reason: HOSPADM

## 2017-12-07 RX ORDER — FAMOTIDINE 20 MG/1
20 TABLET, FILM COATED ORAL 2 TIMES DAILY
Qty: 60 TABLET | Refills: 3 | Status: ON HOLD | OUTPATIENT
Start: 2017-12-07 | End: 2018-09-17 | Stop reason: HOSPADM

## 2017-12-07 RX ORDER — FUROSEMIDE 40 MG/1
40 TABLET ORAL 2 TIMES DAILY
Qty: 60 TABLET | Refills: 3 | Status: ON HOLD | OUTPATIENT
Start: 2017-12-07 | End: 2018-03-22 | Stop reason: HOSPADM

## 2017-12-07 RX ORDER — FAMOTIDINE 20 MG/1
20 TABLET, FILM COATED ORAL 2 TIMES DAILY
Status: DISCONTINUED | OUTPATIENT
Start: 2017-12-07 | End: 2017-12-07 | Stop reason: HOSPADM

## 2017-12-07 RX ADMIN — DILTIAZEM HYDROCHLORIDE 90 MG: 60 TABLET, FILM COATED ORAL at 17:39

## 2017-12-07 RX ADMIN — FUROSEMIDE 40 MG: 40 TABLET ORAL at 08:53

## 2017-12-07 RX ADMIN — METOPROLOL TARTRATE 50 MG: 50 TABLET, FILM COATED ORAL at 08:53

## 2017-12-07 RX ADMIN — DILTIAZEM HYDROCHLORIDE 90 MG: 60 TABLET, FILM COATED ORAL at 05:07

## 2017-12-07 RX ADMIN — CLOPIDOGREL 75 MG: 75 TABLET, FILM COATED ORAL at 08:53

## 2017-12-07 RX ADMIN — FUROSEMIDE 40 MG: 40 TABLET ORAL at 16:55

## 2017-12-07 RX ADMIN — TAZOBACTAM SODIUM AND PIPERACILLIN SODIUM 2.25 G: 250; 2 INJECTION, SOLUTION INTRAVENOUS at 16:04

## 2017-12-07 RX ADMIN — Medication 1 UNITS: at 11:48

## 2017-12-07 RX ADMIN — ASPIRIN 325 MG: 325 TABLET, DELAYED RELEASE ORAL at 08:52

## 2017-12-07 RX ADMIN — FAMOTIDINE 20 MG: 20 TABLET, FILM COATED ORAL at 08:53

## 2017-12-07 RX ADMIN — Medication 1 UNITS: at 16:55

## 2017-12-07 RX ADMIN — HYDRALAZINE HYDROCHLORIDE 100 MG: 50 TABLET, FILM COATED ORAL at 14:58

## 2017-12-07 RX ADMIN — Medication 10 ML: at 08:52

## 2017-12-07 RX ADMIN — DILTIAZEM HYDROCHLORIDE 90 MG: 60 TABLET, FILM COATED ORAL at 11:47

## 2017-12-07 RX ADMIN — ISOSORBIDE MONONITRATE 60 MG: 60 TABLET ORAL at 08:53

## 2017-12-07 RX ADMIN — TAZOBACTAM SODIUM AND PIPERACILLIN SODIUM 2.25 G: 250; 2 INJECTION, SOLUTION INTRAVENOUS at 08:52

## 2017-12-07 RX ADMIN — HYDRALAZINE HYDROCHLORIDE 100 MG: 50 TABLET, FILM COATED ORAL at 05:07

## 2017-12-07 ASSESSMENT — PAIN SCALES - GENERAL
PAINLEVEL_OUTOF10: 0

## 2017-12-07 NOTE — PROGRESS NOTES
CLINICAL PHARMACY: DISCHARGE MED RECONCILIATION/REVIEW    [facility]) Select Patient?: No  Total # of Interventions Recommended: 0   -   Total # Interventions Accepted: 0  Intervention Severity:   - Level 1 Intervention Present?: No   - Level 2 #: 0   - Level 3 #: 0   Time Spent (min): 15    Additional Documentation:

## 2017-12-07 NOTE — PROGRESS NOTES
Nutrition Assessment    Type and Reason for Visit: Reassess (PO & supplement monitor)    Nutrition Recommendations: Continue to monitor wt & glucose. Malnutrition Assessment:  · Malnutrition Status: At risk for malnutrition    Nutrition Diagnosis:   · Problem: Increased nutrient needs  · Etiology: related to Increased demand for energy/nutrients due to     Signs and symptoms:  as evidenced by Presence of wounds    Nutrition Assessment:  · Subjective Assessment: Pt s/p bowel resection 11/27 seen up in chair reports eating good,no nausea, taking supplement & plan discharge today. Pt + stool today. Glucose 119  · Wound Type: Surgical Wound (11/27/17 abdomen incision, laparotomy, bowel resection with reanastomosis)  · Current Nutrition Therapies:  · Oral Diet Orders:  (DB CHO controlled)   · Oral Diet intake: %  · Oral Nutrition Supplement (ONS) Orders: Diabetic Oral Supplement (BID)  · ONS intake:  (taking per pt )  · Anthropometric Measures:  · Ht: 5' 9\" (175.3 cm)   · Current Body Wt: 229 lb 11.5 oz (104.2 kg)  · Admission Body Wt:  (+ 2 & + 3 edema noted)  · Usual Body Wt: 245 lb (111.1 kg) (12/3/15 per EMR)  · % Weight Change: 12 % wt. loss,  ~2 years  · Ideal Body Wt: 160 lb (72.6 kg), Adjusted Body Wt: 174 lb (78.9 kg),    · BMI Classification: BMI 30.0 - 34.9 Obese Class I  · Comparative Standards (Estimated Nutrition Needs):  · Estimated Daily Total Kcal: 2109-1294  · Estimated Daily Protein (g):  grams    Estimated Intake vs Estimated Needs: Intake Improving    Nutrition Risk Level: Moderate    Nutrition Interventions:   Continue current ONS  Continued Inpatient Monitoring, Education Completed (11/24/17: cardiac, carbohydrate control diet education done, written materials provided. 12/1/17: reinforced diet guidelines with patient)    Nutrition Evaluation:   · Evaluation: Goals set   · Goals: Patient will consume 75% or greater of meals  during LOS.      · Monitoring: Diet Progression, Meal

## 2017-12-07 NOTE — PROGRESS NOTES
[facility]  [facility] CCU-STEPDOWN 3B  DAILY NOTE    Time:  Time In: 5838  Time Out: 1349  Timed Code Treatment Minutes: 23 Minutes  Minutes: 23          Date: 2017  Patient Name: Geoff Rhodes,   Gender: male      Room: 3B-25/025-A  MRN: [id number]  : 1965  (50 y.o.)  Referring Practitioner: Dr. Danica Salas  Diagnosis: NSTEMI  Additional Pertinent Hx: The patient states that while he was at work today, he began feeling dizzy and clammy. He had his blood pressure tested in the nurse's station at work and the patient states that is was 207. His employer called EMS, which brought him to the emergency department.   pt s/p Sigmoid Colon Resection , Appendectomy , and Liver bx core needle on     Restrictions/Precautions:  Restrictions/Precautions: General Precautions, Fall Risk            Position Activity Restriction  Other position/activity restrictions: abdominal surgery         Past Medical History:   Diagnosis Date    Chronic kidney disease     see's Ivis Dalton Coronary artery disease involving native coronary artery of native heart without angina pectoris     Diabetes mellitus ([facility] [address]     Hyperlipidemia     Hypertension     Thyroid disease      Past Surgical History:   Procedure Laterality Date    ENDOSCOPY, COLON, DIAGNOSTIC      EYE SURGERY Left     laser surgery    INSERTION / REMOVAL / REPLACEMENT VENOUS ACCESS CATHETER Right 2017    MEDIPORT INSERTION performed by Steven Cuenca MD at [address] Left 2017    COLONOSCOPY WITH BIOPSY performed by Soni Amin MD at [address] Endoscopy    LA EGD TRANSORAL BIOPSY SINGLE/MULTIPLE N/A 2017    EGD BIOPSY performed by Soni Amin MD at [address]      [facility] on nose removed    SMALL INTESTINE SURGERY N/A 2017    SIGMOID COLON RESECTION, APPENDECTOMY, LIVER BIOPSY performed by Steven Cuenca MD at [facility] Subjective  Patient assessed for rehabilitation services?: Yes    Subjective: Pt seated in recliner upon arrival. Pt agreeable to OT and RN okayed therapy session           Pain:  Pain Assessment  Patient Currently in Pain: Denies       Objective               ADL  LE Dressing: Stand by assistance (sitting edge of recliner to adjust slipper socks )               Transfers  Sit to stand: Stand by assistance  Stand to sit: Stand by assistance       Balance  Sitting Balance: Supervision  Standing Balance: Stand by assistance     Time: x1 min   Activity: prep to amublate      Functional Mobility  Functional - Mobility Device: No device  Activity: Other  Assist Level: Stand by assistance  Functional Mobility Comments: Pt ambulated in hallway of unit with no LOB and good posture noted, and steady pace            Type of ROM/Therapeutic Exercise: AROM  Comment: pt part in red theraband exercises with BUE seated in recliner x15 reps x1 set in all planes and joints to increase strength and endurance for ADLS and transfers             Activity Tolerance:  Activity Tolerance: Patient Tolerated treatment well    Assessment:     Performance deficits / Impairments: Decreased functional mobility , Decreased ADL status, Decreased endurance, Decreased balance, Decreased strength, Decreased safe awareness, Decreased high-level IADLs  Prognosis: Good  Discharge Recommendations: Subacute/Skilled Nursing Facility    Patient Education:  Patient Education: safety with ambulation     Equipment Recommendations:  Equipment Needed: Yes  Other: continue to assess    Safety:  Safety Devices in place: Yes  Type of devices: Call light within reach, All fall risk precautions in place, Left in chair, Chair alarm in place, Gait belt    Plan:  Times per week: 5x  Current Treatment Recommendations: Strengthening, Balance Training, Functional Mobility Training, Endurance Training, Patient/Caregiver Education & Training, Self-Care / ADL, Safety Education & Training    Goals:  Patient goals : to go to rehab facility    Short term goals  Time Frame for Short term goals: 1 week  Short term goal 1: Pt will complete LB ADLs wtih min A and LHAE prn with no cues for safety to increase I with dressing routine  Short term goal 2: Pt will complete dynamic standing task wtih SBA for > 3 minutes with no LOB or vc for safety to increase I with sink side grooming  Short term goal 3: Pt will complete BUE light strengthening exercises toincrease strength and endurance needed for ease with ADLs  Short term goal 4: Pt will complete functional transfers with SBA and no cues for safety to increase I with toileting routine  Short term goal 5: pt will demonstrate functional mobility HH distances with SBA and least restrictive device with no vc for safety to increase I with HH ambulation  Long term goals  Time Frame for Long term goals : No LTG established d/t short ELOS         AM-PAC Inpatient Daily Activity Raw Score: 19  AM-PAC Inpatient ADL T-Scale Score : 40.22  ADL Inpatient CMS 0-100% Score: 42.8  ADL Inpatient CMS G-Code Modifier : CARLO

## 2017-12-07 NOTE — PLAN OF CARE
Problem: Safety:  Goal: Free from accidental physical injury  Free from accidental physical injury    Outcome: Ongoing  Patient up with assist, poor judgement at times,     Problem: Daily Care:  Goal: Daily care needs are met  Daily care needs are met    Outcome: Ongoing  Assistance provided with daily care    Problem: Pain:  Goal: Patient's pain/discomfort is manageable  Patient's pain/discomfort is manageable   Outcome: Ongoing  Patient denies pain    Problem: Discharge Planning:  Goal: Patients continuum of care needs are met  Patients continuum of care needs are met   Outcome: Ongoing  Patient plans to discharge to nursing home    Problem: Falls - Risk of  Goal: Absence of falls  Outcome: Ongoing  No falls this shift    Problem: Serum Glucose Level - Abnormal:  Goal: Ability to maintain appropriate glucose levels will improve  Ability to maintain appropriate glucose levels will improve   Outcome: Ongoing  Continue to monitor and provide education on proper food choices    Problem: Tissue Perfusion - Cardiopulmonary, Altered:  Goal: Circulatory function within specified parameters  Circulatory function within specified parameters   Outcome: Ongoing  No issues    Problem: Tissue Perfusion - Peripheral, Altered:  Goal: Absence of hematoma at arterial access site  Absence of hematoma at arterial access site   Outcome: Ongoing  No sign of hematoma- no increased bruising, or firmness    Problem: Nutrition  Goal: Optimal nutrition therapy  Outcome: Ongoing  Appetite good, following prescribed diet    Problem: DISCHARGE BARRIERS  Goal: Patient's continuum of care needs are met  Outcome: Ongoing  Patient plans to discharge to [facility],    Comments: Care plan reviewed with patient. Patient verbalizes understanding of the care plan and contributed to goal setting.

## 2017-12-07 NOTE — CARE COORDINATION
12/7/17, 11:27 AM    DISCHARGE BARRIERS  Called Yessica Heaton in admissions for [facility]. SW made her aware the patient is ready to go. Waiting on the pre-cert. She told SW she will look into it and call back. Update 11:38am: Yessica Heaton from [facility] called back and made GILMAR aware that Zachary's precert has gone through.

## 2017-12-07 NOTE — DISCHARGE SUMMARY
Discharge Summary    Jannet Rodas  :  1965  MRN:  [id number]    Admit date:  2017  Discharge date:      Admitting Physician:  Danica Jimenez MD    Discharge Diagnoses:      1. Acute blood loss anemia with hemoccult positive stool  2. Gastric ulcer, erosions  3. Metastatic colon cancer with liver deposit. s/p laparotomy, bowel resection  4. Elevated troponin / NSTEMI / multivessel CAD s/p LHC  5. NAVEEN   6. CKD stage 3, stable  7. HTN  8. DM 2  9. dyslipidemia    Patient Active Problem List   Diagnosis    Accelerated hypertension    Hyperkalemia    [facility]STEM[facility] (non-ST elevated myocardial infarction) ([facility] [address]    Hypertension    Elevated troponin    Coronary artery disease involving native coronary artery of native heart without angina pectoris       Admission Condition:  serious  Discharged Condition:  good    Hospital Course:   Patient presented with hematochezia. GI w/up revealed a colonic mass- adenocarcinoma with near obstruction. He had laparotomy with bowel resection. He was seen by the oncologist and will start adj chemoRx as OP. See path report below. Discharge Medications:       Jorge Quach   Home Medication Instructions WOW:[id number]    Printed on:17 1430   Medication Information                      aspirin 325 MG EC tablet  Take 1 tablet by mouth daily             betamethasone valerate (VALISONE) 0.1 % ointment  Apply topically 3 times daily.  Do not apply to face             Blood Pressure Monitor KIT  CHECK BP TWICE DAILY IF SBP >140 CALL PCP             clopidogrel (PLAVIX) 75 MG tablet  Take 1 tablet by mouth daily             diltiazem (CARDIZEM) 90 MG tablet  Take 1 tablet by mouth 4 times daily             doxazosin (CARDURA) 8 MG tablet  Take 1 tablet by mouth daily             famotidine (PEPCID) 20 MG tablet  Take 1 tablet by mouth 2 times daily             furosemide (LASIX) 40 MG tablet  Take 1 tablet by mouth 2 times daily             hydrALAZINE (APRESOLINE) 100 MG tablet  Take 1 tablet by mouth 3 times daily             hydrocortisone 2.5 % cream  Apply topically 3 times daily. Apply to face             insulin glargine (TOUJEO SOLOSTAR) 300 UNIT/ML injection pen  Inject 10 Units into the skin daily             isosorbide mononitrate (IMDUR) 60 MG extended release tablet  Take 1 tablet by mouth 2 times daily             Liraglutide (VICTOZA) 18 MG/3ML SOPN SC injection  Inject 1.8 mg into the skin daily              metoprolol tartrate (LOPRESSOR) 50 MG tablet  Take 50 mg by mouth 2 times daily              simvastatin (ZOCOR) 20 MG tablet  Take 20 mg by mouth nightly                 Consults:  GI, hematology/oncology and general surgery    Significant Diagnostic Studies: labs: CBC:         Recent Labs      12/05/17   0354  12/06/17   0347  12/07/17   0426   WBC  7.1  7.3  6.5   HGB  9.7*  8.9*  9.5*   PLT  170  157  146      BMP:         Recent Labs      12/05/17   0354  12/07/17   0426   NA  142  145   K  3.6  3.8   CL  107  110   CO2  24  25   BUN  24*  28*   CREATININE  2.1*  2.2*   GLUCOSE  126*  119*      CEA 27.1 (H)      Pathology:  FINAL DIAGNOSIS:  A - Appendix, appendectomy:   Luminal fibrous obliteration. B - Colon, sigmoid, segmental resection:   Moderately differentiated invasive adenocarcinoma.   Tumor size:  3.2 cm.      Tumor extension:  Tumor invades through the muscularis propria into      the subserosal adipose tissue.   Margins:  Negative for malignancy.   Lymph nodes:  Four of fifteen lymph nodes are positive for metastatic  carcinoma.   Pathologic stage:  pT3, pN2a, pM1a  C - Liver, right lobe, core needle biopsies:   Metastatic adenocarcinoma consistent with colon primary.     Doppler legs: no dvt.     Renal USS: no obstructive lesion / process     Assessment and Plan:   10. Acute blood loss anemia with hemoccult positive stool  11. Gastric ulcer, erosions  12. Metastatic colon cancer with liver deposit. s/p laparotomy, bowel

## 2017-12-07 NOTE — PROGRESS NOTES
Pharmacy Intravenous to Oral Protocol  Medication changed per P&T protocol: Pepcid    Patient meets criteria based on the followin. IV therapy > 24 hours - yes  2. Nausea/vomiting - no  3. Regular diet - yes  4. Tolerating other oral medications: yes  5. Afebrile for 24 hours: n/a  6.  WBC trending downward: n/a

## 2017-12-07 NOTE — PROGRESS NOTES
Tolerated treatment well;Patient limited by endurance    Assessment: Body structures, Functions, Activity limitations: Decreased functional mobility , Decreased strength, Decreased endurance, Decreased ROM, Decreased balance, Decreased coordination, Decreased fine motor control  Assessment: Patient tolerated session fairly well,  limited by decreased strength and endurance overall,  does have mild developmental delay. Requires cues for safety throughout session and would benefit from addtional skilled PT to increase strength, balance, endurance and safety for improved functional mobility. Prognosis: Excellent  REQUIRES PT FOLLOW UP: Yes  Discharge Recommendations: Continue to assess pending progress, Patient would benefit from continued therapy after discharge    Patient Education:  Patient Education: POC, transfers, gait training, therex, safety with transfers and ambulation     Equipment Recommendations:  Equipment Needed: No    Safety:  Type of devices:  All fall risk precautions in place, Call light within reach, Chair alarm in place, Left in chair, Patient at risk for falls, Gait belt, Nurse notified    Plan:  Times per week: 5x GM  Times per day: Daily  Specific instructions for Next Treatment: mobility and therex  Current Treatment Recommendations: Strengthening, Balance Training, Functional Mobility Training, Transfer Training, Endurance Training, Gait Training, Home Exercise Program, Safety Education & Training, Patient/Caregiver Education & Training    Goals:  Patient goals : go home    Short term goals  Time Frame for Short term goals: 3 days  Short term goal 1: Pt to be Supervision for supine <> sit to get in/out of bed  Short term goal 2: Pt to be Supervision for sit <> stand to get up to ambulate  Short term goal 3: Pt to ambulate > 80 ft with AD with Supervision for household distances    Long term goals  Time Frame for Long term goals : not set due to [address] Mobility without Stair Climbing Raw Score : 15  AM-PAC Inpatient without Stair Climbing T-Scale Score : 43.03  Mobility Inpatient CMS 0-100% Score: 47.43  Mobility Inpatient without Stair CMS G-Code Modifier : CK

## 2017-12-07 NOTE — CARE COORDINATION
12/7/17, 11:43 AM    Discharge plan discussed by  and . Discharge plan reviewed with patient/ family. Patient/ family verbalize understanding of discharge plan and are in agreement with plan. Understanding was demonstrated using the teach back method. Alise Mendoza will be discharged today to [facility]. He will be going skilled under his insurance. SW called his mother Sveta Prescott to make her aware of discharge plan and she will be finding transportation. Called Jerica Rivera at [facility] and made her aware patient would be transported after 4:00pm today.

## 2017-12-10 PROBLEM — R53.81 PHYSICAL DECONDITIONING: Status: ACTIVE | Noted: 2017-12-10

## 2017-12-10 PROBLEM — I25.2 HISTORY OF NON-ST ELEVATION MYOCARDIAL INFARCTION (NSTEMI): Status: ACTIVE | Noted: 2017-12-10

## 2017-12-10 NOTE — PROGRESS NOTES
H&P at [facility]      NAME: Laddie Aschoff  DATE: 17  ROOM #: 30-2  CODE STATUS: FULL CODE  REASON FOR ADMISSION: Physical deconditioning after hospitalization. Metastatic colon cancer  : 1965  ADMISSION DATE: 17  SKILLED PATIENT: Yes    History obtained from chart review, the patient and nursing staff. SUBJECTIVE:  HPI: Laddie Aschoff is a 50 y.o. male. Pt seen and examined at bedside. Patient admitted to [facility] from  to  for new onset anemia, found to have metastatic colon cancer to the liver. Appears had an NSTEMI during admission as well. Besides surgery mentioned below, also underwent cardiac cath echo etc. Appears cath showed some coronary dz amendable to PCI, per available records. However, appears plan is to stent him as an OP once recovered and cleared by nephro. Course also complicated by acute on chronic renal dz. Dialysis not required. D/c summary:  Hospital Course:   Patient presented with hematochezia. GI w/up revealed a colonic mass- adenocarcinoma with near obstruction. He had laparotomy with bowel resection. He was seen by the oncologist and will start adj chemoRx as OP. Last onc progress note:  Progress Notes  Date of Service: 2017 1:08 PM  Arvin Yanez MD   Hematology and Oncology      []Hide copied text  []Hover for attribution information  Pathology did show metastatic disease in the liver. He is a candidate for chemotherapy. He will need a PET scan as an outpatient. If he has only a disease in his liver he may be a candidate for resection in the future. He will need a port to be put in for chemo treatment. Since admission has done well. Incision healing well. Port in place. Bowels moving normally. Progressing well with therapy. Has all appropriate f/u apts. Denies CP/SOB. No RUQ pain or jaundice. Renal fxn stable, has apt with nephro today. Voiding normally. DM2: on lantus and victoza, sugars appropriate so far. No lows. HTN: BP high since admission, 140/160/70-90. High during hospitalization as well. Currently on dilt, doxazosin, hydralazine, lasix, amd lopressor. Has apt with nephro today     HLD: on zocor, tolerating well. Denies RUQ pain, juandice or myalgias. Allergies and Medications were reviewed through the [facility] EMR. All medications reviewed and reconciled, including OTC and herbal medications.        Patient Active Problem List    Diagnosis Date Noted    Elevated troponin      Priority: High    Coronary artery disease involving native coronary artery of native heart without angina pectoris      Priority: High    Hypertension      Priority: High    History of non-ST elevation myocardial infarction (NSTEMI) 12/10/2017    Physical deconditioning 12/10/2017    [facility]     NSTEMI (non-ST elevated myocardial infarction) ([facility] 11/21/2017    Accelerated hypertension 12/04/2015         Past Medical History:   Diagnosis Date    ASCVD (arteriosclerotic cardiovascular disease)     CKD (chronic kidney disease) stage 3, GFR 30-59 ml/min     [facility]    DM2 (diabetes mellitus, type 2) ([facility]     Dyslipidemia     Hypertension     Thyroid disease          Past Surgical History:   Procedure Laterality Date    ENDOSCOPY, COLON, DIAGNOSTIC      EYE SURGERY Left 2013    laser surgery    INSERTION / REMOVAL / REPLACEMENT VENOUS ACCESS CATHETER Right 12/4/2017    MEDIPORT INSERTION performed by Sukhdev Manuel MD at [address] Left 11/24/2017    COLONOSCOPY WITH BIOPSY performed by Barrington Laughlin MD at [facility]    [place] EGD TRANSORAL BIOPSY SINGLE/MULTIPLE N/A 11/23/2017    EGD BIOPSY performed by Barrington Laughlin MD at [address] [place] [address] St      mole on nose removed    SMALL INTESTINE SURGERY N/A 11/27/2017    SIGMOID COLON RESECTION, APPENDECTOMY, LIVER BIOPSY performed by Sukhdev Manuel MD at [facility] Known Allergies      Social History     Social History    Marital status: Single     Spouse name: N/A    Number of children: N/A    Years of education: 15     Occupational History    factory work [employer]     Social History Main Topics    Smoking status: Never Smoker    Smokeless tobacco: Never Used    Alcohol use No    Drug use: No    Sexual activity: No     Other Topics Concern    Not on file     Social History Narrative    No narrative on file         Family History   Problem Relation Age of Onset    Cancer Father     Heart Disease Father     High Blood Pressure Mother          I have reviewed the patient's past medical history, past surgical history, allergies, medications, social and family history and I have made updates where appropriate.       Review of Systems  Positive responses are highlighted in bold    Constitutional:  Fever, Chills, Night Sweats, Fatigue, Unexpected changes in weight  Eyes:  Eye discharge, Eye pain, Eye redness, Visual disturbances   HENT:  Ear pain, Tinnitus, Nosebleeds, Trouble swallowing, Hearing loss, Sore throat  Cardiovascular:  Chest Pain, Palpitations, Orthopnea, Paroxysmal Nocturnal Dyspnea  Respiratory:  Cough, Wheezing, Shortness of breath, Chest tightness, Apnea  Gastrointestinal:  Nausea, Vomiting, Diarrhea, Constipation, Heartburn, Blood in stool  Genitourinary:  Difficulty or painful urination, Flank pain, Change in frequency, Urgency  Skin:  Color change, Rash, Itching, Wound  Psychiatric:  Hallucinations, Anxiety, Depression, Suicidal ideation  Hematological:  Enlarged glands, Easy bleeding, Easily bruising  Musculoskeletal:  Joint pain, Back pain, Gait problems, Joint swelling, Myalgias  Neurological:  Dizziness, Headaches, Presyncope, Numbness, Seizures, Tremors  Allergy:  Environmental allergies, Food allergies  Endocrine:  Heat Intolerance, Cold Intolerance, Polydipsia, Polyphagia, Polyuria      PHYSICAL EXAM:  Vitals:    12/12/17 0530   BP: (!) 147/65   Pulse: 70   Resp: 18   Temp: 97 °F (36.1 °C)   Weight: 229 lb (103.9 kg)   Height: 5' 9\" (1.753 m)     Body mass index is 33.82 kg/m². Pain: 0    VS Reviewed  General Appearance: well developed and well- nourished, in no acute distress  Head: normocephalic and atraumatic  Eyes: pupils equal, round, and reactive to light, conjunctivae and eye lids without erythema  ENT: external ear and ear canal normal bilaterally, nose without deformity, nasal mucosa and turbinates normal without polyps, oropharynx normal, dentition is normal for age, no lip or gum lesions noted  Neck: supple and non-tender without mass, no thyromegaly or thyroid nodules, no cervical lymphadenopathy  Pulmonary/Chest: clear to auscultation bilaterally- no wheezes, rales or rhonchi, normal air movement, no respiratory distress or retractions  Cardiovascular: normal rate, regular rhythm, normal S1 and S2, no murmurs, rubs, clicks, or gallops, distal pulses intact  Abdomen: soft, non-tender, non-distended, bowel sounds physiologic,  no rebound or guarding, no masses or hernias noted. Liver and spleen without enlargement. Extremities: no cyanosis, clubbing or edema of the lower extremities  Musculoskeletal: No joint swelling or gross deformity   Neuro:  Alert, normal speech, no focal findings or movement disorder noted  Psych:  Normal affect without evidence of depression or anxiety, insight and judgement are appropriate, memory appears intact  Skin: warm and dry, no rash or erythema, abominal incision healing well. No cellulitic changes.    Lymph:  No cervical, auricular or supraclavicular lymph nodes palpated      LABS/IMAGING    Recent Labs      12/07/17   0426  12/06/17   0347  12/05/17   0354   WBC  6.5  7.3  7.1   HGB  9.5*  8.9*  9.7*   HCT  30.1*  28.3*  30.4*   MCV  77.9*  77.0*  77.1*   PLT  146  157  170       Lab Results   Component Value Date     12/07/2017    K 3.8 12/07/2017     12/07/2017    CO2 25 12/07/2017 BUN 28 (H) 12/07/2017    CREATININE 2.2 (H) 12/07/2017    GLUCOSE 119 (H) 12/07/2017    CALCIUM 8.2 (L) 12/07/2017    PROT 5.2 (L) 11/21/2017    LABALBU 2.9 (L) 11/21/2017    BILITOT <0.2 (L) 11/21/2017    ALKPHOS 110 11/21/2017    AST 14 11/21/2017    ALT 17 11/21/2017    LABGLOM 32 (A) 12/07/2017       Lab Results   Component Value Date    LABA1C 5.8 11/22/2017    LABA1C 9.9 12/03/2015       Lab Results   Component Value Date    TSH 1.510 11/21/2017       Narrative   PROCEDURE: US RENAL COMPLETE       CLINICAL INFORMATION: RENAL FAILURE, ACUTE (KIDNEY INJURY),  .       COMPARISON: No prior study.       TECHNIQUE: 2-D grayscale ultrasound of the kidneys with color flow and Doppler spectral analysis of both kidneys.       FINDINGS:        RIGHT KIDNEY - 11.8 x 7.1 x 6.0 cm   Resistive Index - 0.68   Cortical Thickness - 1.1 cm       LEFT KIDNEY - 13.3 x 6.5 x 6.0 cm   Resistive Index - 0.69   Cortical Thickness - 1.3 cm       URINARY BLADDER   Pre-Void - 55.3mL   Post-Void - n/a mL       Right kidney:       The right kidney has a normal shape and size. There is normal cortical echotexture and thickness. There is no hydronephrosis, cyst, mass, or stone in the right kidney. There is normal blood flow. The resistive index is normal.       Left kidney:       The left kidney has a normal shape and size. There is normal cortical echotexture and thickness. There is no hydronephrosis, cyst, mass, or stone in the left kidney. There is normal blood flow. The resistive index is within normal limits.       Urinary bladder:       There is a Willis catheter balloon within the urinary bladder. Grossly the urinary bladder appears normal and its nearly decompressed state. There is still 55.3 mL of urine within the urinary bladder.       There are small bilateral pleural effusions.               Impression   1.  Normal appearance of the right left kidneys. 2.  Small bilateral pleural effusions.    3.  Grossly normal appearance of the noncontrast evaluation of the upper abdomen is unremarkable. However CT of the abdomen and pelvis are reported separately.           Impression   1. There is at least mild cardiomegaly. 2. There is moderate right and small left-sided pleural fluid present. There is adjacent mild right greater than left passive atelectasis or pneumonia at the lung bases. 3. There is a mild to moderate amount of pericardial fluid present.               **This report has been created using voice recognition software.  It may contain minor errors which are inherent in voice recognition technology. **       Final report electronically signed by Dr. Lars Perez on 11/25/2017 8:10 AM         Narrative   PROCEDURE: CT ABDOMEN PELVIS WO IV CONTRAST       CLINICAL INFORMATION: colon mass        COMPARISON: No prior study.       TECHNIQUE:  Axial CT images were obtained through the abdomen and pelvis without contrast. Coronal and sagittal reformatted images were rendered. All CT scans at this facility use dose modulation, iterative reconstruction, and/or weight-based dosing when    appropriate to reduce radiation dose to as low as reasonably achievable.       FINDINGS:        CT chest is reported separately. Briefly, there are pleural effusions demonstrated at the right greater than left lung bases. There is also pericardial fluid present.       Noncontrast evaluation of the liver, spleen, and pancreas appear within normal limits. The adrenal glands appear grossly within normal limits.       There is trace minimal nonspecific ascitic fluid demonstrated within the right lateral abdomen inferior and posterior to the inferior right hepatic lobe.       There is a punctate nonobstructive intrarenal stone density within the right mid kidney. There is no hydronephrosis or hydroureter. No ureterolithiasis is seen. No bladder stones are identified. No definite left-sided hydronephrosis or hydroureter is    seen.  However, there is a punctate calcific 11/25/2017 8:20 AM         Narrative   PROCEDURE: CT HEAD WO CONTRAST       CLINICAL INFORMATION: Headache.       COMPARISON: 12/3/2015.       TECHNIQUE:    5 mm axial imaging through the head without IV contrast.        All CT scans at this facility use dose modulation, iterative reconstruction, and/or weight based dosing when appropriate to reduce the radiation dose to as low as reasonably achievable.           FINDINGS:    POSTOPERATIVE CHANGES: None.       BRAIN VOLUME:    1. Normal for the patient's age.       VENTRICLES/CISTERNS:    1. Normal for the patient's age.       INFARCT/WHITE MATTER DISEASE: Unremarkable.       INTRACRANIAL HEMORRHAGE: None.       MASS/MASS EFFECT/MIDLINE SHIFT: None.       INTRA-AXIAL/EXTRA-AXIAL FLUID COLLECTIONS: None.       INTRAORBITAL CONTENTS: Unremarkable.       OTHER: None.       SKULL/SCALP: Unremarkable.       PARANASAL SINUSES: Unremarkable.               Impression   1. Unremarkable noncontrast CT head.               **This report has been created using voice recognition software.  It may contain minor errors which are inherent in voice recognition technology. **       Final report electronically signed by Dr. Nataliia Mclean on 11/21/2017 6:22 PM         ECHO 22 NOV 2017   Conclusions      Summary   Mild concentric left ventricular hypertrophy.   Systolic function was low normal.   Ejection fraction is visually estimated at 50%.   Normal right ventricular size and function.   Right ventricular systolic pressure of 42 mm Hg consistent with mild   pulmonary hypertension.   Leaflets exhibited mild to moderately increased thickness and mildly   reduced cuspal separation of the aortic valve.   Mild to moderate aortic regurgitation is noted.   Aortic valve leaflets are Mildly calcified.   Small to moderate circumferential pericardial effusion without tamponade   physiology is noted .   and in subcostal view of 1.22 cm in upper region and 1.08 cm in lower   region.   May consider

## 2017-12-12 ENCOUNTER — CLINICAL DOCUMENTATION (OUTPATIENT)
Dept: FAMILY MEDICINE CLINIC | Age: 52
End: 2017-12-12

## 2017-12-12 VITALS
SYSTOLIC BLOOD PRESSURE: 147 MMHG | BODY MASS INDEX: 33.92 KG/M2 | TEMPERATURE: 97 F | HEART RATE: 70 BPM | DIASTOLIC BLOOD PRESSURE: 65 MMHG | WEIGHT: 229 LBS | RESPIRATION RATE: 18 BRPM | HEIGHT: 69 IN

## 2017-12-12 DIAGNOSIS — C78.7 METASTATIC COLON CANCER TO LIVER (HCC): ICD-10-CM

## 2017-12-12 DIAGNOSIS — N18.30 TYPE 2 DIABETES MELLITUS WITH STAGE 3 CHRONIC KIDNEY DISEASE, WITH LONG-TERM CURRENT USE OF INSULIN (HCC): ICD-10-CM

## 2017-12-12 DIAGNOSIS — I25.10 ASCVD (ARTERIOSCLEROTIC CARDIOVASCULAR DISEASE): ICD-10-CM

## 2017-12-12 DIAGNOSIS — D64.9 NORMOCYTIC ANEMIA: ICD-10-CM

## 2017-12-12 DIAGNOSIS — Z79.4 TYPE 2 DIABETES MELLITUS WITH STAGE 3 CHRONIC KIDNEY DISEASE, WITH LONG-TERM CURRENT USE OF INSULIN (HCC): ICD-10-CM

## 2017-12-12 DIAGNOSIS — N18.9 ACUTE KIDNEY INJURY SUPERIMPOSED ON CHRONIC KIDNEY DISEASE (HCC): ICD-10-CM

## 2017-12-12 DIAGNOSIS — N18.30 CKD (CHRONIC KIDNEY DISEASE) STAGE 3, GFR 30-59 ML/MIN (HCC): ICD-10-CM

## 2017-12-12 DIAGNOSIS — C18.9 METASTATIC COLON CANCER TO LIVER (HCC): ICD-10-CM

## 2017-12-12 DIAGNOSIS — E78.5 DYSLIPIDEMIA: ICD-10-CM

## 2017-12-12 DIAGNOSIS — N17.9 ACUTE KIDNEY INJURY SUPERIMPOSED ON CHRONIC KIDNEY DISEASE (HCC): ICD-10-CM

## 2017-12-12 DIAGNOSIS — I25.2 HISTORY OF NON-ST ELEVATION MYOCARDIAL INFARCTION (NSTEMI): ICD-10-CM

## 2017-12-12 DIAGNOSIS — E11.22 TYPE 2 DIABETES MELLITUS WITH STAGE 3 CHRONIC KIDNEY DISEASE, WITH LONG-TERM CURRENT USE OF INSULIN (HCC): ICD-10-CM

## 2017-12-12 DIAGNOSIS — I10 ESSENTIAL HYPERTENSION: ICD-10-CM

## 2017-12-12 DIAGNOSIS — R53.81 PHYSICAL DECONDITIONING: Primary | ICD-10-CM

## 2017-12-14 ENCOUNTER — OFFICE VISIT (OUTPATIENT)
Dept: SURGERY | Age: 52
End: 2017-12-14

## 2017-12-14 VITALS
DIASTOLIC BLOOD PRESSURE: 76 MMHG | RESPIRATION RATE: 18 BRPM | TEMPERATURE: 97.4 F | HEIGHT: 69 IN | BODY MASS INDEX: 33.75 KG/M2 | OXYGEN SATURATION: 94 % | WEIGHT: 227.9 LBS | SYSTOLIC BLOOD PRESSURE: 140 MMHG | HEART RATE: 74 BPM

## 2017-12-14 DIAGNOSIS — Z48.02 REMOVAL OF STAPLES: ICD-10-CM

## 2017-12-14 DIAGNOSIS — Z90.49 S/P PARTIAL RESECTION OF COLON: Primary | ICD-10-CM

## 2017-12-14 DIAGNOSIS — Z78.9 CENTRAL VENOUS CATHETER IN PLACE: ICD-10-CM

## 2017-12-14 PROCEDURE — 99024 POSTOP FOLLOW-UP VISIT: CPT | Performed by: NURSE PRACTITIONER

## 2017-12-14 ASSESSMENT — ENCOUNTER SYMPTOMS
RECTAL PAIN: 0
ANAL BLEEDING: 0
EYE DISCHARGE: 0
WHEEZING: 0
EYE PAIN: 0
APNEA: 0
ABDOMINAL PAIN: 0
COLOR CHANGE: 0
NAUSEA: 0
SINUS PAIN: 0
BACK PAIN: 0
SORE THROAT: 0
ABDOMINAL DISTENTION: 0
EYE ITCHING: 0
VOMITING: 0
PHOTOPHOBIA: 0
CONSTIPATION: 0
TROUBLE SWALLOWING: 0
COUGH: 0
RHINORRHEA: 0
DIARRHEA: 0
FACIAL SWELLING: 0
CHEST TIGHTNESS: 0
SINUS PRESSURE: 0
SHORTNESS OF BREATH: 0
EYE REDNESS: 0
VOICE CHANGE: 0
CHOKING: 0
STRIDOR: 0
BLOOD IN STOOL: 0

## 2017-12-14 NOTE — PROGRESS NOTES
[facility]  [facility]  [address]  [address] [address]  Dept: [phone]  Dept Fax: [phone]  Loc: [phone]    Visit Date: 12/14/2017       Goyo Womack is a 50 y.o. male who presents today for:  Chief Complaint   Patient presents with    Post-Op Check     s/p Right subclavian vein MediPort placement 12/6/17    Post-Op Check     s/p Sigmoid colon resection, incidental appendectomy,  Biopsy of right lobe of the liver, core needle 11/27/17       HPI:     Lindsey Mcguire presents today for a 3 week post op check and one week post mediport insertion. Today He has no complaint. He is doing rather well and is currently a resident of [facility]. The midline abdominal incision looks good, the mediport insertion site looks good. All staples were removed today. There is no drainage, steri strips applied. He is tolerating his meals and having bowel movements. He is scheduled to follow up with cardiology and oncology next week. We will follow up in 2 weeks for a final appointment. Call with any needs prior to appt if any questions.          Past Medical History:   Diagnosis Date    ASCVD (arteriosclerotic cardiovascular disease)     CKD (chronic kidney disease) stage 3, GFR 30-59 ml/min     see's [facility]    DM2 (diabetes mellitus, type 2) ([facility] [address]     Dyslipidemia     Hypertension     Thyroid disease       Past Surgical History:   Procedure Laterality Date    ENDOSCOPY, COLON, DIAGNOSTIC      EYE SURGERY Left 2013    laser surgery    INSERTION / REMOVAL / REPLACEMENT VENOUS ACCESS CATHETER Right 12/4/2017    MEDIPORT INSERTION performed by Ruben Payan MD at [address] Left 11/24/2017    COLONOSCOPY WITH BIOPSY performed by Renan Dietrich MD at [facility]    [place] EGD TRANSORAL BIOPSY SINGLE/MULTIPLE N/A 11/23/2017    EGD BIOPSY performed by Renan Dietrich MD at [address] [place] [address] Negative for activity change, appetite change, chills, diaphoresis, fatigue, fever and unexpected weight change. HENT: Negative for congestion, dental problem, drooling, ear discharge, ear pain, facial swelling, hearing loss, mouth sores, nosebleeds, postnasal drip, rhinorrhea, sinus pain, sinus pressure, sneezing, sore throat, tinnitus, trouble swallowing and voice change. Eyes: Negative for photophobia, pain, discharge, redness, itching and visual disturbance. Respiratory: Negative for apnea, cough, choking, chest tightness, shortness of breath, wheezing and stridor. Cardiovascular: Negative for chest pain, palpitations and leg swelling. Gastrointestinal: Negative for abdominal distention, abdominal pain, anal bleeding, blood in stool, constipation, diarrhea, nausea, rectal pain and vomiting. Endocrine: Negative for cold intolerance, heat intolerance, polydipsia, polyphagia and polyuria. Genitourinary: Negative for decreased urine volume, difficulty urinating, discharge, dysuria, enuresis, flank pain, frequency, genital sores, hematuria, penile pain, penile swelling, scrotal swelling, testicular pain and urgency. Musculoskeletal: Negative for arthralgias, back pain, gait problem, joint swelling, myalgias, neck pain and neck stiffness. Skin: Positive for wound. Negative for color change, pallor and rash. Abdominal incision-staples intact   Allergic/Immunologic: Negative for environmental allergies, food allergies and immunocompromised state. Neurological: Negative for dizziness, tremors, seizures, syncope, facial asymmetry, speech difficulty, weakness, light-headedness, numbness and headaches. Hematological: Negative for adenopathy. Does not bruise/bleed easily. Psychiatric/Behavioral: Negative for agitation, behavioral problems, confusion, decreased concentration, dysphoric mood, hallucinations, self-injury, sleep disturbance and suicidal ideas.  The patient is not nervous/anxious and is not hyperactive. Objective:     BP (!) 140/76 (Site: Left Arm, Position: Sitting, Cuff Size: Medium Adult)   Pulse 74   Temp 97.4 °F (36.3 °C) (Tympanic)   Resp 18   Ht 5' 9\" (1.753 m)   Wt 227 lb 14.4 oz (103.4 kg)   SpO2 94%   BMI 33.65 kg/m²     Wt Readings from Last 3 Encounters:   12/14/17 227 lb 14.4 oz (103.4 kg)   12/12/17 229 lb (103.9 kg)   12/05/17 229 lb 12.8 oz (104.2 kg)       Physical Exam   Constitutional: He appears well-developed. HENT:   Head: Normocephalic. Eyes: Pupils are equal, round, and reactive to light. Neck: Normal range of motion. Cardiovascular: Normal rate. Pulmonary/Chest: Effort normal.   Abdominal: Soft. Musculoskeletal: Normal range of motion. Neurological: He is alert. Skin: Skin is warm and dry. Assessment/Plan:     Oval Signs was seen today for post-op check and post-op check. Diagnoses and all orders for this visit:    S/P partial resection of colon    Central venous catheter in place  Comments:  s/p mediport     Removal of staples        Return in about 2 weeks (around 12/28/2017). Patient Instructions   1. Continue wound care. Monitor for redness, swelling, or purulent drainage. No lotions, ointments, alcohol or peroxide to incision sites. 2. Maintain lifting restrictions for the full 6 weeks after surgery. No heavy lifting, pulling, or tugging greater than 10-15 lbs. Gradually ease into normal routine (total of 8 weeks after surgery) before lifting heavier objects. 3. Bowel Function: Continue stool softeners as needed. Over the counter- Colace or Miralax is recommended. Do not allow yourself to become constipated     4. Increase water to 64oz per day    5. Ambulate 4 times a day for 15 minutes each time to help increase increase stamina and help stimulate GI motility    6. Continue at home diet- may need to eat smaller more frequent meals     7.  Call for any other the follow symptoms:    Fever over 101 degrees by mouth   Increased redness, warmth, hardness at operative site   Blood soaked dressing (small amounts of oozing may be normal)   Increased or progressive drainage from the surgical area   Inability to urinate or blood in the urine   Pain not relieved by the medications ordered   Persistent nausea and/or vomiting, unable to retain fluids   Pain or swelling in your legs   Shortness of breath or chest pain    8. Signs and symptoms have been reviewed with patient that would be concerning and need her to return to office for re-evaluation. Patient states he will call if she has questions or concerns. Discussed use, benefit, and side effects of prescribed medications. All patient questions answered. Pt voiced understanding.      Electronically signed by Quinten Lomeli CNP on 12/14/2017 at 2:16 PM

## 2017-12-19 ENCOUNTER — OFFICE VISIT (OUTPATIENT)
Dept: CARDIOLOGY CLINIC | Age: 52
End: 2017-12-19
Payer: MEDICAID

## 2017-12-19 VITALS
SYSTOLIC BLOOD PRESSURE: 136 MMHG | HEART RATE: 62 BPM | HEIGHT: 69 IN | BODY MASS INDEX: 33.03 KG/M2 | WEIGHT: 223 LBS | DIASTOLIC BLOOD PRESSURE: 72 MMHG

## 2017-12-19 DIAGNOSIS — Z01.810 PREOP CARDIOVASCULAR EXAM: ICD-10-CM

## 2017-12-19 DIAGNOSIS — I25.10 CORONARY ARTERY DISEASE INVOLVING NATIVE CORONARY ARTERY OF NATIVE HEART WITHOUT ANGINA PECTORIS: Primary | ICD-10-CM

## 2017-12-19 PROCEDURE — 99213 OFFICE O/P EST LOW 20 MIN: CPT | Performed by: INTERNAL MEDICINE

## 2017-12-19 PROCEDURE — G8417 CALC BMI ABV UP PARAM F/U: HCPCS | Performed by: INTERNAL MEDICINE

## 2017-12-19 PROCEDURE — 1111F DSCHRG MED/CURRENT MED MERGE: CPT | Performed by: INTERNAL MEDICINE

## 2017-12-19 PROCEDURE — G8598 ASA/ANTIPLAT THER USED: HCPCS | Performed by: INTERNAL MEDICINE

## 2017-12-19 PROCEDURE — G8427 DOCREV CUR MEDS BY ELIG CLIN: HCPCS | Performed by: INTERNAL MEDICINE

## 2017-12-19 PROCEDURE — 1036F TOBACCO NON-USER: CPT | Performed by: INTERNAL MEDICINE

## 2017-12-19 PROCEDURE — G8484 FLU IMMUNIZE NO ADMIN: HCPCS | Performed by: INTERNAL MEDICINE

## 2017-12-19 PROCEDURE — 3017F COLORECTAL CA SCREEN DOC REV: CPT | Performed by: INTERNAL MEDICINE

## 2017-12-19 NOTE — PROGRESS NOTES
Mercy Simmonds is a 50 y.o. male is here for atherosclerotic disease who came in with anemia provoking a non-STEMI at which time he was noted to have multivessel disease by cardiac catheterization and has colon cancer needing an operation doing okay and has had no chest pains . Intensity of the pain none , provocation of the pain none  what relieves the pain none , where does  the pain migrates to no where  and no nausea no fever and chills and no sob with and without activity.  With some  evidence of swelling in legs no palpitations and no syncopal episodes and no dizziness ,no bleeding ,or urination  Problems ,no double visions ,no speech problems and no bowel problems or diarrhea and tolerating medications     Patient Active Problem List   Diagnosis    Accelerated hypertension    [facility]non-ST elevated [facility] infarction) ([facility]    Hypertension    Elevated troponin    Coronary artery disease involving native coronary artery of native heart without angina pectoris    Metastatic colon cancer to liver (HCC)    History of non-ST elevation myocardial infarction (NSTEMI)    Physical deconditioning     Past Medical History:   Diagnosis Date    ASCVD (arteriosclerotic cardiovascular disease)     Cancer ([facility] 11/2017    Dr. James Townsend    CKD (chronic kidney disease) stage 3, GFR 30-59 ml/min     see's [place] [facility]    DM2 (diabetes mellitus, type 2) ([facility]     Dyslipidemia     Hypertension     Thyroid disease      Social History   Substance Use Topics    Smoking status: Never Smoker    Smokeless tobacco: Never Used    Alcohol use No     No Known Allergies  Current Outpatient Prescriptions   Medication Sig Dispense Refill    aspirin 325 MG EC tablet Take 1 tablet by mouth daily 30 tablet 3    isosorbide mononitrate (IMDUR) 60 MG extended release tablet Take 1 tablet by mouth 2 times daily 30 tablet 3    hydrALAZINE (APRESOLINE) 100 MG tablet Take 1 tablet by mouth 3 times daily 90 tablet 3    doxazosin symmetrical without poor dentition and gums  and palate appears to be healthy and  head is normal cephalic  Without signs of trauma and eyes are without trauma no conjunctiva and Iids retracting and not retracted ptosis and no ptosis and without  erythematous changes and  Nose is symmetrical  without drainage  and ears are  without tinnitus  and throat is clear   JUGULAR VEINS  are not elevated and tongue is mid line no masses presence and no olmstead A waves present   LYMPHATICS are without adenopathies at least on exam   CHEST  No biventricular heaves and thrills and no right ventricular heaves and examination without signs of trauma and lesions  HEART not distant and regular rate and rhythm without   gallop signs and and no murmurs and systolic crescendo  Decrescendo  normal s1 and s2 sounds and no s3 and s4 split   Point of maximum intensity of the heartbeat  is at or displaced from the fifth intercostal space  LUNGS no   presence of intercostal retractions and no  use of the accessory muscles with a diaphragmatic movement present and not present pectus and pigeon chest and without wheezes and rhonchi and non diminished in all posterior lungs  and without rales  ABDOMEN abdominal bruit not present  And  No  Presency of  morbid obesity and with no    non distended without organomegaly and no rebound tenderness and bowel sound are present  all quadrants   NEUROLOGY all the cranial nerves are intact and no motor deficits  and no sensory deficits  MUSCULAR SKELETAL without signs of trauma and kyphosis and scoliosis and normal range of motion for all extremities  INTEGUMENTARY without tenting and skin rashes indentation and  dryness  NECK without lymph adenopathy   NEUROPSYCHIATRIC has no anxiety, no mood swings and depression  VASCULAR EXAM for carotid ,radial femoral arteries are  steady  and not diminished   Extremities  With some  evidence of peripheral edema  And pulses are  normal    Assessment and plans  1.

## 2017-12-19 NOTE — PROGRESS NOTES
Pt here for f/u [facility] HTN    Pt denies chest pain, dizziness.  Sob, heart palpitations     Pt continues with swelling in bilateral legs and feet at times,

## 2017-12-25 LAB
MISC #3 REFERENCE REPORT: NORMAL
MISC. #1 REFERENCE GROUP TEST: NORMAL
MISC. #2 REFERENCE REPORT: NORMAL

## 2017-12-27 ENCOUNTER — HOSPITAL ENCOUNTER (OUTPATIENT)
Dept: PET IMAGING | Age: 52
Discharge: HOME | End: 2017-12-27
Payer: MEDICAID

## 2017-12-27 DIAGNOSIS — C26.0: ICD-10-CM

## 2017-12-27 PROCEDURE — A9552 F18 FDG: HCPCS | Performed by: SPECIALIST

## 2017-12-27 PROCEDURE — 78815 PET IMAGE W/CT SKULL-THIGH: CPT

## 2017-12-27 PROCEDURE — 3430000000 HC RX DIAGNOSTIC RADIOPHARMACEUTICAL: Performed by: SPECIALIST

## 2017-12-27 RX ORDER — FLUDEOXYGLUCOSE F 18 200 MCI/ML
13.1 INJECTION, SOLUTION INTRAVENOUS
Status: COMPLETED | OUTPATIENT
Start: 2017-12-27 | End: 2017-12-27

## 2017-12-27 RX ADMIN — FLUDEOXYGLUCOSE F 18 13.1 MILLICURIE: 200 INJECTION, SOLUTION INTRAVENOUS at 07:24

## 2017-12-28 ENCOUNTER — OFFICE VISIT (OUTPATIENT)
Dept: SURGERY | Age: 52
End: 2017-12-28

## 2017-12-28 VITALS
WEIGHT: 228.5 LBS | RESPIRATION RATE: 18 BRPM | BODY MASS INDEX: 33.84 KG/M2 | HEART RATE: 58 BPM | OXYGEN SATURATION: 97 % | TEMPERATURE: 97.2 F | HEIGHT: 69 IN | SYSTOLIC BLOOD PRESSURE: 118 MMHG | DIASTOLIC BLOOD PRESSURE: 60 MMHG

## 2017-12-28 DIAGNOSIS — Z51.89 VISIT FOR WOUND CHECK: Primary | ICD-10-CM

## 2017-12-28 DIAGNOSIS — Z90.49 S/P COLON RESECTION: ICD-10-CM

## 2017-12-28 PROCEDURE — 99024 POSTOP FOLLOW-UP VISIT: CPT | Performed by: NURSE PRACTITIONER

## 2017-12-28 ASSESSMENT — ENCOUNTER SYMPTOMS
EYE ITCHING: 0
RECTAL PAIN: 0
EYE REDNESS: 0
SORE THROAT: 0
ABDOMINAL PAIN: 0
CONSTIPATION: 0
BACK PAIN: 0
WHEEZING: 0
ANAL BLEEDING: 0
TROUBLE SWALLOWING: 0
SINUS PRESSURE: 0
COUGH: 0
EYE PAIN: 0
VOMITING: 0
FACIAL SWELLING: 0
CHOKING: 0
ABDOMINAL DISTENTION: 0
SHORTNESS OF BREATH: 0
VOICE CHANGE: 0
COLOR CHANGE: 0
EYE DISCHARGE: 0
PHOTOPHOBIA: 0
RHINORRHEA: 0
BLOOD IN STOOL: 0
DIARRHEA: 0
APNEA: 0
SINUS PAIN: 0
STRIDOR: 0
NAUSEA: 0
CHEST TIGHTNESS: 0

## 2017-12-28 NOTE — PROGRESS NOTES
[facility] PROFESSIONAL [facility]S  [facility]  [address]  [address] [address]  Dept: [phone]  Dept Fax: [phone]  Loc: [phone]    Visit Date: 12/28/2017       Rafy De Oliveira is a 50 y.o. male who presents today for:  Chief Complaint   Patient presents with    Post-Op Check     s/p port placement 12/6    Post-Op Check      s/p Sigmoid colon resection, appendectomy, biopsy of liver 11/27/17       HPI:     Miesha Brian presents today for a 4 week post op check. Today He is feeling well, he is taking no pain medications at this time. He is requesting to go back to work. He may return to work in 2 weeks with no restrictions from a surgical standpoint. The incision looks good, healing nicely. Having bowel movements and tolerating meals. He does follow up with Dr Thalia Olsen on January 2. He has been seen by cardiology, it is okay from a surgical standpoint to have stents placed when cardiology feels that it is appropriate.  No follow up needed, call with any concerns     Past Medical History:   Diagnosis Date    ASCVD (arteriosclerotic cardiovascular disease)     Cancer ([facility] 11/2017    Dr. Franklin Del Rosario    CKD (chronic kidney disease) stage 3, GFR 30-59 ml/min     see's June Ficks    DM2 (diabetes mellitus, type 2) ([facility]     Dyslipidemia     Hypertension     Thyroid disease       Past Surgical History:   Procedure Laterality Date    ENDOSCOPY, COLON, DIAGNOSTIC      EYE SURGERY Left 2013    laser surgery    INSERTION / REMOVAL / REPLACEMENT VENOUS ACCESS CATHETER Right 12/4/2017    MEDIP[address] [facility]ude Rock [facility] [address] Left 11/24/2017    COLONOSCOPY WITH BIOPSY performed by Felipe Westbrook MD at [address] [facility]    [place] EGD TRANSORAL BIOPSY SINGLE/MULTIPLE N/A 11/23/2017    EGD BIOPSY performed by Felipe Westbrook MD at [address]      [facility] [place]    SMALL INTESTINE SURGERY N/A 11/27/2017    SIGMOID COLON RESECTION, APPENDECTOMY, LIVER BIOPSY performed by Yadira Boo MD at [address] [facility] History   Problem Relation Age of Onset    Cancer Father     Heart Disease Father     High Blood Pressure Mother      Social History   Substance Use Topics    Smoking status: Never Smoker    Smokeless tobacco: Never Used    Alcohol use No        Current Outpatient Prescriptions   Medication Sig Dispense Refill    aspirin 325 MG EC tablet Take 1 tablet by mouth daily 30 tablet 3    isosorbide mononitrate (IMDUR) 60 MG extended release tablet Take 1 tablet by mouth 2 times daily 30 tablet 3    hydrALAZINE (APRESOLINE) 100 MG tablet Take 1 tablet by mouth 3 times daily 90 tablet 3    doxazosin (CARDURA) 8 MG tablet Take 1 tablet by mouth daily 30 tablet 3    diltiazem (CARDIZEM) 90 MG tablet Take 1 tablet by mouth 4 times daily 120 tablet 3    furosemide (LASIX) 40 MG tablet Take 1 tablet by mouth 2 times daily 60 tablet 3    clopidogrel (PLAVIX) 75 MG tablet Take 1 tablet by mouth daily 30 tablet 3    famotidine (PEPCID) 20 MG tablet Take 1 tablet by mouth 2 times daily 60 tablet 3    insulin glargine (TOUJEO SOLOSTAR) 300 UNIT/ML injection pen Inject 10 Units into the skin daily      metoprolol tartrate (LOPRESSOR) 50 MG tablet Take 50 mg by mouth 2 times daily       Liraglutide (VICTOZA) 18 MG/3ML SOPN SC injection Inject 1.8 mg into the skin daily       hydrocortisone 2.5 % cream Apply topically 3 times daily. Apply to face 45 g 0    betamethasone valerate (VALISONE) 0.1 % ointment Apply topically 3 times daily. Do not apply to face 45 g 0    Blood Pressure Monitor KIT CHECK BP TWICE DAILY IF SBP >140 CALL PCP 1 kit 0    simvastatin (ZOCOR) 20 MG tablet Take 20 mg by mouth nightly       No current facility-administered medications for this visit.         No Known Allergies    Subjective:      Review of Systems   Constitutional: Negative for activity change, appetite change, Medium Adult)   Pulse 58   Temp 97.2 °F (36.2 °C) (Tympanic)   Resp 18   Ht 5' 9\" (1.753 m)   Wt 228 lb 8 oz (103.6 kg)   SpO2 97%   BMI 33.74 kg/m²     Wt Readings from Last 3 Encounters:   12/28/17 228 lb 8 oz (103.6 kg)   12/19/17 223 lb (101.2 kg)   12/14/17 227 lb 14.4 oz (103.4 kg)       Physical Exam   Constitutional: He appears well-developed. HENT:   Head: Normocephalic. Eyes: Pupils are equal, round, and reactive to light. Neck: Normal range of motion. Cardiovascular: Normal rate. Pulmonary/Chest: Effort normal.   Abdominal: Soft. Musculoskeletal: Normal range of motion. Neurological: He is alert. Skin: Skin is warm and dry. Assessment/Plan:     Danika Lara was seen today for post-op check and post-op check. Diagnoses and all orders for this visit:    Visit for wound check    S/P colon resection        Return if symptoms worsen or fail to improve. Patient Instructions   1. Continue wound care. Monitor for redness, swelling, or purulent drainage. No lotions, ointments, alcohol or peroxide to incision sites. 2. Maintain lifting restrictions for the full 6 weeks after surgery. No heavy lifting, pulling, or tugging greater than 10-15 lbs. Gradually ease into normal routine (total of 8 weeks after surgery) before lifting heavier objects. 3. Bowel Function: Continue stool softeners as needed. Over the counter- Colace or Miralax is recommended. Do not allow yourself to become constipated     4. Increase water to 64oz per day    5. Ambulate 4 times a day for 15 minutes each time to help increase increase stamina and help stimulate GI motility    6. Continue at home diet- may need to eat smaller more frequent meals     7.  Call for any other the follow symptoms:    Fever over 101 degrees by mouth   Increased redness, warmth, hardness at operative site   Blood soaked dressing (small amounts of oozing may be normal)   Increased or progressive drainage from the surgical

## 2017-12-28 NOTE — LETTER
[facility]  [place] [id number]  Phone: [phone]  Fax: [phone]    Zion Bah, [facility]        December 28, 2017     Patient: Mercy Simmonds   YOB: 1965   Date of Visit: 12/28/2017       To Whom It May Concern: It is my medical opinion that Vanessa Mina may return to work on January 15, 2018 with no restrictions from a surgical standpoint. If you have any questions or concerns, please don't hesitate to call.     Sincerely,          Zion Bah, CNP

## 2018-01-02 ENCOUNTER — TELEPHONE (OUTPATIENT)
Dept: CARDIOLOGY CLINIC | Age: 53
End: 2018-01-02

## 2018-01-31 ENCOUNTER — HOSPITAL ENCOUNTER (INPATIENT)
Age: 53
LOS: 3 days | Discharge: HOME HEALTH | DRG: 469 | End: 2018-02-03
Attending: FAMILY MEDICINE | Admitting: INTERNAL MEDICINE
Payer: MEDICAID

## 2018-01-31 DIAGNOSIS — D64.9 ANEMIA, UNSPECIFIED TYPE: ICD-10-CM

## 2018-01-31 DIAGNOSIS — E87.5 HYPERKALEMIA: ICD-10-CM

## 2018-01-31 DIAGNOSIS — N17.9 AKI (ACUTE KIDNEY INJURY) (HCC): Primary | ICD-10-CM

## 2018-01-31 DIAGNOSIS — D70.9 NEUTROPENIA, UNSPECIFIED TYPE (HCC): ICD-10-CM

## 2018-01-31 DIAGNOSIS — N18.30 STAGE 3 CHRONIC KIDNEY DISEASE (HCC): ICD-10-CM

## 2018-01-31 DIAGNOSIS — I10 ESSENTIAL HYPERTENSION: ICD-10-CM

## 2018-01-31 LAB
ABO: NORMAL
ALBUMIN SERPL-MCNC: 3 G/DL (ref 3.5–5.1)
ALP BLD-CCNC: 109 U/L (ref 38–126)
ALT SERPL-CCNC: 20 U/L (ref 11–66)
ANION GAP SERPL CALCULATED.3IONS-SCNC: 11 MEQ/L (ref 8–16)
ANISOCYTOSIS: ABNORMAL
ANTIBODY SCREEN: NORMAL
AST SERPL-CCNC: 7 U/L (ref 5–40)
BASOPHILS # BLD: 1.2 %
BASOPHILS ABSOLUTE: 0 THOU/MM3 (ref 0–0.1)
BILIRUB SERPL-MCNC: 0.2 MG/DL (ref 0.3–1.2)
BILIRUBIN DIRECT: < 0.2 MG/DL (ref 0–0.3)
BUN BLDV-MCNC: 89 MG/DL (ref 7–22)
CALCIUM SERPL-MCNC: 8.3 MG/DL (ref 8.5–10.5)
CHLORIDE BLD-SCNC: 112 MEQ/L (ref 98–111)
CO2: 19 MEQ/L (ref 23–33)
CREAT SERPL-MCNC: 2.2 MG/DL (ref 0.4–1.2)
DIFFERENTIAL TYPE: ABNORMAL
EKG ATRIAL RATE: 72 BPM
EKG P AXIS: 58 DEGREES
EKG P-R INTERVAL: 126 MS
EKG Q-T INTERVAL: 418 MS
EKG QRS DURATION: 96 MS
EKG QTC CALCULATION (BAZETT): 457 MS
EKG R AXIS: -24 DEGREES
EKG T AXIS: 75 DEGREES
EKG VENTRICULAR RATE: 72 BPM
EOSINOPHIL # BLD: 1 %
EOSINOPHILS ABSOLUTE: 0 THOU/MM3 (ref 0–0.4)
GFR SERPL CREATININE-BSD FRML MDRD: 32 ML/MIN/1.73M2
GLUCOSE BLD-MCNC: 129 MG/DL (ref 70–108)
GLUCOSE BLD-MCNC: 164 MG/DL (ref 70–108)
GLUCOSE BLD-MCNC: 216 MG/DL (ref 70–108)
GLUCOSE BLD-MCNC: 219 MG/DL (ref 70–108)
HCT VFR BLD CALC: 21.2 % (ref 42–52)
HEMOGLOBIN: 6.8 GM/DL (ref 14–18)
HYPOCHROMIA: ABNORMAL
LYMPHOCYTES # BLD: 52.5 %
LYMPHOCYTES ABSOLUTE: 0.6 THOU/MM3 (ref 1–4.8)
MAGNESIUM: 1.9 MG/DL (ref 1.6–2.4)
MCH RBC QN AUTO: 26.4 PG (ref 27–31)
MCHC RBC AUTO-ENTMCNC: 32 GM/DL (ref 33–37)
MCV RBC AUTO: 82.3 FL (ref 80–94)
MONOCYTES # BLD: 1.3 %
MONOCYTES ABSOLUTE: 0 THOU/MM3 (ref 0.4–1.3)
NUCLEATED RED BLOOD CELLS: 0 /100 WBC
OSMOLALITY CALCULATION: 317.1 MOSMOL/KG (ref 275–300)
OVALOCYTES: ABNORMAL
PATHOLOGIST REVIEW: ABNORMAL
PDW BLD-RTO: 24.2 % (ref 11.5–14.5)
PLATELET # BLD: 213 THOU/MM3 (ref 130–400)
PLATELET ESTIMATE: ADEQUATE
PMV BLD AUTO: 9.8 MCM (ref 7.4–10.4)
POIKILOCYTES: ABNORMAL
POTASSIUM REFLEX MAGNESIUM: 6.2 MEQ/L (ref 3.5–5.2)
POTASSIUM REFLEX MAGNESIUM: 6.3 MEQ/L (ref 3.5–5.2)
POTASSIUM SERPL-SCNC: 6.6 MEQ/L (ref 3.5–5.2)
RBC # BLD: 2.57 MILL/MM3 (ref 4.7–6.1)
RH FACTOR: NORMAL
SCAN OF BLOOD SMEAR: NORMAL
SEG NEUTROPHILS: 44 %
SEGMENTED NEUTROPHILS ABSOLUTE COUNT: 0.5 THOU/MM3 (ref 1.8–7.7)
SODIUM BLD-SCNC: 142 MEQ/L (ref 135–145)
TOTAL PROTEIN: 5 G/DL (ref 6.1–8)
WBC # BLD: 1.1 THOU/MM3 (ref 4.8–10.8)

## 2018-01-31 PROCEDURE — 86850 RBC ANTIBODY SCREEN: CPT

## 2018-01-31 PROCEDURE — 2580000003 HC RX 258: Performed by: FAMILY MEDICINE

## 2018-01-31 PROCEDURE — 6370000000 HC RX 637 (ALT 250 FOR IP): Performed by: INTERNAL MEDICINE

## 2018-01-31 PROCEDURE — 6360000002 HC RX W HCPCS: Performed by: FAMILY MEDICINE

## 2018-01-31 PROCEDURE — 84132 ASSAY OF SERUM POTASSIUM: CPT

## 2018-01-31 PROCEDURE — 2500000003 HC RX 250 WO HCPCS: Performed by: INTERNAL MEDICINE

## 2018-01-31 PROCEDURE — 2580000003 HC RX 258: Performed by: INTERNAL MEDICINE

## 2018-01-31 PROCEDURE — 85025 COMPLETE CBC W/AUTO DIFF WBC: CPT

## 2018-01-31 PROCEDURE — 86901 BLOOD TYPING SEROLOGIC RH(D): CPT

## 2018-01-31 PROCEDURE — 83735 ASSAY OF MAGNESIUM: CPT

## 2018-01-31 PROCEDURE — 6360000002 HC RX W HCPCS: Performed by: INTERNAL MEDICINE

## 2018-01-31 PROCEDURE — 36430 TRANSFUSION BLD/BLD COMPNT: CPT

## 2018-01-31 PROCEDURE — P9016 RBC LEUKOCYTES REDUCED: HCPCS

## 2018-01-31 PROCEDURE — 6370000000 HC RX 637 (ALT 250 FOR IP): Performed by: FAMILY MEDICINE

## 2018-01-31 PROCEDURE — 1200000003 HC TELEMETRY R&B

## 2018-01-31 PROCEDURE — 93005 ELECTROCARDIOGRAM TRACING: CPT

## 2018-01-31 PROCEDURE — 99285 EMERGENCY DEPT VISIT HI MDM: CPT

## 2018-01-31 PROCEDURE — 82248 BILIRUBIN DIRECT: CPT

## 2018-01-31 PROCEDURE — 36591 DRAW BLOOD OFF VENOUS DEVICE: CPT

## 2018-01-31 PROCEDURE — 80053 COMPREHEN METABOLIC PANEL: CPT

## 2018-01-31 PROCEDURE — 86900 BLOOD TYPING SEROLOGIC ABO: CPT

## 2018-01-31 PROCEDURE — 86923 COMPATIBILITY TEST ELECTRIC: CPT

## 2018-01-31 PROCEDURE — 82948 REAGENT STRIP/BLOOD GLUCOSE: CPT

## 2018-01-31 PROCEDURE — 2500000003 HC RX 250 WO HCPCS: Performed by: FAMILY MEDICINE

## 2018-01-31 PROCEDURE — 93000 ELECTROCARDIOGRAM COMPLETE: CPT | Performed by: FAMILY MEDICINE

## 2018-01-31 RX ORDER — DEXTROSE AND SODIUM CHLORIDE 5; .9 G/100ML; G/100ML
100 INJECTION, SOLUTION INTRAVENOUS PRN
Status: DISCONTINUED | OUTPATIENT
Start: 2018-01-31 | End: 2018-01-31

## 2018-01-31 RX ORDER — SODIUM POLYSTYRENE SULFONATE 15 G/60ML
15 SUSPENSION ORAL; RECTAL ONCE
Status: COMPLETED | OUTPATIENT
Start: 2018-01-31 | End: 2018-01-31

## 2018-01-31 RX ORDER — 0.9 % SODIUM CHLORIDE 0.9 %
500 INTRAVENOUS SOLUTION INTRAVENOUS ONCE
Status: COMPLETED | OUTPATIENT
Start: 2018-01-31 | End: 2018-01-31

## 2018-01-31 RX ORDER — SODIUM POLYSTYRENE SULFONATE 15 G/60ML
15 SUSPENSION ORAL; RECTAL ONCE
Status: COMPLETED | OUTPATIENT
Start: 2018-02-01 | End: 2018-02-01

## 2018-01-31 RX ORDER — ISOSORBIDE MONONITRATE 60 MG/1
60 TABLET, EXTENDED RELEASE ORAL 2 TIMES DAILY
Status: DISCONTINUED | OUTPATIENT
Start: 2018-01-31 | End: 2018-02-01

## 2018-01-31 RX ORDER — DEXTROSE MONOHYDRATE 25 G/50ML
25 INJECTION, SOLUTION INTRAVENOUS ONCE
Status: DISCONTINUED | OUTPATIENT
Start: 2018-01-31 | End: 2018-01-31

## 2018-01-31 RX ORDER — SODIUM CHLORIDE 9 MG/ML
INJECTION, SOLUTION INTRAVENOUS CONTINUOUS
Status: DISCONTINUED | OUTPATIENT
Start: 2018-01-31 | End: 2018-02-01

## 2018-01-31 RX ORDER — DEXTROSE MONOHYDRATE 25 G/50ML
25 INJECTION, SOLUTION INTRAVENOUS ONCE
Status: COMPLETED | OUTPATIENT
Start: 2018-01-31 | End: 2018-01-31

## 2018-01-31 RX ORDER — HEPARIN SODIUM 5000 [USP'U]/ML
5000 INJECTION, SOLUTION INTRAVENOUS; SUBCUTANEOUS EVERY 8 HOURS SCHEDULED
Status: DISCONTINUED | OUTPATIENT
Start: 2018-01-31 | End: 2018-02-03 | Stop reason: HOSPADM

## 2018-01-31 RX ORDER — HYDRALAZINE HYDROCHLORIDE 50 MG/1
100 TABLET, FILM COATED ORAL 3 TIMES DAILY
Status: DISCONTINUED | OUTPATIENT
Start: 2018-01-31 | End: 2018-02-03 | Stop reason: HOSPADM

## 2018-01-31 RX ORDER — DOXAZOSIN MESYLATE 4 MG/1
8 TABLET ORAL DAILY
Status: DISCONTINUED | OUTPATIENT
Start: 2018-02-01 | End: 2018-02-02

## 2018-01-31 RX ORDER — DEXTROSE MONOHYDRATE 25 G/50ML
12.5 INJECTION, SOLUTION INTRAVENOUS PRN
Status: DISCONTINUED | OUTPATIENT
Start: 2018-01-31 | End: 2018-01-31

## 2018-01-31 RX ORDER — METOPROLOL TARTRATE 50 MG/1
50 TABLET, FILM COATED ORAL 2 TIMES DAILY
Status: DISCONTINUED | OUTPATIENT
Start: 2018-01-31 | End: 2018-02-03 | Stop reason: HOSPADM

## 2018-01-31 RX ORDER — DEXTROSE MONOHYDRATE 25 G/50ML
12.5 INJECTION, SOLUTION INTRAVENOUS ONCE
Status: COMPLETED | OUTPATIENT
Start: 2018-01-31 | End: 2018-01-31

## 2018-01-31 RX ORDER — SODIUM CHLORIDE 9 MG/ML
INJECTION, SOLUTION INTRAVENOUS CONTINUOUS
Status: DISCONTINUED | OUTPATIENT
Start: 2018-01-31 | End: 2018-01-31

## 2018-01-31 RX ORDER — NICOTINE POLACRILEX 4 MG
15 LOZENGE BUCCAL PRN
Status: DISCONTINUED | OUTPATIENT
Start: 2018-01-31 | End: 2018-02-03 | Stop reason: HOSPADM

## 2018-01-31 RX ORDER — SIMVASTATIN 20 MG
20 TABLET ORAL NIGHTLY
Status: DISCONTINUED | OUTPATIENT
Start: 2018-01-31 | End: 2018-02-03 | Stop reason: HOSPADM

## 2018-01-31 RX ORDER — CLOPIDOGREL BISULFATE 75 MG/1
75 TABLET ORAL DAILY
Status: DISCONTINUED | OUTPATIENT
Start: 2018-02-01 | End: 2018-02-03 | Stop reason: HOSPADM

## 2018-01-31 RX ORDER — INSULIN GLARGINE 100 [IU]/ML
10 INJECTION, SOLUTION SUBCUTANEOUS NIGHTLY
Status: DISCONTINUED | OUTPATIENT
Start: 2018-01-31 | End: 2018-02-03 | Stop reason: HOSPADM

## 2018-01-31 RX ORDER — CALCIUM GLUCONATE 94 MG/ML
1 INJECTION, SOLUTION INTRAVENOUS ONCE
Status: COMPLETED | OUTPATIENT
Start: 2018-01-31 | End: 2018-01-31

## 2018-01-31 RX ORDER — 0.9 % SODIUM CHLORIDE 0.9 %
250 INTRAVENOUS SOLUTION INTRAVENOUS ONCE
Status: COMPLETED | OUTPATIENT
Start: 2018-01-31 | End: 2018-02-01

## 2018-01-31 RX ADMIN — SODIUM POLYSTYRENE SULFONATE 15 G: 15 SUSPENSION ORAL; RECTAL at 14:26

## 2018-01-31 RX ADMIN — SODIUM BICARBONATE 50 MEQ: 84 INJECTION, SOLUTION INTRAVENOUS at 14:27

## 2018-01-31 RX ADMIN — HEPARIN SODIUM 5000 UNITS: 5000 INJECTION, SOLUTION INTRAVENOUS; SUBCUTANEOUS at 22:40

## 2018-01-31 RX ADMIN — METOPROLOL TARTRATE 50 MG: 50 TABLET, FILM COATED ORAL at 20:12

## 2018-01-31 RX ADMIN — CALCIUM GLUCONATE 1 G: 94 INJECTION, SOLUTION INTRAVENOUS at 14:51

## 2018-01-31 RX ADMIN — INSULIN HUMAN 10 UNITS: 100 INJECTION, SOLUTION PARENTERAL at 19:12

## 2018-01-31 RX ADMIN — ISOSORBIDE MONONITRATE 60 MG: 60 TABLET ORAL at 20:12

## 2018-01-31 RX ADMIN — DILTIAZEM HYDROCHLORIDE 90 MG: 60 TABLET, FILM COATED ORAL at 18:25

## 2018-01-31 RX ADMIN — INSULIN GLARGINE 10 UNITS: 100 INJECTION, SOLUTION SUBCUTANEOUS at 23:21

## 2018-01-31 RX ADMIN — SODIUM CHLORIDE: 9 INJECTION, SOLUTION INTRAVENOUS at 14:30

## 2018-01-31 RX ADMIN — SODIUM CHLORIDE: 9 INJECTION, SOLUTION INTRAVENOUS at 18:41

## 2018-01-31 RX ADMIN — SODIUM POLYSTYRENE SULFONATE 15 G: 15 SUSPENSION ORAL; RECTAL at 18:24

## 2018-01-31 RX ADMIN — SODIUM CHLORIDE 250 ML: 9 INJECTION, SOLUTION INTRAVENOUS at 22:35

## 2018-01-31 RX ADMIN — DILTIAZEM HYDROCHLORIDE 90 MG: 60 TABLET, FILM COATED ORAL at 22:30

## 2018-01-31 RX ADMIN — HYDRALAZINE HYDROCHLORIDE 100 MG: 50 TABLET, FILM COATED ORAL at 22:30

## 2018-01-31 RX ADMIN — SODIUM CHLORIDE 500 ML: 9 INJECTION, SOLUTION INTRAVENOUS at 14:26

## 2018-01-31 RX ADMIN — SIMVASTATIN 20 MG: 20 TABLET, FILM COATED ORAL at 22:38

## 2018-01-31 RX ADMIN — DEXTROSE MONOHYDRATE 25 G: 25 INJECTION, SOLUTION INTRAVENOUS at 19:12

## 2018-01-31 RX ADMIN — HYDRALAZINE HYDROCHLORIDE 100 MG: 50 TABLET, FILM COATED ORAL at 18:26

## 2018-01-31 RX ADMIN — Medication 1 UNITS: at 23:20

## 2018-01-31 RX ADMIN — INSULIN HUMAN 5 UNITS: 100 INJECTION, SOLUTION PARENTERAL at 14:30

## 2018-01-31 RX ADMIN — DEXTROSE MONOHYDRATE 12.5 G: 25 INJECTION, SOLUTION INTRAVENOUS at 14:29

## 2018-01-31 RX ADMIN — SODIUM BICARBONATE 50 MEQ: 84 INJECTION, SOLUTION INTRAVENOUS at 18:43

## 2018-01-31 ASSESSMENT — ENCOUNTER SYMPTOMS
NAUSEA: 0
SHORTNESS OF BREATH: 0
VOMITING: 0

## 2018-01-31 NOTE — ED PROVIDER NOTES
signs or Co-signs this chart in the absence of a cardiologist.      EKG showed sinus rhythm with rate 72. QRS complexes show -24° axis, 96 ms conduction. ST-T waves show nonspecific changes        RADIOLOGY: non-plain film images(s) such as CT, Ultrasound and MRI are read by the radiologist.  None    LABS:   Labs Reviewed   CBC WITH AUTO DIFFERENTIAL - Abnormal; Notable for the following:        Result Value    WBC 1.1 (*)     RBC 2.57 (*)     Hemoglobin 6.8 (*)     Hematocrit 21.2 (*)     MCH 26.4 (*)     MCHC 32.0 (*)     RDW 24.2 (*)     All other components within normal limits   BASIC METABOLIC PANEL - Abnormal; Notable for the following:     Potassium 6.6 (*)     Chloride 112 (*)     CO2 19 (*)     Glucose 219 (*)     BUN 89 (*)     CREATININE 2.2 (*)     Calcium 8.3 (*)     All other components within normal limits   HEPATIC FUNCTION PANEL - Abnormal; Notable for the following:     Alb 3.0 (*)     Total Bilirubin 0.2 (*)     Total Protein 5.0 (*)     All other components within normal limits   GLOMERULAR FILTRATION RATE, ESTIMATED - Abnormal; Notable for the following:     Est, Glom Filt Rate 32 (*)     All other components within normal limits   OSMOLALITY - Abnormal; Notable for the following:     Osmolality Calc 317.1 (*)     All other components within normal limits   MAGNESIUM   ANION GAP   TYPE AND SCREEN       EMERGENCY DEPARTMENT COURSE:   Vitals:    Vitals:    01/31/18 1230 01/31/18 1331   BP: (!) 143/75 (!) 151/70   Pulse: 71 69   Resp: 18 17   Temp: 97.7 °F (36.5 °C)    TempSrc: Oral    SpO2: 93% 95%   Weight: 240 lb (108.9 kg)    Height: 5' 9\" (1.753 m)      12:32 PM: The patient was seen and evaluated.      Nursing notes reviewed    Right-sided Mediport accessed by nursing    Laboratory data returns with BUN 89, creatinine 2.2 showing acute prerenal azotemia worse than baseline    Saline bolus 500 ml followed by infusion ordered    Potassium returned 6.6    We'll give Kayexalate by mouth,

## 2018-01-31 NOTE — H&P
hydrALAZINE (APRESOLINE) 100 MG tablet Take 1 tablet by mouth 3 times daily 12/7/17  Yes Gisselle Land MD   doxazosin (CARDURA) 8 MG tablet Take 1 tablet by mouth daily 12/7/17  Yes Gisselle Land MD   diltiazem (CARDIZEM) 90 MG tablet Take 1 tablet by mouth 4 times daily 12/7/17  Yes Gisselle Land MD   furosemide (LASIX) 40 MG tablet Take 1 tablet by mouth 2 times daily 12/7/17  Yes Gisselle Land MD   clopidogrel (PLAVIX) 75 MG tablet Take 1 tablet by mouth daily 12/8/17  Yes Gisselle Land MD   famotidine (PEPCID) 20 MG tablet Take 1 tablet by mouth 2 times daily 12/7/17  Yes Gisselle Lnad MD   insulin glargine (TOUJEO SOLOSTAR) 300 UNIT/ML injection pen Inject 10 Units into the skin daily   Yes Historical Provider, MD   metoprolol tartrate (LOPRESSOR) 50 MG tablet Take 50 mg by mouth 2 times daily    Yes Historical Provider, MD   Liraglutide (VICTOZA) 18 MG/3ML SOPN SC injection Inject 1.8 mg into the skin daily    Yes Historical Provider, MD   betamethasone valerate (VALISONE) 0.1 % ointment Apply topically 3 times daily. Do not apply to face 9/2/16  Yes Tasha Sandoval MD   Blood Pressure Monitor KIT CHECK BP TWICE DAILY IF SBP >140 CALL PCP 12/6/15  Yes Shawn Camarena MD   simvastatin (ZOCOR) 20 MG tablet Take 20 mg by mouth nightly   Yes Historical Provider, MD   isosorbide mononitrate (IMDUR) 60 MG extended release tablet Take 1 tablet by mouth 2 times daily 12/7/17   Gisselle Land MD   hydrocortisone 2.5 % cream Apply topically 3 times daily. Apply to face 9/2/16   Tasha Sandoval MD       Allergies:  Review of patient's allergies indicates no known allergies. Social History:   TOBACCO:   reports that he has never smoked. He has never used smokeless tobacco.  ETOH:   reports that he does not drink alcohol.       Family History:       Problem Relation Age of Onset    Cancer Father     Heart Disease Father     High Blood Pressure Mother        REVIEW OF SYSTEMS:  CONSTITUTIONAL:  positive for fatigue and malaise  negative for  fevers and chills  EYES:  negative for  irritation, redness and icterus  HEENT:  negative for  sore mouth, sore throat and hoarseness  RESPIRATORY:  negative for  dry cough, dyspnea, wheezing, hemoptysis and chest pain  CARDIOVASCULAR:  negative for  dyspnea, palpitations, orthopnea, PND  GASTROINTESTINAL:  negative for nausea, vomiting, change in bowel habits, abdominal pain, abdominal mass, abdominal distention and jaundice  GENITOURINARY:  negative for frequency, dysuria and hematuria  ENDOCRINE:  negative for heat intolerance and cold intolerance  MUSCULOSKELETAL:  negative for  myalgias, arthralgias and muscle weakness  NEUROLOGICAL:  negative for memory problems, speech problems, visual disturbance, coordination problems and syncope  BEHAVIOR/PSYCH:  negative for elated mood, increased agitation and anxiety    Physical Exam:    Vitals: BP (!) 177/81   Pulse 80   Temp 98.2 °F (36.8 °C) (Oral)   Resp 22   Ht 5' 9\" (1.753 m)   Wt 240 lb (108.9 kg)   SpO2 91%   BMI 35.44 kg/m²   CONSTITUTIONAL:  awake, alert, cooperative, no apparent distress, and appears stated age  EYES:  extra-ocular muscles intact  ENT:  normocepalic, without obvious abnormality  NECK:  supple, symmetrical, trachea midline  LUNGS:  no increased work of breathing and clear to auscultation  CARDIOVASCULAR:  normal S1 and S2  ABDOMEN:  scars noted large midline scar, normal bowel sounds, soft, non-distended, non-tender and no hepatosplenomegally  MUSCULOSKELETAL:  motor strength is 5 out of 5 all extremities bilaterally  NEUROLOGIC:  Awake, alert, oriented to name, place and time. Cranial nerves II-XII are grossly intact. Motor is 5 out of 5 bilaterally. Cerebellar finger to nose, heel to shin intact. Sensory is intact.   Babinski down going, Romberg negative, and gait is normal.  SKIN:  normal skin color, texture, turgor and + edema both legs      CBC:   Recent Labs      01/31/18   1255   WBC  1.1* HGB  6.8*   PLT  213     BMP:    Recent Labs      01/31/18   1255   NA  142   K  6.6*   CL  112*   CO2  19*   BUN  89*   CREATININE  2.2*   GLUCOSE  219*     Hepatic:   Recent Labs      01/31/18   1255   AST  7   ALT  20   BILITOT  0.2*   ALKPHOS  109           Assessment and Plan   1. NAVEEN  2. Hyperkalemia  3. CKD stage 3  4. Anemia sec to chemorx  5. Leucopenia prob related to chemoRx  6. CAD  7. HTN    Treat hyperkalemia-D50 + IV R Insulin, kayexalate  IV hydration. PRBC transfusion. Neutropenic precautions. Onc consult. Renal consult. PT/OT.     Patient Active Problem List   Diagnosis Code    Accelerated hypertension I10    NSTEMI (non-ST elevated myocardial infarction) ([facility] I21.4    Hypertension I10    Elevated troponin R74.8    Coronary artery disease involving native coronary artery of native heart without angina pectoris I25.10    Metastatic colon cancer to liver (HCC) C18.9, C78.7    History of non-ST elevation myocardial infarction (NSTEMI) I25.2    Physical deconditioning R53.81    Preop cardiovascular exam Z01.810    NAVEEN (acute kidney injury) ([facility] N17.9       Adelita Dye MD  Admitting Internist

## 2018-01-31 NOTE — PROGRESS NOTES
Pt admitted to  6K13 via cart/stretcher. Complaints: abnormal labs/AKF. IV normal saline infusing into the mediport at a rate of 100 mls/ hour with about 400 mls in the bag still. IV site free of s/s of infection or infiltration. Vital signs obtained. Assessment and data collection initiated. Oriented to room. Policies and procedures for  explained All questions answered with no further questions at this time. Fall prevention and safety brochure discussed with patient. The best day to schedule a follow up Dr appointment is: Wednesday p.m.

## 2018-01-31 NOTE — ED NOTES
Patient resting in bed. Respirations easy and unlabored. No distress noted. Call light within reach.         Farzana Rodrigues RN  01/31/18 3686

## 2018-01-31 NOTE — ED NOTES
Indira resting in bed and states he is feeling OK.  No needs at this time      aCrine Chapman RN  01/31/18 0994

## 2018-02-01 LAB
ANION GAP SERPL CALCULATED.3IONS-SCNC: 11 MEQ/L (ref 8–16)
BACTERIA: ABNORMAL
BILIRUBIN URINE: NEGATIVE
BLOOD, URINE: NEGATIVE
BUN BLDV-MCNC: 84 MG/DL (ref 7–22)
CALCIUM SERPL-MCNC: 8.1 MG/DL (ref 8.5–10.5)
CASTS: ABNORMAL /LPF
CASTS: ABNORMAL /LPF
CHARACTER, URINE: CLEAR
CHLORIDE BLD-SCNC: 114 MEQ/L (ref 98–111)
CO2: 20 MEQ/L (ref 23–33)
COLOR: YELLOW
CREAT SERPL-MCNC: 2.3 MG/DL (ref 0.4–1.2)
CREATININE URINE: 72.6 MG/DL
CRYSTALS: ABNORMAL
EOSINOPHIL SMEAR: NORMAL
EPITHELIAL CELLS, UA: ABNORMAL /HPF
GFR SERPL CREATININE-BSD FRML MDRD: 30 ML/MIN/1.73M2
GLUCOSE BLD-MCNC: 139 MG/DL (ref 70–108)
GLUCOSE BLD-MCNC: 140 MG/DL (ref 70–108)
GLUCOSE BLD-MCNC: 164 MG/DL (ref 70–108)
GLUCOSE BLD-MCNC: 195 MG/DL (ref 70–108)
GLUCOSE BLD-MCNC: 221 MG/DL (ref 70–108)
GLUCOSE BLD-MCNC: 224 MG/DL (ref 70–108)
GLUCOSE, URINE: 100 MG/DL
HCT VFR BLD CALC: 23.5 % (ref 42–52)
HEMOGLOBIN: 7.6 GM/DL (ref 14–18)
KETONES, URINE: NEGATIVE
LEUKOCYTE EST, POC: NEGATIVE
MCH RBC QN AUTO: 27.3 PG (ref 27–31)
MCHC RBC AUTO-ENTMCNC: 32.2 GM/DL (ref 33–37)
MCV RBC AUTO: 84.7 FL (ref 80–94)
MISCELLANEOUS LAB TEST RESULT: ABNORMAL
NITRITE, URINE: NEGATIVE
OSMOLALITY URINE: 444 MOSMOL/KG (ref 250–750)
PDW BLD-RTO: 22.6 % (ref 11.5–14.5)
PH UA: 5
PLATELET # BLD: 205 THOU/MM3 (ref 130–400)
PMV BLD AUTO: 8.9 FL (ref 7.4–10.4)
POTASSIUM REFLEX MAGNESIUM: 5.3 MEQ/L (ref 3.5–5.2)
POTASSIUM REFLEX MAGNESIUM: 5.3 MEQ/L (ref 3.5–5.2)
POTASSIUM REFLEX MAGNESIUM: 5.7 MEQ/L (ref 3.5–5.2)
POTASSIUM REFLEX MAGNESIUM: 6 MEQ/L (ref 3.5–5.2)
POTASSIUM REFLEX MAGNESIUM: 6.3 MEQ/L (ref 3.5–5.2)
POTASSIUM, URINE: 33.1 MEQ/L
PROTEIN UA: 300 MG/DL
RBC # BLD: 2.78 MILL/MM3 (ref 4.7–6.1)
RBC URINE: ABNORMAL /HPF
RENAL EPITHELIAL, UA: ABNORMAL
SODIUM BLD-SCNC: 145 MEQ/L (ref 135–145)
SODIUM URINE: 49 MEQ/L
SPECIFIC GRAVITY UA: 1.02 (ref 1–1.03)
SPECIMEN: NORMAL
UROBILINOGEN, URINE: 0.2 EU/DL
WBC # BLD: 11.5 THOU/MM3 (ref 4.8–10.8)
WBC UA: ABNORMAL /HPF
YEAST: ABNORMAL

## 2018-02-01 PROCEDURE — 84300 ASSAY OF URINE SODIUM: CPT

## 2018-02-01 PROCEDURE — 82948 REAGENT STRIP/BLOOD GLUCOSE: CPT

## 2018-02-01 PROCEDURE — 82570 ASSAY OF URINE CREATININE: CPT

## 2018-02-01 PROCEDURE — 94640 AIRWAY INHALATION TREATMENT: CPT

## 2018-02-01 PROCEDURE — 6370000000 HC RX 637 (ALT 250 FOR IP): Performed by: INTERNAL MEDICINE

## 2018-02-01 PROCEDURE — 83935 ASSAY OF URINE OSMOLALITY: CPT

## 2018-02-01 PROCEDURE — 6360000002 HC RX W HCPCS: Performed by: INTERNAL MEDICINE

## 2018-02-01 PROCEDURE — 80048 BASIC METABOLIC PNL TOTAL CA: CPT

## 2018-02-01 PROCEDURE — 85027 COMPLETE CBC AUTOMATED: CPT

## 2018-02-01 PROCEDURE — 2580000003 HC RX 258: Performed by: INTERNAL MEDICINE

## 2018-02-01 PROCEDURE — 84132 ASSAY OF SERUM POTASSIUM: CPT

## 2018-02-01 PROCEDURE — 81001 URINALYSIS AUTO W/SCOPE: CPT

## 2018-02-01 PROCEDURE — 2500000003 HC RX 250 WO HCPCS: Performed by: INTERNAL MEDICINE

## 2018-02-01 PROCEDURE — 84133 ASSAY OF URINE POTASSIUM: CPT

## 2018-02-01 PROCEDURE — 1200000003 HC TELEMETRY R&B

## 2018-02-01 PROCEDURE — 89190 NASAL SMEAR FOR EOSINOPHILS: CPT

## 2018-02-01 RX ORDER — DEXTROSE MONOHYDRATE 25 G/50ML
25 INJECTION, SOLUTION INTRAVENOUS PRN
Status: DISCONTINUED | OUTPATIENT
Start: 2018-02-01 | End: 2018-02-03 | Stop reason: HOSPADM

## 2018-02-01 RX ORDER — BUMETANIDE 0.25 MG/ML
0.5 INJECTION, SOLUTION INTRAMUSCULAR; INTRAVENOUS ONCE
Status: COMPLETED | OUTPATIENT
Start: 2018-02-01 | End: 2018-02-01

## 2018-02-01 RX ORDER — DEXTROSE MONOHYDRATE 25 G/50ML
25 INJECTION, SOLUTION INTRAVENOUS ONCE
Status: COMPLETED | OUTPATIENT
Start: 2018-02-01 | End: 2018-02-01

## 2018-02-01 RX ORDER — ISOSORBIDE MONONITRATE 60 MG/1
120 TABLET, EXTENDED RELEASE ORAL 2 TIMES DAILY
Status: DISCONTINUED | OUTPATIENT
Start: 2018-02-01 | End: 2018-02-03 | Stop reason: HOSPADM

## 2018-02-01 RX ORDER — CALCIUM GLUCONATE 94 MG/ML
1 INJECTION, SOLUTION INTRAVENOUS ONCE
Status: COMPLETED | OUTPATIENT
Start: 2018-02-01 | End: 2018-02-01

## 2018-02-01 RX ADMIN — CALCIUM GLUCONATE 1 G: 94 INJECTION, SOLUTION INTRAVENOUS at 10:18

## 2018-02-01 RX ADMIN — DILTIAZEM HYDROCHLORIDE 90 MG: 60 TABLET, FILM COATED ORAL at 09:30

## 2018-02-01 RX ADMIN — HEPARIN SODIUM 5000 UNITS: 5000 INJECTION, SOLUTION INTRAVENOUS; SUBCUTANEOUS at 22:24

## 2018-02-01 RX ADMIN — DILTIAZEM HYDROCHLORIDE 90 MG: 60 TABLET, FILM COATED ORAL at 20:22

## 2018-02-01 RX ADMIN — DOXAZOSIN 8 MG: 4 TABLET ORAL at 09:31

## 2018-02-01 RX ADMIN — DEXTROSE MONOHYDRATE 25 G: 25 INJECTION, SOLUTION INTRAVENOUS at 10:18

## 2018-02-01 RX ADMIN — CLOPIDOGREL 75 MG: 75 TABLET, FILM COATED ORAL at 09:30

## 2018-02-01 RX ADMIN — ISOSORBIDE MONONITRATE 120 MG: 60 TABLET ORAL at 20:23

## 2018-02-01 RX ADMIN — METOPROLOL TARTRATE 50 MG: 50 TABLET, FILM COATED ORAL at 09:31

## 2018-02-01 RX ADMIN — SODIUM POLYSTYRENE SULFONATE 15 G: 15 SUSPENSION ORAL; RECTAL at 01:07

## 2018-02-01 RX ADMIN — HYDRALAZINE HYDROCHLORIDE 100 MG: 50 TABLET, FILM COATED ORAL at 13:02

## 2018-02-01 RX ADMIN — INSULIN GLARGINE 10 UNITS: 100 INJECTION, SOLUTION SUBCUTANEOUS at 22:24

## 2018-02-01 RX ADMIN — Medication 2 UNITS: at 13:08

## 2018-02-01 RX ADMIN — Medication 1 UNITS: at 16:05

## 2018-02-01 RX ADMIN — ASPIRIN 325 MG: 325 TABLET, DELAYED RELEASE ORAL at 09:30

## 2018-02-01 RX ADMIN — SODIUM CHLORIDE: 9 INJECTION, SOLUTION INTRAVENOUS at 09:31

## 2018-02-01 RX ADMIN — DILTIAZEM HYDROCHLORIDE 90 MG: 60 TABLET, FILM COATED ORAL at 13:02

## 2018-02-01 RX ADMIN — HYDRALAZINE HYDROCHLORIDE 100 MG: 50 TABLET, FILM COATED ORAL at 09:31

## 2018-02-01 RX ADMIN — SODIUM BICARBONATE: 84 INJECTION, SOLUTION INTRAVENOUS at 11:20

## 2018-02-01 RX ADMIN — METOPROLOL TARTRATE 50 MG: 50 TABLET, FILM COATED ORAL at 20:21

## 2018-02-01 RX ADMIN — ALBUTEROL SULFATE 10 MG: 2.5 SOLUTION RESPIRATORY (INHALATION) at 16:20

## 2018-02-01 RX ADMIN — HEPARIN SODIUM 5000 UNITS: 5000 INJECTION, SOLUTION INTRAVENOUS; SUBCUTANEOUS at 06:23

## 2018-02-01 RX ADMIN — HYDRALAZINE HYDROCHLORIDE 100 MG: 50 TABLET, FILM COATED ORAL at 20:21

## 2018-02-01 RX ADMIN — INSULIN HUMAN 10 UNITS: 100 INJECTION, SOLUTION PARENTERAL at 11:20

## 2018-02-01 RX ADMIN — BUMETANIDE 0.5 MG: 0.25 INJECTION, SOLUTION INTRAMUSCULAR; INTRAVENOUS at 10:18

## 2018-02-01 RX ADMIN — SIMVASTATIN 20 MG: 20 TABLET, FILM COATED ORAL at 20:21

## 2018-02-01 RX ADMIN — BUMETANIDE 0.5 MG: 0.25 INJECTION, SOLUTION INTRAMUSCULAR; INTRAVENOUS at 15:27

## 2018-02-01 RX ADMIN — HEPARIN SODIUM 5000 UNITS: 5000 INJECTION, SOLUTION INTRAVENOUS; SUBCUTANEOUS at 13:02

## 2018-02-01 RX ADMIN — Medication 1 UNITS: at 20:22

## 2018-02-01 RX ADMIN — ISOSORBIDE MONONITRATE 60 MG: 60 TABLET ORAL at 09:31

## 2018-02-01 RX ADMIN — ALBUTEROL SULFATE 10 MG: 2.5 SOLUTION RESPIRATORY (INHALATION) at 10:00

## 2018-02-01 RX ADMIN — INSULIN HUMAN 10 UNITS: 100 INJECTION, SOLUTION PARENTERAL at 16:03

## 2018-02-01 RX ADMIN — DILTIAZEM HYDROCHLORIDE 90 MG: 60 TABLET, FILM COATED ORAL at 16:03

## 2018-02-01 NOTE — CONSULTS
kayexalate by ER  Due to hyperkalemia  and one hood of IV Insulin for hyperkalemia  PMHx:   Past Medical History:   Diagnosis Date    ASCVD (arteriosclerotic cardiovascular disease)     Cancer ([facility] 11/2017    Dr. Aviva Gonzalez    CKD (chronic kidney disease) stage 3, GFR 30-59 ml/min     see's Leopoldo Bullion    DM2 (diabetes mellitus, type 2) ([facility]     Dyslipidemia     Hypertension     Thyroid disease        PSHx:   Past Surgical History:   Procedure Laterality Date    ENDOSCOPY, COLON, DIAGNOSTIC      EYE SURGERY Left 2013    laser surgery    INSERTION / REMOVAL / REPLACEMENT VENOUS ACCESS CATHETER Right 12/4/2017    MEDIPORT INSERTION performed by Maricel Brewer MD at [address] Left 11/24/2017    COLONOSCOPY WITH BIOPSY performed by Jenni To MD at [facility]    DC EGD TRANSORAL BIOPSY SINGLE/MULTIPLE N/A 11/23/2017    EGD BIOPSY performed by Jenni To MD at [address] [place] [address] on nose removed    SMALL INTESTINE SURGERY N/A 11/27/2017    SIGMOID COLON RESECTION, APPENDECTOMY, LIVER BIOPSY performed by Maricel Brewer MD at [place] [address] History:   Family History   Problem Relation Age of Onset    Cancer Father     Heart Disease Father     High Blood Pressure Mother        Social History:  reports that he has never smoked. He has never used smokeless tobacco. He reports that he does not drink alcohol or use drugs. Allergies:  Review of patient's allergies indicates no known allergies.     Current Medications:      insulin regular (HUMULIN R;NOVOLIN R) injection 10 Units Once   dextrose 50 % solution 25 g Once   sodium bicarbonate 75 mEq in sodium chloride 0.45 % 1,000 mL infusion Continuous   bumetanide (BUMEX) injection 0.5 mg Once   calcium gluconate 10 % injection 1 g Once   albuterol (PROVENTIL) nebulizer solution 10 mg Once   aspirin EC tablet 325 mg Daily   clopidogrel (PLAVIX) tablet 75 mg Daily   diltiazem (CARDIZEM) tablet 90 mg 4x Daily   doxazosin (CARDURA) tablet 8 mg Daily   hydrALAZINE (APRESOLINE) tablet 100 mg TID   insulin glargine (LANTUS) injection vial 10 Units Nightly   isosorbide mononitrate (IMDUR) extended release tablet 60 mg BID   metoprolol tartrate (LOPRESSOR) tablet 50 mg BID   simvastatin (ZOCOR) tablet 20 mg Nightly   glucose (GLUTOSE) 40 % oral gel 15 g PRN   glucagon (rDNA) injection 1 mg PRN   heparin (porcine) injection 5,000 Units 3 times per day   insulin lispro (HUMALOG) injection vial 0-6 Units TID WC   insulin lispro (HUMALOG) injection vial 0-3 Units Nightly       Review of Systems:   A comprehensive review of systems was negative. except those has been mentioned in HPI. Physical exam:  Vitals: BP (!) 190/83   Pulse 77   Temp 98 °F (36.7 °C) (Oral)   Resp 20   Ht 5' 9\" (1.753 m)   Wt 243 lb 8 oz (110.5 kg)   SpO2 93%   BMI 35.96 kg/m²     I & O's:    Intake/Output Summary (Last 24 hours) at 02/01/18 1000  Last data filed at 02/01/18 0324   Gross per 24 hour   Intake          2486.55 ml   Output                0 ml   Net          2486.55 ml     I/O last 3 completed shifts: In: 2486.6 [P.O.:460; I.V.:1716.6; Blood:310]  Out: 0      Date 02/01/18 0000 - 02/01/18 2359   Shift 0652-6778 3448-1896 8759-9086 24 Hour Total   I  N  T  A  K  E   P.O.  (mL/kg/hr) 100  (0.1)   100    I.V.  (mL/kg) 678.8  (6.1)   678.8  (6.1)    Blood  (mL/kg) 310  (2.8)   310  (2.8)    Shift Total  (mL/kg) 1088.8  (9.9)   1088.8  (9.9)   O  U  T  P  U  T   Shift Total  (mL/kg)       Weight (kg) 110.4 110.4 110.4 110.4       General appearance: NAD  , Axox3  HEENT: Head: Normocephalic, no lesions, without obvious abnormality.   Lymph: no cervical LAD  Skin: no rash  Neck: no adenopathy, no carotid bruit, no JVD, supple, symmetrical, trachea midline and thyroid not enlarged, symmetric, no tenderness/mass/nodules  Lungs: clear to auscultation bilaterally  Heart: regular rate and rhythm, S1, S2 normal, no murmur, click, rub or gallop  Abdomen: soft, non-tender; bowel sounds normal; no masses,  no organomegaly  Genitourinary: not examined   Extremities: extremities normal, atraumatic, no cyanosis or edema  Neurologic: Mental status: Alert, oriented, thought content appropriate   Data:    CBC: Recent Labs      01/31/18   1255  02/01/18   0326   WBC  1.1*  11.5*   RBC  2.57*  2.78*   HGB  6.8*  7.6*   HCT  21.2*  23.5*   MCV  82.3  84.7   RDW  24.2*  22.6*   PLT  213  205     BMP: Recent Labs      01/31/18   1255  01/31/18   1720  01/31/18   2158  02/01/18 0326   NA  142   --    --   145   K  6.6*  6.2*  6.3*  6.3*   CL  112*   --    --   114*   CO2  19*   --    --   20*   BUN  89*   --    --   84*   CREATININE  2.2*   --    --   2.3*     Hepatic: Recent Labs      01/31/18   1255   ALKPHOS  109   ALT  20   AST  7   PROT  5.0*   BILITOT  0.2*   BILIDIR  <0.2   LABALBU  3.0*     BNP: No results for input(s): BNP in the last 72 hours. PT/INR: No results for input(s): PROTIME, INR in the last 72 hours. APTT:No results for input(s): APTT in the last 72 hours. CARDIAC ENZYMES: No results for input(s): CKMB, CKMBINDEX, TROPONINI in the last 72 hours. Invalid input(s): CKTOTAL;3  FASTING LIPID PANEL:  Lab Results   Component Value Date    CHOL 155 11/22/2017    HDL 42 11/22/2017    TRIG 71 11/22/2017     ABGs: No results found for: PHART, PO2ART, ODD8PSI, ZZH7CXL, BEART, E8ZYBBEU   Lactic Acid: No results found for: LACTA   Amylase: No results found for: AMYLASE   Lipase: No results found for: LIPASE CBC:   Recent Labs      01/31/18   1255  02/01/18   0326   WBC  1.1*  11.5*   HGB  6.8*  7.6*   PLT  213  205     Calcium:  Recent Labs      02/01/18   0326   CALCIUM  8.1*     Ionized Calcium:No results for input(s): IONCA in the last 72 hours. Magnesium:  Recent Labs      01/31/18   1255   MG  1.9     Phosphorus:No results for input(s): PHOS in the last 72 hours.   HgbA1C: No results for input(s): LABA1C in the last 72 hours.        UA: No results for input(s): SPECGRAV, PHUR, COLORU, CLARITYU, MUCUS, PROTEINU, BLOODU, RBCUA, WBCUA, BACTERIA, NITRU, GLUCOSEU, BILIRUBINUR, UROBILINOGEN, KETUA, LABCAST, LABCASTTY, AMORPHOS in the last 72 hours. Invalid input(s): CRYSTALS      Imaging:  Echo:  EKG:  Micro:   Lab Results   Component Value Date    BC No growth-preliminary  No growth   11/28/2017       Impression:     1. NAVEEN on CKD Fuwut1Z : Non oliguric? No report of UOP , most likely secondary to ischemic ATN  , no urine sediment at this moment for evaluation   2. Hyperkalemia  3. HTN  4. Colon cancer   5. Acute Anemia   6. Metabolic acidosis     Plan  1-please obtain a U/A , urine Na , urine eosinophils   2-consider medically treating hyperkalemia (IV insulin 10 unit with 1 Amp D50 , Albuterol Neb)  3-please stop IV NS ( can worsen metabolic acidosis and hyperkalemia) , consider 1/2 NS with 75 meq  Bicarb  At 75 cc/hour  4- 1  gram Calcium Gluconate  IV now  , repeat serum potassium at 12 noon and call me with result  5-Strict I/Os and Daily Wt  6- please avoid Nephrotoxic medications   7. Renally dose all medications with current GFR  8. No indication for urgent RRT  9. Renal U/S of  kidney for evaluation of kidney size and echogenicity   10. He is currently s/p one unit of PRBC , his Hb is up to 7.2  , further management per oncology/hematology expertise         Will follow with you very closely        His case was discussed  In details with 6K RN      Please don't hesitate to call me with  any questions or concerns .         Thank you Dr. Isabel Zheng for allowing us to participate in care of [facility]      Electronically signed by Brian Fletcher MD on 2/1/2018 at 82:08 AM    Board Certified Nephrologist

## 2018-02-01 NOTE — PROGRESS NOTES
Critical potassium level called. Potassium is 6.0. Rhina Wharton [place] here rounding and updated. Orders already placed to administer IV insulin, dextrose and calcium gluconate. Waiting on pharmacy to send them to administer.

## 2018-02-01 NOTE — FLOWSHEET NOTE
Prayer and encouragement      02/01/18 1727   Encounter Summary   Services provided to: Patient and family together   Referral/Consult From: [facility] Parent   Continue Visiting Yes  (2/1 )   Complexity of Encounter Moderate   Length of Encounter 30 minutes   Spiritual/Orthodoxy   Type Spiritual support   Assessment Approachable;Calm   Intervention Prayer;Nurtured hope; Active listening   Outcome Connection/belonging;Expressed gratitude;Engaged in conversation; Shared life review;Expressed feelings/needs/concerns;Encouraged; Hopeful;Receptive

## 2018-02-01 NOTE — CARE COORDINATION
2/1/18, 10:40 AM      Chana Grady       Admitted from: ER 1/31/2018/ 1218 Hospital day: 1   [facility]: [facility] Reason for admit: NAVEEN (acute kidney injury) ([facility] [N17.9]  NAVEEN (acute kidney injury) ([facility] [N17.9] Status: IP   Admit order signed?: yes  PMH:  has a past medical history of ASCVD (arteriosclerotic cardiovascular disease); Cancer ([facility] CKD (chronic kidney disease) stage 3, GFR 30-59 ml/min; DM2 (diabetes mellitus, type 2) ([facility] Dyslipidemia; Hypertension; and Thyroid disease. Medications:  Scheduled Meds:   insulin regular  10 Units Intravenous Once    aspirin  325 mg Oral Daily    clopidogrel  75 mg Oral Daily    diltiazem  90 mg Oral 4x Daily    doxazosin  8 mg Oral Daily    hydrALAZINE  100 mg Oral TID    insulin glargine  10 Units Subcutaneous Nightly    isosorbide mononitrate  60 mg Oral BID    metoprolol tartrate  50 mg Oral BID    simvastatin  20 mg Oral Nightly    heparin (porcine)  5,000 Units Subcutaneous 3 times per day    insulin lispro  0-6 Units Subcutaneous TID WC    insulin lispro  0-3 Units Subcutaneous Nightly     Continuous Infusions:   sodium bicarbonate infusion        Pertinent Info/Orders/Treatment Plan:  K+ 6.0, creat 2.3, Hgb 7.6, Ca+8.1. Bicarb gtt ordered, kayexelate given. Oncology and nephrology consulted. Diet: DIET CARB CONTROL; Carb Control: 4 carbs/meal (approximate 1800 kcals/day); Low Sodium (2 GM); Renal   DVT Prophylaxis: Heparin  Smoking status:  reports that he has never smoked.  He has never used smokeless tobacco.   Influenza Vaccination Screening Completed: yes  Pneumonia Vaccination Screening Completed: yes  Core measures: vte  PCP: Dorota Gresham  Readmission:   no  Risk Score: 13.75     Discharge Planning  Current Residence:  Private Residence  Living Arrangements:  Parent   Support Systems:  Parent, Home Care Staff  Current Services PTA:     Potential Assistance Needed:  N/A  Potential Assistance Purchasing Medications:  No  Does

## 2018-02-01 NOTE — PLAN OF CARE
Problem: Tissue Perfusion - Renal, Altered:  Goal: Electrolytes within specified parameters  Electrolytes within specified parameters   Outcome: Ongoing  Potassium drawn every 4 hr. Medication given to decrease potassium. See MAR. Labs to be drawn in the am.     Problem: Discharge Planning:  Goal: Participates in care planning  Participates in care planning   Outcome: Ongoing  Pt plans to be discharged home with mom at time of discharge. Problem: Fluid Volume - Imbalance:  Goal: Absence of imbalanced fluid volume signs and symptoms  Absence of imbalanced fluid volume signs and symptoms   Outcome: Ongoing  See flow sheet for I's and O's. Problem: Nutrition Deficit:  Goal: Ability to achieve adequate nutritional intake will improve  Ability to achieve adequate nutritional intake will improve   Outcome: Ongoing  Pt is currently on a carb control diet. Pt voiced understanding of treatment plan. Comments: Care plan reviewed with patient. Patient verbalize understanding of the plan of care and contribute to goal setting.

## 2018-02-01 NOTE — PROGRESS NOTES
INTERNAL MEDICINE Progress Note  2/1/2018 5:38 PM  Subjective:   Admit Date: 1/31/2018  PCP: Clare Burch  Interval History: no new c/o, no SOB    Objective:   Vitals: BP (!) 154/69   Pulse 60   Temp 98.4 °F (36.9 °C) (Oral)   Resp 20   Ht 5' 9\" (1.753 m)   Wt 243 lb 8 oz (110.5 kg)   SpO2 94%   BMI 35.96 kg/m²   General appearance: alert and cooperative with exam  HEENT: Head: atraumatic  Neck: no adenopathy, no carotid bruit and no JVD  Lungs: diminished breath sounds bibasilar  Heart: S1, S2 normal  Abdomen: normal findings: bowel sounds normal, no organomegaly, soft, non-tender and symmetric and abnormal findings:  distended  Extremities: edema + both legs  Neurologic: Mental status: Alert, oriented, thought content appropriate      Medications:   Scheduled Meds:   aspirin  325 mg Oral Daily    clopidogrel  75 mg Oral Daily    diltiazem  90 mg Oral 4x Daily    doxazosin  8 mg Oral Daily    hydrALAZINE  100 mg Oral TID    insulin glargine  10 Units Subcutaneous Nightly    isosorbide mononitrate  60 mg Oral BID    metoprolol tartrate  50 mg Oral BID    simvastatin  20 mg Oral Nightly    heparin (porcine)  5,000 Units Subcutaneous 3 times per day    insulin lispro  0-6 Units Subcutaneous TID WC    insulin lispro  0-3 Units Subcutaneous Nightly     Continuous Infusions:   sodium bicarbonate infusion 75 mL/hr at 02/01/18 1120       Lab Results:   CBC:   Recent Labs      01/31/18   1255  02/01/18   0326   WBC  1.1*  11.5*   HGB  6.8*  7.6*   PLT  213  205     BMP:    Recent Labs      01/31/18   1255   02/01/18   0326  02/01/18   0920  02/01/18   1254   NA  142   --   145   --    --    K  6.6*   < >  6.3*  6.0*  5.7*   CL  112*   --   114*   --    --    CO2  19*   --   20*   --    --    BUN  89*   --   84*   --    --    CREATININE  2.2*   --   2.3*   --    --    GLUCOSE  219*   --   139*   --    --     < > = values in this interval not displayed.      Hepatic:   Recent Labs      01/31/18   2864 AST  7   ALT  20   BILITOT  0.2*   ALKPHOS  109     Magnesium:    Lab Results   Component Value Date    MG 1.9 01/31/2018           Assessment and Plan:   1. NAVEEN  2. Hyperkalemia  3. CKD stage 3  4. Anemia sec to chemorx  5. Leucopenia prob related to chemoRx, improved  6. CAD  7. HTN      kayexalate  Cautious IV hydration. Cont bp meds. Am labs. Jeremy Allen MD

## 2018-02-02 LAB
ANION GAP SERPL CALCULATED.3IONS-SCNC: 12 MEQ/L (ref 8–16)
BUN BLDV-MCNC: 80 MG/DL (ref 7–22)
CALCIUM SERPL-MCNC: 7.9 MG/DL (ref 8.5–10.5)
CHLORIDE BLD-SCNC: 108 MEQ/L (ref 98–111)
CO2: 22 MEQ/L (ref 23–33)
CREAT SERPL-MCNC: 2.3 MG/DL (ref 0.4–1.2)
GFR SERPL CREATININE-BSD FRML MDRD: 30 ML/MIN/1.73M2
GLUCOSE BLD-MCNC: 128 MG/DL (ref 70–108)
GLUCOSE BLD-MCNC: 132 MG/DL (ref 70–108)
GLUCOSE BLD-MCNC: 139 MG/DL (ref 70–108)
GLUCOSE BLD-MCNC: 168 MG/DL (ref 70–108)
GLUCOSE BLD-MCNC: 193 MG/DL (ref 70–108)
POTASSIUM REFLEX MAGNESIUM: 5 MEQ/L (ref 3.5–5.2)
POTASSIUM REFLEX MAGNESIUM: 5.1 MEQ/L (ref 3.5–5.2)
POTASSIUM REFLEX MAGNESIUM: 5.4 MEQ/L (ref 3.5–5.2)
SODIUM BLD-SCNC: 142 MEQ/L (ref 135–145)

## 2018-02-02 PROCEDURE — 80048 BASIC METABOLIC PNL TOTAL CA: CPT

## 2018-02-02 PROCEDURE — 2500000003 HC RX 250 WO HCPCS: Performed by: INTERNAL MEDICINE

## 2018-02-02 PROCEDURE — 82948 REAGENT STRIP/BLOOD GLUCOSE: CPT

## 2018-02-02 PROCEDURE — 84132 ASSAY OF SERUM POTASSIUM: CPT

## 2018-02-02 PROCEDURE — 6370000000 HC RX 637 (ALT 250 FOR IP): Performed by: INTERNAL MEDICINE

## 2018-02-02 PROCEDURE — 2580000003 HC RX 258: Performed by: INTERNAL MEDICINE

## 2018-02-02 PROCEDURE — 86334 IMMUNOFIX E-PHORESIS SERUM: CPT

## 2018-02-02 PROCEDURE — 1200000003 HC TELEMETRY R&B

## 2018-02-02 PROCEDURE — 6360000002 HC RX W HCPCS: Performed by: INTERNAL MEDICINE

## 2018-02-02 RX ORDER — DOXAZOSIN MESYLATE 4 MG/1
8 TABLET ORAL EVERY 12 HOURS SCHEDULED
Status: DISCONTINUED | OUTPATIENT
Start: 2018-02-02 | End: 2018-02-03 | Stop reason: HOSPADM

## 2018-02-02 RX ORDER — HYDRALAZINE HYDROCHLORIDE 20 MG/ML
10 INJECTION INTRAMUSCULAR; INTRAVENOUS ONCE
Status: COMPLETED | OUTPATIENT
Start: 2018-02-02 | End: 2018-02-02

## 2018-02-02 RX ORDER — HYDRALAZINE HYDROCHLORIDE 20 MG/ML
10 INJECTION INTRAMUSCULAR; INTRAVENOUS EVERY 4 HOURS PRN
Status: DISCONTINUED | OUTPATIENT
Start: 2018-02-02 | End: 2018-02-03 | Stop reason: HOSPADM

## 2018-02-02 RX ADMIN — DILTIAZEM HYDROCHLORIDE 90 MG: 60 TABLET, FILM COATED ORAL at 14:03

## 2018-02-02 RX ADMIN — METOPROLOL TARTRATE 50 MG: 50 TABLET, FILM COATED ORAL at 21:56

## 2018-02-02 RX ADMIN — HYDRALAZINE HYDROCHLORIDE 10 MG: 20 INJECTION INTRAMUSCULAR; INTRAVENOUS at 14:05

## 2018-02-02 RX ADMIN — SODIUM BICARBONATE: 84 INJECTION, SOLUTION INTRAVENOUS at 00:46

## 2018-02-02 RX ADMIN — HYDRALAZINE HYDROCHLORIDE 100 MG: 50 TABLET, FILM COATED ORAL at 09:58

## 2018-02-02 RX ADMIN — INSULIN GLARGINE 10 UNITS: 100 INJECTION, SOLUTION SUBCUTANEOUS at 21:56

## 2018-02-02 RX ADMIN — ASPIRIN 325 MG: 325 TABLET, DELAYED RELEASE ORAL at 09:58

## 2018-02-02 RX ADMIN — DILTIAZEM HYDROCHLORIDE 90 MG: 60 TABLET, FILM COATED ORAL at 09:58

## 2018-02-02 RX ADMIN — DOXAZOSIN 8 MG: 4 TABLET ORAL at 09:58

## 2018-02-02 RX ADMIN — Medication 1 UNITS: at 16:41

## 2018-02-02 RX ADMIN — HYDRALAZINE HYDROCHLORIDE 100 MG: 50 TABLET, FILM COATED ORAL at 21:56

## 2018-02-02 RX ADMIN — CLOPIDOGREL 75 MG: 75 TABLET, FILM COATED ORAL at 09:58

## 2018-02-02 RX ADMIN — METOPROLOL TARTRATE 50 MG: 50 TABLET, FILM COATED ORAL at 09:58

## 2018-02-02 RX ADMIN — ISOSORBIDE MONONITRATE 120 MG: 60 TABLET ORAL at 21:56

## 2018-02-02 RX ADMIN — HYDRALAZINE HYDROCHLORIDE 100 MG: 50 TABLET, FILM COATED ORAL at 14:04

## 2018-02-02 RX ADMIN — DOXAZOSIN 8 MG: 4 TABLET ORAL at 21:56

## 2018-02-02 RX ADMIN — SODIUM BICARBONATE: 84 INJECTION, SOLUTION INTRAVENOUS at 16:50

## 2018-02-02 RX ADMIN — DILTIAZEM HYDROCHLORIDE 90 MG: 60 TABLET, FILM COATED ORAL at 16:41

## 2018-02-02 RX ADMIN — HEPARIN SODIUM 5000 UNITS: 5000 INJECTION, SOLUTION INTRAVENOUS; SUBCUTANEOUS at 14:13

## 2018-02-02 RX ADMIN — HEPARIN SODIUM 5000 UNITS: 5000 INJECTION, SOLUTION INTRAVENOUS; SUBCUTANEOUS at 21:56

## 2018-02-02 RX ADMIN — HEPARIN SODIUM 5000 UNITS: 5000 INJECTION, SOLUTION INTRAVENOUS; SUBCUTANEOUS at 06:10

## 2018-02-02 RX ADMIN — Medication 1 UNITS: at 21:56

## 2018-02-02 RX ADMIN — SIMVASTATIN 20 MG: 20 TABLET, FILM COATED ORAL at 21:56

## 2018-02-02 RX ADMIN — ISOSORBIDE MONONITRATE 120 MG: 60 TABLET ORAL at 09:58

## 2018-02-02 RX ADMIN — DILTIAZEM HYDROCHLORIDE 90 MG: 60 TABLET, FILM COATED ORAL at 21:56

## 2018-02-02 NOTE — PLAN OF CARE
Problem: Tissue Perfusion - Renal, Altered:  Goal: Electrolytes within specified parameters  Electrolytes within specified parameters   Outcome: Ongoing  Potassium drawn every 4 hr. Labs to be drawn in the am.      Problem: Discharge Planning:  Goal: Participates in care planning  Participates in care planning   Outcome: Ongoing  Pt plans to be discharged home with mom at time of discharge.      Problem: Fluid Volume - Imbalance:  Goal: Absence of imbalanced fluid volume signs and symptoms  Absence of imbalanced fluid volume signs and symptoms   Outcome: Ongoing  See flow sheet for I's and O's.      Problem: Nutrition Deficit:  Goal: Ability to achieve adequate nutritional intake will improve  Ability to achieve adequate nutritional intake will improve   Outcome: Ongoing  Pt is currently on a carb control diet. Pt voiced understanding of treatment plan.      Comments: Care plan reviewed with patient. Patient verbalize understanding of the plan of care and contribute to goal setting.

## 2018-02-03 VITALS
HEART RATE: 56 BPM | OXYGEN SATURATION: 95 % | SYSTOLIC BLOOD PRESSURE: 153 MMHG | TEMPERATURE: 97.9 F | DIASTOLIC BLOOD PRESSURE: 70 MMHG | WEIGHT: 251.1 LBS | HEIGHT: 69 IN | RESPIRATION RATE: 18 BRPM | BODY MASS INDEX: 37.19 KG/M2

## 2018-02-03 PROBLEM — N18.30 STAGE 3 CHRONIC KIDNEY DISEASE (HCC): Status: ACTIVE | Noted: 2018-02-03

## 2018-02-03 PROBLEM — E87.5 HYPERKALEMIA: Status: ACTIVE | Noted: 2018-02-03

## 2018-02-03 PROBLEM — D72.819 LEUCOPENIA: Status: ACTIVE | Noted: 2018-02-03

## 2018-02-03 PROBLEM — D64.9 ANEMIA: Status: ACTIVE | Noted: 2018-02-03

## 2018-02-03 LAB
ANION GAP SERPL CALCULATED.3IONS-SCNC: 12 MEQ/L (ref 8–16)
BUN BLDV-MCNC: 70 MG/DL (ref 7–22)
CALCIUM SERPL-MCNC: 8.1 MG/DL (ref 8.5–10.5)
CHLORIDE BLD-SCNC: 110 MEQ/L (ref 98–111)
CO2: 22 MEQ/L (ref 23–33)
CREAT SERPL-MCNC: 2 MG/DL (ref 0.4–1.2)
CREATININE URINE: 61.5 MG/DL
GFR SERPL CREATININE-BSD FRML MDRD: 35 ML/MIN/1.73M2
GLUCOSE BLD-MCNC: 111 MG/DL (ref 70–108)
GLUCOSE BLD-MCNC: 117 MG/DL (ref 70–108)
GLUCOSE BLD-MCNC: 135 MG/DL (ref 70–108)
GLUCOSE BLD-MCNC: 219 MG/DL (ref 70–108)
POTASSIUM SERPL-SCNC: 4.8 MEQ/L (ref 3.5–5.2)
PROT/CREAT RATIO, UR: 5.1
PROTEIN, URINE: 313.6 MG/DL
SODIUM BLD-SCNC: 144 MEQ/L (ref 135–145)

## 2018-02-03 PROCEDURE — 80048 BASIC METABOLIC PNL TOTAL CA: CPT

## 2018-02-03 PROCEDURE — 84156 ASSAY OF PROTEIN URINE: CPT

## 2018-02-03 PROCEDURE — 82948 REAGENT STRIP/BLOOD GLUCOSE: CPT

## 2018-02-03 PROCEDURE — 2500000003 HC RX 250 WO HCPCS: Performed by: INTERNAL MEDICINE

## 2018-02-03 PROCEDURE — 2580000003 HC RX 258: Performed by: INTERNAL MEDICINE

## 2018-02-03 PROCEDURE — 6360000002 HC RX W HCPCS: Performed by: INTERNAL MEDICINE

## 2018-02-03 PROCEDURE — 82570 ASSAY OF URINE CREATININE: CPT

## 2018-02-03 PROCEDURE — 6370000000 HC RX 637 (ALT 250 FOR IP): Performed by: INTERNAL MEDICINE

## 2018-02-03 RX ORDER — HEPARIN SODIUM (PORCINE) LOCK FLUSH IV SOLN 100 UNIT/ML 100 UNIT/ML
500 SOLUTION INTRAVENOUS PRN
Status: DISCONTINUED | OUTPATIENT
Start: 2018-02-03 | End: 2018-02-03 | Stop reason: HOSPADM

## 2018-02-03 RX ORDER — CLONIDINE HYDROCHLORIDE 0.1 MG/1
0.1 TABLET ORAL 2 TIMES DAILY
Status: DISCONTINUED | OUTPATIENT
Start: 2018-02-03 | End: 2018-02-03 | Stop reason: HOSPADM

## 2018-02-03 RX ORDER — CLONIDINE HYDROCHLORIDE 0.1 MG/1
0.1 TABLET ORAL 2 TIMES DAILY
Qty: 60 TABLET | Refills: 3 | Status: ON HOLD | OUTPATIENT
Start: 2018-02-03 | End: 2018-03-22

## 2018-02-03 RX ORDER — HEPARIN SODIUM (PORCINE) LOCK FLUSH IV SOLN 100 UNIT/ML 100 UNIT/ML
100 SOLUTION INTRAVENOUS PRN
Status: DISCONTINUED | OUTPATIENT
Start: 2018-02-03 | End: 2018-02-03

## 2018-02-03 RX ORDER — ISOSORBIDE MONONITRATE 120 MG/1
120 TABLET, EXTENDED RELEASE ORAL 2 TIMES DAILY
Qty: 60 TABLET | Refills: 5 | Status: SHIPPED | OUTPATIENT
Start: 2018-02-03 | End: 2020-06-15

## 2018-02-03 RX ORDER — DOXAZOSIN 8 MG/1
8 TABLET ORAL 2 TIMES DAILY
Qty: 60 TABLET | Refills: 3 | Status: SHIPPED | OUTPATIENT
Start: 2018-02-03

## 2018-02-03 RX ADMIN — ASPIRIN 325 MG: 325 TABLET, DELAYED RELEASE ORAL at 07:48

## 2018-02-03 RX ADMIN — HEPARIN 500 UNITS: 100 SYRINGE at 18:05

## 2018-02-03 RX ADMIN — CLONIDINE HYDROCHLORIDE 0.1 MG: 0.1 TABLET ORAL at 13:45

## 2018-02-03 RX ADMIN — SODIUM BICARBONATE: 84 INJECTION, SOLUTION INTRAVENOUS at 06:13

## 2018-02-03 RX ADMIN — DOXAZOSIN 8 MG: 4 TABLET ORAL at 07:48

## 2018-02-03 RX ADMIN — DILTIAZEM HYDROCHLORIDE 90 MG: 60 TABLET, FILM COATED ORAL at 07:47

## 2018-02-03 RX ADMIN — HEPARIN SODIUM 5000 UNITS: 5000 INJECTION, SOLUTION INTRAVENOUS; SUBCUTANEOUS at 13:27

## 2018-02-03 RX ADMIN — CLOPIDOGREL 75 MG: 75 TABLET, FILM COATED ORAL at 07:48

## 2018-02-03 RX ADMIN — HYDRALAZINE HYDROCHLORIDE 100 MG: 50 TABLET, FILM COATED ORAL at 07:47

## 2018-02-03 RX ADMIN — METOPROLOL TARTRATE 50 MG: 50 TABLET, FILM COATED ORAL at 07:47

## 2018-02-03 RX ADMIN — ISOSORBIDE MONONITRATE 120 MG: 60 TABLET ORAL at 07:47

## 2018-02-03 RX ADMIN — DILTIAZEM HYDROCHLORIDE 90 MG: 60 TABLET, FILM COATED ORAL at 13:27

## 2018-02-03 RX ADMIN — HYDRALAZINE HYDROCHLORIDE 100 MG: 50 TABLET, FILM COATED ORAL at 13:27

## 2018-02-03 RX ADMIN — HEPARIN SODIUM 5000 UNITS: 5000 INJECTION, SOLUTION INTRAVENOUS; SUBCUTANEOUS at 06:13

## 2018-02-03 NOTE — DISCHARGE SUMMARY
hydrALAZINE (APRESOLINE) 100 MG tablet  Take 1 tablet by mouth 3 times daily             hydrocortisone 2.5 % cream  Apply topically 3 times daily. Apply to face             insulin glargine (TOUJEO SOLOSTAR) 300 UNIT/ML injection pen  Inject 10 Units into the skin daily             isosorbide mononitrate (IMDUR) 120 MG extended release tablet  Take 1 tablet by mouth 2 times daily             Liraglutide (VICTOZA) 18 MG/3ML SOPN SC injection  Inject 1.8 mg into the skin daily              metoprolol tartrate (LOPRESSOR) 50 MG tablet  Take 50 mg by mouth 2 times daily              simvastatin (ZOCOR) 20 MG tablet  Take 20 mg by mouth nightly                 Consults:  nephrology    Significant Diagnostic Studies: labs: CBC:       Recent Labs      02/01/18   0326   WBC  11.5*   HGB  7.6*   PLT  205      BMP:            Recent Labs      02/01/18   0326    02/02/18   0409  02/02/18   1015  02/03/18   1330   NA  145   --   142   --   144   K  6.3*   < >  5.1  5.0  4.8   CL  114*   --   108   --   110   CO2  20*   --   22*   --   22*   BUN  84*   --   80*   --   70*   CREATININE  2.3*   --   2.3*   --   2.0*   GLUCOSE  139*   --   132*   --   111*    < > = values in this interval not displayed.      Hepatic:   No results for input(s): AST, ALT, ALB, BILITOT, ALKPHOS in the last 72 hours. Magnesium:          Lab Results   Component Value Date     MG 1.9 01/31/2018         Assessment and Plan:   8. NAVEEN  9. Hyperkalemia, improved  10. CKD stage 3  11. Anemia sec to chemorx  12. Leucopenia prob related to chemoRx, improved  13. CAD  15. HTN     Off IVF  Cont bp meds. As above  Increase cardura 16 mg daily  Stable for dc home. Renal f/up as OP. Treatments:  Kayexalate, Ca gluconate, D50 + IV reg insulin.     Disposition:   home    Signed:  Ryan Joshi  2/3/2018, 4:08 PM

## 2018-02-03 NOTE — PROGRESS NOTES
INTERNAL MEDICINE Progress Note  2/3/2018 4:02 PM  Subjective:   Admit Date: 1/31/2018  PCP: Darryl Graham  Interval History:  no new c/o,   BM++  Objective:   Vitals: BP (!) 153/70   Pulse 56   Temp 97.9 °F (36.6 °C) (Oral)   Resp 18   Ht 5' 9\" (1.753 m)   Wt 251 lb 1.6 oz (113.9 kg)   SpO2 95%   BMI 37.08 kg/m²   General appearance: alert and cooperative with exam  HEENT:  atraumatic  Neck:  no JVD  Lungs: diminished breath sounds bibasilar  Heart: S1, S2 normal  Abdomen: normal findings: bowel sounds normal, no organomegaly, soft, non-tender and symmetric and abnormal findings:  distended  Extremities: trace edema both legs  Neurologic: Alert, oriented, thought content appropriate      Medications:   Scheduled Meds:   cloNIDine  0.1 mg Oral BID    doxazosin  8 mg Oral 2 times per day    isosorbide mononitrate  120 mg Oral BID    aspirin  325 mg Oral Daily    clopidogrel  75 mg Oral Daily    diltiazem  90 mg Oral 4x Daily    hydrALAZINE  100 mg Oral TID    insulin glargine  10 Units Subcutaneous Nightly    metoprolol tartrate  50 mg Oral BID    simvastatin  20 mg Oral Nightly    heparin (porcine)  5,000 Units Subcutaneous 3 times per day    insulin lispro  0-6 Units Subcutaneous TID WC    insulin lispro  0-3 Units Subcutaneous Nightly     Continuous Infusions:       Lab Results:   CBC:   Recent Labs      02/01/18   0326   WBC  11.5*   HGB  7.6*   PLT  205     BMP:    Recent Labs      02/01/18   0326   02/02/18   0409  02/02/18   1015  02/03/18   1330   NA  145   --   142   --   144   K  6.3*   < >  5.1  5.0  4.8   CL  114*   --   108   --   110   CO2  20*   --   22*   --   22*   BUN  84*   --   80*   --   70*   CREATININE  2.3*   --   2.3*   --   2.0*   GLUCOSE  139*   --   132*   --   111*    < > = values in this interval not displayed. Hepatic:   No results for input(s): AST, ALT, ALB, BILITOT, ALKPHOS in the last 72 hours.   Magnesium:    Lab Results   Component Value Date    MG 1.9

## 2018-02-03 NOTE — PLAN OF CARE
Problem: Tissue Perfusion - Renal, Altered:  Goal: Electrolytes within specified parameters  Electrolytes within specified parameters   Outcome: Ongoing  Potassium within normal limits today. Last potassium level 5.0. Labs to be drawn in the am.      Problem: Discharge Planning:  Goal: Participates in care planning  Participates in care planning   Outcome: Ongoing  Pt plans to be discharged home with mom at time of discharge.      Problem: Fluid Volume - Imbalance:  Goal: Absence of imbalanced fluid volume signs and symptoms  Absence of imbalanced fluid volume signs and symptoms   Outcome: Ongoing  See flow sheet for I's and O's.      Problem: Nutrition Deficit:  Goal: Ability to achieve adequate nutritional intake will improve  Ability to achieve adequate nutritional intake will improve   Outcome: Ongoing  Pt is currently on a carb control diet.  Pt voiced understanding of treatment plan.      Comments: Care plan reviewed with patient.  Patient verbalize understanding of the plan of care and contribute to goal setting

## 2018-02-05 ENCOUNTER — OFFICE VISIT (OUTPATIENT)
Dept: CARDIOLOGY CLINIC | Age: 53
End: 2018-02-05
Payer: MEDICAID

## 2018-02-05 VITALS
SYSTOLIC BLOOD PRESSURE: 170 MMHG | BODY MASS INDEX: 37.68 KG/M2 | WEIGHT: 254.4 LBS | HEIGHT: 69 IN | DIASTOLIC BLOOD PRESSURE: 80 MMHG | HEART RATE: 70 BPM

## 2018-02-05 DIAGNOSIS — N18.30 CHRONIC KIDNEY DISEASE, STAGE III (MODERATE) (HCC): ICD-10-CM

## 2018-02-05 DIAGNOSIS — I25.10 CORONARY ARTERY DISEASE INVOLVING NATIVE CORONARY ARTERY OF NATIVE HEART WITHOUT ANGINA PECTORIS: Primary | ICD-10-CM

## 2018-02-05 DIAGNOSIS — Z90.49 STATUS POST COLON RESECTION: ICD-10-CM

## 2018-02-05 DIAGNOSIS — D64.9 ANEMIA, UNSPECIFIED TYPE: ICD-10-CM

## 2018-02-05 DIAGNOSIS — Z85.038 HISTORY OF COLON CANCER: ICD-10-CM

## 2018-02-05 LAB — IMMUNOFIXATION RESULT, SERUM: NORMAL

## 2018-02-05 PROCEDURE — G8482 FLU IMMUNIZE ORDER/ADMIN: HCPCS | Performed by: PHYSICIAN ASSISTANT

## 2018-02-05 PROCEDURE — 99213 OFFICE O/P EST LOW 20 MIN: CPT | Performed by: PHYSICIAN ASSISTANT

## 2018-02-05 PROCEDURE — G8427 DOCREV CUR MEDS BY ELIG CLIN: HCPCS | Performed by: PHYSICIAN ASSISTANT

## 2018-02-05 PROCEDURE — 1111F DSCHRG MED/CURRENT MED MERGE: CPT | Performed by: PHYSICIAN ASSISTANT

## 2018-02-05 PROCEDURE — G8598 ASA/ANTIPLAT THER USED: HCPCS | Performed by: PHYSICIAN ASSISTANT

## 2018-02-05 PROCEDURE — 1036F TOBACCO NON-USER: CPT | Performed by: PHYSICIAN ASSISTANT

## 2018-02-05 PROCEDURE — G8417 CALC BMI ABV UP PARAM F/U: HCPCS | Performed by: PHYSICIAN ASSISTANT

## 2018-02-05 PROCEDURE — 3017F COLORECTAL CA SCREEN DOC REV: CPT | Performed by: PHYSICIAN ASSISTANT

## 2018-02-05 NOTE — PROGRESS NOTES
Pt here for follow up coronary revascn, D/C 2/3/18. Pt denies cp, dizziness/lightheadedness, SOB, palpations. Pt c/o bilateral swelling in feet.

## 2018-02-05 NOTE — PROGRESS NOTES
[facility] PROFESSIONAL SERVICES  [facility]   [address]   [address] [address]   Dept: [phone]   Dept Fax: [phone]   Loc: [phone]      Chief Complaint   Patient presents with   Sudha Santiago Check-Up     follow up fro coronary revascn.  Coronary Artery Disease    Hypertension     Patient with a history of multivessel CAD presents for follow-up. Patient states that he has recovered well from his colon resection. He is undergoing chemotherapy. He is still being treated for anemia and chronic kidney disease. His last creatinine was 2.0. He is scheduled to see his nephrologist this week. Overall from a cardiac standpoint he has been doing well. He denies any chest pain, shortness of breath or palpitations.   Cardiologist:  Dr. Jimenez Ramachandran:   No fever, no chills, No fatigue or weight loss  Pulmonary:    No dyspnea, no wheezing  Cardiac:    Denies recent chest pain   GI:     No nausea or vomiting, no abdominal pain  Neuro:    No dizziness or light headedness  Musculoskeletal:  No recent active issues  Extremities:   ++ edema, good peripheral pulses      Past Medical History:   Diagnosis Date    ASCVD (arteriosclerotic cardiovascular disease)     Cancer ([facility] 11/2017    Dr. Claudetta Miu    CKD (chronic kidney disease) stage 3, GFR 30-59 ml/min     see's Vester Osgood    DM2 [facility]di[facility] mellitus, type 2) ([facility]     Dyslipidemia     Hypertension     Thyroid disease        No Known Allergies    Current Outpatient Prescriptions   Medication Sig Dispense Refill    isosorbide mononitrate (IMDUR) 120 MG extended release tablet Take 1 tablet by mouth 2 times daily 60 tablet 5    cloNIDine (CATAPRES) 0.1 MG tablet Take 1 tablet by mouth 2 times daily 60 tablet 3    doxazosin (CARDURA) 8 MG tablet Take 1 tablet by mouth 2 times daily 60 tablet 3    aspirin 325 MG EC tablet Take 1 tablet by mouth daily 30 tablet 3    hydrALAZINE (APRESOLINE) 100 MG tablet Take 1

## 2018-02-15 ENCOUNTER — HOSPITAL ENCOUNTER (EMERGENCY)
Age: 53
Discharge: HOME | End: 2018-02-15
Payer: MEDICAID

## 2018-02-15 ENCOUNTER — APPOINTMENT (OUTPATIENT)
Dept: GENERAL RADIOLOGY | Age: 53
End: 2018-02-15
Payer: MEDICAID

## 2018-02-15 ENCOUNTER — APPOINTMENT (OUTPATIENT)
Dept: INTERVENTIONAL RADIOLOGY/VASCULAR | Age: 53
End: 2018-02-15
Payer: MEDICAID

## 2018-02-15 VITALS
TEMPERATURE: 98 F | OXYGEN SATURATION: 96 % | HEART RATE: 56 BPM | DIASTOLIC BLOOD PRESSURE: 76 MMHG | SYSTOLIC BLOOD PRESSURE: 123 MMHG | RESPIRATION RATE: 15 BRPM

## 2018-02-15 DIAGNOSIS — S80.12XA CONTUSION OF LEFT LOWER EXTREMITY, INITIAL ENCOUNTER: Primary | ICD-10-CM

## 2018-02-15 PROCEDURE — 99284 EMERGENCY DEPT VISIT MOD MDM: CPT

## 2018-02-15 PROCEDURE — 73590 X-RAY EXAM OF LOWER LEG: CPT

## 2018-02-15 PROCEDURE — 6370000000 HC RX 637 (ALT 250 FOR IP): Performed by: NURSE PRACTITIONER

## 2018-02-15 PROCEDURE — 93971 EXTREMITY STUDY: CPT

## 2018-02-15 RX ORDER — HYDROCODONE BITARTRATE AND ACETAMINOPHEN 5; 325 MG/1; MG/1
1 TABLET ORAL ONCE
Status: COMPLETED | OUTPATIENT
Start: 2018-02-15 | End: 2018-02-15

## 2018-02-15 RX ADMIN — HYDROCODONE BITARTRATE AND ACETAMINOPHEN 1 TABLET: 5; 325 TABLET ORAL at 15:06

## 2018-02-15 ASSESSMENT — ENCOUNTER SYMPTOMS
VOMITING: 0
SORE THROAT: 0
BACK PAIN: 0
RHINORRHEA: 0
EYE DISCHARGE: 0
WHEEZING: 0
ABDOMINAL PAIN: 0
DIARRHEA: 0
SHORTNESS OF BREATH: 0
NAUSEA: 0
EYE REDNESS: 0
COUGH: 0

## 2018-02-15 ASSESSMENT — PAIN SCALES - GENERAL
PAINLEVEL_OUTOF10: 9
PAINLEVEL_OUTOF10: 8

## 2018-02-15 ASSESSMENT — PAIN DESCRIPTION - PAIN TYPE: TYPE: ACUTE PAIN

## 2018-02-15 ASSESSMENT — PAIN DESCRIPTION - DESCRIPTORS: DESCRIPTORS: SORE

## 2018-02-15 ASSESSMENT — PAIN DESCRIPTION - ORIENTATION: ORIENTATION: LEFT

## 2018-02-15 ASSESSMENT — PAIN DESCRIPTION - FREQUENCY: FREQUENCY: CONTINUOUS

## 2018-02-15 NOTE — ED PROVIDER NOTES
[facility]  eMERGENCY dEPARTMENT eNCOUnter          CHIEF COMPLAINT       Chief Complaint   Patient presents with    Leg Pain     left       Nurses Notes reviewed and I agree except as noted in the HPI. HISTORY OF PRESENT ILLNESS    Simone Reis is a 48 y.o. male who presents to the Emergency Department for the evaluation of lower left leg pain and swelling that began two days ago after falling out of bed. Patient rates this pain as a 9/10 in severity. Patient states that while he was falling out of his bed he hit his leg on his night stand. Patient does have a history of kidney problems so he reports that he normally has swelling in his lower legs. He does see a nephrologist for this. Patient was diagnosed with colon cancer in November of 2017. He is on Plavix and takes a baby aspirin daily. He has no history of blood clots. Patient denies any fevers, chills, nausea, vomiting or diarrhea. Patient denies any chest pain or shortness of breath. Location/Symptom: left lower leg pain  Timing/Onset: Two days ago  Context/Setting: Hit leg on dresser as he was falling out of bed  Quality: acute pain  Duration: Continuous   Modifying Factors: None  Severity: 9/10    The HPI was provided by the patient. REVIEW OF SYSTEMS     Review of Systems   Constitutional: Negative for appetite change, chills, fatigue and fever. HENT: Negative for congestion, ear pain, rhinorrhea and sore throat. Eyes: Negative for discharge, redness and visual disturbance. Respiratory: Negative for cough, shortness of breath and wheezing. Cardiovascular: Positive for leg swelling (lower left). Negative for chest pain and palpitations. Gastrointestinal: Negative for abdominal pain, diarrhea, nausea and vomiting. Genitourinary: Negative for decreased urine volume, difficulty urinating and dysuria. Musculoskeletal: Positive for myalgias (left lower leg pain).  Negative for arthralgias, back pain, joint LABS:     Labs Reviewed - No data to display    EMERGENCY DEPARTMENT COURSE:   Vitals:    Vitals:    02/15/18 1445   BP: 123/76   Pulse: 56   Resp: 15   SpO2: 96%       3:00 PM: The patient was seen and evaluated. MDM:  Patient was seen and evaluated for reports of left leg pain and bruising. An x-ray of the left lower extremity and ultrasound performed as the physical exam revealed tenderness to palpation and moderate amount of bruising to the left lower extremity. Patient does not demonstrate any acute fracture at this time and is negative for DVT per the read of the radiologist.  Patient is updated these results and states that he is comfort with the plan for discharge with outpatient follow-up. Patient states that he will return for any new or worsening symptoms. Patient states that he is comfortable continuing to take over-the-counter pain medication at this time. CRITICAL CARE:   None     CONSULTS:  None    PROCEDURES:  None    FINAL IMPRESSION      1. Contusion of left lower extremity, initial encounter          DISPOSITION/PLAN   discharged    PATIENT REFERRED TO:  Fidel Curling [address]  [phone]    Call in 1 day        DISCHARGE MEDICATIONS:  New Prescriptions    No medications on file       (Please note that portions of this note were completed with a voice recognition program.  Efforts were made to edit the dictations but occasionally words are mis-transcribed.)    The patient was given an opportunity to see the Emergency Attending. The patient voiced understanding that I was a Mid-Level Provider and was in agreement with being seen independently by myself. Scribe:  Dianelys Elizondo 2/15/18 3:00 PM Scribing for and in the presence of Tessa Toth CNP.     Signed by: Shelli Mccartney 02/15/18 4:51 PM    Provider:  I personally performed the services described in the documentation, reviewed and edited the documentation which was dictated

## 2018-03-07 ENCOUNTER — APPOINTMENT (OUTPATIENT)
Dept: ULTRASOUND IMAGING | Age: 53
DRG: 194 | End: 2018-03-07
Payer: MEDICAID

## 2018-03-07 ENCOUNTER — APPOINTMENT (OUTPATIENT)
Dept: CT IMAGING | Age: 53
DRG: 194 | End: 2018-03-07
Payer: MEDICAID

## 2018-03-07 ENCOUNTER — HOSPITAL ENCOUNTER (INPATIENT)
Age: 53
LOS: 15 days | Discharge: HOME HEALTH | DRG: 194 | End: 2018-03-22
Attending: INTERNAL MEDICINE | Admitting: INTERNAL MEDICINE
Payer: MEDICAID

## 2018-03-07 DIAGNOSIS — I31.39 PERICARDIAL EFFUSION: Primary | ICD-10-CM

## 2018-03-07 DIAGNOSIS — J90 PLEURAL EFFUSION: ICD-10-CM

## 2018-03-07 PROBLEM — D61.818 PANCYTOPENIA (HCC): Status: ACTIVE | Noted: 2018-03-07

## 2018-03-07 LAB
ABO: NORMAL
ALBUMIN SERPL-MCNC: 2.1 G/DL (ref 3.5–5.1)
ALP BLD-CCNC: 106 U/L (ref 38–126)
ALT SERPL-CCNC: 7 U/L (ref 11–66)
ANION GAP SERPL CALCULATED.3IONS-SCNC: 11 MEQ/L (ref 8–16)
ANISOCYTOSIS: ABNORMAL
ANTIBODY SCREEN: NORMAL
APTT: 26.8 SECONDS (ref 22–38)
AST SERPL-CCNC: 6 U/L (ref 5–40)
BASOPHILIA: SLIGHT
BASOPHILS # BLD: 0 %
BASOPHILS ABSOLUTE: 0 THOU/MM3 (ref 0–0.1)
BILIRUB SERPL-MCNC: 0.2 MG/DL (ref 0.3–1.2)
BILIRUBIN DIRECT: < 0.2 MG/DL (ref 0–0.3)
BUN BLDV-MCNC: 59 MG/DL (ref 7–22)
CALCIUM SERPL-MCNC: 7.7 MG/DL (ref 8.5–10.5)
CHLORIDE BLD-SCNC: 113 MEQ/L (ref 98–111)
CO2: 21 MEQ/L (ref 23–33)
CREAT SERPL-MCNC: 2.7 MG/DL (ref 0.4–1.2)
EKG ATRIAL RATE: 67 BPM
EKG P AXIS: 51 DEGREES
EKG P-R INTERVAL: 136 MS
EKG Q-T INTERVAL: 422 MS
EKG QRS DURATION: 94 MS
EKG QTC CALCULATION (BAZETT): 445 MS
EKG R AXIS: 8 DEGREES
EKG T AXIS: 102 DEGREES
EKG VENTRICULAR RATE: 67 BPM
EOSINOPHIL # BLD: 6.4 %
EOSINOPHILS ABSOLUTE: 0.3 THOU/MM3 (ref 0–0.4)
GFR SERPL CREATININE-BSD FRML MDRD: 25 ML/MIN/1.73M2
GLUCOSE BLD-MCNC: 137 MG/DL (ref 70–108)
HCT VFR BLD CALC: 20 % (ref 42–52)
HEMOGLOBIN: 6.4 GM/DL (ref 14–18)
INR BLD: 1.12 (ref 0.85–1.13)
LIPASE: 11.2 U/L (ref 5.6–51.3)
LYMPHOCYTES # BLD: 11.3 %
LYMPHOCYTES ABSOLUTE: 0.5 THOU/MM3 (ref 1–4.8)
MCH RBC QN AUTO: 27.1 PG (ref 27–31)
MCHC RBC AUTO-ENTMCNC: 31.9 GM/DL (ref 33–37)
MCV RBC AUTO: 85.1 FL (ref 80–94)
MONOCYTES # BLD: 2.9 %
MONOCYTES ABSOLUTE: 0.1 THOU/MM3 (ref 0.4–1.3)
NUCLEATED RED BLOOD CELLS: 0 /100 WBC
OSMOLALITY CALCULATION: 307.4 MOSMOL/KG (ref 275–300)
OVALOCYTES: ABNORMAL
PDW BLD-RTO: 21.3 % (ref 11.5–14.5)
PLATELET # BLD: 114 THOU/MM3 (ref 130–400)
PLATELET ESTIMATE: ADEQUATE
PMV BLD AUTO: 9.5 FL (ref 7.4–10.4)
POIKILOCYTES: ABNORMAL
POTASSIUM SERPL-SCNC: 5.4 MEQ/L (ref 3.5–5.2)
PRO-BNP: ABNORMAL PG/ML (ref 0–900)
RBC # BLD: 2.35 MILL/MM3 (ref 4.7–6.1)
RH FACTOR: NORMAL
ROULEAUX: SLIGHT
SCAN OF BLOOD SMEAR: NORMAL
SEG NEUTROPHILS: 79.4 %
SEGMENTED NEUTROPHILS ABSOLUTE COUNT: 3.2 THOU/MM3 (ref 1.8–7.7)
SODIUM BLD-SCNC: 145 MEQ/L (ref 135–145)
TOTAL PROTEIN: 4.2 G/DL (ref 6.1–8)
TROPONIN T: 0.07 NG/ML
TROPONIN T: 0.07 NG/ML
WBC # BLD: 4 THOU/MM3 (ref 4.8–10.8)

## 2018-03-07 PROCEDURE — 99285 EMERGENCY DEPT VISIT HI MDM: CPT

## 2018-03-07 PROCEDURE — 86923 COMPATIBILITY TEST ELECTRIC: CPT

## 2018-03-07 PROCEDURE — 99223 1ST HOSP IP/OBS HIGH 75: CPT | Performed by: INTERNAL MEDICINE

## 2018-03-07 PROCEDURE — 76770 US EXAM ABDO BACK WALL COMP: CPT

## 2018-03-07 PROCEDURE — 36430 TRANSFUSION BLD/BLD COMPNT: CPT

## 2018-03-07 PROCEDURE — 2580000003 HC RX 258: Performed by: PHYSICIAN ASSISTANT

## 2018-03-07 PROCEDURE — 71250 CT THORAX DX C-: CPT

## 2018-03-07 PROCEDURE — 85025 COMPLETE CBC W/AUTO DIFF WBC: CPT

## 2018-03-07 PROCEDURE — 85730 THROMBOPLASTIN TIME PARTIAL: CPT

## 2018-03-07 PROCEDURE — 83690 ASSAY OF LIPASE: CPT

## 2018-03-07 PROCEDURE — 82248 BILIRUBIN DIRECT: CPT

## 2018-03-07 PROCEDURE — 85610 PROTHROMBIN TIME: CPT

## 2018-03-07 PROCEDURE — 6360000002 HC RX W HCPCS: Performed by: INTERNAL MEDICINE

## 2018-03-07 PROCEDURE — 96374 THER/PROPH/DIAG INJ IV PUSH: CPT

## 2018-03-07 PROCEDURE — 86850 RBC ANTIBODY SCREEN: CPT

## 2018-03-07 PROCEDURE — P9046 ALBUMIN (HUMAN), 25%, 20 ML: HCPCS | Performed by: INTERNAL MEDICINE

## 2018-03-07 PROCEDURE — 36415 COLL VENOUS BLD VENIPUNCTURE: CPT

## 2018-03-07 PROCEDURE — 84484 ASSAY OF TROPONIN QUANT: CPT

## 2018-03-07 PROCEDURE — 86901 BLOOD TYPING SEROLOGIC RH(D): CPT

## 2018-03-07 PROCEDURE — 93005 ELECTROCARDIOGRAM TRACING: CPT | Performed by: INTERNAL MEDICINE

## 2018-03-07 PROCEDURE — 74176 CT ABD & PELVIS W/O CONTRAST: CPT

## 2018-03-07 PROCEDURE — 6370000000 HC RX 637 (ALT 250 FOR IP): Performed by: INTERNAL MEDICINE

## 2018-03-07 PROCEDURE — 83880 ASSAY OF NATRIURETIC PEPTIDE: CPT

## 2018-03-07 PROCEDURE — P9016 RBC LEUKOCYTES REDUCED: HCPCS

## 2018-03-07 PROCEDURE — 80053 COMPREHEN METABOLIC PANEL: CPT

## 2018-03-07 PROCEDURE — 86900 BLOOD TYPING SEROLOGIC ABO: CPT

## 2018-03-07 PROCEDURE — 1200000003 HC TELEMETRY R&B

## 2018-03-07 PROCEDURE — 36591 DRAW BLOOD OFF VENOUS DEVICE: CPT

## 2018-03-07 PROCEDURE — 6360000002 HC RX W HCPCS: Performed by: PHYSICIAN ASSISTANT

## 2018-03-07 PROCEDURE — 2580000003 HC RX 258: Performed by: INTERNAL MEDICINE

## 2018-03-07 RX ORDER — INSULIN GLARGINE 100 [IU]/ML
8 INJECTION, SOLUTION SUBCUTANEOUS DAILY
Status: DISCONTINUED | OUTPATIENT
Start: 2018-03-08 | End: 2018-03-22 | Stop reason: HOSPADM

## 2018-03-07 RX ORDER — INSULIN GLARGINE 100 [IU]/ML
12 INJECTION, SOLUTION SUBCUTANEOUS 2 TIMES DAILY
Status: DISCONTINUED | OUTPATIENT
Start: 2018-03-07 | End: 2018-03-07 | Stop reason: DRUGHIGH

## 2018-03-07 RX ORDER — BETAMETHASONE DIPROPIONATE 0.05 %
OINTMENT (GRAM) TOPICAL 2 TIMES DAILY
Status: DISCONTINUED | OUTPATIENT
Start: 2018-03-07 | End: 2018-03-22 | Stop reason: HOSPADM

## 2018-03-07 RX ORDER — ACETAMINOPHEN 325 MG/1
650 TABLET ORAL EVERY 4 HOURS PRN
Status: DISCONTINUED | OUTPATIENT
Start: 2018-03-07 | End: 2018-03-22 | Stop reason: HOSPADM

## 2018-03-07 RX ORDER — SIMVASTATIN 20 MG
20 TABLET ORAL NIGHTLY
Status: DISCONTINUED | OUTPATIENT
Start: 2018-03-07 | End: 2018-03-22 | Stop reason: HOSPADM

## 2018-03-07 RX ORDER — METOPROLOL TARTRATE 50 MG/1
50 TABLET, FILM COATED ORAL 2 TIMES DAILY
Status: DISCONTINUED | OUTPATIENT
Start: 2018-03-07 | End: 2018-03-09

## 2018-03-07 RX ORDER — ONDANSETRON 2 MG/ML
4 INJECTION INTRAMUSCULAR; INTRAVENOUS EVERY 6 HOURS PRN
Status: DISCONTINUED | OUTPATIENT
Start: 2018-03-07 | End: 2018-03-16

## 2018-03-07 RX ORDER — SODIUM CHLORIDE 0.9 % (FLUSH) 0.9 %
10 SYRINGE (ML) INJECTION PRN
Status: DISCONTINUED | OUTPATIENT
Start: 2018-03-07 | End: 2018-03-22 | Stop reason: HOSPADM

## 2018-03-07 RX ORDER — HYDRALAZINE HYDROCHLORIDE 50 MG/1
100 TABLET, FILM COATED ORAL 3 TIMES DAILY
Status: DISCONTINUED | OUTPATIENT
Start: 2018-03-07 | End: 2018-03-22 | Stop reason: HOSPADM

## 2018-03-07 RX ORDER — CLONIDINE HYDROCHLORIDE 0.1 MG/1
0.1 TABLET ORAL 2 TIMES DAILY
Status: DISCONTINUED | OUTPATIENT
Start: 2018-03-07 | End: 2018-03-13

## 2018-03-07 RX ORDER — PANTOPRAZOLE SODIUM 40 MG/1
40 TABLET, DELAYED RELEASE ORAL
Status: DISCONTINUED | OUTPATIENT
Start: 2018-03-07 | End: 2018-03-22 | Stop reason: HOSPADM

## 2018-03-07 RX ORDER — DEXTROSE MONOHYDRATE 25 G/50ML
12.5 INJECTION, SOLUTION INTRAVENOUS PRN
Status: DISCONTINUED | OUTPATIENT
Start: 2018-03-07 | End: 2018-03-22 | Stop reason: HOSPADM

## 2018-03-07 RX ORDER — ISOSORBIDE MONONITRATE 60 MG/1
120 TABLET, EXTENDED RELEASE ORAL 2 TIMES DAILY
Status: DISCONTINUED | OUTPATIENT
Start: 2018-03-07 | End: 2018-03-13

## 2018-03-07 RX ORDER — ALBUMIN (HUMAN) 12.5 G/50ML
25 SOLUTION INTRAVENOUS ONCE
Status: DISCONTINUED | OUTPATIENT
Start: 2018-03-07 | End: 2018-03-07

## 2018-03-07 RX ORDER — ONDANSETRON 2 MG/ML
4 INJECTION INTRAMUSCULAR; INTRAVENOUS ONCE
Status: DISCONTINUED | OUTPATIENT
Start: 2018-03-07 | End: 2018-03-21

## 2018-03-07 RX ORDER — SODIUM CHLORIDE 0.9 % (FLUSH) 0.9 %
10 SYRINGE (ML) INJECTION EVERY 12 HOURS SCHEDULED
Status: DISCONTINUED | OUTPATIENT
Start: 2018-03-07 | End: 2018-03-22 | Stop reason: HOSPADM

## 2018-03-07 RX ORDER — SODIUM CHLORIDE 9 MG/ML
INJECTION, SOLUTION INTRAVENOUS CONTINUOUS
Status: DISCONTINUED | OUTPATIENT
Start: 2018-03-07 | End: 2018-03-08

## 2018-03-07 RX ORDER — POTASSIUM CHLORIDE 20MEQ/15ML
40 LIQUID (ML) ORAL PRN
Status: DISCONTINUED | OUTPATIENT
Start: 2018-03-07 | End: 2018-03-22 | Stop reason: HOSPADM

## 2018-03-07 RX ORDER — 0.9 % SODIUM CHLORIDE 0.9 %
500 INTRAVENOUS SOLUTION INTRAVENOUS ONCE
Status: COMPLETED | OUTPATIENT
Start: 2018-03-07 | End: 2018-03-07

## 2018-03-07 RX ORDER — 0.9 % SODIUM CHLORIDE 0.9 %
250 INTRAVENOUS SOLUTION INTRAVENOUS ONCE
Status: DISCONTINUED | OUTPATIENT
Start: 2018-03-07 | End: 2018-03-08

## 2018-03-07 RX ORDER — FUROSEMIDE 10 MG/ML
20 INJECTION INTRAMUSCULAR; INTRAVENOUS 2 TIMES DAILY
Status: DISCONTINUED | OUTPATIENT
Start: 2018-03-07 | End: 2018-03-08

## 2018-03-07 RX ORDER — 0.9 % SODIUM CHLORIDE 0.9 %
250 INTRAVENOUS SOLUTION INTRAVENOUS ONCE
Status: COMPLETED | OUTPATIENT
Start: 2018-03-07 | End: 2018-03-08

## 2018-03-07 RX ORDER — DOXAZOSIN MESYLATE 4 MG/1
8 TABLET ORAL 2 TIMES DAILY
Status: DISCONTINUED | OUTPATIENT
Start: 2018-03-07 | End: 2018-03-22 | Stop reason: HOSPADM

## 2018-03-07 RX ORDER — MORPHINE SULFATE 4 MG/ML
4 INJECTION, SOLUTION INTRAMUSCULAR; INTRAVENOUS ONCE
Status: DISCONTINUED | OUTPATIENT
Start: 2018-03-07 | End: 2018-03-21

## 2018-03-07 RX ORDER — POTASSIUM CHLORIDE 7.45 MG/ML
10 INJECTION INTRAVENOUS PRN
Status: DISCONTINUED | OUTPATIENT
Start: 2018-03-07 | End: 2018-03-22 | Stop reason: HOSPADM

## 2018-03-07 RX ORDER — ALBUMIN (HUMAN) 12.5 G/50ML
25 SOLUTION INTRAVENOUS EVERY 8 HOURS
Status: COMPLETED | OUTPATIENT
Start: 2018-03-07 | End: 2018-03-08

## 2018-03-07 RX ORDER — POTASSIUM CHLORIDE 20 MEQ/1
40 TABLET, EXTENDED RELEASE ORAL PRN
Status: DISCONTINUED | OUTPATIENT
Start: 2018-03-07 | End: 2018-03-22 | Stop reason: HOSPADM

## 2018-03-07 RX ORDER — DEXTROSE MONOHYDRATE 50 MG/ML
100 INJECTION, SOLUTION INTRAVENOUS PRN
Status: DISCONTINUED | OUTPATIENT
Start: 2018-03-07 | End: 2018-03-22 | Stop reason: HOSPADM

## 2018-03-07 RX ORDER — NICOTINE POLACRILEX 4 MG
15 LOZENGE BUCCAL PRN
Status: DISCONTINUED | OUTPATIENT
Start: 2018-03-07 | End: 2018-03-22 | Stop reason: HOSPADM

## 2018-03-07 RX ORDER — ORPHENADRINE CITRATE 30 MG/ML
60 INJECTION INTRAMUSCULAR; INTRAVENOUS ONCE
Status: COMPLETED | OUTPATIENT
Start: 2018-03-07 | End: 2018-03-07

## 2018-03-07 RX ADMIN — SODIUM CHLORIDE 500 ML: 9 INJECTION, SOLUTION INTRAVENOUS at 13:01

## 2018-03-07 RX ADMIN — PANTOPRAZOLE SODIUM 40 MG: 40 TABLET, DELAYED RELEASE ORAL at 17:49

## 2018-03-07 RX ADMIN — SODIUM CHLORIDE 250 ML: 9 INJECTION, SOLUTION INTRAVENOUS at 18:08

## 2018-03-07 RX ADMIN — ORPHENADRINE CITRATE 60 MG: 30 INJECTION INTRAMUSCULAR; INTRAVENOUS at 11:56

## 2018-03-07 RX ADMIN — FUROSEMIDE 20 MG: 10 INJECTION, SOLUTION INTRAMUSCULAR; INTRAVENOUS at 20:28

## 2018-03-07 RX ADMIN — ALBUMIN (HUMAN) 25 G: 0.25 INJECTION, SOLUTION INTRAVENOUS at 23:04

## 2018-03-07 RX ADMIN — HYDROMORPHONE HYDROCHLORIDE 0.5 MG: 1 INJECTION, SOLUTION INTRAMUSCULAR; INTRAVENOUS; SUBCUTANEOUS at 16:08

## 2018-03-07 RX ADMIN — DOXAZOSIN 8 MG: 4 TABLET ORAL at 23:02

## 2018-03-07 RX ADMIN — SIMVASTATIN 20 MG: 20 TABLET, FILM COATED ORAL at 23:02

## 2018-03-07 RX ADMIN — ISOSORBIDE MONONITRATE 120 MG: 60 TABLET ORAL at 23:02

## 2018-03-07 RX ADMIN — HYDRALAZINE HYDROCHLORIDE 100 MG: 50 TABLET ORAL at 16:32

## 2018-03-07 RX ADMIN — CLONIDINE HYDROCHLORIDE 0.1 MG: 0.1 TABLET ORAL at 23:02

## 2018-03-07 RX ADMIN — HYDRALAZINE HYDROCHLORIDE 100 MG: 50 TABLET ORAL at 23:02

## 2018-03-07 RX ADMIN — ALBUMIN (HUMAN) 25 G: 0.25 INJECTION, SOLUTION INTRAVENOUS at 16:59

## 2018-03-07 RX ADMIN — DILTIAZEM HYDROCHLORIDE 90 MG: 60 TABLET, FILM COATED ORAL at 16:33

## 2018-03-07 RX ADMIN — SODIUM CHLORIDE: 9 INJECTION, SOLUTION INTRAVENOUS at 16:59

## 2018-03-07 RX ADMIN — DILTIAZEM HYDROCHLORIDE 90 MG: 60 TABLET, FILM COATED ORAL at 23:02

## 2018-03-07 RX ADMIN — METOPROLOL TARTRATE 50 MG: 50 TABLET, FILM COATED ORAL at 23:02

## 2018-03-07 ASSESSMENT — PAIN SCALES - GENERAL
PAINLEVEL_OUTOF10: 9
PAINLEVEL_OUTOF10: 0

## 2018-03-07 ASSESSMENT — ENCOUNTER SYMPTOMS
VOMITING: 0
COLOR CHANGE: 0
ABDOMINAL DISTENTION: 0
DIARRHEA: 0
STRIDOR: 0
SHORTNESS OF BREATH: 0
EYE DISCHARGE: 0
ABDOMINAL PAIN: 1
NAUSEA: 0
EYE REDNESS: 0
FACIAL SWELLING: 0
PHOTOPHOBIA: 0
COUGH: 0
RHINORRHEA: 0

## 2018-03-07 ASSESSMENT — PAIN DESCRIPTION - LOCATION
LOCATION: ABDOMEN
LOCATION: ABDOMEN

## 2018-03-07 ASSESSMENT — PAIN DESCRIPTION - PAIN TYPE
TYPE: ACUTE PAIN
TYPE: ACUTE PAIN

## 2018-03-07 ASSESSMENT — PAIN DESCRIPTION - PROGRESSION: CLINICAL_PROGRESSION: NOT CHANGED

## 2018-03-07 ASSESSMENT — PAIN DESCRIPTION - FREQUENCY: FREQUENCY: CONTINUOUS

## 2018-03-07 ASSESSMENT — PAIN DESCRIPTION - ORIENTATION
ORIENTATION: LOWER
ORIENTATION: UPPER

## 2018-03-07 ASSESSMENT — PAIN DESCRIPTION - DESCRIPTORS
DESCRIPTORS: BURNING
DESCRIPTORS: SHARP

## 2018-03-07 NOTE — ED NOTES
[facility] [address] [facility] Updated on [facility]. Family at bedside. Ultrasound at bedside to take pt.       Marion Munoz, ELIANE  03/07/18 [id number]

## 2018-03-07 NOTE — ED PROVIDER NOTES
person, place, and time. He appears well-developed and well-nourished. HENT:   Head: Normocephalic and atraumatic. Eyes: Conjunctivae are normal. Right eye exhibits no discharge. Left eye exhibits no discharge. No scleral icterus. Neck: Normal range of motion. No JVD present. Cardiovascular: Normal rate, regular rhythm and normal heart sounds. Exam reveals no gallop and no friction rub. No murmur heard. Pulmonary/Chest: Effort normal and breath sounds normal. No stridor. He has no decreased breath sounds. He has no wheezes. He has no rhonchi. He has no rales. He exhibits tenderness (lower, bilaterally). No bruising or abrasions   Abdominal: Soft. Bowel sounds are normal. There is tenderness (upper). There is no rebound and no guarding. No bruising or abrasions   Musculoskeletal: Normal range of motion. He exhibits no edema. Neurological: He is alert and oriented to person, place, and time. GCS eye subscore is 4. GCS verbal subscore is 5. GCS motor subscore is 6. Skin: Skin is warm and dry. No rash (on exposed body surfaces) noted. He is not diaphoretic. Psychiatric: He has a normal mood and affect. His behavior is normal. Thought content normal.     DIFFERENTIAL DIAGNOSIS:   Discussed with patient at bedside    DIAGNOSTIC RESULTS     RADIOLOGY: non-plain film images(s) such as CT, Ultrasound and MRI are read by the radiologist.    US RENAL COMPLETE   Final Result   1. Normal sonographic appearance of the kidneys. 2. Mild elevation of the left resistive index. This is nonspecific but may be related to underlying medical renal disease. **This report has been created using voice recognition software. It may contain minor errors which are inherent in voice recognition technology. **      Final report electronically signed by Dr. Vincent Brown on 3/7/2018 4:05 PM      CT ABDOMEN PELVIS WO CONTRAST Additional Contrast? None   Final Result      CT CHEST WO CONTRAST   Final Result

## 2018-03-08 LAB
ALBUMIN SERPL-MCNC: 2.7 G/DL (ref 3.5–5.1)
ANION GAP SERPL CALCULATED.3IONS-SCNC: 12 MEQ/L (ref 8–16)
ANION GAP SERPL CALCULATED.3IONS-SCNC: 12 MEQ/L (ref 8–16)
BUN BLDV-MCNC: 59 MG/DL (ref 7–22)
BUN BLDV-MCNC: 63 MG/DL (ref 7–22)
CALCIUM SERPL-MCNC: 7.7 MG/DL (ref 8.5–10.5)
CALCIUM SERPL-MCNC: 7.7 MG/DL (ref 8.5–10.5)
CHLORIDE BLD-SCNC: 110 MEQ/L (ref 98–111)
CHLORIDE BLD-SCNC: 112 MEQ/L (ref 98–111)
CO2: 20 MEQ/L (ref 23–33)
CO2: 20 MEQ/L (ref 23–33)
CREAT SERPL-MCNC: 2.7 MG/DL (ref 0.4–1.2)
CREAT SERPL-MCNC: 2.7 MG/DL (ref 0.4–1.2)
GFR SERPL CREATININE-BSD FRML MDRD: 25 ML/MIN/1.73M2
GFR SERPL CREATININE-BSD FRML MDRD: 25 ML/MIN/1.73M2
GLUCOSE BLD-MCNC: 156 MG/DL (ref 70–108)
GLUCOSE BLD-MCNC: 209 MG/DL (ref 70–108)
HEMOGLOBIN: 7.8 GM/DL (ref 14–18)
HEMOGLOBIN: 8.9 GM/DL (ref 14–18)
PHOSPHORUS: 5.2 MG/DL (ref 2.4–4.7)
POTASSIUM SERPL-SCNC: 5.6 MEQ/L (ref 3.5–5.2)
SODIUM BLD-SCNC: 142 MEQ/L (ref 135–145)
SODIUM BLD-SCNC: 144 MEQ/L (ref 135–145)
SODIUM URINE: 88 MEQ/L
TROPONIN T: 0.06 NG/ML

## 2018-03-08 PROCEDURE — 93010 ELECTROCARDIOGRAM REPORT: CPT | Performed by: INTERNAL MEDICINE

## 2018-03-08 PROCEDURE — 99233 SBSQ HOSP IP/OBS HIGH 50: CPT | Performed by: HOSPITALIST

## 2018-03-08 PROCEDURE — 93307 TTE W/O DOPPLER COMPLETE: CPT

## 2018-03-08 PROCEDURE — 80048 BASIC METABOLIC PNL TOTAL CA: CPT

## 2018-03-08 PROCEDURE — 2580000003 HC RX 258: Performed by: INTERNAL MEDICINE

## 2018-03-08 PROCEDURE — 1200000003 HC TELEMETRY R&B

## 2018-03-08 PROCEDURE — 36430 TRANSFUSION BLD/BLD COMPNT: CPT

## 2018-03-08 PROCEDURE — 6360000002 HC RX W HCPCS: Performed by: INTERNAL MEDICINE

## 2018-03-08 PROCEDURE — 6360000002 HC RX W HCPCS: Performed by: HOSPITALIST

## 2018-03-08 PROCEDURE — 6370000000 HC RX 637 (ALT 250 FOR IP): Performed by: HOSPITALIST

## 2018-03-08 PROCEDURE — 80069 RENAL FUNCTION PANEL: CPT

## 2018-03-08 PROCEDURE — 84484 ASSAY OF TROPONIN QUANT: CPT

## 2018-03-08 PROCEDURE — 36592 COLLECT BLOOD FROM PICC: CPT

## 2018-03-08 PROCEDURE — 51702 INSERT TEMP BLADDER CATH: CPT

## 2018-03-08 PROCEDURE — 84300 ASSAY OF URINE SODIUM: CPT

## 2018-03-08 PROCEDURE — 99223 1ST HOSP IP/OBS HIGH 75: CPT | Performed by: INTERNAL MEDICINE

## 2018-03-08 PROCEDURE — 85018 HEMOGLOBIN: CPT

## 2018-03-08 PROCEDURE — P9016 RBC LEUKOCYTES REDUCED: HCPCS

## 2018-03-08 PROCEDURE — 6370000000 HC RX 637 (ALT 250 FOR IP): Performed by: INTERNAL MEDICINE

## 2018-03-08 PROCEDURE — 36591 DRAW BLOOD OFF VENOUS DEVICE: CPT

## 2018-03-08 PROCEDURE — P9046 ALBUMIN (HUMAN), 25%, 20 ML: HCPCS | Performed by: INTERNAL MEDICINE

## 2018-03-08 PROCEDURE — 2700000000 HC OXYGEN THERAPY PER DAY

## 2018-03-08 RX ORDER — POLYETHYLENE GLYCOL 3350 17 G/17G
17 POWDER, FOR SOLUTION ORAL DAILY
Status: DISCONTINUED | OUTPATIENT
Start: 2018-03-08 | End: 2018-03-22 | Stop reason: HOSPADM

## 2018-03-08 RX ORDER — DIAPER,BRIEF,INFANT-TODD,DISP
EACH MISCELLANEOUS DAILY PRN
COMMUNITY
End: 2019-05-14 | Stop reason: ALTCHOICE

## 2018-03-08 RX ORDER — SENNA AND DOCUSATE SODIUM 50; 8.6 MG/1; MG/1
2 TABLET, FILM COATED ORAL DAILY PRN
Status: DISCONTINUED | OUTPATIENT
Start: 2018-03-08 | End: 2018-03-22 | Stop reason: HOSPADM

## 2018-03-08 RX ORDER — FUROSEMIDE 10 MG/ML
40 INJECTION INTRAMUSCULAR; INTRAVENOUS 2 TIMES DAILY
Status: DISCONTINUED | OUTPATIENT
Start: 2018-03-08 | End: 2018-03-08

## 2018-03-08 RX ORDER — FUROSEMIDE 10 MG/ML
40 INJECTION INTRAMUSCULAR; INTRAVENOUS 4 TIMES DAILY
Status: DISCONTINUED | OUTPATIENT
Start: 2018-03-08 | End: 2018-03-09

## 2018-03-08 RX ORDER — BUMETANIDE 0.25 MG/ML
1 INJECTION, SOLUTION INTRAMUSCULAR; INTRAVENOUS 2 TIMES DAILY
Status: DISCONTINUED | OUTPATIENT
Start: 2018-03-08 | End: 2018-03-08 | Stop reason: RX

## 2018-03-08 RX ADMIN — PANTOPRAZOLE SODIUM 40 MG: 40 TABLET, DELAYED RELEASE ORAL at 06:34

## 2018-03-08 RX ADMIN — HYDRALAZINE HYDROCHLORIDE 100 MG: 50 TABLET ORAL at 10:35

## 2018-03-08 RX ADMIN — HYDROCORTISONE: 25 CREAM TOPICAL at 10:41

## 2018-03-08 RX ADMIN — INSULIN GLARGINE 8 UNITS: 100 INJECTION, SOLUTION SUBCUTANEOUS at 10:44

## 2018-03-08 RX ADMIN — DILTIAZEM HYDROCHLORIDE 90 MG: 60 TABLET, FILM COATED ORAL at 16:24

## 2018-03-08 RX ADMIN — FUROSEMIDE 40 MG: 10 INJECTION, SOLUTION INTRAMUSCULAR; INTRAVENOUS at 16:25

## 2018-03-08 RX ADMIN — POLYETHYLENE GLYCOL 3350 17 G: 17 POWDER, FOR SOLUTION ORAL at 16:43

## 2018-03-08 RX ADMIN — DOXAZOSIN 8 MG: 4 TABLET ORAL at 10:36

## 2018-03-08 RX ADMIN — ISOSORBIDE MONONITRATE 120 MG: 60 TABLET ORAL at 21:07

## 2018-03-08 RX ADMIN — HYDRALAZINE HYDROCHLORIDE 100 MG: 50 TABLET ORAL at 21:07

## 2018-03-08 RX ADMIN — BETAMETHASONE DIPROPIONATE: 0.5 OINTMENT TOPICAL at 10:38

## 2018-03-08 RX ADMIN — DILTIAZEM HYDROCHLORIDE 90 MG: 60 TABLET, FILM COATED ORAL at 10:20

## 2018-03-08 RX ADMIN — ISOSORBIDE MONONITRATE 120 MG: 60 TABLET ORAL at 10:20

## 2018-03-08 RX ADMIN — BETAMETHASONE DIPROPIONATE: 0.5 OINTMENT TOPICAL at 21:18

## 2018-03-08 RX ADMIN — METOPROLOL TARTRATE 50 MG: 50 TABLET, FILM COATED ORAL at 10:34

## 2018-03-08 RX ADMIN — ALBUMIN (HUMAN) 25 G: 0.25 INJECTION, SOLUTION INTRAVENOUS at 10:20

## 2018-03-08 RX ADMIN — CLONIDINE HYDROCHLORIDE 0.1 MG: 0.1 TABLET ORAL at 21:07

## 2018-03-08 RX ADMIN — HYDRALAZINE HYDROCHLORIDE 100 MG: 50 TABLET ORAL at 16:24

## 2018-03-08 RX ADMIN — FUROSEMIDE 40 MG: 10 INJECTION, SOLUTION INTRAMUSCULAR; INTRAVENOUS at 10:44

## 2018-03-08 RX ADMIN — METOPROLOL TARTRATE 50 MG: 50 TABLET, FILM COATED ORAL at 21:07

## 2018-03-08 RX ADMIN — FUROSEMIDE 40 MG: 10 INJECTION, SOLUTION INTRAMUSCULAR; INTRAVENOUS at 21:07

## 2018-03-08 RX ADMIN — Medication 10 ML: at 10:38

## 2018-03-08 RX ADMIN — ACETAMINOPHEN 650 MG: 325 TABLET ORAL at 16:27

## 2018-03-08 RX ADMIN — CLONIDINE HYDROCHLORIDE 0.1 MG: 0.1 TABLET ORAL at 10:35

## 2018-03-08 RX ADMIN — DILTIAZEM HYDROCHLORIDE 90 MG: 60 TABLET, FILM COATED ORAL at 21:07

## 2018-03-08 RX ADMIN — HYDROCORTISONE: 25 CREAM TOPICAL at 21:18

## 2018-03-08 RX ADMIN — DOXAZOSIN 8 MG: 4 TABLET ORAL at 21:07

## 2018-03-08 RX ADMIN — PANTOPRAZOLE SODIUM 40 MG: 40 TABLET, DELAYED RELEASE ORAL at 16:23

## 2018-03-08 RX ADMIN — Medication 10 ML: at 21:08

## 2018-03-08 RX ADMIN — SIMVASTATIN 20 MG: 20 TABLET, FILM COATED ORAL at 21:07

## 2018-03-08 ASSESSMENT — PAIN DESCRIPTION - ONSET
ONSET: ON-GOING
ONSET: ON-GOING

## 2018-03-08 ASSESSMENT — PAIN DESCRIPTION - DESCRIPTORS
DESCRIPTORS: DISCOMFORT
DESCRIPTORS: DISCOMFORT

## 2018-03-08 ASSESSMENT — PAIN DESCRIPTION - FREQUENCY
FREQUENCY: INTERMITTENT
FREQUENCY: INTERMITTENT

## 2018-03-08 ASSESSMENT — PAIN DESCRIPTION - PROGRESSION
CLINICAL_PROGRESSION: NOT CHANGED
CLINICAL_PROGRESSION: NOT CHANGED

## 2018-03-08 ASSESSMENT — PAIN SCALES - GENERAL
PAINLEVEL_OUTOF10: 2
PAINLEVEL_OUTOF10: 8
PAINLEVEL_OUTOF10: 4

## 2018-03-08 ASSESSMENT — PAIN DESCRIPTION - LOCATION
LOCATION: ABDOMEN;RIB CAGE
LOCATION: ABDOMEN;RIB CAGE

## 2018-03-08 ASSESSMENT — PAIN DESCRIPTION - PAIN TYPE
TYPE: ACUTE PAIN
TYPE: ACUTE PAIN

## 2018-03-08 NOTE — PROGRESS NOTES
Full consult has been dictated. Recommend evaluation by cardiology and surgery service. No transfusion if HGB is > 8 gm. Okay to discharge based on evaluation by Hospitalist.  Thank you for the consult.

## 2018-03-08 NOTE — CONSULTS
[facility] [address] [place] [address]  Cardiology Consult        Patient:  Valerie Perea  YOB: 1965  MRN: [id number]     Acct: [de-identified]    PCP: Carolin Wilson    Date of Admission: 3/7/2018      Chief Complaint:  Abdominal pain, SOB      History Of Present Illness:    48 y.o. pleasant male who presented to the hospital with complaints of abdominal pain. The abdominal pain is sudden onset, 7/10, non radiating, aggravated by movement, denies N/V/D. Has hx of colon cancer and underwent chemo/surgery. While this is being worked up. His labs showed elevated troponins, cardiology was consulted. His EKG shows SR with nonspecific ST-T changes. Echo shows EF 50% and cath showed multiple vessel disease in 11/2017 but due to active GI bleeding and need sigmoid colon resection, PCI was deferred. Patient has been getting chemo, had treatment yesterday.       Past Medical History:          Diagnosis Date    ASCVD (arteriosclerotic cardiovascular disease)     Cancer ([facility] 11/2017    Dr. Jorge L Lal    CKD (chronic kidney disease) stage 3, GFR 30-59 ml/min     see's [facility]    DM2 (diabetes mellitus, type 2) ([facility]     Dyslipidemia     Hypertension     Thyroid disease        Past Surgical History:          Procedure Laterality Date    ENDOSCOPY, COLON, DIAGNOSTIC      EYE SURGERY Left 2013    laser surgery    INSERTION / REMOVAL / REPLACEMENT VENOUS ACCESS CATHETER Right 12/4/2017    MEDIPORT INSERTION performed by Hira Queen MD at [address] Left 11/24/2017    COLONOSCOPY WITH BIOPSY performed by Kelly Tristan MD at [facility]    ME EGD TRANSORAL BIOPSY SINGLE/MULTIPLE N/A 11/23/2017    EGD BIOPSY performed by Kelly Tristan MD at [address] [place] [address] on nose removed    SMALL INTESTINE SURGERY N/A 11/27/2017    SIGMOID COLON RESECTION, APPENDECTOMY, LIVER BIOPSY performed by Hira Queen MD at [facility] Frequency Provider Last Rate Last Dose    0.9 % sodium chloride infusion   Intravenous Continuous Cooper Kessler PA-C 100 mL/hr at 03/07/18 1659      0.9 % sodium chloride bolus  250 mL Intravenous Once Cooper Kessler PA-C        sodium chloride flush 0.9 % injection 10 mL  10 mL Intravenous 2 times per day Cande Herman MD        sodium chloride flush 0.9 % injection 10 mL  10 mL Intravenous PRN Cande Herman MD        acetaminophen (TYLENOL) tablet 650 mg  650 mg Oral Q4H PRN Cande Herman MD        ondansetron [facility]) injection 4 mg  4 mg Intravenous Q6H PRN Cande Herman MD        magnesium sulfate 1 g in dextrose 5 % 100 mL IVPB  1 g Intravenous PRN Cande Herman MD        potassium chloride (KLOR-CON M) extended release tablet 40 mEq  40 mEq Oral PRN Cande Herman MD        Or    potassium chloride 20 MEQ/15ML (10%) oral solution 40 mEq  40 mEq Oral PRN Cande Herman MD        Or    potassium chloride 10 mEq/100 mL IVPB (Peripheral Line)  10 mEq Intravenous PRN Cande Herman MD        HYDROmorphone (DILAUDID) injection 0.5 mg  0.5 mg Intravenous Q3H PRN Cande Herman MD   0.5 mg at 03/07/18 1608    furosemide (LASIX) injection 20 mg  20 mg Intravenous BID Cande Herman MD   20 mg at 03/07/18 2028    pantoprazole (PROTONIX) tablet 40 mg  40 mg Oral BID AC Cande Herman MD   40 mg at 03/08/18 0634    albumin human 25 % solution 25 g  25 g Intravenous Q8H Cande Herman MD   25 g at 03/07/18 2304    morphine injection 4 mg  4 mg Intravenous Once Cooper Kessler PA-C        ondansetron [facility]) injection 4 mg  4 mg Intravenous Once Cooper Kessler PA-C        betamethasone dipropionate (DIPROLENE) 0.05 % ointment   Topical BID Cande Herman MD        cloNIDine (CATAPRES) tablet 0.1 mg  0.1 mg Oral BID Cande Herman MD   0.1 mg at 03/07/18 2302    diltiazem (CARDIZEM) tablet 90 mg  90 mg Oral 4x Daily Cande Herman MD   90 mg at 03/07/18 2302    doxazosin (CARDURA) tablet 8 mg  8 mg Oral BID Sylvie Mcintosh MD   8 mg at 03/07/18 2302    hydrALAZINE (APRESOLINE) tablet 100 mg  100 mg Oral TID Sylvie Mcintosh MD   100 mg at 03/07/18 2302    hydrocortisone 2.5 % cream   Topical BID Sylvie Mcintosh MD        isosorbide mononitrate (IMDUR) extended release tablet 120 mg  120 mg Oral BID Sylvie Mcintosh MD   120 mg at 03/07/18 2302    metoprolol tartrate (LOPRESSOR) tablet 50 mg  50 mg Oral BID Sylvie Mcintosh MD   50 mg at 03/07/18 2302    simvastatin (ZOCOR) tablet 20 mg  20 mg Oral Nightly Sylvie Mcintosh MD   20 mg at 03/07/18 2302    insulin glargine (LANTUS) injection vial 8 Units  8 Units Subcutaneous Daily Sylvie Mcintosh MD        glucose (GLUTOSE) 40 % oral gel 15 g  15 g Oral PRN Sylvie Mcintosh MD        dextrose 50 % solution 12.5 g  12.5 g Intravenous PRN Sylvie Mcintosh MD        glucagon (rDNA) injection 1 mg  1 mg Intramuscular PRN Sylvie Mcintosh MD        dextrose 5 % solution  100 mL/hr Intravenous PRN Sylive Mcintosh MD           Allergies:  Patient has no known allergies. Social History:    TOBACCO:   reports that he has never smoked. He has never used smokeless tobacco.  ETOH:   reports that he does not drink alcohol. Family History:        Problem Relation Age of Onset    Cancer Father     Heart Disease Father     High Blood Pressure Mother          Review of Systems -   General ROS: negative  Psychological ROS: negative  Hematological and Lymphatic ROS: No history of blood clots or bleeding disorder. Respiratory ROS: no cough, shortness of breath, or wheezing  Cardiovascular ROS: no chest pain or dyspnea on exertion  Gastrointestinal ROS: + abdominal pain  Genito-Urinary ROS: no dysuria, trouble voiding, or hematuria  Musculoskeletal ROS: negative  Neurological ROS: no TIA or stroke symptoms  Dermatological ROS: negative    All others reviewed and are negative.        BP (!) 170/78   Pulse 60   Temp 98.4 °F (36.9 °C) (Oral)   Resp 20   Ht 5' 9\" (1.753 m)   Wt 259 lb 12.8 oz (117.8 kg)   SpO2 93%   BMI 38.37 kg/m²       Physical Examination:   General appearance - alert, well appearing, and in no distress  Mental status - alert, oriented to person, place, and time  Neck - supple, no significant adenopathy, no JVD, or carotid bruits  Chest - clear to auscultation, no wheezes, rales or rhonchi, symmetric air entry  Heart - normal rate, regular rhythm, normal S1, S2, no murmurs, rubs, clicks or gallops  Abdomen - +abdominal tenderness, nondistended, no masses or organomegaly  Neurological - alert, oriented, normal speech, no focal findings or movement disorder noted  Musculoskeletal - no joint tenderness, deformity or swelling  Extremities - peripheral pulses normal, no pedal edema, no clubbing or cyanosis  Skin - normal coloration and turgor, no rashes, no suspicious skin lesions noted      LABS:    Recent Labs      03/07/18   2030  03/08/18   0145  03/08/18   0625   TROPONINT  0.066*  0.058*  0.060*     CBC:   Lab Results   Component Value Date    WBC 4.0 03/07/2018    RBC 2.35 03/07/2018    HGB 7.8 03/08/2018    HCT 20.0 03/07/2018    MCV 85.1 03/07/2018    MCH 27.1 03/07/2018    MCHC 31.9 03/07/2018    RDW 21.3 03/07/2018     03/07/2018    MPV 9.5 03/07/2018     BMP:    Lab Results   Component Value Date     03/08/2018    K 5.6 03/08/2018    K 5.0 02/02/2018     03/08/2018    CO2 20 03/08/2018    BUN 59 03/08/2018    LABALBU 2.1 03/07/2018    CREATININE 2.7 03/08/2018    CALCIUM 7.7 03/08/2018    LABGLOM 25 03/08/2018    GLUCOSE 156 03/08/2018     Hepatic Function Panel:    Lab Results   Component Value Date    ALKPHOS 106 03/07/2018    ALT 7 03/07/2018    AST 6 03/07/2018    PROT 4.2 03/07/2018    BILITOT 0.2 03/07/2018    BILIDIR <0.2 03/07/2018    LABALBU 2.1 03/07/2018     Magnesium:    Lab Results   Component Value Date    MG 1.9 01/31/2018     Warfarin

## 2018-03-08 NOTE — CARE COORDINATION
3/8/18, 10:32 AM      Matt Kan       Admitted from: ER, patient presented with abdominal pain post fall a few days prior. 3/7/2018/ [address] day: 1   Location: [facility]/028-A Reason for admit: NAVEEN (acute kidney injury) ([facility] [N17.9] Status: Inpt. Admit order signed?: yes  PMH:  has a past medical history of ASCVD (arteriosclerotic cardiovascular disease); Cancer ([facility] CKD (chronic kidney disease) stage 3, GFR 30-59 ml/min; DM2 (diabetes mellitus, type 2) ([facility] Dyslipidemia; Hypertension; and Thyroid disease. Procedure: N/A  Pertinent abnormal Imaging:CT of chest and abdomen. Medications:  Scheduled Meds:   furosemide  40 mg Intravenous BID    sodium chloride  250 mL Intravenous Once    sodium chloride flush  10 mL Intravenous 2 times per day    pantoprazole  40 mg Oral BID AC    morphine  4 mg Intravenous Once    ondansetron  4 mg Intravenous Once    betamethasone dipropionate   Topical BID    cloNIDine  0.1 mg Oral BID    diltiazem  90 mg Oral 4x Daily    doxazosin  8 mg Oral BID    hydrALAZINE  100 mg Oral TID    hydrocortisone   Topical BID    isosorbide mononitrate  120 mg Oral BID    metoprolol tartrate  50 mg Oral BID    simvastatin  20 mg Oral Nightly    insulin glargine  8 Units Subcutaneous Daily     Continuous Infusions:   dextrose        Pertinent Info/Orders/Treatment Plan:  Cardiology (elevated troponin levels), Oncology, and Nephrology (creatinine 2.7) consult, Albumin x 3 doses, IV Lasix daily, DM management, daily labs, strong catheter, oxygen, telemetry, PT/OT. Diet: DIET CARDIAC;   DVT Prophylaxis: SCD's ordered  Smoking status:  reports that he has never smoked.  He has never used smokeless tobacco.   Influenza Vaccination Screening Completed: yes  Pneumonia Vaccination Screening Completed: yes  Core measures: VTE  PCP: Alison Grey  Readmission:   No  Risk Score: 13.75   Discharge Planning  Current Residence:  Private Residence  Living Arrangements:  Parent

## 2018-03-09 LAB
ALBUMIN SERPL-MCNC: 2.4 G/DL (ref 3.5–5.1)
AMYLASE: 8 U/L (ref 20–104)
ANION GAP SERPL CALCULATED.3IONS-SCNC: 11 MEQ/L (ref 8–16)
ANISOCYTOSIS: ABNORMAL
BASOPHILS # BLD: 1.5 %
BASOPHILS ABSOLUTE: 0 THOU/MM3 (ref 0–0.1)
BUN BLDV-MCNC: 64 MG/DL (ref 7–22)
CALCIUM SERPL-MCNC: 7.6 MG/DL (ref 8.5–10.5)
CHLORIDE BLD-SCNC: 113 MEQ/L (ref 98–111)
CO2: 20 MEQ/L (ref 23–33)
CREAT SERPL-MCNC: 2.7 MG/DL (ref 0.4–1.2)
ELLIPTOCYTES: ABNORMAL
EOSINOPHIL # BLD: 18.4 %
EOSINOPHILS ABSOLUTE: 0.5 THOU/MM3 (ref 0–0.4)
GFR SERPL CREATININE-BSD FRML MDRD: 25 ML/MIN/1.73M2
GLUCOSE BLD-MCNC: 147 MG/DL (ref 70–108)
GLUCOSE BLD-MCNC: 155 MG/DL (ref 70–108)
HCT VFR BLD CALC: 25.3 % (ref 42–52)
HEMOCCULT STL QL: NEGATIVE
HEMOGLOBIN: 8.1 GM/DL (ref 14–18)
LIPASE: 11.4 U/L (ref 5.6–51.3)
LYMPHOCYTES # BLD: 11.4 %
LYMPHOCYTES ABSOLUTE: 0.3 THOU/MM3 (ref 1–4.8)
MAGNESIUM: 1.8 MG/DL (ref 1.6–2.4)
MCH RBC QN AUTO: 27.8 PG (ref 27–31)
MCHC RBC AUTO-ENTMCNC: 32.2 GM/DL (ref 33–37)
MCV RBC AUTO: 86.5 FL (ref 80–94)
MONOCYTES # BLD: 3.7 %
MONOCYTES ABSOLUTE: 0.1 THOU/MM3 (ref 0.4–1.3)
NUCLEATED RED BLOOD CELLS: 0 /100 WBC
PDW BLD-RTO: 18 % (ref 11.5–14.5)
PHOSPHORUS: 5.2 MG/DL (ref 2.4–4.7)
PLATELET # BLD: 66 THOU/MM3 (ref 130–400)
PLATELET ESTIMATE: ABNORMAL
PMV BLD AUTO: 9.7 FL (ref 7.4–10.4)
POIKILOCYTES: ABNORMAL
POTASSIUM SERPL-SCNC: 5.2 MEQ/L (ref 3.5–5.2)
RBC # BLD: 2.93 MILL/MM3 (ref 4.7–6.1)
SCAN OF BLOOD SMEAR: NORMAL
SCHISTOCYTES: ABNORMAL
SEG NEUTROPHILS: 65 %
SEGMENTED NEUTROPHILS ABSOLUTE COUNT: 1.7 THOU/MM3 (ref 1.8–7.7)
SODIUM BLD-SCNC: 144 MEQ/L (ref 135–145)
STOMATOCYTES: ABNORMAL
TROPONIN T: 0.08 NG/ML
TROPONIN T: 0.09 NG/ML
WBC # BLD: 2.6 THOU/MM3 (ref 4.8–10.8)

## 2018-03-09 PROCEDURE — 97110 THERAPEUTIC EXERCISES: CPT

## 2018-03-09 PROCEDURE — 83735 ASSAY OF MAGNESIUM: CPT

## 2018-03-09 PROCEDURE — 6360000002 HC RX W HCPCS: Performed by: SPECIALIST

## 2018-03-09 PROCEDURE — 2700000000 HC OXYGEN THERAPY PER DAY

## 2018-03-09 PROCEDURE — 6370000000 HC RX 637 (ALT 250 FOR IP): Performed by: FAMILY MEDICINE

## 2018-03-09 PROCEDURE — 80069 RENAL FUNCTION PANEL: CPT

## 2018-03-09 PROCEDURE — 97163 PT EVAL HIGH COMPLEX 45 MIN: CPT

## 2018-03-09 PROCEDURE — 82150 ASSAY OF AMYLASE: CPT

## 2018-03-09 PROCEDURE — 6370000000 HC RX 637 (ALT 250 FOR IP): Performed by: HOSPITALIST

## 2018-03-09 PROCEDURE — 6370000000 HC RX 637 (ALT 250 FOR IP): Performed by: NURSE PRACTITIONER

## 2018-03-09 PROCEDURE — 82948 REAGENT STRIP/BLOOD GLUCOSE: CPT

## 2018-03-09 PROCEDURE — 1200000003 HC TELEMETRY R&B

## 2018-03-09 PROCEDURE — 6360000002 HC RX W HCPCS: Performed by: HOSPITALIST

## 2018-03-09 PROCEDURE — G8978 MOBILITY CURRENT STATUS: HCPCS

## 2018-03-09 PROCEDURE — 82272 OCCULT BLD FECES 1-3 TESTS: CPT

## 2018-03-09 PROCEDURE — 84484 ASSAY OF TROPONIN QUANT: CPT

## 2018-03-09 PROCEDURE — 6370000000 HC RX 637 (ALT 250 FOR IP): Performed by: INTERNAL MEDICINE

## 2018-03-09 PROCEDURE — G8979 MOBILITY GOAL STATUS: HCPCS

## 2018-03-09 PROCEDURE — 99233 SBSQ HOSP IP/OBS HIGH 50: CPT | Performed by: HOSPITALIST

## 2018-03-09 PROCEDURE — 97166 OT EVAL MOD COMPLEX 45 MIN: CPT

## 2018-03-09 PROCEDURE — G8987 SELF CARE CURRENT STATUS: HCPCS

## 2018-03-09 PROCEDURE — 36592 COLLECT BLOOD FROM PICC: CPT

## 2018-03-09 PROCEDURE — 85025 COMPLETE CBC W/AUTO DIFF WBC: CPT

## 2018-03-09 PROCEDURE — 99232 SBSQ HOSP IP/OBS MODERATE 35: CPT | Performed by: NURSE PRACTITIONER

## 2018-03-09 PROCEDURE — G8988 SELF CARE GOAL STATUS: HCPCS

## 2018-03-09 PROCEDURE — 2580000003 HC RX 258: Performed by: INTERNAL MEDICINE

## 2018-03-09 PROCEDURE — 83690 ASSAY OF LIPASE: CPT

## 2018-03-09 RX ORDER — FUROSEMIDE 10 MG/ML
60 INJECTION INTRAMUSCULAR; INTRAVENOUS 4 TIMES DAILY
Status: DISCONTINUED | OUTPATIENT
Start: 2018-03-09 | End: 2018-03-12

## 2018-03-09 RX ORDER — LACTULOSE 10 G/15ML
10 SOLUTION ORAL ONCE
Status: COMPLETED | OUTPATIENT
Start: 2018-03-09 | End: 2018-03-09

## 2018-03-09 RX ORDER — METOLAZONE 5 MG/1
5 TABLET ORAL DAILY
Status: DISCONTINUED | OUTPATIENT
Start: 2018-03-09 | End: 2018-03-10

## 2018-03-09 RX ORDER — LABETALOL 200 MG/1
200 TABLET, FILM COATED ORAL EVERY 8 HOURS SCHEDULED
Status: DISCONTINUED | OUTPATIENT
Start: 2018-03-09 | End: 2018-03-12

## 2018-03-09 RX ADMIN — BETAMETHASONE DIPROPIONATE: 0.5 OINTMENT TOPICAL at 08:56

## 2018-03-09 RX ADMIN — Medication 10 ML: at 22:48

## 2018-03-09 RX ADMIN — HYDRALAZINE HYDROCHLORIDE 100 MG: 50 TABLET ORAL at 22:42

## 2018-03-09 RX ADMIN — DILTIAZEM HYDROCHLORIDE 90 MG: 60 TABLET, FILM COATED ORAL at 08:54

## 2018-03-09 RX ADMIN — LABETALOL HCL 200 MG: 200 TABLET, FILM COATED ORAL at 22:42

## 2018-03-09 RX ADMIN — POLYETHYLENE GLYCOL 3350 17 G: 17 POWDER, FOR SOLUTION ORAL at 08:55

## 2018-03-09 RX ADMIN — DILTIAZEM HYDROCHLORIDE 90 MG: 60 TABLET, FILM COATED ORAL at 17:26

## 2018-03-09 RX ADMIN — DILTIAZEM HYDROCHLORIDE 90 MG: 60 TABLET, FILM COATED ORAL at 23:45

## 2018-03-09 RX ADMIN — INSULIN GLARGINE 8 UNITS: 100 INJECTION, SOLUTION SUBCUTANEOUS at 09:18

## 2018-03-09 RX ADMIN — BETAMETHASONE DIPROPIONATE: 0.5 OINTMENT TOPICAL at 22:48

## 2018-03-09 RX ADMIN — PANTOPRAZOLE SODIUM 40 MG: 40 TABLET, DELAYED RELEASE ORAL at 17:26

## 2018-03-09 RX ADMIN — HYDROCORTISONE: 25 CREAM TOPICAL at 09:20

## 2018-03-09 RX ADMIN — CLONIDINE HYDROCHLORIDE 0.1 MG: 0.1 TABLET ORAL at 22:42

## 2018-03-09 RX ADMIN — SIMVASTATIN 20 MG: 20 TABLET, FILM COATED ORAL at 22:42

## 2018-03-09 RX ADMIN — HYDROCORTISONE: 25 CREAM TOPICAL at 22:48

## 2018-03-09 RX ADMIN — DARBEPOETIN ALFA 100 MCG: 100 INJECTION, SOLUTION INTRAVENOUS; SUBCUTANEOUS at 13:47

## 2018-03-09 RX ADMIN — LABETALOL HCL 200 MG: 200 TABLET, FILM COATED ORAL at 13:49

## 2018-03-09 RX ADMIN — Medication 10 ML: at 13:48

## 2018-03-09 RX ADMIN — ISOSORBIDE MONONITRATE 120 MG: 60 TABLET ORAL at 22:42

## 2018-03-09 RX ADMIN — LACTULOSE 10 G: 10 SOLUTION ORAL at 08:55

## 2018-03-09 RX ADMIN — METOPROLOL TARTRATE 50 MG: 50 TABLET, FILM COATED ORAL at 08:55

## 2018-03-09 RX ADMIN — CLONIDINE HYDROCHLORIDE 0.1 MG: 0.1 TABLET ORAL at 08:54

## 2018-03-09 RX ADMIN — ISOSORBIDE MONONITRATE 120 MG: 60 TABLET ORAL at 08:55

## 2018-03-09 RX ADMIN — FUROSEMIDE 60 MG: 10 INJECTION, SOLUTION INTRAMUSCULAR; INTRAVENOUS at 22:41

## 2018-03-09 RX ADMIN — HYDRALAZINE HYDROCHLORIDE 100 MG: 50 TABLET ORAL at 13:47

## 2018-03-09 RX ADMIN — FUROSEMIDE 60 MG: 10 INJECTION, SOLUTION INTRAMUSCULAR; INTRAVENOUS at 13:48

## 2018-03-09 RX ADMIN — DILTIAZEM HYDROCHLORIDE 90 MG: 60 TABLET, FILM COATED ORAL at 13:47

## 2018-03-09 RX ADMIN — Medication 10 ML: at 09:02

## 2018-03-09 RX ADMIN — FUROSEMIDE 40 MG: 10 INJECTION, SOLUTION INTRAMUSCULAR; INTRAVENOUS at 08:55

## 2018-03-09 RX ADMIN — HYDRALAZINE HYDROCHLORIDE 100 MG: 50 TABLET ORAL at 08:55

## 2018-03-09 RX ADMIN — DOXAZOSIN 8 MG: 4 TABLET ORAL at 08:55

## 2018-03-09 RX ADMIN — METOLAZONE 5 MG: 5 TABLET ORAL at 13:47

## 2018-03-09 RX ADMIN — PANTOPRAZOLE SODIUM 40 MG: 40 TABLET, DELAYED RELEASE ORAL at 08:55

## 2018-03-09 RX ADMIN — DOXAZOSIN 8 MG: 4 TABLET ORAL at 22:42

## 2018-03-09 ASSESSMENT — PAIN SCALES - GENERAL
PAINLEVEL_OUTOF10: 0
PAINLEVEL_OUTOF10: 7
PAINLEVEL_OUTOF10: 0

## 2018-03-09 ASSESSMENT — PAIN DESCRIPTION - LOCATION
LOCATION: ABDOMEN;KNEE
LOCATION: ABDOMEN;KNEE;LEG

## 2018-03-09 ASSESSMENT — PAIN DESCRIPTION - DESCRIPTORS: DESCRIPTORS: DISCOMFORT

## 2018-03-09 ASSESSMENT — PAIN DESCRIPTION - PROGRESSION: CLINICAL_PROGRESSION: NOT CHANGED

## 2018-03-09 ASSESSMENT — PAIN DESCRIPTION - ORIENTATION
ORIENTATION: RIGHT
ORIENTATION: RIGHT;LOWER

## 2018-03-09 ASSESSMENT — PAIN SCALES - WONG BAKER: WONGBAKER_NUMERICALRESPONSE: 6

## 2018-03-09 ASSESSMENT — PAIN DESCRIPTION - PAIN TYPE
TYPE: ACUTE PAIN
TYPE: ACUTE PAIN

## 2018-03-09 ASSESSMENT — PAIN DESCRIPTION - FREQUENCY: FREQUENCY: INTERMITTENT

## 2018-03-09 NOTE — PROGRESS NOTES
Patient complains of pain in abdomen rates it a 8 out of 10. Describes it as a aching. Hospitalist called and informed of need of pain medication.

## 2018-03-09 NOTE — PROGRESS NOTES
Echo   Left ventricle size is normal.   Mild concentric left ventricular hypertrophy.   Ejection fraction is visually estimated at 50-55%.      Right Atrium   The right atrium is of normal size.      Right Ventricle   Normal right ventricular size and function.      Pericardial Effusion   Small circumferential pericardial effusion without tamponade physiology. EF: 50-55%    Stress: Summary   Lexiscan EKG stress test is not suggestive for ischemia.   The LVEF is calculated to be with moderate inferior apical hypokinesis .   There was a moderate infarct with NO AND OR VERY MINUTE viability in the   distribution of the right coronary and left circumflex arteries in the   inferior region and inferior apical wall . clinical correlation is   suggested .      Signatures      ----------------------------------------------------------------   Electronically signed by Janay Robertson DO (Interpreting   Cardiologist) on 11/24/2017 at 16:39    Left Heart Cath:Multi-vessel Coronary Artery Disease. LAD - 80%  RCA - 70%  Lcx - 80%  LM - Mild ds  EF - 50%  Recommendations  Complex case  Active GI bleed due to sigmoid mass  Hb was 7 yesterday. He is getting blood transfusion. Hb is 9.8 this morning. I can't insert stents due to active GI bleed. D/w Surgical team.  High risk cardiac patient. No other option. Lab Data:    Cardiac Enzymes:  No results for input(s): CKTOTAL, CKMB, CKMBINDEX, TROPONINI in the last 72 hours.     CBC:   Lab Results   Component Value Date    WBC 2.6 03/09/2018    RBC 2.93 03/09/2018    HGB 8.1 03/09/2018    HCT 25.3 03/09/2018    PLT 66 03/09/2018       CMP:  Lab Results   Component Value Date     03/09/2018    K 5.2 03/09/2018    K 5.0 02/02/2018     03/09/2018    CO2 20 03/09/2018    BUN 64 03/09/2018    CREATININE 2.7 03/09/2018    LABGLOM 25 03/09/2018    GLUCOSE 155 03/09/2018    CALCIUM 7.6 03/09/2018       Hepatic Function Panel:  Lab Results   Component Value Date    ALKPHOS 106

## 2018-03-09 NOTE — PROGRESS NOTES
Hospitalist Progress Note    Patient:  David Guo      Unit/Bed:5K-28/028-A    YOB: 1965    MRN: [id number]       Acct: [de-identified]     PCP: Salima Gonsales    Date of Admission: 3/7/2018  --------------------------    Chief Complaint:       Generalized weakness, fall, leg pain    Hospital Course:     David Guo is a 48 y.o. male admitted to [facility] on 3/7/2018    With generalized weakness, fall, shortness of breath. He has known history of metastatic colon cancer status post surgery and now undergoing chemotherapy. .        Assessment:       1. Acute hypoxic respiratory failure, likely secondary to #2 and 3    2. Acute diastolic CHF  3.  bilateral pleural effusion  4. Small to moderate pericardial effusion per CT scan. 5. Nonspecific troponin elevation likely demand ischemia  6. Acute nonoliguric on chronic kidney disease. No obstruction on renal ultrasound. 7. Mild hyperkalemia, resolved   8. Acute anemia, undetermined etiology. No overt bleeding. Suspected secondary to chemotherapy-induced  9.  hypertensive urgency  10. Abdominal discomfort, CT scan of the abdomen revealed mesenteric edema, moderate soft tissue anasarca likely secondary to CHF. comorbidities:    · Metastatic colon cancer to the liver Status post colectomy,  Chemotherapy  ·  dyslipidemia  · Essential hypertension  · Coronary artery disease  · Obesity Body mass index is 38.54 kg/m². Plan:  1. Increase IV lasix, add metolazone, monitor bodyweight and urine output. . Echo noted, monitor renal pane, and potassium   2. Permissive hypertension to allow aggressive diuresis   3. Echocardiogram ordered, cardiology service consulted  4. Continue insulin, monitor blood sugar. 5. Continue ASA, stain  6.      Monitor hemoglobin  , transfusion if Hb less than 7              Code Status: Full Code         DVT prophylaxis: SCD for now sec to anemia    Disposition:  home in 2-3 days    I kg/m²      Gen: Not in distress. Alert. Obese  Head: Normocephalic. Atraumatic. Eyes: Conjunctivae/corneas clear. ENT: Oral mucosa moist  Neck: No JVD. No obvious thyromegaly. CVS: Nml S1S2, no MRG, RRR  Pulmomary: unchanged reduced air entry b/l   Gastrointestinal: Soft, no tenderness no rebound tenderness , non distend,  Positive bowel sounds. Musculoskeletal: 3+ edema b/l   Neuro: No focal deficit. Moves extremity spontaneously. Psychiatry: Appropriate affect. Not agitated. Labs:   Recent Labs      03/07/18   1159  03/08/18   0145  03/08/18   1140  03/09/18   0430   WBC  4.0*   --    --   2.6*   HGB  6.4*  7.8*  8.9*  8.1*   HCT  20.0*   --    --   25.3*   PLT  114*   --    --   66*     Recent Labs      03/08/18   0625  03/08/18   1640  03/09/18   0430   NA  144  142  144   K  5.6*  5.6*  5.6*  5.2   CL  112*  110  113*   CO2  20*  20*  20*   BUN  59*  63*  64*   CREATININE  2.7*  2.7*  2.7*   CALCIUM  7.7*  7.7*  7.6*   PHOS   --   5.2*  5.2*     Recent Labs      03/07/18   1159   AST  6   ALT  7*   BILIDIR  <0.2   BILITOT  0.2*   ALKPHOS  106     Recent Labs      03/07/18   1437   INR  1.12     No results for input(s): CKTOTAL, TROPONINI in the last 72 hours. Urinalysis:      Lab Results   Component Value Date    NITRU NEGATIVE 02/01/2018    WBCUA 2-4 02/01/2018    BACTERIA NONE 02/01/2018    RBCUA 0-2 02/01/2018    BLOODU NEGATIVE 02/01/2018    SPECGRAV 1.018 02/01/2018    GLUCOSEU 250 11/30/2017       Radiology:  US RENAL COMPLETE   Final Result   1. Normal sonographic appearance of the kidneys. 2. Mild elevation of the left resistive index. This is nonspecific but may be related to underlying medical renal disease. **This report has been created using voice recognition software. It may contain minor errors which are inherent in voice recognition technology. **      Final report electronically signed by Dr. Tootie Dillon on 3/7/2018 4:05 PM      CT ABDOMEN PELVIS WO CONTRAST Additional Contrast? None   Final Result      CT CHEST WO CONTRAST   Final Result                  Electronically signed by Shaaron Leyden, MD on 3/9/2018 at 2:30 PM

## 2018-03-09 NOTE — PROGRESS NOTES
right and CGA on left, long arc quads with cues and encouragemnt  for full ROM on right , ankle pumps to imporve strength for function     Activity Tolerance:  Activity Tolerance: Patient limited by pain; Patient limited by fatigue;Patient limited by endurance    Treatment Initiated: see there ex   Assessment: Body structures, Functions, Activity limitations: Decreased functional mobility , Decreased endurance, Decreased strength, Decreased balance  Assessment: Pt with pain in abd and right knee , recent fall 1 week ago, unsteady, decrease endurance, fatigues easily. Pt requires skilled Pt to return to functional ambualtion. Prognosis: Good    Clinical Presentation: High - Unstable with Unpredictable Characteristics: Pt with pain in abd and right knee , recent fall 1 week ago, unsteady, decrease endurance, fatigues easily. Pt requires skilled Pt to return to functional ambualtion.:    Decision Making: High Complexitybased on patient assessment and decision making process of determining plan of care and establishing reasonable expectations for measurable functional outcomes    REQUIRES PT FOLLOW UP: Yes  Discharge Recommendations: Continue to assess pending progress, Patient would benefit from continued therapy after discharge, Subacute/Skilled Nursing Facility, ECF with PT, 24 hour supervision or assist, Home with Home health PT    Patient Education:  Patient Education: POC, importance of ROM and ex    Equipment Recommendations:   Other: may need RW    Safety:  Type of devices: Left in chair, Call light within reach, Chair alarm in place, Gait belt, Nurse notified, All fall risk precautions in place  Restraints  Initially in place: No    Plan:  Times per week: 3-5 X GM  Times per day: Daily  Specific instructions for Next Treatment: ther ex, mobility, gait, balance, endurance   Current Treatment Recommendations: Strengthening, Gait Training, Balance Training, Functional Mobility Training, Transfer Training,

## 2018-03-09 NOTE — CONSULTS
[address] [place] [address]                                   CONSULTATION    PATIENT NAME: Johan Dubois                  :        1965  MED REC NO:   [id number]                           ROOM:       0028  ACCOUNT NO:   [de-identified]                           ADMIT DATE: 2018  PROVIDER:     Jeremy Trevizo MD    CONSULT DATE:  2018    REASON FOR CONSULTATION:  Acute kidney injury and hyperkalemia. HISTORY OF PRESENTING ILLNESS:  The patient is a 51-year-old male who  presented to [address] [place] [address] for evaluation of abdominal pain. The patient reportedly had a fall, approximately six days ago prior to  presentation and was seen by his PCP five days ago. He started developing  sudden onset of severe abdominal pain, 8/10 in severity, sore in character  and so presented to the hospital for further evaluation and management. The patient denies any headache, nausea, vomiting, diarrhea, bloody stool,  or dysuria. He does have a history of colon cancer for which he has had  adjuvant chemotherapy. The patient reportedly gets a chemo injection that  is mixed with a blood thinner. Evaluation done in the hospital indicated  that he has developed acute kidney injury with hyperkalemia and Nephrology  consultation has been obtained. PAST MEDICAL HISTORY:  1. Coronary artery disease. 2.  Chronic kidney disease, stage III. 3.  Type 2 diabetes. 4.  Dyslipidemia. 5.  Hypertension. 6.  Thyroid disorder. 7.  Colon cancer status post resection and chemotherapy. PAST SURGICAL HISTORY:  1. Endoscopy. 2.  Colonoscopy. 3.  Eye surgery. 4.  Incision, removal, replacement venous access catheter port. 5.  Multiple colonoscopy with biopsy, transoral biopsy, single multiple EGD biopsy, skin biopsy. MEDICATIONS:  Both home and inpatient medications have been reviewed.     ALLERGIES:  No known drug the opportunity to participate in the care of the  patient. We will continue to follow the patient while in-house.         Hosea Street MD    D: 03/08/2018 21:31:46       T: 03/08/2018 22:42:21     [email or web address]  Job#: [id number]     Doc#: [id number]    CC:

## 2018-03-10 LAB
ALBUMIN SERPL-MCNC: 2.2 G/DL (ref 3.5–5.1)
ANION GAP SERPL CALCULATED.3IONS-SCNC: 12 MEQ/L (ref 8–16)
ANISOCYTOSIS: ABNORMAL
BASOPHILIA: SLIGHT
BASOPHILS # BLD: 1 %
BASOPHILS ABSOLUTE: 0 THOU/MM3 (ref 0–0.1)
BUN BLDV-MCNC: 69 MG/DL (ref 7–22)
CALCIUM SERPL-MCNC: 7.7 MG/DL (ref 8.5–10.5)
CHLORIDE BLD-SCNC: 111 MEQ/L (ref 98–111)
CO2: 20 MEQ/L (ref 23–33)
CREAT SERPL-MCNC: 3.3 MG/DL (ref 0.4–1.2)
ELLIPTOCYTES: ABNORMAL
EOSINOPHIL # BLD: 11.4 %
EOSINOPHILS ABSOLUTE: 0.2 THOU/MM3 (ref 0–0.4)
GFR SERPL CREATININE-BSD FRML MDRD: 20 ML/MIN/1.73M2
GLUCOSE BLD-MCNC: 224 MG/DL (ref 70–108)
GLUCOSE BLD-MCNC: 229 MG/DL (ref 70–108)
GLUCOSE BLD-MCNC: 232 MG/DL (ref 70–108)
GLUCOSE BLD-MCNC: 238 MG/DL (ref 70–108)
HCT VFR BLD CALC: 26.9 % (ref 42–52)
HEMOGLOBIN: 8.7 GM/DL (ref 14–18)
LYMPHOCYTES # BLD: 13.9 %
LYMPHOCYTES ABSOLUTE: 0.2 THOU/MM3 (ref 1–4.8)
MCH RBC QN AUTO: 28 PG (ref 27–31)
MCHC RBC AUTO-ENTMCNC: 32.5 GM/DL (ref 33–37)
MCV RBC AUTO: 86.3 FL (ref 80–94)
MONOCYTES # BLD: 6.9 %
MONOCYTES ABSOLUTE: 0.1 THOU/MM3 (ref 0.4–1.3)
NUCLEATED RED BLOOD CELLS: 1 /100 WBC
OVALOCYTES: ABNORMAL
PDW BLD-RTO: 17.9 % (ref 11.5–14.5)
PHOSPHORUS: 5.1 MG/DL (ref 2.4–4.7)
PLATELET # BLD: 80 THOU/MM3 (ref 130–400)
PLATELET ESTIMATE: ABNORMAL
PMV BLD AUTO: 9.4 FL (ref 7.4–10.4)
POIKILOCYTES: ABNORMAL
POTASSIUM SERPL-SCNC: 4.5 MEQ/L (ref 3.5–5.2)
RBC # BLD: 3.12 MILL/MM3 (ref 4.7–6.1)
SCAN OF BLOOD SMEAR: NORMAL
SCHISTOCYTES: ABNORMAL
SEG NEUTROPHILS: 66.8 %
SEGMENTED NEUTROPHILS ABSOLUTE COUNT: 1.1 THOU/MM3 (ref 1.8–7.7)
SODIUM BLD-SCNC: 143 MEQ/L (ref 135–145)
TARGET CELLS: ABNORMAL
WBC # BLD: 1.6 THOU/MM3 (ref 4.8–10.8)

## 2018-03-10 PROCEDURE — 82948 REAGENT STRIP/BLOOD GLUCOSE: CPT

## 2018-03-10 PROCEDURE — 6370000000 HC RX 637 (ALT 250 FOR IP): Performed by: INTERNAL MEDICINE

## 2018-03-10 PROCEDURE — 99253 IP/OBS CNSLTJ NEW/EST LOW 45: CPT | Performed by: SURGERY

## 2018-03-10 PROCEDURE — 80069 RENAL FUNCTION PANEL: CPT

## 2018-03-10 PROCEDURE — 36591 DRAW BLOOD OFF VENOUS DEVICE: CPT

## 2018-03-10 PROCEDURE — 2580000003 HC RX 258: Performed by: INTERNAL MEDICINE

## 2018-03-10 PROCEDURE — 99233 SBSQ HOSP IP/OBS HIGH 50: CPT | Performed by: HOSPITALIST

## 2018-03-10 PROCEDURE — 6370000000 HC RX 637 (ALT 250 FOR IP): Performed by: FAMILY MEDICINE

## 2018-03-10 PROCEDURE — 6360000002 HC RX W HCPCS: Performed by: HOSPITALIST

## 2018-03-10 PROCEDURE — 2700000000 HC OXYGEN THERAPY PER DAY

## 2018-03-10 PROCEDURE — 51798 US URINE CAPACITY MEASURE: CPT

## 2018-03-10 PROCEDURE — 99231 SBSQ HOSP IP/OBS SF/LOW 25: CPT | Performed by: PHYSICIAN ASSISTANT

## 2018-03-10 PROCEDURE — 6370000000 HC RX 637 (ALT 250 FOR IP): Performed by: HOSPITALIST

## 2018-03-10 PROCEDURE — 1200000003 HC TELEMETRY R&B

## 2018-03-10 PROCEDURE — 85025 COMPLETE CBC W/AUTO DIFF WBC: CPT

## 2018-03-10 PROCEDURE — 6370000000 HC RX 637 (ALT 250 FOR IP): Performed by: NURSE PRACTITIONER

## 2018-03-10 RX ORDER — CLOPIDOGREL BISULFATE 75 MG/1
75 TABLET ORAL DAILY
Status: DISCONTINUED | OUTPATIENT
Start: 2018-03-10 | End: 2018-03-20

## 2018-03-10 RX ORDER — ASPIRIN 81 MG/1
81 TABLET, CHEWABLE ORAL DAILY
Status: DISCONTINUED | OUTPATIENT
Start: 2018-03-10 | End: 2018-03-20

## 2018-03-10 RX ADMIN — BETAMETHASONE DIPROPIONATE: 0.5 OINTMENT TOPICAL at 09:09

## 2018-03-10 RX ADMIN — DOXAZOSIN 8 MG: 4 TABLET ORAL at 21:42

## 2018-03-10 RX ADMIN — Medication 10 ML: at 09:10

## 2018-03-10 RX ADMIN — DILTIAZEM HYDROCHLORIDE 90 MG: 60 TABLET, FILM COATED ORAL at 12:49

## 2018-03-10 RX ADMIN — LABETALOL HCL 200 MG: 200 TABLET, FILM COATED ORAL at 13:48

## 2018-03-10 RX ADMIN — FUROSEMIDE 60 MG: 10 INJECTION, SOLUTION INTRAMUSCULAR; INTRAVENOUS at 01:59

## 2018-03-10 RX ADMIN — DOXAZOSIN 8 MG: 4 TABLET ORAL at 09:09

## 2018-03-10 RX ADMIN — DILTIAZEM HYDROCHLORIDE 90 MG: 60 TABLET, FILM COATED ORAL at 21:42

## 2018-03-10 RX ADMIN — ISOSORBIDE MONONITRATE 120 MG: 60 TABLET ORAL at 09:10

## 2018-03-10 RX ADMIN — METOLAZONE 5 MG: 5 TABLET ORAL at 09:10

## 2018-03-10 RX ADMIN — LABETALOL HCL 200 MG: 200 TABLET, FILM COATED ORAL at 23:45

## 2018-03-10 RX ADMIN — SIMVASTATIN 20 MG: 20 TABLET, FILM COATED ORAL at 21:42

## 2018-03-10 RX ADMIN — HYDRALAZINE HYDROCHLORIDE 100 MG: 50 TABLET ORAL at 09:10

## 2018-03-10 RX ADMIN — ISOSORBIDE MONONITRATE 120 MG: 60 TABLET ORAL at 21:42

## 2018-03-10 RX ADMIN — NITROGLYCERIN 0.5 INCH: 20 OINTMENT TOPICAL at 21:57

## 2018-03-10 RX ADMIN — NITROGLYCERIN 0.5 INCH: 20 OINTMENT TOPICAL at 01:59

## 2018-03-10 RX ADMIN — LABETALOL HCL 200 MG: 200 TABLET, FILM COATED ORAL at 06:10

## 2018-03-10 RX ADMIN — HYDROCORTISONE: 25 CREAM TOPICAL at 09:09

## 2018-03-10 RX ADMIN — CLONIDINE HYDROCHLORIDE 0.1 MG: 0.1 TABLET ORAL at 09:09

## 2018-03-10 RX ADMIN — CLONIDINE HYDROCHLORIDE 0.1 MG: 0.1 TABLET ORAL at 21:42

## 2018-03-10 RX ADMIN — HYDRALAZINE HYDROCHLORIDE 100 MG: 50 TABLET ORAL at 21:42

## 2018-03-10 RX ADMIN — NITROGLYCERIN 0.5 INCH: 20 OINTMENT TOPICAL at 14:13

## 2018-03-10 RX ADMIN — DILTIAZEM HYDROCHLORIDE 90 MG: 60 TABLET, FILM COATED ORAL at 09:09

## 2018-03-10 RX ADMIN — DILTIAZEM HYDROCHLORIDE 90 MG: 60 TABLET, FILM COATED ORAL at 16:39

## 2018-03-10 RX ADMIN — POLYETHYLENE GLYCOL 3350 17 G: 17 POWDER, FOR SOLUTION ORAL at 09:10

## 2018-03-10 RX ADMIN — PANTOPRAZOLE SODIUM 40 MG: 40 TABLET, DELAYED RELEASE ORAL at 06:10

## 2018-03-10 RX ADMIN — INSULIN GLARGINE 8 UNITS: 100 INJECTION, SOLUTION SUBCUTANEOUS at 09:10

## 2018-03-10 RX ADMIN — Medication 10 ML: at 21:48

## 2018-03-10 RX ADMIN — HYDROCORTISONE: 25 CREAM TOPICAL at 21:48

## 2018-03-10 RX ADMIN — HYDRALAZINE HYDROCHLORIDE 100 MG: 50 TABLET ORAL at 13:48

## 2018-03-10 RX ADMIN — ASPIRIN 81 MG: 81 TABLET, CHEWABLE ORAL at 13:48

## 2018-03-10 RX ADMIN — PANTOPRAZOLE SODIUM 40 MG: 40 TABLET, DELAYED RELEASE ORAL at 16:39

## 2018-03-10 RX ADMIN — CLOPIDOGREL 75 MG: 75 TABLET, FILM COATED ORAL at 13:48

## 2018-03-10 RX ADMIN — BETAMETHASONE DIPROPIONATE: 0.5 OINTMENT TOPICAL at 21:45

## 2018-03-10 ASSESSMENT — PAIN SCALES - GENERAL
PAINLEVEL_OUTOF10: 0

## 2018-03-10 NOTE — PROCEDURES
A Bladder scan was performed at 1820 . The patient's strong removed with little to no voiding . The residual amount was measured to be 285 ML. Report of results was given to [facility].

## 2018-03-10 NOTE — PROGRESS NOTES
Hospitalist Progress Note    Patient:  Rogelio Mina      Unit/Bed:5K-28/028-A    YOB: 1965    MRN: [id number]       Acct: [de-identified]     PCP: Berenice Bruce    Date of Admission: 3/7/2018  --------------------------    Chief Complaint:       Generalized weakness, fall, leg pain    Hospital Course:     Rogelio Mina is a 48 y.o. male admitted to [facility] on 3/7/2018    With generalized weakness, fall, shortness of breath. He has known history of metastatic colon cancer status post surgery and now undergoing chemotherapy. .        Assessment:       1. Acute hypoxic respiratory failure, likely secondary to #2 and 3    2. Acute diastolic CHF  3.  bilateral pleural effusion  4. Small to moderate pericardial effusion per CT scan. 5. Nonspecific troponin elevation likely demand ischemia  6. Acute nonoliguric on chronic kidney disease. No obstruction on renal ultrasound. 7. Mild hyperkalemia, resolved   8. Acute anemia, undetermined etiology. No overt bleeding. Suspected secondary to chemotherapy-induced  9. Leukopenia,  10.  hypertensive urgency  11. Abdominal discomfort, CT scan of the abdomen revealed mesenteric edema, moderate soft tissue anasarca likely secondary to CHF. comorbidities:    · Metastatic colon cancer to the liver Status post colectomy,  Chemotherapy  ·  dyslipidemia  · Essential hypertension  · Coronary artery disease  · Obesity Body mass index is 31.16 kg/m². Plan:  1. Holding diuresis in the light of the increase of creatinine. Monitor urine output. Nephrology following. 2.   Compression stocking. 3.   Wean off oxygen as tolerated. 4. Echocardiogram ordered, cardiology service consulted  5. Continue insulin, monitor blood sugar. 6. Continue ASA, stain  7. Continue to monitor hemoglobin, WBC.   ANC 1100             Code Status: Full Code         DVT prophylaxis: SCD for now sec to anemia    Disposition:  home in 1-2 m)   Wt 211 lb (95.7 kg)   SpO2 95%   BMI 31.16 kg/m²      Gen: Not in distress. Alert. Obese  Head: Normocephalic. Atraumatic. Eyes: Conjunctivae/corneas clear. ENT: Oral mucosa moist  Neck: No JVD. No obvious thyromegaly. CVS: Nml S1S2, no MRG, RRR  Pulmomary: improved air entry   Gastrointestinal: Soft, no tenderness no rebound tenderness , non distend,  Positive bowel sounds. Musculoskeletal: 3+ edema b/l   Neuro: No focal deficit. Moves extremity spontaneously. Psychiatry: Appropriate affect. Not agitated. Labs:   Recent Labs      03/08/18   1140  03/09/18   0430  03/10/18   0600   WBC   --   2.6*  1.6*   HGB  8.9*  8.1*  8.7*   HCT   --   25.3*  26.9*   PLT   --   66*  80*     Recent Labs      03/08/18   1640  03/09/18   0430  03/10/18   0600   NA  142  144  143   K  5.6*  5.6*  5.2  4.5   CL  110  113*  111   CO2  20*  20*  20*   BUN  63*  64*  69*   CREATININE  2.7*  2.7*  3.3*   CALCIUM  7.7*  7.6*  7.7*   PHOS  5.2*  5.2*  5.1*     No results for input(s): AST, ALT, BILIDIR, BILITOT, ALKPHOS in the last 72 hours. Recent Labs      03/07/18   1437   INR  1.12     No results for input(s): Jan Kelli in the last 72 hours. Urinalysis:      Lab Results   Component Value Date    NITRU NEGATIVE 02/01/2018    WBCUA 2-4 02/01/2018    BACTERIA NONE 02/01/2018    RBCUA 0-2 02/01/2018    BLOODU NEGATIVE 02/01/2018    SPECGRAV 1.018 02/01/2018    GLUCOSEU 250 11/30/2017       Radiology:  US RENAL COMPLETE   Final Result   1. Normal sonographic appearance of the kidneys. 2. Mild elevation of the left resistive index. This is nonspecific but may be related to underlying medical renal disease. **This report has been created using voice recognition software. It may contain minor errors which are inherent in voice recognition technology. **      Final report electronically signed by Dr. Rodrigue Gaffney on 3/7/2018 4:05 PM      CT ABDOMEN PELVIS WO CONTRAST Additional Contrast? None Final Result      CT CHEST WO CONTRAST   Final Result                  Electronically signed by Leonides Ewing MD on 3/10/2018 at 12:12 PM

## 2018-03-10 NOTE — PROGRESS NOTES
to short estimated length of stay. Evaluation Complexity: Based on the findings of patient history, examination, clinical presentation, and decision making during this evaluation, this patient is of medium complexity.     AM-PAC Inpatient Daily Activity Raw Score: 21  AM-PAC Inpatient ADL T-Scale Score : 44.27  ADL Inpatient CMS 0-100% Score: 32.79  ADL Inpatient CMS G-Code Modifier : CJ

## 2018-03-10 NOTE — PROGRESS NOTES
HCT 26.9 03/10/2018    PLT 80 03/10/2018       CMP:  Lab Results   Component Value Date     03/10/2018    K 4.5 03/10/2018    K 5.0 02/02/2018     03/10/2018    CO2 20 03/10/2018    BUN 69 03/10/2018    CREATININE 3.3 03/10/2018    LABGLOM 20 03/10/2018    GLUCOSE 229 03/10/2018    CALCIUM 7.7 03/10/2018       Hepatic Function Panel:  Lab Results   Component Value Date    ALKPHOS 106 03/07/2018    ALT 7 03/07/2018    AST 6 03/07/2018    PROT 4.2 03/07/2018    BILITOT 0.2 03/07/2018    BILIDIR <0.2 03/07/2018    LABALBU 2.2 03/10/2018       Magnesium:    Lab Results   Component Value Date    MG 1.8 03/09/2018       PT/INR:    Lab Results   Component Value Date    INR 1.12 03/07/2018       HgBA1c:    Lab Results   Component Value Date    LABA1C 5.8 11/22/2017       FLP:  Lab Results   Component Value Date    TRIG 71 11/22/2017    HDL 42 11/22/2017    LDLCALC 99 11/22/2017       TSH:    Lab Results   Component Value Date    TSH 1.510 11/21/2017         Assessment:    Admission with abd pain - s/p colon cancer, s/p colectomy  Chronically elevated troponins - no chest pain or sob  CKD stage IV  Small pericardial effusion  Acute diastolic CHF/cardiorenal  Uncontrolled hynt  s/p cath with multivessel CAD  Ef 50-55 per echo 3/8/18    Plan:  · PCI as outpt once cleared by renal and oncology  · Diuresis per renal  · Cont statin/bb/imdur  · Restart home asa/plavix if ok wth attending         Electronically signed by Maureen Toney PA-C on 3/10/2018 at 12:11 PM

## 2018-03-10 NOTE — CONSULTS
[address] [place] [address]                                   CONSULTATION    PATIENT NAME: Dali Gilbert                  :        1965  MED REC NO:   [id number]                           ROOM:       0028  ACCOUNT NO:   [de-identified]                           ADMIT DATE: 2018  PROVIDER:     Jennyfer Ordonez. Claudetta Miu, MD    CONSULT DATE:  2018    CHIEF COMPLAINT:  Abdominal pain. HISTORY OF PRESENT ILLNESS:  The patient is a 61-year-old white male who in  2017 presented with anemia with hemoglobin of 7, blood in his stool and  basically a workup revealed a nearly obstructing sigmoid colon mass. The  patient was taken to surgery on 2017, undergoing a sigmoid colon  resection. At that time liver nodules were palpated and biopsies were  performed with the diagnosis of metastatic colon carcinoma to the liver. The patient has subsequently undergone chemotherapy under the direction of  Dr. Yessica Wheatley, but then apparently fell last week and has become weak. Also  was complaining of some abdominal pain and was seen in our emergency room  here on 2018. He denies any change in bowel habits. He denies any  vomiting. He has been able to eat. His mother does report that he is able  to eat, but basically was admitted because of this weakness and this  abdominal pain and surgical consultation has been requested. Again, he  denies any nausea or vomiting. No change in bowel habits. PAST MEDICAL HISTORY:  Positive for coronary artery disease, chronic renal  disease, diabetes, hyperlipidemia, hypertension, hypothyroidism. PAST SURGICAL HISTORY:  Laser eye surgery in the past.  He has had  colonoscopies and EGD. He has had the colon resection as mentioned above  along with liver biopsy. He has also had a MediPort placement for  chemotherapy.     MEDICATIONS AT HOME:  Includes Imdur, Catapres, Cardura, no surgical issues  at this time. NONI CRAVEN [facility], MD    D: 03/10/2018 11:26:35       T: 03/10/2018 11:28:06     [email or web address]  Job#: [id number]     Doc#: [id number]    CC:

## 2018-03-11 ENCOUNTER — APPOINTMENT (OUTPATIENT)
Dept: GENERAL RADIOLOGY | Age: 53
DRG: 194 | End: 2018-03-11
Payer: MEDICAID

## 2018-03-11 LAB
ALBUMIN SERPL-MCNC: 2.1 G/DL (ref 3.5–5.1)
ANION GAP SERPL CALCULATED.3IONS-SCNC: 13 MEQ/L (ref 8–16)
ANISOCYTOSIS: ABNORMAL
BASOPHILIC STIPPLING: SLIGHT
BASOPHILS # BLD: 2.1 %
BASOPHILS ABSOLUTE: 0 THOU/MM3 (ref 0–0.1)
BUN BLDV-MCNC: 70 MG/DL (ref 7–22)
CALCIUM SERPL-MCNC: 7.6 MG/DL (ref 8.5–10.5)
CHLORIDE BLD-SCNC: 109 MEQ/L (ref 98–111)
CO2: 20 MEQ/L (ref 23–33)
CREAT SERPL-MCNC: 3.3 MG/DL (ref 0.4–1.2)
CRENATED RBC'S: ABNORMAL
ELLIPTOCYTES: ABNORMAL
EOSINOPHIL # BLD: 10.3 %
EOSINOPHILS ABSOLUTE: 0.1 THOU/MM3 (ref 0–0.4)
GFR SERPL CREATININE-BSD FRML MDRD: 20 ML/MIN/1.73M2
GLUCOSE BLD-MCNC: 216 MG/DL (ref 70–108)
GLUCOSE BLD-MCNC: 222 MG/DL (ref 70–108)
GLUCOSE BLD-MCNC: 226 MG/DL (ref 70–108)
GLUCOSE BLD-MCNC: 249 MG/DL (ref 70–108)
GLUCOSE BLD-MCNC: 249 MG/DL (ref 70–108)
HCT VFR BLD CALC: 24.5 % (ref 42–52)
HEMOGLOBIN: 8 GM/DL (ref 14–18)
LYMPHOCYTES # BLD: 20.8 %
LYMPHOCYTES ABSOLUTE: 0.3 THOU/MM3 (ref 1–4.8)
MCH RBC QN AUTO: 27.8 PG (ref 27–31)
MCHC RBC AUTO-ENTMCNC: 32.7 GM/DL (ref 33–37)
MCV RBC AUTO: 85.1 FL (ref 80–94)
MONOCYTES # BLD: 12.7 %
MONOCYTES ABSOLUTE: 0.2 THOU/MM3 (ref 0.4–1.3)
NUCLEATED RED BLOOD CELLS: 0 /100 WBC
OVALOCYTES: ABNORMAL
PATHOLOGIST REVIEW: ABNORMAL
PDW BLD-RTO: 18.2 % (ref 11.5–14.5)
PHOSPHORUS: 4.7 MG/DL (ref 2.4–4.7)
PLATELET # BLD: 116 THOU/MM3 (ref 130–400)
PLATELET ESTIMATE: ADEQUATE
PMV BLD AUTO: 9.9 FL (ref 7.4–10.4)
POIKILOCYTES: ABNORMAL
POTASSIUM SERPL-SCNC: 4.1 MEQ/L (ref 3.5–5.2)
RBC # BLD: 2.88 MILL/MM3 (ref 4.7–6.1)
SCAN OF BLOOD SMEAR: NORMAL
SCHISTOCYTES: ABNORMAL
SEG NEUTROPHILS: 54.1 %
SEGMENTED NEUTROPHILS ABSOLUTE COUNT: 0.8 THOU/MM3 (ref 1.8–7.7)
SODIUM BLD-SCNC: 142 MEQ/L (ref 135–145)
TARGET CELLS: ABNORMAL
WBC # BLD: 1.4 THOU/MM3 (ref 4.8–10.8)

## 2018-03-11 PROCEDURE — 6370000000 HC RX 637 (ALT 250 FOR IP): Performed by: HOSPITALIST

## 2018-03-11 PROCEDURE — 85025 COMPLETE CBC W/AUTO DIFF WBC: CPT

## 2018-03-11 PROCEDURE — 2700000000 HC OXYGEN THERAPY PER DAY

## 2018-03-11 PROCEDURE — 71046 X-RAY EXAM CHEST 2 VIEWS: CPT

## 2018-03-11 PROCEDURE — 80069 RENAL FUNCTION PANEL: CPT

## 2018-03-11 PROCEDURE — 99232 SBSQ HOSP IP/OBS MODERATE 35: CPT | Performed by: SURGERY

## 2018-03-11 PROCEDURE — P9046 ALBUMIN (HUMAN), 25%, 20 ML: HCPCS | Performed by: INTERNAL MEDICINE

## 2018-03-11 PROCEDURE — 6370000000 HC RX 637 (ALT 250 FOR IP): Performed by: NURSE PRACTITIONER

## 2018-03-11 PROCEDURE — 6360000002 HC RX W HCPCS: Performed by: INTERNAL MEDICINE

## 2018-03-11 PROCEDURE — 99233 SBSQ HOSP IP/OBS HIGH 50: CPT | Performed by: HOSPITALIST

## 2018-03-11 PROCEDURE — 1200000003 HC TELEMETRY R&B

## 2018-03-11 PROCEDURE — 2580000003 HC RX 258: Performed by: INTERNAL MEDICINE

## 2018-03-11 PROCEDURE — 82948 REAGENT STRIP/BLOOD GLUCOSE: CPT

## 2018-03-11 PROCEDURE — 6370000000 HC RX 637 (ALT 250 FOR IP): Performed by: FAMILY MEDICINE

## 2018-03-11 PROCEDURE — 6370000000 HC RX 637 (ALT 250 FOR IP): Performed by: INTERNAL MEDICINE

## 2018-03-11 PROCEDURE — 6360000002 HC RX W HCPCS: Performed by: HOSPITALIST

## 2018-03-11 PROCEDURE — 36591 DRAW BLOOD OFF VENOUS DEVICE: CPT

## 2018-03-11 RX ORDER — BUMETANIDE 0.25 MG/ML
1 INJECTION, SOLUTION INTRAMUSCULAR; INTRAVENOUS ONCE
Status: DISCONTINUED | OUTPATIENT
Start: 2018-03-11 | End: 2018-03-11

## 2018-03-11 RX ORDER — ALBUMIN (HUMAN) 12.5 G/50ML
25 SOLUTION INTRAVENOUS ONCE
Status: COMPLETED | OUTPATIENT
Start: 2018-03-11 | End: 2018-03-11

## 2018-03-11 RX ADMIN — LABETALOL HCL 200 MG: 200 TABLET, FILM COATED ORAL at 22:01

## 2018-03-11 RX ADMIN — NITROGLYCERIN 0.5 INCH: 20 OINTMENT TOPICAL at 20:03

## 2018-03-11 RX ADMIN — BETAMETHASONE DIPROPIONATE: 0.5 OINTMENT TOPICAL at 20:04

## 2018-03-11 RX ADMIN — ISOSORBIDE MONONITRATE 120 MG: 60 TABLET ORAL at 20:03

## 2018-03-11 RX ADMIN — DILTIAZEM HYDROCHLORIDE 90 MG: 60 TABLET, FILM COATED ORAL at 19:27

## 2018-03-11 RX ADMIN — DILTIAZEM HYDROCHLORIDE 90 MG: 60 TABLET, FILM COATED ORAL at 23:58

## 2018-03-11 RX ADMIN — SIMVASTATIN 20 MG: 20 TABLET, FILM COATED ORAL at 20:03

## 2018-03-11 RX ADMIN — ASPIRIN 81 MG: 81 TABLET, CHEWABLE ORAL at 09:13

## 2018-03-11 RX ADMIN — NITROGLYCERIN 0.5 INCH: 20 OINTMENT TOPICAL at 04:46

## 2018-03-11 RX ADMIN — CLONIDINE HYDROCHLORIDE 0.1 MG: 0.1 TABLET ORAL at 20:03

## 2018-03-11 RX ADMIN — DOXAZOSIN 8 MG: 4 TABLET ORAL at 20:03

## 2018-03-11 RX ADMIN — ISOSORBIDE MONONITRATE 120 MG: 60 TABLET ORAL at 09:13

## 2018-03-11 RX ADMIN — INSULIN GLARGINE 8 UNITS: 100 INJECTION, SOLUTION SUBCUTANEOUS at 09:17

## 2018-03-11 RX ADMIN — DOXAZOSIN 8 MG: 4 TABLET ORAL at 09:13

## 2018-03-11 RX ADMIN — PANTOPRAZOLE SODIUM 40 MG: 40 TABLET, DELAYED RELEASE ORAL at 06:16

## 2018-03-11 RX ADMIN — FUROSEMIDE 60 MG: 10 INJECTION, SOLUTION INTRAMUSCULAR; INTRAVENOUS at 09:14

## 2018-03-11 RX ADMIN — CLOPIDOGREL 75 MG: 75 TABLET, FILM COATED ORAL at 09:13

## 2018-03-11 RX ADMIN — Medication 10 ML: at 20:03

## 2018-03-11 RX ADMIN — PANTOPRAZOLE SODIUM 40 MG: 40 TABLET, DELAYED RELEASE ORAL at 16:01

## 2018-03-11 RX ADMIN — BETAMETHASONE DIPROPIONATE: 0.5 OINTMENT TOPICAL at 09:13

## 2018-03-11 RX ADMIN — Medication 10 ML: at 09:14

## 2018-03-11 RX ADMIN — HYDRALAZINE HYDROCHLORIDE 100 MG: 50 TABLET ORAL at 09:13

## 2018-03-11 RX ADMIN — HYDRALAZINE HYDROCHLORIDE 100 MG: 50 TABLET ORAL at 20:03

## 2018-03-11 RX ADMIN — FUROSEMIDE 60 MG: 10 INJECTION, SOLUTION INTRAMUSCULAR; INTRAVENOUS at 16:01

## 2018-03-11 RX ADMIN — DILTIAZEM HYDROCHLORIDE 90 MG: 60 TABLET, FILM COATED ORAL at 16:01

## 2018-03-11 RX ADMIN — LABETALOL HCL 200 MG: 200 TABLET, FILM COATED ORAL at 06:16

## 2018-03-11 RX ADMIN — HYDROCORTISONE: 25 CREAM TOPICAL at 20:05

## 2018-03-11 RX ADMIN — LABETALOL HCL 200 MG: 200 TABLET, FILM COATED ORAL at 16:01

## 2018-03-11 RX ADMIN — NITROGLYCERIN 0.5 INCH: 20 OINTMENT TOPICAL at 16:01

## 2018-03-11 RX ADMIN — HYDROCORTISONE: 25 CREAM TOPICAL at 09:16

## 2018-03-11 RX ADMIN — FUROSEMIDE 60 MG: 10 INJECTION, SOLUTION INTRAMUSCULAR; INTRAVENOUS at 22:01

## 2018-03-11 RX ADMIN — ALBUMIN (HUMAN) 25 G: 0.25 INJECTION, SOLUTION INTRAVENOUS at 20:03

## 2018-03-11 RX ADMIN — DILTIAZEM HYDROCHLORIDE 90 MG: 60 TABLET, FILM COATED ORAL at 09:14

## 2018-03-11 RX ADMIN — HYDRALAZINE HYDROCHLORIDE 100 MG: 50 TABLET ORAL at 16:01

## 2018-03-11 RX ADMIN — CLONIDINE HYDROCHLORIDE 0.1 MG: 0.1 TABLET ORAL at 09:14

## 2018-03-11 ASSESSMENT — PAIN SCALES - GENERAL
PAINLEVEL_OUTOF10: 8
PAINLEVEL_OUTOF10: 0

## 2018-03-11 NOTE — PROGRESS NOTES
voice recognition software. It may contain minor errors which are inherent in voice recognition technology. **      Final report electronically signed by Dr. Holli Weiss on 3/11/2018 1:02 PM      US RENAL COMPLETE   Final Result   1. Normal sonographic appearance of the kidneys. 2. Mild elevation of the left resistive index. This is nonspecific but may be related to underlying medical renal disease. **This report has been created using voice recognition software. It may contain minor errors which are inherent in voice recognition technology. **      Final report electronically signed by Dr. Valentino Hamilton on 3/7/2018 4:05 PM      CT ABDOMEN PELVIS WO CONTRAST Additional Contrast? None   Final Result      CT CHEST WO CONTRAST   Final Result                  Electronically signed by Monika Kraus MD on 3/11/2018 at 2:46 PM

## 2018-03-11 NOTE — PROGRESS NOTES
Nephrology  Progress Note    Pt Name: Isa Driver  MRN: [id number]  [id number]  YOB: 1965  Admit Date: 3/7/2018 10:40 AM  Date of evaluation: 3/11/2018  Primary Care Physician: Bill Manriquez   5K-28/028-A       Subjective: Interval History: NO SHORTNESS OF  BREATH , No N/V     Diet: DIET CARDIAC; Low Sodium (2 GM); Daily Fluid Restriction: 2000 ml; Renal    Medications:   Scheduled Meds:   nitroglycerin  0.5 inch Topical 4 times per day    clopidogrel  75 mg Oral Daily    aspirin  81 mg Oral Daily    furosemide  60 mg Intravenous 4x Daily    darbepoetin esdras-polysorbate  100 mcg Subcutaneous Q7 Days    labetalol  200 mg Oral 3 times per day    polyethylene glycol  17 g Oral Daily    sodium chloride flush  10 mL Intravenous 2 times per day    pantoprazole  40 mg Oral BID AC    morphine  4 mg Intravenous Once    ondansetron  4 mg Intravenous Once    betamethasone dipropionate   Topical BID    cloNIDine  0.1 mg Oral BID    diltiazem  90 mg Oral 4x Daily    doxazosin  8 mg Oral BID    hydrALAZINE  100 mg Oral TID    hydrocortisone   Topical BID    isosorbide mononitrate  120 mg Oral BID    simvastatin  20 mg Oral Nightly    insulin glargine  8 Units Subcutaneous Daily     Continuous Infusions:   dextrose         Objective:   Vitals:   BP (!) 172/76   Pulse 61   Temp 98.2 °F (36.8 °C) (Oral)   Resp 20   Ht 5' 9\" (1.753 m)   Wt 211 lb (95.7 kg)   SpO2 94%   BMI 31.16 kg/m²     I&O's:    Intake/Output Summary (Last 24 hours) at 03/11/18 1753  Last data filed at 03/11/18 1558   Gross per 24 hour   Intake             1090 ml   Output             1150 ml   Net              -60 ml     I/O last 3 completed shifts:   In: 2578 [P.O.:1080; I.V.:10]  Out: 650 [Urine:650]     Date 03/11/18 0000 - 03/11/18 2359   Shift 2484-7169 4314-4724 2345-2229 24 Hour Total   I  N  T  A  K  E   P.O.  (mL/kg/hr) 300  (0.4) 480  (0.6)  780    Shift Total  (mL/kg) 300  (3.1) 480  (5)  780  (8.1) O  U  T  P  U  T   Urine  (mL/kg/hr) 650  (0.8) 500  (0.7)  1150    Shift Total  (mL/kg) 650  (6.8) 500  (5.2)  1150  (12)   Weight (kg) 95.7 95.7 95.7 95.7       General appearance: no distress  HEENT: PERRLA, EOMI, NON ICTERIC  Neck: NO LAD, NO THYROMEGALY  Lungs: CLEAR TO AUSCULTATION BILATERALLY  Heart: S1 S2 NORMAL, REGULAR RATE AND RHYTHM, NO AUDIBLE MURMURS  Abdomen: SOFT, NON TENDER, NON DISTENDED, NO ORGANOMEGALY FELT, BOWEL SOUNDS NORMAL  Extremities: 3+ b/l  Neurologic: GROSSLY NON FOCAL    LABS:    CBC:   Recent Labs      03/09/18   0430  03/10/18   0600  03/11/18   0612   WBC  2.6*  1.6*  1.4*   HGB  8.1*  8.7*  8.0*   PLT  66*  80*  116*     BMP:  Recent Labs      03/09/18   0430  03/10/18   0600  03/11/18   0612   NA  144  143  142   K  5.2  4.5  4.1   CL  113*  111  109   CO2  20*  20*  20*   BUN  64*  69*  70*   CREATININE  2.7*  3.3*  3.3*   GLUCOSE  155*  229*  222*   CALCIUM  7.6*  7.7*  7.6*   MG  1.8   --    --    PHOS  5.2*  5.1*  4.7     TSH: No results for input(s): TSH in the last 72 hours. HgBa1c: No results for input(s): LABA1C in the last 72 hours. Hepatic:   Recent Labs      03/09/18   0430  03/10/18   0600  03/11/18   0612   LABALBU  2.4*  2.2*  2.1*     Troponin: No results for input(s): TROPONINI in the last 72 hours. BNP: No results for input(s): BNP in the last 72 hours. Lipids: No results for input(s): CHOL, HDL in the last 72 hours. Invalid input(s): LDLCALCU  INR: No results for input(s): INR in the last 72 hours. Images and Other:  reviewed      Assessment and Plan:   1.  Acute blood loss anemia. H/H stable  2.  Anasarca. Still present  3.  Hypertension. 4.  Acute hypoxic respiratory failure secondary to acute diastolic CHF  exacerbation, bilateral pleural effusion. 5.  Mild hyperkalemia. 6.  Acute anemia. 7.  Acute kidney injury.   8.  H/o of colon surgery     Bumex 1 mg IV with 25 gram Albumin   Continue  fluid restriction  Continue low potassium

## 2018-03-11 NOTE — PROGRESS NOTES
[address] [address]    [address]  Daily Progress Note  Pt Name: Yoon Galindo Record Number: [id number]  Date of Birth 1965   Today's Date: 3/11/2018    Abdominal pain    CHIEF COMPLAINT abdominal pain currently on chemotherapy immunocompromised    SUBJECTIVE  Patient has mild complaints of abdominal pain    OBJECTIVE  CURRENT VITALS BP (!) 158/72   Pulse 63   Temp 98.8 °F (37.1 °C) (Oral)   Resp 16   Ht 5' 9\" (1.753 m)   Wt 211 lb (95.7 kg)   SpO2 94%   BMI 31.16 kg/m²   LUNGS: Lungs clear   ABDOMEN: Soft bowel sounds positive  WOUNDS: Not applicable  24 HR INTAKE/OUTPUT :   Intake/Output Summary (Last 24 hours) at 03/11/18 1110  Last data filed at 03/11/18 0617   Gross per 24 hour   Intake              850 ml   Output             1300 ml   Net             -450 ml   I/O last 3 completed shifts: In: 1300 [P.O.:1280; I.V.:20]  Out: 1300 [Urine:1300]  DRAIN/TUBE OUTPUT :      LABS  CBC :   Lab Results   Component Value Date    WBC 1.4 03/11/2018    HGB 8.0 03/11/2018    HCT 24.5 03/11/2018     03/11/2018     BMP: Lab Results   Component Value Date     03/11/2018    K 4.1 03/11/2018    K 5.0 02/02/2018     03/11/2018    CO2 20 03/11/2018    BUN 70 03/11/2018    CREATININE 3.3 03/11/2018    MG 1.8 03/09/2018    PHOS 4.7 03/11/2018       ASSESSMENT  1. Patient's abdomen is soft shows no signs of any issues in the abdomen patient is very immunocompromised as a white count of 1.4      PLAN  1.   No surgical issues will sign off see as needed      Oliver Fofana  Electronically signed 3/11/2018 at 11:10 AM

## 2018-03-11 NOTE — PROGRESS NOTES
Brief Intervention and Referral to Treatment Summary    Patient was provided PHQ-9, AUDIT and DAST Screening:    Pt denies any drug or alcohol use. Pt denies history of Depression or suicidal ideation.      PHQ-9 Score:  0  AUDIT Score:  0  DAST Score:  0    Patients substance use is considered n/a    Low Risk/Healthy  Moderate Risk  Harmful  Dependent    Patients depression is considered: n/a    Minimal  Mild   Moderate  Moderately Severe  Severe    Brief Education Was Provided n/a    Patient was receptive  Patient was not receptive      Brief Intervention Is Provided (Only for AUDIT or DAST) n/a          Recommendations/Referrals for Brief and/or Specialized Treatment Provided to Patient:   No recommendations

## 2018-03-12 LAB
ALBUMIN SERPL-MCNC: 2.4 G/DL (ref 3.5–5.1)
ANION GAP SERPL CALCULATED.3IONS-SCNC: 15 MEQ/L (ref 8–16)
ANISOCYTOSIS: ABNORMAL
BASOPHILS # BLD: 3.3 %
BASOPHILS ABSOLUTE: 0.1 THOU/MM3 (ref 0–0.1)
BUN BLDV-MCNC: 70 MG/DL (ref 7–22)
CALCIUM SERPL-MCNC: 8.2 MG/DL (ref 8.5–10.5)
CHLORIDE BLD-SCNC: 106 MEQ/L (ref 98–111)
CO2: 21 MEQ/L (ref 23–33)
CREAT SERPL-MCNC: 3.3 MG/DL (ref 0.4–1.2)
EOSINOPHIL # BLD: 14.3 %
EOSINOPHILS ABSOLUTE: 0.3 THOU/MM3 (ref 0–0.4)
GFR SERPL CREATININE-BSD FRML MDRD: 20 ML/MIN/1.73M2
GLUCOSE BLD-MCNC: 209 MG/DL (ref 70–108)
GLUCOSE BLD-MCNC: 218 MG/DL (ref 70–108)
GLUCOSE BLD-MCNC: 222 MG/DL (ref 70–108)
GLUCOSE BLD-MCNC: 228 MG/DL (ref 70–108)
GLUCOSE BLD-MCNC: 240 MG/DL (ref 70–108)
HCT VFR BLD CALC: 26.4 % (ref 42–52)
HEMOGLOBIN: 8.6 GM/DL (ref 14–18)
LYMPHOCYTES # BLD: 17.6 %
LYMPHOCYTES ABSOLUTE: 0.3 THOU/MM3 (ref 1–4.8)
MCH RBC QN AUTO: 28 PG (ref 27–31)
MCHC RBC AUTO-ENTMCNC: 32.7 GM/DL (ref 33–37)
MCV RBC AUTO: 85.4 FL (ref 80–94)
MONOCYTES # BLD: 16.8 %
MONOCYTES ABSOLUTE: 0.3 THOU/MM3 (ref 0.4–1.3)
NUCLEATED RED BLOOD CELLS: 0 /100 WBC
PDW BLD-RTO: 17 % (ref 11.5–14.5)
PHOSPHORUS: 4.2 MG/DL (ref 2.4–4.7)
PLATELET # BLD: 125 THOU/MM3 (ref 130–400)
PMV BLD AUTO: 8.9 FL (ref 7.4–10.4)
POTASSIUM SERPL-SCNC: 3.7 MEQ/L (ref 3.5–5.2)
RBC # BLD: 3.09 MILL/MM3 (ref 4.7–6.1)
SEG NEUTROPHILS: 48 %
SEGMENTED NEUTROPHILS ABSOLUTE COUNT: 0.9 THOU/MM3 (ref 1.8–7.7)
SODIUM BLD-SCNC: 142 MEQ/L (ref 135–145)
WBC # BLD: 1.8 THOU/MM3 (ref 4.8–10.8)

## 2018-03-12 PROCEDURE — 99232 SBSQ HOSP IP/OBS MODERATE 35: CPT | Performed by: NURSE PRACTITIONER

## 2018-03-12 PROCEDURE — 6370000000 HC RX 637 (ALT 250 FOR IP): Performed by: HOSPITALIST

## 2018-03-12 PROCEDURE — 1200000003 HC TELEMETRY R&B

## 2018-03-12 PROCEDURE — 6370000000 HC RX 637 (ALT 250 FOR IP): Performed by: INTERNAL MEDICINE

## 2018-03-12 PROCEDURE — 97116 GAIT TRAINING THERAPY: CPT

## 2018-03-12 PROCEDURE — 85025 COMPLETE CBC W/AUTO DIFF WBC: CPT

## 2018-03-12 PROCEDURE — 6360000002 HC RX W HCPCS: Performed by: HOSPITALIST

## 2018-03-12 PROCEDURE — 97110 THERAPEUTIC EXERCISES: CPT

## 2018-03-12 PROCEDURE — 6370000000 HC RX 637 (ALT 250 FOR IP): Performed by: FAMILY MEDICINE

## 2018-03-12 PROCEDURE — 2580000003 HC RX 258: Performed by: INTERNAL MEDICINE

## 2018-03-12 PROCEDURE — 82948 REAGENT STRIP/BLOOD GLUCOSE: CPT

## 2018-03-12 PROCEDURE — 99232 SBSQ HOSP IP/OBS MODERATE 35: CPT | Performed by: INTERNAL MEDICINE

## 2018-03-12 PROCEDURE — 6370000000 HC RX 637 (ALT 250 FOR IP): Performed by: NURSE PRACTITIONER

## 2018-03-12 PROCEDURE — 80069 RENAL FUNCTION PANEL: CPT

## 2018-03-12 PROCEDURE — 36591 DRAW BLOOD OFF VENOUS DEVICE: CPT

## 2018-03-12 RX ORDER — LABETALOL 200 MG/1
400 TABLET, FILM COATED ORAL EVERY 8 HOURS SCHEDULED
Status: DISCONTINUED | OUTPATIENT
Start: 2018-03-12 | End: 2018-03-20

## 2018-03-12 RX ORDER — BUMETANIDE 0.25 MG/ML
1 INJECTION, SOLUTION INTRAMUSCULAR; INTRAVENOUS 2 TIMES DAILY
Status: DISCONTINUED | OUTPATIENT
Start: 2018-03-12 | End: 2018-03-12 | Stop reason: SDUPTHER

## 2018-03-12 RX ORDER — BUMETANIDE 1 MG/1
1 TABLET ORAL 2 TIMES DAILY
Status: DISCONTINUED | OUTPATIENT
Start: 2018-03-12 | End: 2018-03-13

## 2018-03-12 RX ADMIN — DILTIAZEM HYDROCHLORIDE 90 MG: 60 TABLET, FILM COATED ORAL at 19:46

## 2018-03-12 RX ADMIN — LABETALOL HYDROCHLORIDE 400 MG: 200 TABLET, FILM COATED ORAL at 14:32

## 2018-03-12 RX ADMIN — LABETALOL HCL 200 MG: 200 TABLET, FILM COATED ORAL at 06:08

## 2018-03-12 RX ADMIN — CLONIDINE HYDROCHLORIDE 0.1 MG: 0.1 TABLET ORAL at 09:26

## 2018-03-12 RX ADMIN — PANTOPRAZOLE SODIUM 40 MG: 40 TABLET, DELAYED RELEASE ORAL at 16:47

## 2018-03-12 RX ADMIN — ASPIRIN 81 MG: 81 TABLET, CHEWABLE ORAL at 09:26

## 2018-03-12 RX ADMIN — FUROSEMIDE 60 MG: 10 INJECTION, SOLUTION INTRAMUSCULAR; INTRAVENOUS at 09:26

## 2018-03-12 RX ADMIN — DILTIAZEM HYDROCHLORIDE 90 MG: 60 TABLET, FILM COATED ORAL at 16:47

## 2018-03-12 RX ADMIN — HYDRALAZINE HYDROCHLORIDE 100 MG: 50 TABLET ORAL at 14:32

## 2018-03-12 RX ADMIN — BETAMETHASONE DIPROPIONATE: 0.5 OINTMENT TOPICAL at 19:46

## 2018-03-12 RX ADMIN — HYDROCORTISONE: 25 CREAM TOPICAL at 09:30

## 2018-03-12 RX ADMIN — PANTOPRAZOLE SODIUM 40 MG: 40 TABLET, DELAYED RELEASE ORAL at 06:08

## 2018-03-12 RX ADMIN — ISOSORBIDE MONONITRATE 120 MG: 60 TABLET ORAL at 19:46

## 2018-03-12 RX ADMIN — INSULIN GLARGINE 8 UNITS: 100 INJECTION, SOLUTION SUBCUTANEOUS at 09:26

## 2018-03-12 RX ADMIN — HYDROCORTISONE: 25 CREAM TOPICAL at 19:47

## 2018-03-12 RX ADMIN — SIMVASTATIN 20 MG: 20 TABLET, FILM COATED ORAL at 19:46

## 2018-03-12 RX ADMIN — CLOPIDOGREL 75 MG: 75 TABLET, FILM COATED ORAL at 09:26

## 2018-03-12 RX ADMIN — ISOSORBIDE MONONITRATE 120 MG: 60 TABLET ORAL at 09:26

## 2018-03-12 RX ADMIN — DILTIAZEM HYDROCHLORIDE 90 MG: 60 TABLET, FILM COATED ORAL at 09:26

## 2018-03-12 RX ADMIN — LABETALOL HYDROCHLORIDE 400 MG: 200 TABLET, FILM COATED ORAL at 23:13

## 2018-03-12 RX ADMIN — DOXAZOSIN 8 MG: 4 TABLET ORAL at 19:46

## 2018-03-12 RX ADMIN — HYDRALAZINE HYDROCHLORIDE 100 MG: 50 TABLET ORAL at 19:46

## 2018-03-12 RX ADMIN — HYDRALAZINE HYDROCHLORIDE 100 MG: 50 TABLET ORAL at 09:26

## 2018-03-12 RX ADMIN — Medication 10 ML: at 19:46

## 2018-03-12 RX ADMIN — NITROGLYCERIN 0.5 INCH: 20 OINTMENT TOPICAL at 06:08

## 2018-03-12 RX ADMIN — BETAMETHASONE DIPROPIONATE: 0.5 OINTMENT TOPICAL at 09:26

## 2018-03-12 RX ADMIN — DOXAZOSIN 8 MG: 4 TABLET ORAL at 09:26

## 2018-03-12 RX ADMIN — DILTIAZEM HYDROCHLORIDE 90 MG: 60 TABLET, FILM COATED ORAL at 12:17

## 2018-03-12 RX ADMIN — CALCIUM ACETATE 667 MG: 667 CAPSULE ORAL at 16:47

## 2018-03-12 RX ADMIN — CLONIDINE HYDROCHLORIDE 0.1 MG: 0.1 TABLET ORAL at 19:46

## 2018-03-12 RX ADMIN — BUMETANIDE 1 MG: 1 TABLET ORAL at 16:48

## 2018-03-12 RX ADMIN — Medication 10 ML: at 09:30

## 2018-03-12 ASSESSMENT — PAIN SCALES - GENERAL
PAINLEVEL_OUTOF10: 0
PAINLEVEL_OUTOF10: 0
PAINLEVEL_OUTOF10: 9
PAINLEVEL_OUTOF10: 0

## 2018-03-12 NOTE — PROGRESS NOTES
appearance: Not in acute distress. Alert. Head: Normocephalic, atraumatic  Eyes: EOMI, no scleral icterus  CVS: regular rate and rhythm, Normal S1S2  Pulm: Clear to auscultation bilaterally. No crackles/wheezes  Gastrointestinal: Soft, nontender, obese, no guarding or rebound  Extremities: Bilateral LE edema. No erythema or warmth  Neuro: No gross focal deficits noted  Skin: Warm, dry    Labs:   Recent Labs      03/10/18   0600 03/11/18   0612 03/12/18   0558   WBC  1.6*  1.4*  1.8*   HGB  8.7*  8.0*  8.6*   HCT  26.9*  24.5*  26.4*   PLT  80*  116*  125*     Recent Labs      03/10/18   0600 03/11/18   0612 03/12/18   0558   NA  143  142  142   K  4.5  4.1  3.7   CL  111  109  106   CO2  20*  20*  21*   BUN  69*  70*  70*   CREATININE  3.3*  3.3*  3.3*   CALCIUM  7.7*  7.6*  8.2*   PHOS  5.1*  4.7  4.2     No results for input(s): AST, ALT, BILIDIR, BILITOT, ALKPHOS in the last 72 hours. No results for input(s): INR in the last 72 hours. No results for input(s): Aaron Dus in the last 72 hours. Urinalysis:    Lab Results   Component Value Date    NITRU NEGATIVE 02/01/2018    WBCUA 2-4 02/01/2018    BACTERIA NONE 02/01/2018    RBCUA 0-2 02/01/2018    BLOODU NEGATIVE 02/01/2018    SPECGRAV 1.018 02/01/2018    GLUCOSEU 250 11/30/2017       Radiology:  Ct Abdomen Pelvis Wo Contrast Additional Contrast? None    Result Date: 3/7/2018  PROCEDURE: CT CHEST WO CONTRAST, CT ABDOMEN PELVIS WO CONTRAST CLINICAL INFORMATION: injury, chest pain/SOB. COMPARISON: November 25, 2017 TECHNIQUE: 5 mm noncontrast axial images were obtained through the chest. Sagittal and coronal reconstructions were obtained. All CT scans at this facility use dose modulation, iterative reconstruction, and/or weight-based dosing when appropriate to reduce radiation dose to as low as reasonably achievable.  FINDINGS: Limitations: Evaluation of the mediastinum and vascular structures is limited due to the lack of IV contrast. Chest: 3/11/2018  PROCEDURE: XR CHEST (2 VW) CLINICAL INFORMATION: Follow-up congestive heart failure. COMPARISON: 12/4/2017. TECHNIQUE: PA and lateral views of the chest performed. FINDINGS: POSTSURGICAL CHANGES: None. LINES/TUBES/MECHANICAL DEVICES: 1. There is a stable right subclavian central venous Mediport with the distal tip overlying the superior vena cava. TRACHEA/HEART/MEDIASTINUM/HILUM: 1. There is stable mild enlargement of the cardiac silhouette. LUNG BULL: 1. There is resolved pulmonary vascular congestion. There are interval improved infiltrates with mild residual patchy infiltrates within the left suprahilar region and right infrahilar region. There are small bilateral pleural effusions. OTHER: None. PNEUMOTHORAX: None. OSSEOUS STRUCTURES: 1. No acute osseous abnormality. 2. There is a stable old fracture of the mid left clavicle. 1. There is resolved pulmonary vascular congestion. There are interval improved infiltrates with mild residual patchy infiltrates within the left suprahilar region and right infrahilar region. There are small bilateral pleural effusions. **This report has been created using voice recognition software. It may contain minor errors which are inherent in voice recognition technology. ** Final report electronically signed by Dr. Malcolm Harrison on 3/11/2018 1:02 PM    Xr Tibia Fibula Left (2 Views)    Result Date: 2/15/2018  PROCEDURE: XR TIBIA FIBULA LEFT (2 VIEWS) CLINICAL INFORMATION: fall, pain, ecchymosis, . COMPARISON: No prior study. TECHNIQUE: AP and lateral views of the left lower leg. FINDINGS: Bones/joints: No fracture or dislocation. Mild degenerative changes. Normal alignment. Soft tissues: No significant soft tissue swelling. No acute fracture. **This report has been created using voice recognition software. It may contain minor errors which are inherent in voice recognition technology. ** Final report electronically signed by Dr. Stefan Oliver on 2/15/2018 3:26 doppler ultrasound was performed of the left lower extremity using gray scale, color flow and spectral doppler imaging. FINDINGS: There is normal color flow, spectral analysis and compressibility of the common femoral vein, superficial femoral vein and popliteal vein on the left . There is normal color flow and compressibility in the posterior tibial veins, anterior tibial veins and peroneal veins. There is normal color flow, spectral analysis and compressibility in the contralateral common femoral vein. No evidence of a DVT. **This report has been created using voice recognition software. It may contain minor errors which are inherent in voice recognition technology. ** Final report electronically signed by Dr. Maisha Madrigal on 2/15/2018 4:31 PM      Diet: DIET CARDIAC; Low Sodium (2 GM); Daily Fluid Restriction: 2000 ml; Renal     Code Status: Full Code    Assessment/Plan:    Active Hospital Problems    Diagnosis Date Noted    Coronary artery disease involving native coronary artery of native heart without angina pectoris [I25.10]      Priority: High    Hypertension [I10]      Priority: High    Pericardial effusion [I31.3]     Acute congestive heart failure (HCC) [I50.9]     Pancytopenia (HCC) [D61.818] 03/07/2018    Stage 3 chronic kidney disease [N18.3] 02/03/2018    NAVEEN (acute kidney injury) ([facility] [address] [N17.9] 01/31/2018    Metastatic colon cancer to liver (HCC) [C18.9, C78.7]        Assessment:   1. Acute hypoxic respiratory failure, likely secondary to acute diastolic CHF  2. Small to moderate pericardial effusion per CT scan. 3. Nonspecific troponin elevation; likely demand ischemia  4. Acute on chronic kidney disease. No obstruction on renal ultrasound. Likely prerenal 2/2 diuresis  5. Mild hyperkalemia, resolved   6. Acute anemia, undetermined etiology. No overt bleeding. Suspected secondary to chemotherapy  7. Leukopenia 2/2 chemo  8. Hypertensive urgency  9.  Abdominal discomfort, CT scan of the

## 2018-03-12 NOTE — PROGRESS NOTES
[facility] MISSED TREATMENT NOTE  [facility]  5K-28/028-A      Date: 3/12/2018  Patient Name: Maribel Simpson        CSN: [id number]   : 1965  (48 y.o.)  Gender: male   Referring Practitioner: Dr. Lizzie Wills MD  Diagnosis: Acute Kidney Injury         REASON FOR MISSED TREATMENT:  Patient refused treatment. Pt had been able to walk better this day, per PT and RN report. Pt was sitting in the chair. He asked to not do anything at this time.

## 2018-03-12 NOTE — PROGRESS NOTES
Loraine Garcia is a 48 y.o. male patient.     Current Facility-Administered Medications   Medication Dose Route Frequency Provider Last Rate Last Dose    labetalol (NORMODYNE) tablet 400 mg  400 mg Oral 3 times per day Rebeca Hadley, ALEAH        clopidogrel (PLAVIX) tablet 75 mg  75 mg Oral Daily Bill Mejia MD   75 mg at 03/12/18 0926    aspirin chewable tablet 81 mg  81 mg Oral Daily Bill Mejia MD   81 mg at 03/12/18 6304    darbepoetin esdras-polysorbate (ARANESP) injection 100 mcg  100 mcg Subcutaneous Q7 Days Coty Hartman MD   100 mcg at 03/09/18 1347    polyethylene glycol (GLYCOLAX) packet 17 g  17 g Oral Daily Bill Mejia MD   17 g at 03/10/18 0910    sennosides-docusate sodium (SENOKOT-S) 8.6-50 MG tablet 2 tablet  2 tablet Oral Daily PRN Bill Mejia MD        sodium chloride flush 0.9 % injection 10 mL  10 mL Intravenous 2 times per day Dominick Kam MD   10 mL at 03/12/18 0930    sodium chloride flush 0.9 % injection 10 mL  10 mL Intravenous PRN Dominick Kam MD   10 mL at 03/09/18 1348    acetaminophen (TYLENOL) tablet 650 mg  650 mg Oral Q4H PRN Dominick Kam MD   650 mg at 03/08/18 1627    ondansetron (ZOFRAN) injection 4 mg  4 mg Intravenous Q6H PRN Dominick Kam MD        magnesium sulfate 1 g in dextrose 5 % 100 mL IVPB  1 g Intravenous PRN Dominick Kam MD        potassium chloride (KLOR-CON M) extended release tablet 40 mEq  40 mEq Oral PRN Dominick Kam MD        Or    potassium chloride 20 MEQ/15ML (10%) oral solution 40 mEq  40 mEq Oral PRN Dominick Kam MD        Or    potassium chloride 10 mEq/100 mL IVPB (Peripheral Line)  10 mEq Intravenous PRN Dominick Kam MD        pantoprazole (PROTONIX) tablet 40 mg  40 mg Oral BID AC Dominick Kam MD   40 mg at 03/12/18 9033    morphine injection 4 mg  4 mg Intravenous Once Leyla Rea PA-C        ondansetron [facility]) injection 4 mg  4 mg Intravenous Once Jeanie Gilford A Adequate intake. Activity level: Returning to normal.    Pain:  He complains of pain that is mild. Objective:  General Appearance:  Comfortable. Vital signs: (most recent): Blood pressure (!) 169/77, pulse 58, temperature 97.9 °F (36.6 °C), temperature source Oral, resp. rate 18, height 5' 9\" (1.753 m), weight 211 lb (95.7 kg), SpO2 92 %. Vital signs are normal.    Output: Producing urine and producing stool. HEENT: Normal HEENT exam.    Lungs:  Normal effort and normal respiratory rate. Breath sounds clear to auscultation. Heart: Normal rate. Regular rhythm. Abdomen: Abdomen is soft. Bowel sounds are normal.   There is no abdominal tenderness. Extremities: Normal range of motion. Neurological: Patient is alert and oriented to person, place and time. Normal strength. Assessment:  (Metastatic colon cancer,getting chemo/immunotherapy. Neutropenia secondary to chemo,improving. Anemia,secondary to kidney disease,improving.).        Malorie Rosario MD  3/12/2018

## 2018-03-12 NOTE — PROGRESS NOTES
Dr. Rhea Nix updated regarding patient [address] and WBC results from today. No new orders at this time.

## 2018-03-12 NOTE — PROGRESS NOTES
Assessment  Pain Assessment: 0-10  Pain Level: 9 (R knee)       Social/Functional:  Lives With: Family (with his mother)  Type of Home:  (Independent living [facility])  Home Layout: One level  Home Access: Level entry     Objective:       Transfers  Sit to Stand: Minimal Assistance (from chair, good hand placement)  Stand to sit: Stand by assistance (to chair, good hand placement)       Ambulation 1  Surface: level tile  Device: Rolling Walker  Assistance: Contact guard assistance  Quality of Gait: decreased macarena, forward flexed posture, decreased B knee flexion/ext due to pain  Distance: 120ft x1 with 1 standing rest break ~1-2 min         Exercises:  Exercises  Comments: Performed BLE exercises reclined in chair: ankle pumps, heel slides, hip abd/add x10 reps each to increase strength for improved functional mobility. Completed AROM. Activity Tolerance:  Activity Tolerance: Patient limited by fatigue;Patient limited by pain; Patient limited by endurance    Assessment: Body structures, Functions, Activity limitations: Decreased functional mobility , Decreased endurance, Decreased strength, Decreased balance  Assessment: Patient tolerated session well. Patient able to increase ambulation distance this session. Patient would benefit from continued skilled physical therapy to returnt o prior level of function and ambulation. Prognosis: Good  REQUIRES PT FOLLOW UP: Yes  Discharge Recommendations: Continue to assess pending progress, Patient would benefit from continued therapy after discharge, Subacute/Skilled Nursing Facility, F with PT, 24 hour supervision or assist, Home with Home health PT    Patient Education:  Patient Education: ambulation, therex, transfers    Equipment Recommendations: Other: may need RW    Safety:  Type of devices:  All fall risk precautions in place, Call light within reach, Chair alarm in place, Gait belt, Left in chair  Restraints  Initially in place: No    Plan:  Times per week: 3-5 X GM  Times per day: Daily  Specific instructions for Next Treatment: ther ex, mobility, gait, balance, endurance   Current Treatment Recommendations: Strengthening, Gait Training, Balance Training, Functional Mobility Training, Transfer Training, Endurance Training, Home Exercise Program    Goals:  Patient goals : Get releif of pain    Short term goals  Time Frame for Short term goals: 2 weeks   Short term goal 1: supine to sit and return with SBA to get in and out of bed   Short term goal 2: sit to stand with SBA to get on and off various surfaces  Short term goal 3: ambulate with AD over 80 feet with S to walk household distances     Long term goals  Time Frame for Long term goals : NA due to short ELOS            AM-PAC Inpatient Mobility without Stair Climbing Raw Score : 13  AM-PAC Inpatient without Stair Climbing T-Scale Score : 38.96  Mobility Inpatient CMS 0-100% Score: 58.44  Mobility Inpatient without Stair CMS G-Code Modifier : CK

## 2018-03-12 NOTE — PROGRESS NOTES
Nephrology  Progress Note    Pt Name: Mustapha Hyatt  MRN: [id number]  [id number]  YOB: 1965  Admit Date: 3/7/2018 10:40 AM  Date of evaluation: 3/12/2018  Primary Care Physician: Carmen Reno   5K-28/028-A       Subjective: Interval History: NO SOB  , No N/V  , no problem with his urination     Diet: DIET CARDIAC; Low Sodium (2 GM); Daily Fluid Restriction: 2000 ml; Renal    Medications:   Scheduled Meds:   labetalol  400 mg Oral 3 times per day    clopidogrel  75 mg Oral Daily    aspirin  81 mg Oral Daily    darbepoetin esdras-polysorbate  100 mcg Subcutaneous Q7 Days    polyethylene glycol  17 g Oral Daily    sodium chloride flush  10 mL Intravenous 2 times per day    pantoprazole  40 mg Oral BID AC    morphine  4 mg Intravenous Once    ondansetron  4 mg Intravenous Once    betamethasone dipropionate   Topical BID    cloNIDine  0.1 mg Oral BID    diltiazem  90 mg Oral 4x Daily    doxazosin  8 mg Oral BID    hydrALAZINE  100 mg Oral TID    hydrocortisone   Topical BID    isosorbide mononitrate  120 mg Oral BID    simvastatin  20 mg Oral Nightly    insulin glargine  8 Units Subcutaneous Daily     Continuous Infusions:   dextrose         Objective:   Vitals:   BP (!) 169/77   Pulse 58   Temp 97.9 °F (36.6 °C) (Oral)   Resp 18   Ht 5' 9\" (1.753 m)   Wt 211 lb (95.7 kg)   SpO2 92%   BMI 31.16 kg/m²     I&O's:    Intake/Output Summary (Last 24 hours) at 03/12/18 1323  Last data filed at 03/12/18 1104   Gross per 24 hour   Intake             1080 ml   Output             3890 ml   Net            -2810 ml     I/O last 3 completed shifts:   In: 1080 [P.O.:980; I.V.:100]  Out: 3190 [Urine:3190]     Date 03/12/18 0000 - 03/12/18 2359   Shift 8493-0330 9236-7999 1600-2359 24 Hour Total   I  N  T  A  K  E   P.O.  (mL/kg/hr) 100  (0.1)   100    I.V.  (mL/kg) 0  (0)   0  (0)    Shift Total  (mL/kg) 100  (1)   100  (1)   O  U  T  P  U  T   Urine  (mL/kg/hr) 1540  (2) 445 6570    Shift Is/Os   No indication for urgent RRT   Continue Low salt diet   Labetalol has been increased to  400 mg every 8 hours   Start phoslo 667 mg po TID with  Meals       Will follow     Electronically signed by Rozina Frias MD on 3/12/2018 at 5:45PM

## 2018-03-12 NOTE — PROGRESS NOTES
20 03/12/2018    GLUCOSE 209 03/12/2018    CALCIUM 8.2 03/12/2018       Hepatic Function Panel:    Lab Results   Component Value Date    ALKPHOS 106 03/07/2018    ALT 7 03/07/2018    AST 6 03/07/2018    PROT 4.2 03/07/2018    BILITOT 0.2 03/07/2018    BILIDIR <0.2 03/07/2018    LABALBU 2.4 03/12/2018       Magnesium:    Lab Results   Component Value Date    MG 1.8 03/09/2018       PT/INR:    Lab Results   Component Value Date    INR 1.12 03/07/2018       HgBA1c:    Lab Results   Component Value Date    LABA1C 5.8 11/22/2017       FLP:    Lab Results   Component Value Date    TRIG 71 11/22/2017    HDL 42 11/22/2017    LDLCALC 99 11/22/2017       TSH:    Lab Results   Component Value Date    TSH 1.510 11/21/2017         Assessment:  · Admission with abdominal pain s/p colon cancer, s/p colectomy  · Chronically elevated Troponins --no CP  · CKD stage 4  · Small circumferential pericardial effusion without tamponade physiology on ECHO  · Acute diastolic HF/cardio-renal disease   · Uncontrolled HTN   · Bilateral pleural effusions  · S/p cath with multi-vessel CAD   · Normal EF        Plan:  · PCI as outpatient when cleared by renal and oncology   · Diuresis per renal    · Needs BP control, states BP is usually in 150's at home    · Continue clonidine, imdur, and labetolol increased to 400 QID   ·            Electronically signed by Deloris Hall CNP on 3/12/2018 at 10:40 AM

## 2018-03-13 LAB
ALBUMIN SERPL-MCNC: 2.4 G/DL (ref 3.5–5.1)
ANION GAP SERPL CALCULATED.3IONS-SCNC: 12 MEQ/L (ref 8–16)
ANISOCYTOSIS: ABNORMAL
BASOPHILS # BLD: 4 %
BASOPHILS ABSOLUTE: 0.1 THOU/MM3 (ref 0–0.1)
BUN BLDV-MCNC: 64 MG/DL (ref 7–22)
CALCIUM SERPL-MCNC: 8.2 MG/DL (ref 8.5–10.5)
CHLORIDE BLD-SCNC: 110 MEQ/L (ref 98–111)
CO2: 22 MEQ/L (ref 23–33)
CREAT SERPL-MCNC: 3.4 MG/DL (ref 0.4–1.2)
CRENATED RBC'S: ABNORMAL
EOSINOPHIL # BLD: 13 %
EOSINOPHILS ABSOLUTE: 0.3 THOU/MM3 (ref 0–0.4)
GFR SERPL CREATININE-BSD FRML MDRD: 19 ML/MIN/1.73M2
GLUCOSE BLD-MCNC: 178 MG/DL (ref 70–108)
GLUCOSE BLD-MCNC: 262 MG/DL (ref 70–108)
GLUCOSE BLD-MCNC: 271 MG/DL (ref 70–108)
HCT VFR BLD CALC: 26.7 % (ref 42–52)
HEMOGLOBIN: 8.7 GM/DL (ref 14–18)
HYPOCHROMIA: ABNORMAL
LYMPHOCYTES # BLD: 27.8 %
LYMPHOCYTES ABSOLUTE: 0.6 THOU/MM3 (ref 1–4.8)
MCH RBC QN AUTO: 27.9 PG (ref 27–31)
MCHC RBC AUTO-ENTMCNC: 32.8 GM/DL (ref 33–37)
MCV RBC AUTO: 85.1 FL (ref 80–94)
MONOCYTES # BLD: 15 %
MONOCYTES ABSOLUTE: 0.3 THOU/MM3 (ref 0.4–1.3)
NUCLEATED RED BLOOD CELLS: 0 /100 WBC
OVALOCYTES: ABNORMAL
PDW BLD-RTO: 17.3 % (ref 11.5–14.5)
PHOSPHORUS: 3.7 MG/DL (ref 2.4–4.7)
PLATELET # BLD: 148 THOU/MM3 (ref 130–400)
PLATELET ESTIMATE: ADEQUATE
PMV BLD AUTO: 8.8 FL (ref 7.4–10.4)
POTASSIUM SERPL-SCNC: 3.6 MEQ/L (ref 3.5–5.2)
RBC # BLD: 3.14 MILL/MM3 (ref 4.7–6.1)
SCAN OF BLOOD SMEAR: NORMAL
SCHISTOCYTES: ABNORMAL
SEG NEUTROPHILS: 40.2 %
SEGMENTED NEUTROPHILS ABSOLUTE COUNT: 0.9 THOU/MM3 (ref 1.8–7.7)
SODIUM BLD-SCNC: 144 MEQ/L (ref 135–145)
WBC # BLD: 2.3 THOU/MM3 (ref 4.8–10.8)

## 2018-03-13 PROCEDURE — 2580000003 HC RX 258: Performed by: INTERNAL MEDICINE

## 2018-03-13 PROCEDURE — 97110 THERAPEUTIC EXERCISES: CPT

## 2018-03-13 PROCEDURE — 85025 COMPLETE CBC W/AUTO DIFF WBC: CPT

## 2018-03-13 PROCEDURE — 6370000000 HC RX 637 (ALT 250 FOR IP): Performed by: INTERNAL MEDICINE

## 2018-03-13 PROCEDURE — 97535 SELF CARE MNGMENT TRAINING: CPT

## 2018-03-13 PROCEDURE — 1200000003 HC TELEMETRY R&B

## 2018-03-13 PROCEDURE — 36591 DRAW BLOOD OFF VENOUS DEVICE: CPT

## 2018-03-13 PROCEDURE — 97116 GAIT TRAINING THERAPY: CPT

## 2018-03-13 PROCEDURE — 99232 SBSQ HOSP IP/OBS MODERATE 35: CPT | Performed by: INTERNAL MEDICINE

## 2018-03-13 PROCEDURE — 6370000000 HC RX 637 (ALT 250 FOR IP): Performed by: NURSE PRACTITIONER

## 2018-03-13 PROCEDURE — 99231 SBSQ HOSP IP/OBS SF/LOW 25: CPT | Performed by: NURSE PRACTITIONER

## 2018-03-13 PROCEDURE — 80069 RENAL FUNCTION PANEL: CPT

## 2018-03-13 PROCEDURE — 82948 REAGENT STRIP/BLOOD GLUCOSE: CPT

## 2018-03-13 PROCEDURE — 6370000000 HC RX 637 (ALT 250 FOR IP): Performed by: HOSPITALIST

## 2018-03-13 RX ORDER — CLONIDINE HYDROCHLORIDE 0.1 MG/1
0.1 TABLET ORAL 3 TIMES DAILY
Status: DISCONTINUED | OUTPATIENT
Start: 2018-03-13 | End: 2018-03-14

## 2018-03-13 RX ORDER — NITROGLYCERIN 20 MG/100ML
5 INJECTION INTRAVENOUS CONTINUOUS
Status: DISCONTINUED | OUTPATIENT
Start: 2018-03-13 | End: 2018-03-13

## 2018-03-13 RX ORDER — ISOSORBIDE MONONITRATE 60 MG/1
120 TABLET, EXTENDED RELEASE ORAL 2 TIMES DAILY
Status: DISCONTINUED | OUTPATIENT
Start: 2018-03-13 | End: 2018-03-22 | Stop reason: HOSPADM

## 2018-03-13 RX ADMIN — ISOSORBIDE MONONITRATE 120 MG: 60 TABLET ORAL at 16:24

## 2018-03-13 RX ADMIN — DOXAZOSIN 8 MG: 4 TABLET ORAL at 08:58

## 2018-03-13 RX ADMIN — DILTIAZEM HYDROCHLORIDE 90 MG: 60 TABLET, FILM COATED ORAL at 08:57

## 2018-03-13 RX ADMIN — CLONIDINE HYDROCHLORIDE 0.1 MG: 0.1 TABLET ORAL at 16:22

## 2018-03-13 RX ADMIN — BUMETANIDE 1 MG: 1 TABLET ORAL at 08:59

## 2018-03-13 RX ADMIN — CALCIUM ACETATE 667 MG: 667 CAPSULE ORAL at 16:25

## 2018-03-13 RX ADMIN — CALCIUM ACETATE 667 MG: 667 CAPSULE ORAL at 08:58

## 2018-03-13 RX ADMIN — HYDROCORTISONE: 25 CREAM TOPICAL at 09:02

## 2018-03-13 RX ADMIN — DILTIAZEM HYDROCHLORIDE 90 MG: 60 TABLET, FILM COATED ORAL at 13:28

## 2018-03-13 RX ADMIN — HYDRALAZINE HYDROCHLORIDE 100 MG: 50 TABLET ORAL at 08:57

## 2018-03-13 RX ADMIN — ISOSORBIDE MONONITRATE 120 MG: 60 TABLET ORAL at 08:57

## 2018-03-13 RX ADMIN — LABETALOL HYDROCHLORIDE 400 MG: 200 TABLET, FILM COATED ORAL at 13:29

## 2018-03-13 RX ADMIN — Medication 10 ML: at 09:02

## 2018-03-13 RX ADMIN — ASPIRIN 81 MG: 81 TABLET, CHEWABLE ORAL at 09:01

## 2018-03-13 RX ADMIN — INSULIN GLARGINE 8 UNITS: 100 INJECTION, SOLUTION SUBCUTANEOUS at 09:05

## 2018-03-13 RX ADMIN — CLOPIDOGREL 75 MG: 75 TABLET, FILM COATED ORAL at 08:59

## 2018-03-13 RX ADMIN — PANTOPRAZOLE SODIUM 40 MG: 40 TABLET, DELAYED RELEASE ORAL at 05:31

## 2018-03-13 RX ADMIN — BETAMETHASONE DIPROPIONATE: 0.5 OINTMENT TOPICAL at 21:07

## 2018-03-13 RX ADMIN — CALCIUM ACETATE 667 MG: 667 CAPSULE ORAL at 13:28

## 2018-03-13 RX ADMIN — HYDROCORTISONE: 25 CREAM TOPICAL at 21:08

## 2018-03-13 RX ADMIN — CLONIDINE HYDROCHLORIDE 0.1 MG: 0.1 TABLET ORAL at 21:06

## 2018-03-13 RX ADMIN — LABETALOL HYDROCHLORIDE 400 MG: 200 TABLET, FILM COATED ORAL at 21:06

## 2018-03-13 RX ADMIN — HYDRALAZINE HYDROCHLORIDE 100 MG: 50 TABLET ORAL at 13:28

## 2018-03-13 RX ADMIN — DILTIAZEM HYDROCHLORIDE 90 MG: 60 TABLET, FILM COATED ORAL at 21:06

## 2018-03-13 RX ADMIN — Medication 10 ML: at 21:08

## 2018-03-13 RX ADMIN — CLONIDINE HYDROCHLORIDE 0.1 MG: 0.1 TABLET ORAL at 08:59

## 2018-03-13 RX ADMIN — PANTOPRAZOLE SODIUM 40 MG: 40 TABLET, DELAYED RELEASE ORAL at 16:25

## 2018-03-13 RX ADMIN — DOXAZOSIN 8 MG: 4 TABLET ORAL at 21:06

## 2018-03-13 RX ADMIN — DILTIAZEM HYDROCHLORIDE 90 MG: 60 TABLET, FILM COATED ORAL at 16:23

## 2018-03-13 RX ADMIN — LABETALOL HYDROCHLORIDE 400 MG: 200 TABLET, FILM COATED ORAL at 05:31

## 2018-03-13 RX ADMIN — BETAMETHASONE DIPROPIONATE: 0.5 OINTMENT TOPICAL at 09:00

## 2018-03-13 RX ADMIN — SIMVASTATIN 20 MG: 20 TABLET, FILM COATED ORAL at 21:06

## 2018-03-13 RX ADMIN — HYDRALAZINE HYDROCHLORIDE 100 MG: 50 TABLET ORAL at 21:06

## 2018-03-13 ASSESSMENT — PAIN SCALES - GENERAL
PAINLEVEL_OUTOF10: 0

## 2018-03-13 NOTE — PROGRESS NOTES
Physical Therapy   [address] [address]  INPATIENT PHYSICAL THERAPY  [facility] 5K - 5K-28/028-A    Time In: 7966  Time Out: 1035  Timed Code Treatment Minutes: 23 Minutes  Minutes: 23          Date: 3/13/2018  Patient Name: Loraine Garcia,  Gender:  male        MRN: [id number]  : 1965  (48 y.o.)     Referring Practitioner: Danny Crane MD  Diagnosis: NAVEEN  Additional Pertinent Hx: Pt admitted 3-7 with above. h/o colon sx in Nov.h/o Colon CA with mets to liver . Pt with abd pain and pain in right LE and knee where he had fallen 6 days ago getting out of a car , whenit was raining. Past Medical History:   Diagnosis Date    ASCVD (arteriosclerotic cardiovascular disease)     Cancer ([facility] [address] 2017    Dr. Alexandro Morse    CKD (chronic kidney disease) stage 3, GFR 30-59 ml/min     see's Merary Yip    DM2 (diabetes mellitus, type 2) ([facility] [address]     Dyslipidemia     Hypertension     Thyroid disease      Past Surgical History:   Procedure Laterality Date    ENDOSCOPY, COLON, DIAGNOSTIC      EYE SURGERY Left     laser surgery    INSERTION / REMOVAL / REPLACEMENT VENOUS ACCESS CATHETER Right 2017    MEDIPORT INSERTION performed by Ranjan Diaz MD at [address] Left 2017    COLONOSCOPY WITH BIOPSY performed by Primo Barrett MD at [facility]    CT EGD TRANSORAL BIOPSY SINGLE/MULTIPLE N/A 2017    EGD BIOPSY performed by Primo Barrett MD at [address] [place] [address] St      mole on nose removed    SMALL INTESTINE SURGERY N/A 2017    SIGMOID COLON RESECTION, APPENDECTOMY, LIVER BIOPSY performed by Ranjan Diaz MD at [facility]       Restrictions/Precautions:  Fall Risk, General Precautions        Other position/activity restrictions: up with assist       Subjective:  Chart Reviewed: Yes  Family / Caregiver Present: Yes  Subjective: RN approved session. Pt resting in bedside chair with his mother present.  Pt pleasant and

## 2018-03-13 NOTE — PROGRESS NOTES
Hospitalist Progress Note    Patient:  Maribel Simpson      Unit/Bed:5K-28/028-A    YOB: 1965    MRN: [id number]       Acct: [de-identified]     PCP: Merlin Salas    Date of Admission: 3/7/2018    Chief Complaint:   Chief Complaint   Patient presents with    Fall    Abdominal Pain       Subjective:   Dyspnea improving. Otherwise no new acute complaints. Cr up to 3.4    Medications:  Reviewed    Infusion Medications    dextrose       Scheduled Medications    isosorbide mononitrate  120 mg Oral BID    cloNIDine  0.1 mg Oral TID    labetalol  400 mg Oral 3 times per day    calcium acetate  667 mg Oral TID WC    clopidogrel  75 mg Oral Daily    aspirin  81 mg Oral Daily    darbepoetin esdras-polysorbate  100 mcg Subcutaneous Q7 Days    polyethylene glycol  17 g Oral Daily    sodium chloride flush  10 mL Intravenous 2 times per day    pantoprazole  40 mg Oral BID AC    morphine  4 mg Intravenous Once    ondansetron  4 mg Intravenous Once    betamethasone dipropionate   Topical BID    diltiazem  90 mg Oral 4x Daily    doxazosin  8 mg Oral BID    hydrALAZINE  100 mg Oral TID    hydrocortisone   Topical BID    simvastatin  20 mg Oral Nightly    insulin glargine  8 Units Subcutaneous Daily     PRN Meds: sennosides-docusate sodium, sodium chloride flush, acetaminophen, ondansetron, magnesium sulfate, potassium chloride **OR** potassium chloride **OR** potassium chloride, glucose, dextrose, glucagon (rDNA), dextrose      Intake/Output Summary (Last 24 hours) at 03/13/18 1620  Last data filed at 03/13/18 1300   Gross per 24 hour   Intake              880 ml   Output             2350 ml   Net            -1470 ml       Diet:  DIET CARDIAC; Low Sodium (2 GM);  Daily Fluid Restriction: 2000 ml; Renal    Exam:  BP (!) 166/75   Pulse 54   Temp 98.2 °F (36.8 °C) (Oral)   Resp 16   Ht 5' 9\" (1.753 m)   Wt 211 lb (95.7 kg)   SpO2 94%   BMI 31.16 kg/m²     Gen/overall appearance: Not in acute distress. Alert. Head: Normocephalic, atraumatic  Eyes: EOMI, no scleral icterus  CVS: regular rate and rhythm, Normal S1S2  Pulm: Clear to auscultation bilaterally. No crackles/wheezes  Gastrointestinal: Soft, nontender, obese, no guarding or rebound  Extremities: Bilateral LE edema. No erythema or warmth  Neuro: No gross focal deficits noted  Skin: Warm, dry    Labs:   Recent Labs      03/11/18   0612  03/12/18   0558  03/13/18   0532   WBC  1.4*  1.8*  2.3*   HGB  8.0*  8.6*  8.7*   HCT  24.5*  26.4*  26.7*   PLT  116*  125*  148     Recent Labs      03/11/18   0612  03/12/18   0558  03/13/18   0532   NA  142  142  144   K  4.1  3.7  3.6   CL  109  106  110   CO2  20*  21*  22*   BUN  70*  70*  64*   CREATININE  3.3*  3.3*  3.4*   CALCIUM  7.6*  8.2*  8.2*   PHOS  4.7  4.2  3.7     No results for input(s): AST, ALT, BILIDIR, BILITOT, ALKPHOS in the last 72 hours. No results for input(s): INR in the last 72 hours. No results for input(s): Wilhelminia Dasen in the last 72 hours. Urinalysis:      Lab Results   Component Value Date    NITRU NEGATIVE 02/01/2018    WBCUA 2-4 02/01/2018    BACTERIA NONE 02/01/2018    RBCUA 0-2 02/01/2018    BLOODU NEGATIVE 02/01/2018    SPECGRAV 1.018 02/01/2018    GLUCOSEU 250 11/30/2017       Radiology:  Ct Abdomen Pelvis Wo Contrast Additional Contrast? None    Result Date: 3/7/2018  PROCEDURE: CT CHEST WO CONTRAST, CT ABDOMEN PELVIS WO CONTRAST CLINICAL INFORMATION: injury, chest pain/SOB. COMPARISON: November 25, 2017 TECHNIQUE: 5 mm noncontrast axial images were obtained through the chest. Sagittal and coronal reconstructions were obtained. All CT scans at this facility use dose modulation, iterative reconstruction, and/or weight-based dosing when appropriate to reduce radiation dose to as low as reasonably achievable.  FINDINGS: Limitations: Evaluation of the mediastinum and vascular structures is limited due to the lack of IV contrast. Chest: Bilateral pleural effusions CLINICAL INFORMATION: Follow-up congestive heart failure. COMPARISON: 12/4/2017. TECHNIQUE: PA and lateral views of the chest performed. FINDINGS: POSTSURGICAL CHANGES: None. LINES/TUBES/MECHANICAL DEVICES: 1. There is a stable right subclavian central venous Mediport with the distal tip overlying the superior vena cava. TRACHEA/HEART/MEDIASTINUM/HILUM: 1. There is stable mild enlargement of the cardiac silhouette. LUNG BULL: 1. There is resolved pulmonary vascular congestion. There are interval improved infiltrates with mild residual patchy infiltrates within the left suprahilar region and right infrahilar region. There are small bilateral pleural effusions. OTHER: None. PNEUMOTHORAX: None. OSSEOUS STRUCTURES: 1. No acute osseous abnormality. 2. There is a stable old fracture of the mid left clavicle. 1. There is resolved pulmonary vascular congestion. There are interval improved infiltrates with mild residual patchy infiltrates within the left suprahilar region and right infrahilar region. There are small bilateral pleural effusions. **This report has been created using voice recognition software. It may contain minor errors which are inherent in voice recognition technology. ** Final report electronically signed by Dr. Bernard Maguire on 3/11/2018 1:02 PM    Xr Tibia Fibula Left (2 Views)    Result Date: 2/15/2018  PROCEDURE: XR TIBIA FIBULA LEFT (2 VIEWS) CLINICAL INFORMATION: fall, pain, ecchymosis, . COMPARISON: No prior study. TECHNIQUE: AP and lateral views of the left lower leg. FINDINGS: Bones/joints: No fracture or dislocation. Mild degenerative changes. Normal alignment. Soft tissues: No significant soft tissue swelling. No acute fracture. **This report has been created using voice recognition software. It may contain minor errors which are inherent in voice recognition technology. ** Final report electronically signed by Dr. Neftali Andres on 2/15/2018 3:26 PM    Ct Chest Wo Contrast    Result Date: lower extremity using gray scale, color flow and spectral doppler imaging. FINDINGS: There is normal color flow, spectral analysis and compressibility of the common femoral vein, superficial femoral vein and popliteal vein on the left . There is normal color flow and compressibility in the posterior tibial veins, anterior tibial veins and peroneal veins. There is normal color flow, spectral analysis and compressibility in the contralateral common femoral vein. No evidence of a DVT. **This report has been created using voice recognition software. It may contain minor errors which are inherent in voice recognition technology. ** Final report electronically signed by Dr. Marilyn Phalen on 2/15/2018 4:31 PM      Diet: DIET CARDIAC; Low Sodium (2 GM); Daily Fluid Restriction: 2000 ml; Renal     Code Status: Full Code    Assessment/Plan:    Active Hospital Problems    Diagnosis Date Noted    Coronary artery disease involving native coronary artery of native heart without angina pectoris [I25.10]      Priority: High    Hypertension [I10]      Priority: High    Pericardial effusion [I31.3]     Acute congestive heart failure (HCC) [I50.9]     Pancytopenia (HCC) [D61.818] 03/07/2018    Stage 3 chronic kidney disease [N18.3] 02/03/2018    NAVEEN (acute kidney injury) ([facility] [address] [N17.9] 01/31/2018    Metastatic colon cancer to liver (HCC) [C18.9, C78.7]        Assessment:   1. Acute hypoxic respiratory failure, likely secondary to acute diastolic CHF  2. Small to moderate pericardial effusion per CT scan. 3. Nonspecific troponin elevation; likely demand ischemia  4. Acute on chronic kidney disease. No obstruction on renal ultrasound. Likely prerenal 2/2 diuresis  5. Mild hyperkalemia, resolved   6. Acute anemia, undetermined etiology. No overt bleeding. Suspected secondary to chemotherapy  7. Leukopenia 2/2 chemo  8. Hypertensive urgency  9.  Abdominal discomfort, CT scan of the abdomen revealed mesenteric edema, moderate soft tissue anasarca likely secondary to CHF. Improved      comorbidities:  · Metastatic colon cancer to the liver Status post colectomy,  Chemotherapy  ·  dyslipidemia  · Essential hypertension  · Coronary artery disease  · Obesity Body mass index is 31.16 kg/m².     Plan:  1. Wean O2 as tolerated  2. Respiratory care  3. Intermittent diuretics per nephro  4. Trend Cr and lytes  5. Outpt PCI  6. Titrate BP regimen  7. Glycemic control  8. PT/OT  9.  D/c planning    Electronically signed by Gabbie Zhang MD on 3/13/2018 at 4:20 PM

## 2018-03-13 NOTE — PROGRESS NOTES
O  U  T  P  U  T   Urine  (mL/kg/hr) 500  (0.7) 1050  (1.4)  1550    Shift Total  (mL/kg) 500  (5.2) 1050  (11)  1550  (16.2)   Weight (kg) 95.7 95.7 95.7 95.7       General appearance: no distress  HEENT: PERRLA, EOMI, NON ICTERIC  Neck: NO LAD, NO THYROMEGALY  Lungs: CLEAR TO AUSCULTATION BILATERALLY  Heart: S1 S2 NORMAL, REGULAR RATE AND RHYTHM, NO AUDIBLE MURMURS  Abdomen: SOFT, NON TENDER, NON DISTENDED, NO ORGANOMEGALY FELT, BOWEL SOUNDS NORMAL  Extremities: 3+ b/l  Neurologic: GROSSLY NON FOCAL    LABS:    CBC:   Recent Labs      03/11/18   0612  03/12/18   0558  03/13/18   0532   WBC  1.4*  1.8*  2.3*   HGB  8.0*  8.6*  8.7*   PLT  116*  125*  148     BMP:  Recent Labs      03/11/18   0612  03/12/18   0558  03/13/18   0532   NA  142  142  144   K  4.1  3.7  3.6   CL  109  106  110   CO2  20*  21*  22*   BUN  70*  70*  64*   CREATININE  3.3*  3.3*  3.4*   GLUCOSE  222*  209*  178*   CALCIUM  7.6*  8.2*  8.2*   PHOS  4.7  4.2  3.7     TSH: No results for input(s): TSH in the last 72 hours. HgBa1c: No results for input(s): LABA1C in the last 72 hours. Hepatic:   Recent Labs      03/11/18   0612  03/12/18   0558  03/13/18   0532   LABALBU  2.1*  2.4*  2.4*     Troponin: No results for input(s): TROPONINI in the last 72 hours. BNP: No results for input(s): BNP in the last 72 hours. Lipids: No results for input(s): CHOL, HDL in the last 72 hours. Invalid input(s): LDLCALCU  INR: No results for input(s): INR in the last 72 hours. Images and Other:  reviewed      Assessment and Plan:   1.  Acute blood loss anemia. H/H stable  2.  Anasarca. Still present  3.  Hypertension. 4.  Acute hypoxic respiratory failure secondary to acute diastolic CHF  exacerbation, bilateral pleural effusion. 5.  Mild hyperkalemia. 6.  Acute anemia. 7.  Acute kidney injury.   8.  H/o of colon surgery  9. hyperphosphatemia     Stop Bumex  Very close attention to his electrolytes ,acid/base and volume status   Continue  fluid

## 2018-03-14 ENCOUNTER — APPOINTMENT (OUTPATIENT)
Dept: INTERVENTIONAL RADIOLOGY/VASCULAR | Age: 53
DRG: 194 | End: 2018-03-14
Payer: MEDICAID

## 2018-03-14 LAB
ALBUMIN SERPL-MCNC: 2.1 G/DL (ref 3.5–5.1)
ANION GAP SERPL CALCULATED.3IONS-SCNC: 12 MEQ/L (ref 8–16)
ANISOCYTOSIS: ABNORMAL
BASOPHILS # BLD: 1.4 %
BASOPHILS ABSOLUTE: 0 THOU/MM3 (ref 0–0.1)
BUN BLDV-MCNC: 61 MG/DL (ref 7–22)
CALCIUM SERPL-MCNC: 8.1 MG/DL (ref 8.5–10.5)
CHLORIDE BLD-SCNC: 107 MEQ/L (ref 98–111)
CO2: 22 MEQ/L (ref 23–33)
CREAT SERPL-MCNC: 3.4 MG/DL (ref 0.4–1.2)
EOSINOPHIL # BLD: 7 %
EOSINOPHILS ABSOLUTE: 0.2 THOU/MM3 (ref 0–0.4)
GFR SERPL CREATININE-BSD FRML MDRD: 19 ML/MIN/1.73M2
GLUCOSE BLD-MCNC: 243 MG/DL (ref 70–108)
GLUCOSE BLD-MCNC: 260 MG/DL (ref 70–108)
GLUCOSE BLD-MCNC: 334 MG/DL (ref 70–108)
HCT VFR BLD CALC: 25.7 % (ref 42–52)
HEMOGLOBIN: 8.4 GM/DL (ref 14–18)
LYMPHOCYTES # BLD: 18.5 %
LYMPHOCYTES ABSOLUTE: 0.6 THOU/MM3 (ref 1–4.8)
MCH RBC QN AUTO: 27.5 PG (ref 27–31)
MCHC RBC AUTO-ENTMCNC: 32.9 GM/DL (ref 33–37)
MCV RBC AUTO: 83.6 FL (ref 80–94)
MONOCYTES # BLD: 15.1 %
MONOCYTES ABSOLUTE: 0.5 THOU/MM3 (ref 0.4–1.3)
NUCLEATED RED BLOOD CELLS: 0 /100 WBC
PDW BLD-RTO: 18.1 % (ref 11.5–14.5)
PHOSPHORUS: 3.5 MG/DL (ref 2.4–4.7)
PLATELET # BLD: 170 THOU/MM3 (ref 130–400)
PMV BLD AUTO: 9.6 FL (ref 7.4–10.4)
POTASSIUM SERPL-SCNC: 3.4 MEQ/L (ref 3.5–5.2)
RBC # BLD: 3.07 MILL/MM3 (ref 4.7–6.1)
SEG NEUTROPHILS: 58 %
SEGMENTED NEUTROPHILS ABSOLUTE COUNT: 1.9 THOU/MM3 (ref 1.8–7.7)
SODIUM BLD-SCNC: 141 MEQ/L (ref 135–145)
WBC # BLD: 3.3 THOU/MM3 (ref 4.8–10.8)

## 2018-03-14 PROCEDURE — 99232 SBSQ HOSP IP/OBS MODERATE 35: CPT | Performed by: INTERNAL MEDICINE

## 2018-03-14 PROCEDURE — 97110 THERAPEUTIC EXERCISES: CPT

## 2018-03-14 PROCEDURE — 2500000003 HC RX 250 WO HCPCS

## 2018-03-14 PROCEDURE — 1200000003 HC TELEMETRY R&B

## 2018-03-14 PROCEDURE — 77001 FLUOROGUIDE FOR VEIN DEVICE: CPT | Performed by: RADIOLOGY

## 2018-03-14 PROCEDURE — 6370000000 HC RX 637 (ALT 250 FOR IP): Performed by: INTERNAL MEDICINE

## 2018-03-14 PROCEDURE — 80069 RENAL FUNCTION PANEL: CPT

## 2018-03-14 PROCEDURE — 97116 GAIT TRAINING THERAPY: CPT

## 2018-03-14 PROCEDURE — 2580000003 HC RX 258: Performed by: INTERNAL MEDICINE

## 2018-03-14 PROCEDURE — 36556 INSERT NON-TUNNEL CV CATH: CPT | Performed by: RADIOLOGY

## 2018-03-14 PROCEDURE — 2780000010 HC IMPLANT OTHER

## 2018-03-14 PROCEDURE — 85025 COMPLETE CBC W/AUTO DIFF WBC: CPT

## 2018-03-14 PROCEDURE — 6370000000 HC RX 637 (ALT 250 FOR IP): Performed by: NURSE PRACTITIONER

## 2018-03-14 PROCEDURE — C1769 GUIDE WIRE: HCPCS

## 2018-03-14 PROCEDURE — 6370000000 HC RX 637 (ALT 250 FOR IP): Performed by: HOSPITALIST

## 2018-03-14 PROCEDURE — 82948 REAGENT STRIP/BLOOD GLUCOSE: CPT

## 2018-03-14 PROCEDURE — 6360000002 HC RX W HCPCS

## 2018-03-14 PROCEDURE — 36591 DRAW BLOOD OFF VENOUS DEVICE: CPT

## 2018-03-14 PROCEDURE — 05HN33Z INSERTION OF INFUSION DEVICE INTO LEFT INTERNAL JUGULAR VEIN, PERCUTANEOUS APPROACH: ICD-10-PCS | Performed by: RADIOLOGY

## 2018-03-14 PROCEDURE — 76937 US GUIDE VASCULAR ACCESS: CPT | Performed by: RADIOLOGY

## 2018-03-14 RX ORDER — TORSEMIDE 20 MG/1
40 TABLET ORAL DAILY
Status: DISCONTINUED | OUTPATIENT
Start: 2018-03-14 | End: 2018-03-22 | Stop reason: HOSPADM

## 2018-03-14 RX ORDER — POTASSIUM CHLORIDE 20 MEQ/1
40 TABLET, EXTENDED RELEASE ORAL ONCE
Status: COMPLETED | OUTPATIENT
Start: 2018-03-14 | End: 2018-03-14

## 2018-03-14 RX ORDER — CLONIDINE HYDROCHLORIDE 0.2 MG/1
0.2 TABLET ORAL 3 TIMES DAILY
Status: DISCONTINUED | OUTPATIENT
Start: 2018-03-14 | End: 2018-03-14

## 2018-03-14 RX ORDER — CLONIDINE HYDROCHLORIDE 0.2 MG/1
0.2 TABLET ORAL 2 TIMES DAILY
Status: DISCONTINUED | OUTPATIENT
Start: 2018-03-14 | End: 2018-03-22 | Stop reason: HOSPADM

## 2018-03-14 RX ADMIN — DOXAZOSIN 8 MG: 4 TABLET ORAL at 10:19

## 2018-03-14 RX ADMIN — LABETALOL HYDROCHLORIDE 400 MG: 200 TABLET, FILM COATED ORAL at 21:24

## 2018-03-14 RX ADMIN — DILTIAZEM HYDROCHLORIDE 90 MG: 60 TABLET, FILM COATED ORAL at 10:18

## 2018-03-14 RX ADMIN — ISOSORBIDE MONONITRATE 120 MG: 60 TABLET ORAL at 10:19

## 2018-03-14 RX ADMIN — HYDROCORTISONE: 25 CREAM TOPICAL at 10:31

## 2018-03-14 RX ADMIN — ISOSORBIDE MONONITRATE 120 MG: 60 TABLET ORAL at 18:21

## 2018-03-14 RX ADMIN — DILTIAZEM HYDROCHLORIDE 90 MG: 60 TABLET, FILM COATED ORAL at 13:43

## 2018-03-14 RX ADMIN — CALCIUM ACETATE 667 MG: 667 CAPSULE ORAL at 13:42

## 2018-03-14 RX ADMIN — HYDROCORTISONE: 25 CREAM TOPICAL at 21:25

## 2018-03-14 RX ADMIN — CALCIUM ACETATE 667 MG: 667 CAPSULE ORAL at 10:17

## 2018-03-14 RX ADMIN — PANTOPRAZOLE SODIUM 40 MG: 40 TABLET, DELAYED RELEASE ORAL at 05:18

## 2018-03-14 RX ADMIN — CLOPIDOGREL 75 MG: 75 TABLET, FILM COATED ORAL at 10:17

## 2018-03-14 RX ADMIN — DILTIAZEM HYDROCHLORIDE 90 MG: 60 TABLET, FILM COATED ORAL at 21:24

## 2018-03-14 RX ADMIN — POTASSIUM CHLORIDE 40 MEQ: 1500 TABLET, EXTENDED RELEASE ORAL at 10:24

## 2018-03-14 RX ADMIN — INSULIN GLARGINE 8 UNITS: 100 INJECTION, SOLUTION SUBCUTANEOUS at 10:25

## 2018-03-14 RX ADMIN — PANTOPRAZOLE SODIUM 40 MG: 40 TABLET, DELAYED RELEASE ORAL at 18:21

## 2018-03-14 RX ADMIN — BETAMETHASONE DIPROPIONATE: 0.5 OINTMENT TOPICAL at 10:25

## 2018-03-14 RX ADMIN — HYDRALAZINE HYDROCHLORIDE 100 MG: 50 TABLET ORAL at 13:44

## 2018-03-14 RX ADMIN — LABETALOL HYDROCHLORIDE 400 MG: 200 TABLET, FILM COATED ORAL at 05:18

## 2018-03-14 RX ADMIN — CLONIDINE HYDROCHLORIDE 0.1 MG: 0.1 TABLET ORAL at 10:18

## 2018-03-14 RX ADMIN — SIMVASTATIN 20 MG: 20 TABLET, FILM COATED ORAL at 21:24

## 2018-03-14 RX ADMIN — HYDRALAZINE HYDROCHLORIDE 100 MG: 50 TABLET ORAL at 21:24

## 2018-03-14 RX ADMIN — Medication 10 ML: at 21:24

## 2018-03-14 RX ADMIN — CLONIDINE HYDROCHLORIDE 0.2 MG: 0.2 TABLET ORAL at 21:24

## 2018-03-14 RX ADMIN — ASPIRIN 81 MG: 81 TABLET, CHEWABLE ORAL at 10:21

## 2018-03-14 RX ADMIN — HYDRALAZINE HYDROCHLORIDE 100 MG: 50 TABLET ORAL at 10:19

## 2018-03-14 RX ADMIN — BETAMETHASONE DIPROPIONATE: 0.5 OINTMENT TOPICAL at 21:24

## 2018-03-14 RX ADMIN — TORSEMIDE 40 MG: 20 TABLET ORAL at 13:46

## 2018-03-14 RX ADMIN — CLONIDINE HYDROCHLORIDE 0.2 MG: 0.2 TABLET ORAL at 13:41

## 2018-03-14 RX ADMIN — DILTIAZEM HYDROCHLORIDE 90 MG: 60 TABLET, FILM COATED ORAL at 18:20

## 2018-03-14 RX ADMIN — CALCIUM ACETATE 667 MG: 667 CAPSULE ORAL at 18:21

## 2018-03-14 RX ADMIN — Medication 10 ML: at 10:20

## 2018-03-14 RX ADMIN — DOXAZOSIN 8 MG: 4 TABLET ORAL at 21:24

## 2018-03-14 ASSESSMENT — PAIN SCALES - GENERAL
PAINLEVEL_OUTOF10: 0

## 2018-03-14 NOTE — PROGRESS NOTES
(95.7 kg)   SpO2 96%   BMI 31.16 kg/m²     Gen/overall appearance: Not in acute distress. Alert. Head: Normocephalic, atraumatic  Eyes: EOMI, no scleral icterus  CVS: regular rate and rhythm, Normal S1S2  Pulm: Clear to auscultation bilaterally. No crackles/wheezes  Gastrointestinal: Soft, nontender, obese, no guarding or rebound  Extremities: Bilateral LE edema. No erythema or warmth  Neuro: No gross focal deficits noted  Skin: Warm, dry    Labs:   Recent Labs      03/12/18   0558  03/13/18   0532  03/14/18   0515   WBC  1.8*  2.3*  3.3*   HGB  8.6*  8.7*  8.4*   HCT  26.4*  26.7*  25.7*   PLT  125*  148  170     Recent Labs      03/12/18   0558  03/13/18   0532  03/14/18   0515   NA  142  144  141   K  3.7  3.6  3.4*   CL  106  110  107   CO2  21*  22*  22*   BUN  70*  64*  61*   CREATININE  3.3*  3.4*  3.4*   CALCIUM  8.2*  8.2*  8.1*   PHOS  4.2  3.7  3.5     No results for input(s): AST, ALT, BILIDIR, BILITOT, ALKPHOS in the last 72 hours. No results for input(s): INR in the last 72 hours. No results for input(s): [place] in the last 72 hours. Urinalysis:      Lab Results   Component Value Date    NITRU NEGATIVE 02/01/2018    WBCUA 2-4 02/01/2018    BACTERIA NONE 02/01/2018    RBCUA 0-2 02/01/2018    BLOODU NEGATIVE 02/01/2018    SPECGRAV 1.018 02/01/2018    GLUCOSEU 250 11/30/2017       Radiology:  Ct Abdomen Pelvis Wo Contrast Additional Contrast? None    Result Date: 3/7/2018  PROCEDURE: CT CHEST WO CONTRAST, CT ABDOMEN PELVIS WO CONTRAST CLINICAL INFORMATION: injury, chest pain/SOB. COMPARISON: November 25, 2017 TECHNIQUE: 5 mm noncontrast axial images were obtained through the chest. Sagittal and coronal reconstructions were obtained. All CT scans at this facility use dose modulation, iterative reconstruction, and/or weight-based dosing when appropriate to reduce radiation dose to as low as reasonably achievable.  FINDINGS: Limitations: Evaluation of the mediastinum and vascular signed by Dr. Jennifer Ewing on 2/15/2018 3:26 PM    Ct Chest Wo Contrast    Result Date: 3/7/2018  PROCEDURE: CT CHEST WO CONTRAST, CT ABDOMEN PELVIS WO CONTRAST CLINICAL INFORMATION: injury, chest pain/SOB. COMPARISON: November 25, 2017 TECHNIQUE: 5 mm noncontrast axial images were obtained through the chest. Sagittal and coronal reconstructions were obtained. All CT scans at this facility use dose modulation, iterative reconstruction, and/or weight-based dosing when appropriate to reduce radiation dose to as low as reasonably achievable. FINDINGS: Limitations: Evaluation of the mediastinum and vascular structures is limited due to the lack of IV contrast. Chest: Bilateral pleural effusions right greater than left with adjacent consolidation, correlate for atelectasis, CHF versus infiltrate/pneumonia. No displaced fracture. Small-moderate pericardial effusion. Mild cardiomegaly. Coronary artery calcifications. Few calcified subcarinal and hilar lymph nodes correlate for prior granulomatous infection. Thoracic aorta appears normal caliber, 3.6 cm however evaluation is limited without intravenous contrast. Degenerative changes thoracic spine. No acute fracture. Abdomen pelvis: Small hiatal hernia. Granulomatous calcifications in the spleen. Liver, gallbladder, adrenal glands and pancreas are unremarkable. Kidneys are symmetric. Aorta is normal caliber with atherosclerosis. There is diffuse soft tissue anasarca. Postsurgical changes sigmoid colon. Moderate stool throughout the colon. Correlate for constipation. There is mild, diffuse mesenteric edema and fluid within the paracolic gutters bilaterally, nonspecific. Correlation with symptoms and follow-up as clinically indicated. Degenerative changes lumbar spine. No acute fracture. Prostate calcifications. IMPRESSION chest[de-identified]  1. Bilateral pleural effusions right greater than left with adjacent consolidation, correlate for atelectasis, CHF versus infiltrate/pneumonia.  2.

## 2018-03-14 NOTE — CARE COORDINATION
3/14/18, 2:42 PM      Yaritza Bob day: 7  Location: [facility]28/028- Reason for admit: NAVEEN (acute kidney injury) [facility]) [N17.9]   Procedure: Temporary dialysis catheter placement in radiology. IHD scheduled for tomorrow. Treatment Plan of Care: Creatinine 3.4. Cardiology has signed off, patient to follow-up at discharge. Dr. Patricio Das following for Oncology, Dr. mJ Chapman following for Nephrology, temporary dialysis catheter placement with inpatient hemodialysis tomorrow, fluid restriction, strict I & O, low Na diet, blood pressure medications adjusted, DM management, Potassium replacement protocol, telemetry, PT/OT. Core Measures: VTE  PCP: Heber Wilkinson  Discharge Plan: Home residing with his mother as PTA.

## 2018-03-14 NOTE — PROGRESS NOTES
devices:  All fall risk precautions in place, Call light within reach, Chair alarm in place, Gait belt, Left in chair  Restraints  Initially in place: No    Plan:  Times per week: 3-5 X GM  Times per day: Daily  Specific instructions for Next Treatment: ther ex, mobility, gait, balance, endurance   Current Treatment Recommendations: Strengthening, Gait Training, Balance Training, Functional Mobility Training, Transfer Training, Endurance Training, Home Exercise Program    Goals:  Patient goals : Get releif of pain    Short term goals  Time Frame for Short term goals: 2 weeks   Short term goal 1: supine to sit and return with SBA to get in and out of bed   Short term goal 2: sit to stand with SBA to get on and off various surfaces  Short term goal 3: ambulate with AD over 80 feet with S to walk household distances     Long term goals  Time Frame for Long term goals : NA due to short ELOS

## 2018-03-14 NOTE — PROGRESS NOTES
Activity Tolerance:  Activity Tolerance: Patient limited by fatigue  Activity Tolerance: Pt rested following the trip to the bathroom. He was reclined in the chair when therapy session ended. He was not using O2 at this time. Assessment:     Performance deficits / Impairments: Decreased functional mobility , Decreased ADL status, Decreased strength, Decreased endurance  Prognosis: Good  Discharge Recommendations: Continue to assess pending progress, Patient would benefit from continued therapy after discharge    Patient Education:  Patient Education: Transfer safety and hand placement    Equipment Recommendations:  Equipment Needed: Yes  Mobility Devices: ADL Assistive Devices  ADL Assistive Devices: Reacher, Sock-Aid Soft    Safety:  Safety Devices in place: Yes  Type of devices: Call light within reach, Gait belt, Chair alarm in place, Left in chair, Nurse notified, Patient at risk for falls    Plan:  Times per week: 3-5x  Current Treatment Recommendations: Functional Mobility Training, Strengthening, Endurance Training, Self-Care / ADL, Safety Education & Training  Plan Comment: Pt would benefit from continued OT while medically stable and discharged from Acute. Specific instructions for Next Treatment: Functional mobility as able to tolerate; ADLs and standing tolerance; upper body exercises with theraband    Goals:  Patient goals : \"I want to get back some of my strength and be able to do things independently. \" pt states.      Short term goals  Time Frame for Short term goals: 2 weeks  Short term goal

## 2018-03-15 LAB
ALBUMIN SERPL-MCNC: 2.2 G/DL (ref 3.5–5.1)
ANION GAP SERPL CALCULATED.3IONS-SCNC: 9 MEQ/L (ref 8–16)
ANISOCYTOSIS: ABNORMAL
BASOPHILS # BLD: 1.2 %
BASOPHILS ABSOLUTE: 0.1 THOU/MM3 (ref 0–0.1)
BUN BLDV-MCNC: 57 MG/DL (ref 7–22)
CALCIUM SERPL-MCNC: 8.2 MG/DL (ref 8.5–10.5)
CHLORIDE BLD-SCNC: 110 MEQ/L (ref 98–111)
CO2: 24 MEQ/L (ref 23–33)
CREAT SERPL-MCNC: 3.4 MG/DL (ref 0.4–1.2)
EOSINOPHIL # BLD: 4.2 %
EOSINOPHILS ABSOLUTE: 0.2 THOU/MM3 (ref 0–0.4)
GFR SERPL CREATININE-BSD FRML MDRD: 19 ML/MIN/1.73M2
GLUCOSE BLD-MCNC: 202 MG/DL (ref 70–108)
GLUCOSE BLD-MCNC: 246 MG/DL (ref 70–108)
GLUCOSE BLD-MCNC: 265 MG/DL (ref 70–108)
GLUCOSE BLD-MCNC: 276 MG/DL (ref 70–108)
HBV SURFACE AB TITR SER: NEGATIVE {TITER}
HCT VFR BLD CALC: 27.1 % (ref 42–52)
HEMOGLOBIN: 9 GM/DL (ref 14–18)
HEPATITIS B CORE IGM ANTIBODY: NEGATIVE
HEPATITIS B SURFACE ANTIGEN: NEGATIVE
LYMPHOCYTES # BLD: 12.1 %
LYMPHOCYTES ABSOLUTE: 0.7 THOU/MM3 (ref 1–4.8)
MCH RBC QN AUTO: 27.9 PG (ref 27–31)
MCHC RBC AUTO-ENTMCNC: 33.4 GM/DL (ref 33–37)
MCV RBC AUTO: 83.5 FL (ref 80–94)
MONOCYTES # BLD: 12.5 %
MONOCYTES ABSOLUTE: 0.7 THOU/MM3 (ref 0.4–1.3)
NUCLEATED RED BLOOD CELLS: 0 /100 WBC
PDW BLD-RTO: 17.1 % (ref 11.5–14.5)
PHOSPHORUS: 3.7 MG/DL (ref 2.4–4.7)
PLATELET # BLD: 143 THOU/MM3 (ref 130–400)
PMV BLD AUTO: 8.8 FL (ref 7.4–10.4)
POTASSIUM SERPL-SCNC: 3.6 MEQ/L (ref 3.5–5.2)
RBC # BLD: 3.25 MILL/MM3 (ref 4.7–6.1)
SEG NEUTROPHILS: 70 %
SEGMENTED NEUTROPHILS ABSOLUTE COUNT: 4.1 THOU/MM3 (ref 1.8–7.7)
SODIUM BLD-SCNC: 143 MEQ/L (ref 135–145)
WBC # BLD: 5.9 THOU/MM3 (ref 4.8–10.8)

## 2018-03-15 PROCEDURE — 90935 HEMODIALYSIS ONE EVALUATION: CPT

## 2018-03-15 PROCEDURE — 85025 COMPLETE CBC W/AUTO DIFF WBC: CPT

## 2018-03-15 PROCEDURE — 80069 RENAL FUNCTION PANEL: CPT

## 2018-03-15 PROCEDURE — 1200000003 HC TELEMETRY R&B

## 2018-03-15 PROCEDURE — 6370000000 HC RX 637 (ALT 250 FOR IP): Performed by: HOSPITALIST

## 2018-03-15 PROCEDURE — 86705 HEP B CORE ANTIBODY IGM: CPT

## 2018-03-15 PROCEDURE — 6370000000 HC RX 637 (ALT 250 FOR IP): Performed by: INTERNAL MEDICINE

## 2018-03-15 PROCEDURE — 36591 DRAW BLOOD OFF VENOUS DEVICE: CPT

## 2018-03-15 PROCEDURE — 99232 SBSQ HOSP IP/OBS MODERATE 35: CPT | Performed by: INTERNAL MEDICINE

## 2018-03-15 PROCEDURE — 87340 HEPATITIS B SURFACE AG IA: CPT

## 2018-03-15 PROCEDURE — 82948 REAGENT STRIP/BLOOD GLUCOSE: CPT

## 2018-03-15 PROCEDURE — 6370000000 HC RX 637 (ALT 250 FOR IP): Performed by: NURSE PRACTITIONER

## 2018-03-15 PROCEDURE — 2580000003 HC RX 258: Performed by: INTERNAL MEDICINE

## 2018-03-15 PROCEDURE — 5A1D70Z PERFORMANCE OF URINARY FILTRATION, INTERMITTENT, LESS THAN 6 HOURS PER DAY: ICD-10-PCS | Performed by: HOSPITALIST

## 2018-03-15 PROCEDURE — 86706 HEP B SURFACE ANTIBODY: CPT

## 2018-03-15 PROCEDURE — 6360000002 HC RX W HCPCS: Performed by: INTERNAL MEDICINE

## 2018-03-15 RX ORDER — VALSARTAN 80 MG/1
80 TABLET ORAL DAILY
Status: DISCONTINUED | OUTPATIENT
Start: 2018-03-15 | End: 2018-03-16

## 2018-03-15 RX ORDER — SPIRONOLACTONE 25 MG/1
25 TABLET ORAL DAILY
Status: DISCONTINUED | OUTPATIENT
Start: 2018-03-15 | End: 2018-03-16

## 2018-03-15 RX ADMIN — HYDRALAZINE HYDROCHLORIDE 100 MG: 50 TABLET ORAL at 20:47

## 2018-03-15 RX ADMIN — CLONIDINE HYDROCHLORIDE 0.2 MG: 0.2 TABLET ORAL at 20:47

## 2018-03-15 RX ADMIN — PANTOPRAZOLE SODIUM 40 MG: 40 TABLET, DELAYED RELEASE ORAL at 17:14

## 2018-03-15 RX ADMIN — CALCIUM ACETATE 667 MG: 667 CAPSULE ORAL at 12:31

## 2018-03-15 RX ADMIN — BETAMETHASONE DIPROPIONATE: 0.5 OINTMENT TOPICAL at 12:31

## 2018-03-15 RX ADMIN — HYDROCORTISONE: 25 CREAM TOPICAL at 20:43

## 2018-03-15 RX ADMIN — INSULIN LISPRO 1 UNITS: 100 INJECTION, SOLUTION INTRAVENOUS; SUBCUTANEOUS at 20:56

## 2018-03-15 RX ADMIN — ASPIRIN 81 MG: 81 TABLET, CHEWABLE ORAL at 12:21

## 2018-03-15 RX ADMIN — PANTOPRAZOLE SODIUM 40 MG: 40 TABLET, DELAYED RELEASE ORAL at 05:59

## 2018-03-15 RX ADMIN — BETAMETHASONE DIPROPIONATE: 0.5 OINTMENT TOPICAL at 20:51

## 2018-03-15 RX ADMIN — DILTIAZEM HYDROCHLORIDE 90 MG: 60 TABLET, FILM COATED ORAL at 12:21

## 2018-03-15 RX ADMIN — DILTIAZEM HYDROCHLORIDE 90 MG: 60 TABLET, FILM COATED ORAL at 20:47

## 2018-03-15 RX ADMIN — SPIRONOLACTONE 25 MG: 25 TABLET, FILM COATED ORAL at 13:16

## 2018-03-15 RX ADMIN — LABETALOL HYDROCHLORIDE 400 MG: 200 TABLET, FILM COATED ORAL at 05:59

## 2018-03-15 RX ADMIN — DILTIAZEM HYDROCHLORIDE 90 MG: 60 TABLET, FILM COATED ORAL at 17:14

## 2018-03-15 RX ADMIN — ISOSORBIDE MONONITRATE 120 MG: 60 TABLET ORAL at 17:14

## 2018-03-15 RX ADMIN — ONDANSETRON 4 MG: 2 INJECTION INTRAMUSCULAR; INTRAVENOUS at 17:12

## 2018-03-15 RX ADMIN — VALSARTAN 80 MG: 80 TABLET ORAL at 13:16

## 2018-03-15 RX ADMIN — Medication 10 ML: at 20:59

## 2018-03-15 RX ADMIN — HYDRALAZINE HYDROCHLORIDE 100 MG: 50 TABLET ORAL at 12:21

## 2018-03-15 RX ADMIN — CALCIUM ACETATE 667 MG: 667 CAPSULE ORAL at 17:14

## 2018-03-15 RX ADMIN — Medication 10 ML: at 12:32

## 2018-03-15 RX ADMIN — LABETALOL HYDROCHLORIDE 400 MG: 200 TABLET, FILM COATED ORAL at 13:17

## 2018-03-15 RX ADMIN — TORSEMIDE 40 MG: 20 TABLET ORAL at 12:28

## 2018-03-15 RX ADMIN — ISOSORBIDE MONONITRATE 120 MG: 60 TABLET ORAL at 12:28

## 2018-03-15 RX ADMIN — CALCIUM ACETATE 667 MG: 667 CAPSULE ORAL at 12:30

## 2018-03-15 RX ADMIN — SIMVASTATIN 20 MG: 20 TABLET, FILM COATED ORAL at 20:48

## 2018-03-15 RX ADMIN — CLOPIDOGREL 75 MG: 75 TABLET, FILM COATED ORAL at 12:28

## 2018-03-15 RX ADMIN — CLONIDINE HYDROCHLORIDE 0.2 MG: 0.2 TABLET ORAL at 12:33

## 2018-03-15 RX ADMIN — DOXAZOSIN 8 MG: 4 TABLET ORAL at 12:28

## 2018-03-15 RX ADMIN — DOXAZOSIN 8 MG: 4 TABLET ORAL at 20:50

## 2018-03-15 ASSESSMENT — PAIN SCALES - GENERAL: PAINLEVEL_OUTOF10: 0

## 2018-03-15 NOTE — PROGRESS NOTES
Nephrology  Progress Note    Pt Name: Rosie Montelongo  MRN: [id number]  [id number]  YOB: 1965  Admit Date: 3/7/2018 10:40 AM  Date of evaluation: 3/15/2018  Primary Care Physician: Rashi Wing   5K-28/028-A       Subjective:       Feeling nauseated , vomited once   Lower extremity edema after IHD has been improved  Blood pressure is still in high ranges  Despite being on 4 different  BP meds and all max dose   Diet: DIET CARDIAC; Low Sodium (2 GM);  Daily Fluid Restriction: 2000 ml; Renal    Medications:   Scheduled Meds:   spironolactone  25 mg Oral Daily    valsartan  80 mg Oral Daily    insulin lispro  0-6 Units Subcutaneous TID WC    insulin lispro  0-3 Units Subcutaneous Nightly    cloNIDine  0.2 mg Oral BID    torsemide  40 mg Oral Daily    isosorbide mononitrate  120 mg Oral BID    labetalol  400 mg Oral 3 times per day    calcium acetate  667 mg Oral TID WC    clopidogrel  75 mg Oral Daily    aspirin  81 mg Oral Daily    darbepoetin esdars-polysorbate  100 mcg Subcutaneous Q7 Days    polyethylene glycol  17 g Oral Daily    sodium chloride flush  10 mL Intravenous 2 times per day    pantoprazole  40 mg Oral BID AC    morphine  4 mg Intravenous Once    ondansetron  4 mg Intravenous Once    betamethasone dipropionate   Topical BID    diltiazem  90 mg Oral 4x Daily    doxazosin  8 mg Oral BID    hydrALAZINE  100 mg Oral TID    hydrocortisone   Topical BID    simvastatin  20 mg Oral Nightly    insulin glargine  8 Units Subcutaneous Daily     Continuous Infusions:   dextrose         Objective:   Vitals:   BP (!) 185/86   Pulse 56   Temp 98.3 °F (36.8 °C) (Oral)   Resp 14   Ht 5' 9\" (1.753 m)   Wt 230 lb 9.6 oz (104.6 kg)   SpO2 95%   BMI 34.05 kg/m²     I&O's:    Intake/Output Summary (Last 24 hours) at 03/15/18 1712  Last data filed at 03/15/18 1122   Gross per 24 hour   Intake             1140 ml   Output             5225 ml   Net            -4085 ml     I/O last 3 completed shifts: In: 1140 [P.O.:740]  Out: 5225 [Urine:1825]     Date 03/15/18 0000 - 03/15/18 2359   Shift 8833-7150 7633-5403 5282-1391 24 Hour Total   I  N  T  A  K  E   P.O.  (mL/kg/hr) 500  (0.7)   500    I.V.  (mL/kg) 0  (0)   0  (0)    Dialysis  (mL/kg)  400  (3.8)  400  (3.8)    Shift Total  (mL/kg) 500  (5.2) 400  (3.8)  900  (8.6)   O  U  T  P  U  T   Urine  (mL/kg/hr) 1325  (1.7)   1325    Dialysis  3400  3400    Shift Total  (mL/kg) 1325  (13.8) 3400  (32.5)  4725  (45.2)   Weight (kg) 95.7 104.6 104.6 104.6       General appearance: no distress  HEENT: PERRLA, EOMI, NON ICTERIC  Neck: NO LAD, NO THYROMEGALY  Lungs: CLEAR TO AUSCULTATION BILATERALLY  Heart: S1 S2 NORMAL, REGULAR RATE AND RHYTHM, NO AUDIBLE MURMURS  Abdomen: SOFT, NON TENDER, NON DISTENDED, NO ORGANOMEGALY FELT, BOWEL SOUNDS NORMAL  Extremities: 3+ b/l  Neurologic: GROSSLY NON FOCAL    LABS:    CBC:   Recent Labs      03/13/18   0532  03/14/18   0515  03/15/18   0555   WBC  2.3*  3.3*  5.9   HGB  8.7*  8.4*  9.0*   PLT  148  170  143     BMP:  Recent Labs      03/13/18   0532  03/14/18   0515  03/15/18   0555   NA  144  141  143   K  3.6  3.4*  3.6   CL  110  107  110   CO2  22*  22*  24   BUN  64*  61*  57*   CREATININE  3.4*  3.4*  3.4*   GLUCOSE  178*  243*  276*   CALCIUM  8.2*  8.1*  8.2*   PHOS  3.7  3.5  3.7     TSH: No results for input(s): TSH in the last 72 hours. HgBa1c: No results for input(s): LABA1C in the last 72 hours. Hepatic:   Recent Labs      03/13/18   0532  03/14/18   0515  03/15/18   0555   LABALBU  2.4*  2.1*  2.2*     Troponin: No results for input(s): TROPONINI in the last 72 hours. BNP: No results for input(s): BNP in the last 72 hours. Lipids: No results for input(s): CHOL, HDL in the last 72 hours. Invalid input(s): LDLCALCU  INR: No results for input(s): INR in the last 72 hours. Images and Other:  reviewed      Assessment and Plan:   1.  Acute blood loss anemia. H/H stable  2.  Anasarca.  Still

## 2018-03-15 NOTE — PROGRESS NOTES
GM); Daily Fluid Restriction: 2000 ml; Renal    Exam:  BP (!) 185/86   Pulse 56   Temp 98.3 °F (36.8 °C) (Oral)   Resp 14   Ht 5' 9\" (1.753 m)   Wt 230 lb 9.6 oz (104.6 kg)   SpO2 95%   BMI 34.05 kg/m²     Gen/overall appearance: Not in acute distress. Alert. Head: Normocephalic, atraumatic  Eyes: EOMI, no scleral icterus  CVS: regular rate and rhythm, Normal S1S2  Pulm: Clear to auscultation bilaterally. No crackles/wheezes  Gastrointestinal: Soft, nontender, obese, no guarding or rebound  Extremities: Bilateral LE edema. No erythema or warmth  Neuro: No gross focal deficits noted  Skin: Warm, dry    Labs:   Recent Labs      03/13/18   0532  03/14/18   0515  03/15/18   0555   WBC  2.3*  3.3*  5.9   HGB  8.7*  8.4*  9.0*   HCT  26.7*  25.7*  27.1*   PLT  148  170  143     Recent Labs      03/13/18   0532  03/14/18   0515  03/15/18   0555   NA  144  141  143   K  3.6  3.4*  3.6   CL  110  107  110   CO2  22*  22*  24   BUN  64*  61*  57*   CREATININE  3.4*  3.4*  3.4*   CALCIUM  8.2*  8.1*  8.2*   PHOS  3.7  3.5  3.7     No results for input(s): AST, ALT, BILIDIR, BILITOT, ALKPHOS in the last 72 hours. No results for input(s): INR in the last 72 hours. No results for input(s): Olinda Nadeem in the last 72 hours. Urinalysis:      Lab Results   Component Value Date    NITRU NEGATIVE 02/01/2018    WBCUA 2-4 02/01/2018    BACTERIA NONE 02/01/2018    RBCUA 0-2 02/01/2018    BLOODU NEGATIVE 02/01/2018    SPECGRAV 1.018 02/01/2018    GLUCOSEU 250 11/30/2017       Radiology:  Ct Abdomen Pelvis Wo Contrast Additional Contrast? None    Result Date: 3/7/2018  PROCEDURE: CT CHEST WO CONTRAST, CT ABDOMEN PELVIS WO CONTRAST CLINICAL INFORMATION: injury, chest pain/SOB. COMPARISON: November 25, 2017 TECHNIQUE: 5 mm noncontrast axial images were obtained through the chest. Sagittal and coronal reconstructions were obtained.  All CT scans at this facility use dose modulation, iterative reconstruction, and/or weight-based dosing when appropriate to reduce radiation dose to as low as reasonably achievable. FINDINGS: Limitations: Evaluation of the mediastinum and vascular structures is limited due to the lack of IV contrast. Chest: Bilateral pleural effusions right greater than left with adjacent consolidation, correlate for atelectasis, CHF versus infiltrate/pneumonia. No displaced fracture. Small-moderate pericardial effusion. Mild cardiomegaly. Coronary artery calcifications. Few calcified subcarinal and hilar lymph nodes correlate for prior granulomatous infection. Thoracic aorta appears normal caliber, 3.6 cm however evaluation is limited without intravenous contrast. Degenerative changes thoracic spine. No acute fracture. Abdomen pelvis: Small hiatal hernia. Granulomatous calcifications in the spleen. Liver, gallbladder, adrenal glands and pancreas are unremarkable. Kidneys are symmetric. Aorta is normal caliber with atherosclerosis. There is diffuse soft tissue anasarca. Postsurgical changes sigmoid colon. Moderate stool throughout the colon. Correlate for constipation. There is mild, diffuse mesenteric edema and fluid within the paracolic gutters bilaterally, nonspecific. Correlation with symptoms and follow-up as clinically indicated. Degenerative changes lumbar spine. No acute fracture. Prostate calcifications. IMPRESSION chest[de-identified]  1. Bilateral pleural effusions right greater than left with adjacent consolidation, correlate for atelectasis, CHF versus infiltrate/pneumonia. 2. Mild cardiomegaly. . Small/moderate pericardial effusion. IMPRESSION abdomen and pelvis: 1. Moderate stool throughout the colon. Correlate for constipation 2. There is mild, diffuse mesenteric edema and fluid within the paracolic gutters bilaterally, nonspecific. Correlation with symptoms and follow-up as clinically indicated. 3. Moderate soft tissue anasarca, regressed from prior study.  **This report has been created using voice recognition hyperkalemia, resolved   6. Acute anemia, undetermined etiology. No overt bleeding. Suspected secondary to chemotherapy  7. Leukopenia 2/2 chemo  8. Hypertensive urgency  9. Abdominal discomfort, CT scan of the abdomen revealed mesenteric edema, moderate soft tissue anasarca likely secondary to CHF. Improved      comorbidities:  · Metastatic colon cancer to the liver Status post colectomy,  Chemotherapy  ·  dyslipidemia  · Essential hypertension  · Coronary artery disease  · Obesity Body mass index is 31.16 kg/m².     Plan:  1. Started on HD per nephro; tolerating well  2. Bumex d/esthela  3. Trend lytes closely  4. Wean O2 as tolerated  5. Respiratory care  6. Outpt PCI  7. Titrate BP regimen PRN  8. Glycemic control  9.  PT/OT    Electronically signed by Monika Vidal MD on 3/15/2018 at 5:27 PM

## 2018-03-15 NOTE — PROGRESS NOTES
Nutrition Assessment    Type and Reason for Visit: Initial (LOS)    Malnutrition Assessment:  · Malnutrition Status: No malnutrition    Nutrition Diagnosis:   · Problem: No nutrition diagnosis at this time    Nutrition Assessment:  · Subjective Assessment: Pt admitted d/t abd. pain/fall. Pt received intermittent hemodialysis today. Pt seen- he reports good appetite and intake over the past week, consuming 75% or more of meals. Pt reports diaylsis today made him sleepy and he wasn't hungry for lunch. Pt is unsure of UBW but states he desired weight loss. Pt's current weight appears to be stable x3 months per chart review. Pt declines all ONS during LOS. Pt denies N/V. Last BM x1 on 3/14. Labs: POC Glucose 202, BUN 54, Cr. 3.4.   · Wound Type: None  · Current Nutrition Therapies:  · Oral Diet Orders: 2gm Sodium, Cardiac, Renal, Fluid Restriction (2000 mL)   · Oral Diet intake: %  · Oral Nutrition Supplement (ONS) Orders: None (Pt declines all ONS at this time)  · Anthropometric Measures:  · Ht: 5' 9\" (175.3 cm)   · Current Body Wt: 230 lb 9.6 oz (104.6 kg) (3/15; +2 pitting edema BLE)  · Admission Body Wt: 259 lb 12.8 oz (117.8 kg) (3/7; +2 pitting edema BLE)  · Usual Body Wt: 254 lb 6.4 oz (115.4 kg) (on 2/5/18; 228 lb 8 oz on 12/28/17)  · % Weight Change: weight stable,  x3 months per chart review  · Ideal Body Wt: 160 lb (72.6 kg), % Ideal Body 144%  · Adjusted Body Wt: 177 lb 8 oz (80.5 kg), body weight adjusted for Obesity  · BMI Classification: BMI 30.0 - 34.9 Obese Class I  · Comparative Standards (Estimated Nutrition Needs):  · Estimated Daily Total Kcal: 8281-4842 kcal/day  · Estimated Daily Protein (g): 48-64 g/day - monitoring renal status    Estimated Intake vs Estimated Needs: Intake Meets Needs    Nutrition Risk Level   Risk Level: Low    Nutrition Intervention  Food and/or Delivery: Continue current diet  Nutrition Education/Counseling/Coordination of Care:  Continued Inpatient Monitoring, Education Initiated (Encouraged adequate po intake of meals during LOS)    Patient assessed for nutrition risk. Deemed to be at low risk at this time. Will continue to follow patient.       Electronically signed by Marvin Rudolph RD, LD on 3/15/18 at 2:36 PM    Contact Number: [phone]

## 2018-03-16 ENCOUNTER — APPOINTMENT (OUTPATIENT)
Dept: ULTRASOUND IMAGING | Age: 53
DRG: 194 | End: 2018-03-16
Payer: MEDICAID

## 2018-03-16 LAB
ALBUMIN SERPL-MCNC: 2.2 G/DL (ref 3.5–5.1)
ANION GAP SERPL CALCULATED.3IONS-SCNC: 11 MEQ/L (ref 8–16)
ANION GAP SERPL CALCULATED.3IONS-SCNC: 12 MEQ/L (ref 8–16)
ANISOCYTOSIS: ABNORMAL
BACTERIA: ABNORMAL /HPF
BASOPHILS # BLD: 0.9 %
BASOPHILS ABSOLUTE: 0.1 THOU/MM3 (ref 0–0.1)
BILIRUBIN URINE: NEGATIVE
BLOOD, URINE: ABNORMAL
BUN BLDV-MCNC: 44 MG/DL (ref 7–22)
BUN BLDV-MCNC: 45 MG/DL (ref 7–22)
CALCIUM SERPL-MCNC: 8.3 MG/DL (ref 8.5–10.5)
CALCIUM SERPL-MCNC: 8.4 MG/DL (ref 8.5–10.5)
CASTS 2: ABNORMAL /LPF
CASTS UA: ABNORMAL /LPF
CHARACTER, URINE: ABNORMAL
CHLORIDE BLD-SCNC: 102 MEQ/L (ref 98–111)
CHLORIDE BLD-SCNC: 103 MEQ/L (ref 98–111)
CO2: 25 MEQ/L (ref 23–33)
CO2: 25 MEQ/L (ref 23–33)
COLOR: YELLOW
CREAT SERPL-MCNC: 3.1 MG/DL (ref 0.4–1.2)
CREAT SERPL-MCNC: 3.3 MG/DL (ref 0.4–1.2)
CREATININE URINE: 71.8 MG/DL
CRYSTALS, UA: ABNORMAL
EOSINOPHIL # BLD: 2.5 %
EOSINOPHILS ABSOLUTE: 0.2 THOU/MM3 (ref 0–0.4)
EPITHELIAL CELLS, UA: ABNORMAL /HPF
GFR SERPL CREATININE-BSD FRML MDRD: 20 ML/MIN/1.73M2
GFR SERPL CREATININE-BSD FRML MDRD: 21 ML/MIN/1.73M2
GLUCOSE BLD-MCNC: 231 MG/DL (ref 70–108)
GLUCOSE BLD-MCNC: 238 MG/DL (ref 70–108)
GLUCOSE BLD-MCNC: 257 MG/DL (ref 70–108)
GLUCOSE BLD-MCNC: 258 MG/DL (ref 70–108)
GLUCOSE BLD-MCNC: 267 MG/DL (ref 70–108)
GLUCOSE BLD-MCNC: 276 MG/DL (ref 70–108)
GLUCOSE URINE: >= 1000 MG/DL
HCT VFR BLD CALC: 28.1 % (ref 42–52)
HEMOGLOBIN: 9.4 GM/DL (ref 14–18)
KETONES, URINE: NEGATIVE
LEUKOCYTE ESTERASE, URINE: NEGATIVE
LYMPHOCYTES # BLD: 8.6 %
LYMPHOCYTES ABSOLUTE: 0.7 THOU/MM3 (ref 1–4.8)
MCH RBC QN AUTO: 27.9 PG (ref 27–31)
MCHC RBC AUTO-ENTMCNC: 33.3 GM/DL (ref 33–37)
MCV RBC AUTO: 83.9 FL (ref 80–94)
MISCELLANEOUS 2: ABNORMAL
MONOCYTES # BLD: 8.9 %
MONOCYTES ABSOLUTE: 0.7 THOU/MM3 (ref 0.4–1.3)
NITRITE, URINE: NEGATIVE
NUCLEATED RED BLOOD CELLS: 0 /100 WBC
PDW BLD-RTO: 17.3 % (ref 11.5–14.5)
PH UA: 6
PHOSPHORUS: 4.5 MG/DL (ref 2.4–4.7)
PLATELET # BLD: 134 THOU/MM3 (ref 130–400)
PMV BLD AUTO: 8.9 FL (ref 7.4–10.4)
POTASSIUM SERPL-SCNC: 3.7 MEQ/L (ref 3.5–5.2)
POTASSIUM SERPL-SCNC: 3.7 MEQ/L (ref 3.5–5.2)
PROT/CREAT RATIO, UR: 23.27
PROTEIN UA: >= 1000
PROTEIN, URINE: 1671 MG/DL
RBC # BLD: 3.35 MILL/MM3 (ref 4.7–6.1)
RBC URINE: ABNORMAL /HPF
RENAL EPITHELIAL, UA: ABNORMAL
SEG NEUTROPHILS: 79.1 %
SEGMENTED NEUTROPHILS ABSOLUTE COUNT: 6 THOU/MM3 (ref 1.8–7.7)
SODIUM BLD-SCNC: 138 MEQ/L (ref 135–145)
SODIUM BLD-SCNC: 140 MEQ/L (ref 135–145)
SPECIFIC GRAVITY, URINE: 1.02 (ref 1–1.03)
UROBILINOGEN, URINE: 0.2 EU/DL
WBC # BLD: 7.6 THOU/MM3 (ref 4.8–10.8)
WBC UA: ABNORMAL /HPF
YEAST: ABNORMAL

## 2018-03-16 PROCEDURE — 80069 RENAL FUNCTION PANEL: CPT

## 2018-03-16 PROCEDURE — 82948 REAGENT STRIP/BLOOD GLUCOSE: CPT

## 2018-03-16 PROCEDURE — 93975 VASCULAR STUDY: CPT

## 2018-03-16 PROCEDURE — 6370000000 HC RX 637 (ALT 250 FOR IP): Performed by: HOSPITALIST

## 2018-03-16 PROCEDURE — 85025 COMPLETE CBC W/AUTO DIFF WBC: CPT

## 2018-03-16 PROCEDURE — 6370000000 HC RX 637 (ALT 250 FOR IP): Performed by: INTERNAL MEDICINE

## 2018-03-16 PROCEDURE — 2580000003 HC RX 258: Performed by: INTERNAL MEDICINE

## 2018-03-16 PROCEDURE — 90935 HEMODIALYSIS ONE EVALUATION: CPT

## 2018-03-16 PROCEDURE — 81001 URINALYSIS AUTO W/SCOPE: CPT

## 2018-03-16 PROCEDURE — 82570 ASSAY OF URINE CREATININE: CPT

## 2018-03-16 PROCEDURE — 6370000000 HC RX 637 (ALT 250 FOR IP): Performed by: NURSE PRACTITIONER

## 2018-03-16 PROCEDURE — 87186 SC STD MICRODIL/AGAR DIL: CPT

## 2018-03-16 PROCEDURE — 87086 URINE CULTURE/COLONY COUNT: CPT

## 2018-03-16 PROCEDURE — 84156 ASSAY OF PROTEIN URINE: CPT

## 2018-03-16 PROCEDURE — 87077 CULTURE AEROBIC IDENTIFY: CPT

## 2018-03-16 PROCEDURE — 82088 ASSAY OF ALDOSTERONE: CPT

## 2018-03-16 PROCEDURE — 99232 SBSQ HOSP IP/OBS MODERATE 35: CPT | Performed by: INTERNAL MEDICINE

## 2018-03-16 PROCEDURE — 80048 BASIC METABOLIC PNL TOTAL CA: CPT

## 2018-03-16 PROCEDURE — 83835 ASSAY OF METANEPHRINES: CPT

## 2018-03-16 PROCEDURE — 1200000003 HC TELEMETRY R&B

## 2018-03-16 PROCEDURE — 84244 ASSAY OF RENIN: CPT

## 2018-03-16 RX ORDER — SPIRONOLACTONE 25 MG/1
50 TABLET ORAL DAILY
Status: DISCONTINUED | OUTPATIENT
Start: 2018-03-17 | End: 2018-03-22 | Stop reason: HOSPADM

## 2018-03-16 RX ORDER — ONDANSETRON 4 MG/1
4 TABLET, ORALLY DISINTEGRATING ORAL EVERY 6 HOURS
Status: DISCONTINUED | OUTPATIENT
Start: 2018-03-16 | End: 2018-03-16

## 2018-03-16 RX ORDER — ONDANSETRON 4 MG/1
4 TABLET, ORALLY DISINTEGRATING ORAL EVERY 6 HOURS PRN
Status: DISCONTINUED | OUTPATIENT
Start: 2018-03-16 | End: 2018-03-22 | Stop reason: HOSPADM

## 2018-03-16 RX ORDER — VALSARTAN 160 MG/1
160 TABLET ORAL DAILY
Status: DISCONTINUED | OUTPATIENT
Start: 2018-03-17 | End: 2018-03-18

## 2018-03-16 RX ADMIN — DOXAZOSIN 8 MG: 4 TABLET ORAL at 20:33

## 2018-03-16 RX ADMIN — CLONIDINE HYDROCHLORIDE 0.2 MG: 0.2 TABLET ORAL at 20:33

## 2018-03-16 RX ADMIN — INSULIN GLARGINE 8 UNITS: 100 INJECTION, SOLUTION SUBCUTANEOUS at 08:09

## 2018-03-16 RX ADMIN — PANTOPRAZOLE SODIUM 40 MG: 40 TABLET, DELAYED RELEASE ORAL at 17:49

## 2018-03-16 RX ADMIN — TORSEMIDE 40 MG: 20 TABLET ORAL at 08:10

## 2018-03-16 RX ADMIN — SPIRONOLACTONE 25 MG: 25 TABLET, FILM COATED ORAL at 08:10

## 2018-03-16 RX ADMIN — HYDROCORTISONE: 25 CREAM TOPICAL at 22:22

## 2018-03-16 RX ADMIN — CLONIDINE HYDROCHLORIDE 0.2 MG: 0.2 TABLET ORAL at 08:11

## 2018-03-16 RX ADMIN — CLOPIDOGREL 75 MG: 75 TABLET, FILM COATED ORAL at 08:11

## 2018-03-16 RX ADMIN — DOXAZOSIN 8 MG: 4 TABLET ORAL at 08:09

## 2018-03-16 RX ADMIN — HYDRALAZINE HYDROCHLORIDE 100 MG: 50 TABLET ORAL at 20:33

## 2018-03-16 RX ADMIN — PANTOPRAZOLE SODIUM 40 MG: 40 TABLET, DELAYED RELEASE ORAL at 08:11

## 2018-03-16 RX ADMIN — SIMVASTATIN 20 MG: 20 TABLET, FILM COATED ORAL at 22:06

## 2018-03-16 RX ADMIN — INSULIN LISPRO 2 UNITS: 100 INJECTION, SOLUTION INTRAVENOUS; SUBCUTANEOUS at 22:28

## 2018-03-16 RX ADMIN — BETAMETHASONE DIPROPIONATE: 0.5 OINTMENT TOPICAL at 08:09

## 2018-03-16 RX ADMIN — DILTIAZEM HYDROCHLORIDE 90 MG: 60 TABLET, FILM COATED ORAL at 20:33

## 2018-03-16 RX ADMIN — BETAMETHASONE DIPROPIONATE: 0.5 OINTMENT TOPICAL at 20:33

## 2018-03-16 RX ADMIN — VALSARTAN 80 MG: 80 TABLET ORAL at 08:10

## 2018-03-16 RX ADMIN — Medication 10 ML: at 22:23

## 2018-03-16 RX ADMIN — DILTIAZEM HYDROCHLORIDE 90 MG: 60 TABLET, FILM COATED ORAL at 08:10

## 2018-03-16 RX ADMIN — CALCIUM ACETATE 667 MG: 667 CAPSULE ORAL at 08:11

## 2018-03-16 RX ADMIN — ISOSORBIDE MONONITRATE 120 MG: 60 TABLET ORAL at 08:10

## 2018-03-16 RX ADMIN — Medication 2 UNITS: at 08:10

## 2018-03-16 RX ADMIN — HYDRALAZINE HYDROCHLORIDE 100 MG: 50 TABLET ORAL at 08:10

## 2018-03-16 RX ADMIN — CALCIUM ACETATE 667 MG: 667 CAPSULE ORAL at 17:49

## 2018-03-16 RX ADMIN — ASPIRIN 81 MG: 81 TABLET, CHEWABLE ORAL at 08:11

## 2018-03-16 ASSESSMENT — PAIN SCALES - GENERAL
PAINLEVEL_OUTOF10: 0
PAINLEVEL_OUTOF10: 0

## 2018-03-16 NOTE — PROGRESS NOTES
GM); Daily Fluid Restriction: 2000 ml; Renal    Exam:  BP (!) 155/68   Pulse 52   Temp 98.2 °F (36.8 °C)   Resp 16   Ht 5' 9\" (1.753 m)   Wt 232 lb 12.9 oz (105.6 kg)   SpO2 94%   BMI 34.38 kg/m²     Gen/overall appearance: Not in acute distress. Alert. Head: Normocephalic, atraumatic  Eyes: EOMI, no scleral icterus  CVS: regular rate and rhythm, Normal S1S2  Pulm: Clear to auscultation bilaterally. No crackles/wheezes  Gastrointestinal: Soft, nontender, obese, no guarding or rebound  Extremities: Bilateral LE edema. No erythema or warmth  Neuro: No gross focal deficits noted  Skin: Warm, dry    Labs:   Recent Labs      03/14/18   0515  03/15/18   0555  03/16/18   0936   WBC  3.3*  5.9  7.6   HGB  8.4*  9.0*  9.4*   HCT  25.7*  27.1*  28.1*   PLT  170  143  134     Recent Labs      03/14/18   0515  03/15/18   0555  03/16/18   0936  03/16/18   0937   NA  141  143  140  138   K  3.4*  3.6  3.7  3.7   CL  107  110  103  102   CO2  22*  24  25  25   BUN  61*  57*  44*  45*   CREATININE  3.4*  3.4*  3.3*  3.1*   CALCIUM  8.1*  8.2*  8.3*  8.4*   PHOS  3.5  3.7  4.5   --      No results for input(s): AST, ALT, BILIDIR, BILITOT, ALKPHOS in the last 72 hours. No results for input(s): INR in the last 72 hours. No results for input(s): Brenna Fine in the last 72 hours. Urinalysis:      Lab Results   Component Value Date    NITRU NEGATIVE 03/16/2018    WBCUA 10-15 03/16/2018    BACTERIA FEW 03/16/2018    RBCUA 3-5 03/16/2018    BLOODU TRACE 03/16/2018    SPECGRAV 1.018 02/01/2018    GLUCOSEU >= 1000 03/16/2018       Radiology:  Ct Abdomen Pelvis Wo Contrast Additional Contrast? None    Result Date: 3/7/2018  PROCEDURE: CT CHEST WO CONTRAST, CT ABDOMEN PELVIS WO CONTRAST CLINICAL INFORMATION: injury, chest pain/SOB. COMPARISON: November 25, 2017 TECHNIQUE: 5 mm noncontrast axial images were obtained through the chest. Sagittal and coronal reconstructions were obtained.  All CT scans at this facility use dose

## 2018-03-16 NOTE — PROGRESS NOTES
[P.O.:200]  Out: 275 [Urine:275]     Date 03/16/18 0000 - 03/16/18 2359   Shift [id number] [id number] 0593-2359 24 Hour Total   I  N  T  A  K  E   P.O.  (mL/kg/hr) 100  (0.1)   100    I.V.  (mL/kg) 0  (0)   0  (0)    Shift Total  (mL/kg) 100  (1)   100  (1)   O  U  T  P  U  T   Urine  (mL/kg/hr) 275  (0.3)   275    Shift Total  (mL/kg) 275  (2.6)   275  (2.7)   Weight (kg) 104.6 105.6 102.4 102.4       General appearance: no distress  HEENT: PERRLA, EOMI, NON ICTERIC  Neck: NO LAD, NO THYROMEGALY  Lungs: CLEAR TO AUSCULTATION BILATERALLY  Heart: S1 S2 NORMAL, REGULAR RATE AND RHYTHM, NO AUDIBLE MURMURS  Abdomen: SOFT, NON TENDER, NON DISTENDED, NO ORGANOMEGALY FELT, BOWEL SOUNDS NORMAL  Extremities: 3+ b/l  Neurologic: GROSSLY NON FOCAL    LABS:    CBC:   Recent Labs      03/14/18   0515  03/15/18   0555  03/16/18   0936   WBC  3.3*  5.9  7.6   HGB  8.4*  9.0*  9.4*   PLT  170  143  134     BMP:  Recent Labs      03/14/18   0515  03/15/18   0555  03/16/18   0936  03/16/18   0937   NA  141  143  140  138   K  3.4*  3.6  3.7  3.7   CL  107  110  103  102   CO2  22*  24  25  25   BUN  61*  57*  44*  45*   CREATININE  3.4*  3.4*  3.3*  3.1*   GLUCOSE  243*  276*  257*  258*   CALCIUM  8.1*  8.2*  8.3*  8.4*   PHOS  3.5  3.7  4.5   --      TSH: No results for input(s): TSH in the last 72 hours. HgBa1c: No results for input(s): LABA1C in the last 72 hours. Hepatic:   Recent Labs      03/14/18   0515  03/15/18   0555  03/16/18   0936   LABALBU  2.1*  2.2*  2.2*     Troponin: No results for input(s): TROPONINI in the last 72 hours. BNP: No results for input(s): BNP in the last 72 hours. Lipids: No results for input(s): CHOL, HDL in the last 72 hours. Invalid input(s): LDLCALCU  INR: No results for input(s): INR in the last 72 hours. Images and Other:  reviewed      Assessment and Plan:   1.  Acute blood loss anemia. H/H stable  2.  Anasarca. Still present  3.  Hypertension.   4.  Acute hypoxic respiratory failure

## 2018-03-17 LAB
ALBUMIN SERPL-MCNC: 2.3 G/DL (ref 3.5–5.1)
ANION GAP SERPL CALCULATED.3IONS-SCNC: 14 MEQ/L (ref 8–16)
ANISOCYTOSIS: ABNORMAL
BASOPHILS # BLD: 0.6 %
BASOPHILS ABSOLUTE: 0.1 THOU/MM3 (ref 0–0.1)
BUN BLDV-MCNC: 36 MG/DL (ref 7–22)
CALCIUM SERPL-MCNC: 8.3 MG/DL (ref 8.5–10.5)
CHLORIDE BLD-SCNC: 102 MEQ/L (ref 98–111)
CO2: 25 MEQ/L (ref 23–33)
CREAT SERPL-MCNC: 3.3 MG/DL (ref 0.4–1.2)
EOSINOPHIL # BLD: 1.4 %
EOSINOPHILS ABSOLUTE: 0.1 THOU/MM3 (ref 0–0.4)
GFR SERPL CREATININE-BSD FRML MDRD: 20 ML/MIN/1.73M2
GLUCOSE BLD-MCNC: 237 MG/DL (ref 70–108)
GLUCOSE BLD-MCNC: 249 MG/DL (ref 70–108)
GLUCOSE BLD-MCNC: 258 MG/DL (ref 70–108)
GLUCOSE BLD-MCNC: 267 MG/DL (ref 70–108)
HCT VFR BLD CALC: 30.7 % (ref 42–52)
HEMOGLOBIN: 10 GM/DL (ref 14–18)
LYMPHOCYTES # BLD: 7.4 %
LYMPHOCYTES ABSOLUTE: 0.7 THOU/MM3 (ref 1–4.8)
MCH RBC QN AUTO: 27.4 PG (ref 27–31)
MCHC RBC AUTO-ENTMCNC: 32.6 GM/DL (ref 33–37)
MCV RBC AUTO: 84 FL (ref 80–94)
MONOCYTES # BLD: 8.6 %
MONOCYTES ABSOLUTE: 0.8 THOU/MM3 (ref 0.4–1.3)
NUCLEATED RED BLOOD CELLS: 0 /100 WBC
PDW BLD-RTO: 17.9 % (ref 11.5–14.5)
PHOSPHORUS: 4.3 MG/DL (ref 2.4–4.7)
PLATELET # BLD: 132 THOU/MM3 (ref 130–400)
PMV BLD AUTO: 9.2 FL (ref 7.4–10.4)
POTASSIUM SERPL-SCNC: 4.3 MEQ/L (ref 3.5–5.2)
RBC # BLD: 3.66 MILL/MM3 (ref 4.7–6.1)
SEG NEUTROPHILS: 82 %
SEGMENTED NEUTROPHILS ABSOLUTE COUNT: 7.3 THOU/MM3 (ref 1.8–7.7)
SODIUM BLD-SCNC: 141 MEQ/L (ref 135–145)
WBC # BLD: 8.9 THOU/MM3 (ref 4.8–10.8)

## 2018-03-17 PROCEDURE — 6370000000 HC RX 637 (ALT 250 FOR IP): Performed by: INTERNAL MEDICINE

## 2018-03-17 PROCEDURE — 6360000002 HC RX W HCPCS: Performed by: INTERNAL MEDICINE

## 2018-03-17 PROCEDURE — 99232 SBSQ HOSP IP/OBS MODERATE 35: CPT | Performed by: INTERNAL MEDICINE

## 2018-03-17 PROCEDURE — 1200000003 HC TELEMETRY R&B

## 2018-03-17 PROCEDURE — 6370000000 HC RX 637 (ALT 250 FOR IP): Performed by: NURSE PRACTITIONER

## 2018-03-17 PROCEDURE — 82948 REAGENT STRIP/BLOOD GLUCOSE: CPT

## 2018-03-17 PROCEDURE — 2580000003 HC RX 258: Performed by: INTERNAL MEDICINE

## 2018-03-17 PROCEDURE — 6370000000 HC RX 637 (ALT 250 FOR IP): Performed by: HOSPITALIST

## 2018-03-17 PROCEDURE — 80069 RENAL FUNCTION PANEL: CPT

## 2018-03-17 PROCEDURE — 85025 COMPLETE CBC W/AUTO DIFF WBC: CPT

## 2018-03-17 RX ORDER — HYDRALAZINE HYDROCHLORIDE 20 MG/ML
10 INJECTION INTRAMUSCULAR; INTRAVENOUS EVERY 4 HOURS PRN
Status: DISCONTINUED | OUTPATIENT
Start: 2018-03-17 | End: 2018-03-22 | Stop reason: HOSPADM

## 2018-03-17 RX ADMIN — TORSEMIDE 40 MG: 20 TABLET ORAL at 10:07

## 2018-03-17 RX ADMIN — BETAMETHASONE DIPROPIONATE: 0.5 OINTMENT TOPICAL at 19:46

## 2018-03-17 RX ADMIN — LABETALOL HYDROCHLORIDE 400 MG: 200 TABLET, FILM COATED ORAL at 15:35

## 2018-03-17 RX ADMIN — SPIRONOLACTONE 50 MG: 25 TABLET, FILM COATED ORAL at 10:08

## 2018-03-17 RX ADMIN — Medication 10 ML: at 19:50

## 2018-03-17 RX ADMIN — CALCIUM ACETATE 667 MG: 667 CAPSULE ORAL at 13:00

## 2018-03-17 RX ADMIN — HYDRALAZINE HYDROCHLORIDE 100 MG: 50 TABLET ORAL at 10:08

## 2018-03-17 RX ADMIN — DILTIAZEM HYDROCHLORIDE 90 MG: 60 TABLET, FILM COATED ORAL at 13:00

## 2018-03-17 RX ADMIN — CLONIDINE HYDROCHLORIDE 0.2 MG: 0.2 TABLET ORAL at 10:08

## 2018-03-17 RX ADMIN — CALCIUM ACETATE 667 MG: 667 CAPSULE ORAL at 10:06

## 2018-03-17 RX ADMIN — PANTOPRAZOLE SODIUM 40 MG: 40 TABLET, DELAYED RELEASE ORAL at 05:36

## 2018-03-17 RX ADMIN — ISOSORBIDE MONONITRATE 120 MG: 60 TABLET ORAL at 10:07

## 2018-03-17 RX ADMIN — DOXAZOSIN 8 MG: 4 TABLET ORAL at 10:07

## 2018-03-17 RX ADMIN — HYDROCORTISONE: 25 CREAM TOPICAL at 10:10

## 2018-03-17 RX ADMIN — HYDRALAZINE HYDROCHLORIDE 100 MG: 50 TABLET ORAL at 19:46

## 2018-03-17 RX ADMIN — DOXAZOSIN 8 MG: 4 TABLET ORAL at 19:46

## 2018-03-17 RX ADMIN — DILTIAZEM HYDROCHLORIDE 90 MG: 60 TABLET, FILM COATED ORAL at 19:46

## 2018-03-17 RX ADMIN — CLOPIDOGREL 75 MG: 75 TABLET, FILM COATED ORAL at 10:07

## 2018-03-17 RX ADMIN — DILTIAZEM HYDROCHLORIDE 90 MG: 60 TABLET, FILM COATED ORAL at 10:06

## 2018-03-17 RX ADMIN — HYDRALAZINE HYDROCHLORIDE 10 MG: 20 INJECTION INTRAMUSCULAR; INTRAVENOUS at 04:01

## 2018-03-17 RX ADMIN — LABETALOL HYDROCHLORIDE 400 MG: 200 TABLET, FILM COATED ORAL at 21:50

## 2018-03-17 RX ADMIN — HYDRALAZINE HYDROCHLORIDE 100 MG: 50 TABLET ORAL at 15:35

## 2018-03-17 RX ADMIN — PANTOPRAZOLE SODIUM 40 MG: 40 TABLET, DELAYED RELEASE ORAL at 15:35

## 2018-03-17 RX ADMIN — INSULIN LISPRO 2 UNITS: 100 INJECTION, SOLUTION INTRAVENOUS; SUBCUTANEOUS at 21:51

## 2018-03-17 RX ADMIN — INSULIN GLARGINE 8 UNITS: 100 INJECTION, SOLUTION SUBCUTANEOUS at 10:21

## 2018-03-17 RX ADMIN — CLONIDINE HYDROCHLORIDE 0.2 MG: 0.2 TABLET ORAL at 19:46

## 2018-03-17 RX ADMIN — VALSARTAN 160 MG: 160 TABLET ORAL at 10:08

## 2018-03-17 RX ADMIN — BETAMETHASONE DIPROPIONATE: 0.5 OINTMENT TOPICAL at 10:07

## 2018-03-17 RX ADMIN — ISOSORBIDE MONONITRATE 120 MG: 60 TABLET ORAL at 15:35

## 2018-03-17 RX ADMIN — SIMVASTATIN 20 MG: 20 TABLET, FILM COATED ORAL at 19:44

## 2018-03-17 RX ADMIN — ASPIRIN 81 MG: 81 TABLET, CHEWABLE ORAL at 10:07

## 2018-03-17 ASSESSMENT — PAIN SCALES - GENERAL
PAINLEVEL_OUTOF10: 0
PAINLEVEL_OUTOF10: 0

## 2018-03-17 NOTE — PROGRESS NOTES
Hospitalist Progress Note    Patient:  Loraine Garcia      Unit/Bed:5K-28/028-A    YOB: 1965    MRN: [id number]       Acct: [de-identified]     PCP: Jordyn Melgar    Date of Admission: 3/7/2018    Chief Complaint:   Chief Complaint   Patient presents with    Fall    Abdominal Pain       Subjective:   Dyspnea and LE edema improving. S/p HD yesterday  Otherwise no new acute complaints. Medications:  Reviewed    Infusion Medications    dextrose       Scheduled Medications    spironolactone  50 mg Oral Daily    valsartan  160 mg Oral Daily    insulin lispro  0-6 Units Subcutaneous TID WC    insulin lispro  0-3 Units Subcutaneous Nightly    cloNIDine  0.2 mg Oral BID    torsemide  40 mg Oral Daily    isosorbide mononitrate  120 mg Oral BID    labetalol  400 mg Oral 3 times per day    calcium acetate  667 mg Oral TID WC    clopidogrel  75 mg Oral Daily    aspirin  81 mg Oral Daily    polyethylene glycol  17 g Oral Daily    sodium chloride flush  10 mL Intravenous 2 times per day    pantoprazole  40 mg Oral BID AC    morphine  4 mg Intravenous Once    ondansetron  4 mg Intravenous Once    betamethasone dipropionate   Topical BID    diltiazem  90 mg Oral 4x Daily    doxazosin  8 mg Oral BID    hydrALAZINE  100 mg Oral TID    hydrocortisone   Topical BID    simvastatin  20 mg Oral Nightly    insulin glargine  8 Units Subcutaneous Daily     PRN Meds: hydrALAZINE, ondansetron, sennosides-docusate sodium, sodium chloride flush, acetaminophen, magnesium sulfate, potassium chloride **OR** potassium chloride **OR** potassium chloride, glucose, dextrose, glucagon (rDNA), dextrose      Intake/Output Summary (Last 24 hours) at 03/17/18 1609  Last data filed at 03/16/18 2239   Gross per 24 hour   Intake                0 ml   Output              200 ml   Net             -200 ml       Diet:  DIET CARDIAC; Low Sodium (2 GM);  Daily Fluid Restriction: 2000 ml; Renal    Exam:  BP (!) 156/69 Pulse 55   Temp 98.4 °F (36.9 °C) (Oral)   Resp 18   Ht 5' 9\" (1.753 m)   Wt 225 lb 12 oz (102.4 kg)   SpO2 93%   BMI 33.34 kg/m²     Gen/overall appearance: Not in acute distress. Alert. Head: Normocephalic, atraumatic  Eyes: EOMI, no scleral icterus  CVS: regular rate and rhythm, Normal S1S2  Pulm: Clear to auscultation bilaterally. No crackles/wheezes  Gastrointestinal: Soft, nontender, obese, no guarding or rebound  Extremities: Bilateral LE edema. No erythema or warmth  Neuro: No gross focal deficits noted  Skin: Warm, dry    Labs:   Recent Labs      03/15/18   0555  03/16/18   0936  03/17/18   0613   WBC  5.9  7.6  8.9   HGB  9.0*  9.4*  10.0*   HCT  27.1*  28.1*  30.7*   PLT  143  134  132     Recent Labs      03/15/18   0555  03/16/18   0936  03/16/18   0937  03/17/18   0614   NA  143  140  138  141   K  3.6  3.7  3.7  4.3   CL  110  103  102  102   CO2  24  25  25  25   BUN  57*  44*  45*  36*   CREATININE  3.4*  3.3*  3.1*  3.3*   CALCIUM  8.2*  8.3*  8.4*  8.3*   PHOS  3.7  4.5   --   4.3     No results for input(s): AST, ALT, BILIDIR, BILITOT, ALKPHOS in the last 72 hours. No results for input(s): INR in the last 72 hours. No results for input(s): Shaniqua Che in the last 72 hours. Urinalysis:      Lab Results   Component Value Date    NITRU NEGATIVE 03/16/2018    WBCUA 10-15 03/16/2018    BACTERIA FEW 03/16/2018    RBCUA 3-5 03/16/2018    BLOODU TRACE 03/16/2018    SPECGRAV 1.018 02/01/2018    GLUCOSEU >= 1000 03/16/2018       Radiology:  Ct Abdomen Pelvis Wo Contrast Additional Contrast? None    Result Date: 3/7/2018  PROCEDURE: CT CHEST WO CONTRAST, CT ABDOMEN PELVIS WO CONTRAST CLINICAL INFORMATION: injury, chest pain/SOB. COMPARISON: November 25, 2017 TECHNIQUE: 5 mm noncontrast axial images were obtained through the chest. Sagittal and coronal reconstructions were obtained.  All CT scans at this facility use dose modulation, iterative reconstruction, and/or weight-based dosing when appropriate to reduce radiation dose to as low as reasonably achievable. FINDINGS: Limitations: Evaluation of the mediastinum and vascular structures is limited due to the lack of IV contrast. Chest: Bilateral pleural effusions right greater than left with adjacent consolidation, correlate for atelectasis, CHF versus infiltrate/pneumonia. No displaced fracture. Small-moderate pericardial effusion. Mild cardiomegaly. Coronary artery calcifications. Few calcified subcarinal and hilar lymph nodes correlate for prior granulomatous infection. Thoracic aorta appears normal caliber, 3.6 cm however evaluation is limited without intravenous contrast. Degenerative changes thoracic spine. No acute fracture. Abdomen pelvis: Small hiatal hernia. Granulomatous calcifications in the spleen. Liver, gallbladder, adrenal glands and pancreas are unremarkable. Kidneys are symmetric. Aorta is normal caliber with atherosclerosis. There is diffuse soft tissue anasarca. Postsurgical changes sigmoid colon. Moderate stool throughout the colon. Correlate for constipation. There is mild, diffuse mesenteric edema and fluid within the paracolic gutters bilaterally, nonspecific. Correlation with symptoms and follow-up as clinically indicated. Degenerative changes lumbar spine. No acute fracture. Prostate calcifications. IMPRESSION chest[de-identified]  1. Bilateral pleural effusions right greater than left with adjacent consolidation, correlate for atelectasis, CHF versus infiltrate/pneumonia. 2. Mild cardiomegaly. . Small/moderate pericardial effusion. IMPRESSION abdomen and pelvis: 1. Moderate stool throughout the colon. Correlate for constipation 2. There is mild, diffuse mesenteric edema and fluid within the paracolic gutters bilaterally, nonspecific. Correlation with symptoms and follow-up as clinically indicated. 3. Moderate soft tissue anasarca, regressed from prior study. **This report has been created using voice recognition software.  It may contain Bilateral pleural effusions right greater than left with adjacent consolidation, correlate for atelectasis, CHF versus infiltrate/pneumonia. 2. Mild cardiomegaly. . Small/moderate pericardial effusion. IMPRESSION abdomen and pelvis: 1. Moderate stool throughout the colon. Correlate for constipation 2. There is mild, diffuse mesenteric edema and fluid within the paracolic gutters bilaterally, nonspecific. Correlation with symptoms and follow-up as clinically indicated. 3. Moderate soft tissue anasarca, regressed from prior study. **This report has been created using voice recognition software. It may contain minor errors which are inherent in voice recognition technology. ** Final report electronically signed by Dr. Antony Myers on 3/7/2018 1:23 PM    Us Renal Complete    Result Date: 3/7/2018  PROCEDURE: US RENAL COMPLETE CLINICAL INFORMATION: ronnie COMPARISON: 12/4/2017, CT dated 3/7/2018 TECHNIQUE:  Transverse and longitudinal renal ultrasound images were obtained. Grayscale, color flow and spectral waveform ultrasound images were performed. Resistive indices were calculated. FINDINGS: The kidneys appear normal in size and echogenicity bilaterally. No hydronephrosis is seen. The ureteral jets are seen bilaterally. The resistive index on the left is mildly prominent. RIGHT KIDNEY - 12.2 x 7.2 x 5.1 cm Resistive Index - 0.65 Cortical Thickness - 1.6 cm LEFT KIDNEY - 12.1 x 5.1 x 5.8 cm Resistive Index - 0.71 Cortical Thickness - 1.4 cm URINARY BLADDER Pre-Void - 108.3 mL Post-Void - not obtained     1. Normal sonographic appearance of the kidneys. 2. Mild elevation of the left resistive index. This is nonspecific but may be related to underlying medical renal disease. **This report has been created using voice recognition software. It may contain minor errors which are inherent in voice recognition technology. ** Final report electronically signed by Dr. Vinnie Black on 3/7/2018 4:05 PM    Vl Dup Lower Extremity Venous resolved   6. Acute anemia, undetermined etiology. No overt bleeding. Suspected secondary to chemotherapy  7. Leukopenia 2/2 chemo  8. Hypertensive urgency  9. Abdominal discomfort, CT scan of the abdomen revealed mesenteric edema, moderate soft tissue anasarca likely secondary to CHF. Improved      comorbidities:  · Metastatic colon cancer to the liver Status post colectomy,  Chemotherapy  ·  dyslipidemia  · Essential hypertension  · Coronary artery disease  · Obesity Body mass index is 31.16 kg/m².     Plan:  1. Likely repeat HD tomorrow or Mon  2. Trend lytes closely  3. Respiratory care  4. Outpt PCI  5. Titrate BP regimen PRN with HD  6. Glycemic control  7.  PT/OT  8. D/c planning    Electronically signed by Butler Blizzard, MD on 3/17/2018 at 4:09 PM

## 2018-03-17 NOTE — PROGRESS NOTES
Nephrology  Progress Note    Pt Name: Charlene Loya  MRN: [id number]  [id number]  YOB: 1965  Admit Date: 3/7/2018 10:40 AM  Date of evaluation: 3/17/2018  Primary Care Physician: Jackson Call   [id number]-28/028-A       Subjective:       Slightly dizziness and lightlessness   No N/V   No SOB , No chest pain            Diet: DIET CARDIAC; Low Sodium (2 GM);  Daily Fluid Restriction: 2000 ml; Renal    Medications:   Scheduled Meds:   spironolactone  50 mg Oral Daily    valsartan  160 mg Oral Daily    insulin lispro  0-6 Units Subcutaneous TID WC    insulin lispro  0-3 Units Subcutaneous Nightly    cloNIDine  0.2 mg Oral BID    torsemide  40 mg Oral Daily    isosorbide mononitrate  120 mg Oral BID    labetalol  400 mg Oral 3 times per day    calcium acetate  667 mg Oral TID WC    clopidogrel  75 mg Oral Daily    aspirin  81 mg Oral Daily    polyethylene glycol  17 g Oral Daily    sodium chloride flush  10 mL Intravenous 2 times per day    pantoprazole  40 mg Oral BID AC    morphine  4 mg Intravenous Once    ondansetron  4 mg Intravenous Once    betamethasone dipropionate   Topical BID    diltiazem  90 mg Oral 4x Daily    doxazosin  8 mg Oral BID    hydrALAZINE  100 mg Oral TID    hydrocortisone   Topical BID    simvastatin  20 mg Oral Nightly    insulin glargine  8 Units Subcutaneous Daily     Continuous Infusions:   dextrose         Objective:   Vitals:   BP (!) 156/69   Pulse 55   Temp 98.4 °F (36.9 °C) (Oral)   Resp 18   Ht 5' 9\" (1.753 m)   Wt 225 lb 12 oz (102.4 kg)   SpO2 93%   BMI 33.34 kg/m²     I&O's:    Intake/Output Summary (Last 24 hours) at 03/17/18 1646  Last data filed at 03/17/18 1522   Gross per 24 hour   Intake             1000 ml   Output              600 ml   Net              400 ml     I/O last 3 completed shifts:  In: -   Out: 200 [Urine:200]     Date 03/17/18 0000 - 03/17/18 2359   Shift [id number] 6922-5182 6730-0805 24 Hour Total   I  N  T  A  K  E P.O.  (mL/kg/hr)  1000  (1.2)  1000    Shift Total  (mL/kg)  1000  (9.8)  1000  (9.8)   O  U  T  P  U  T   Urine  (mL/kg/hr)  400  (0.5)  400    Shift Total  (mL/kg)  400  (3.9)  400  (3.9)   Weight (kg) 102.4 102.4 102.4 102.4       General appearance: no distress  HEENT: PERRLA, EOMI, NON ICTERIC  Neck: NO LAD, NO THYROMEGALY  Lungs: CLEAR TO AUSCULTATION BILATERALLY  Heart: S1 S2 NORMAL, REGULAR RATE AND RHYTHM, NO AUDIBLE MURMURS  Abdomen: SOFT, NON TENDER, NON DISTENDED, NO ORGANOMEGALY FELT, BOWEL SOUNDS NORMAL  Extremities: 3+ b/l  Neurologic: GROSSLY NON FOCAL    LABS:    CBC:   Recent Labs      03/15/18   0555  03/16/18   0936  03/17/18   0613   WBC  5.9  7.6  8.9   HGB  9.0*  9.4*  10.0*   PLT  143  134  132     BMP:  Recent Labs      03/15/18   0555  03/16/18   0936  03/16/18   0937  03/17/18   0614   NA  143  140  138  141   K  3.6  3.7  3.7  4.3   CL  110  103  102  102   CO2  24  25  25  25   BUN  57*  44*  45*  36*   CREATININE  3.4*  3.3*  3.1*  3.3*   GLUCOSE  276*  257*  258*  237*   CALCIUM  8.2*  8.3*  8.4*  8.3*   PHOS  3.7  4.5   --   4.3     TSH: No results for input(s): TSH in the last 72 hours. HgBa1c: No results for input(s): LABA1C in the last 72 hours. Hepatic:   Recent Labs      03/15/18   0555  03/16/18   0936  03/17/18   0614   LABALBU  2.2*  2.2*  2.3*     Troponin: No results for input(s): TROPONINI in the last 72 hours. BNP: No results for input(s): BNP in the last 72 hours. Lipids: No results for input(s): CHOL, HDL in the last 72 hours. Invalid input(s): LDLCALCU  INR: No results for input(s): INR in the last 72 hours. Images and Other:  reviewed      Assessment and Plan:   1.  Acute blood loss anemia. H/H stable  2.  Anasarca. Still present  3.  Hypertension. 4.  Acute hypoxic respiratory failure secondary to acute diastolic CHF  exacerbation, bilateral pleural effusion. 5.  Mild hyperkalemia. 6.  Acute anemia. 7.  Acute kidney injury on CKD stage4  8.   H/o of colon

## 2018-03-18 LAB
ALBUMIN SERPL-MCNC: 2.2 G/DL (ref 3.5–5.1)
ALDOSTERONE: 3.7 NG/DL
ANION GAP SERPL CALCULATED.3IONS-SCNC: 11 MEQ/L (ref 8–16)
ANISOCYTOSIS: ABNORMAL
BASOPHILS # BLD: 0.3 %
BASOPHILS ABSOLUTE: 0 THOU/MM3 (ref 0–0.1)
BUN BLDV-MCNC: 45 MG/DL (ref 7–22)
CALCIUM SERPL-MCNC: 7.8 MG/DL (ref 8.5–10.5)
CHLORIDE BLD-SCNC: 102 MEQ/L (ref 98–111)
CO2: 25 MEQ/L (ref 23–33)
CREAT SERPL-MCNC: 3.7 MG/DL (ref 0.4–1.2)
EOSINOPHIL # BLD: 1.1 %
EOSINOPHILS ABSOLUTE: 0.1 THOU/MM3 (ref 0–0.4)
GFR SERPL CREATININE-BSD FRML MDRD: 17 ML/MIN/1.73M2
GLUCOSE BLD-MCNC: 176 MG/DL (ref 70–108)
GLUCOSE BLD-MCNC: 181 MG/DL (ref 70–108)
GLUCOSE BLD-MCNC: 196 MG/DL (ref 70–108)
GLUCOSE BLD-MCNC: 216 MG/DL (ref 70–108)
GLUCOSE BLD-MCNC: 411 MG/DL (ref 70–108)
HCT VFR BLD CALC: 28.2 % (ref 42–52)
HEMOGLOBIN: 9.1 GM/DL (ref 14–18)
LYMPHOCYTES # BLD: 9.1 %
LYMPHOCYTES ABSOLUTE: 0.7 THOU/MM3 (ref 1–4.8)
MCH RBC QN AUTO: 27.2 PG (ref 27–31)
MCHC RBC AUTO-ENTMCNC: 32.4 GM/DL (ref 33–37)
MCV RBC AUTO: 84.1 FL (ref 80–94)
MONOCYTES # BLD: 8.9 %
MONOCYTES ABSOLUTE: 0.7 THOU/MM3 (ref 0.4–1.3)
NUCLEATED RED BLOOD CELLS: 0 /100 WBC
PDW BLD-RTO: 18.4 % (ref 11.5–14.5)
PHOSPHORUS: 4.3 MG/DL (ref 2.4–4.7)
PLATELET # BLD: 121 THOU/MM3 (ref 130–400)
PMV BLD AUTO: 9.2 FL (ref 7.4–10.4)
POTASSIUM SERPL-SCNC: 3.5 MEQ/L (ref 3.5–5.2)
RBC # BLD: 3.36 MILL/MM3 (ref 4.7–6.1)
SEG NEUTROPHILS: 80.6 %
SEGMENTED NEUTROPHILS ABSOLUTE COUNT: 6.1 THOU/MM3 (ref 1.8–7.7)
SODIUM BLD-SCNC: 138 MEQ/L (ref 135–145)
WBC # BLD: 7.6 THOU/MM3 (ref 4.8–10.8)

## 2018-03-18 PROCEDURE — 85025 COMPLETE CBC W/AUTO DIFF WBC: CPT

## 2018-03-18 PROCEDURE — 6370000000 HC RX 637 (ALT 250 FOR IP): Performed by: INTERNAL MEDICINE

## 2018-03-18 PROCEDURE — 82948 REAGENT STRIP/BLOOD GLUCOSE: CPT

## 2018-03-18 PROCEDURE — 6370000000 HC RX 637 (ALT 250 FOR IP): Performed by: HOSPITALIST

## 2018-03-18 PROCEDURE — 2580000003 HC RX 258: Performed by: INTERNAL MEDICINE

## 2018-03-18 PROCEDURE — 80069 RENAL FUNCTION PANEL: CPT

## 2018-03-18 PROCEDURE — 6370000000 HC RX 637 (ALT 250 FOR IP): Performed by: NURSE PRACTITIONER

## 2018-03-18 PROCEDURE — 1200000003 HC TELEMETRY R&B

## 2018-03-18 PROCEDURE — 99232 SBSQ HOSP IP/OBS MODERATE 35: CPT | Performed by: INTERNAL MEDICINE

## 2018-03-18 PROCEDURE — 6360000002 HC RX W HCPCS: Performed by: INTERNAL MEDICINE

## 2018-03-18 RX ORDER — VALSARTAN 160 MG/1
160 TABLET ORAL EVERY 12 HOURS SCHEDULED
Status: DISCONTINUED | OUTPATIENT
Start: 2018-03-18 | End: 2018-03-22 | Stop reason: HOSPADM

## 2018-03-18 RX ORDER — LABETALOL HYDROCHLORIDE 5 MG/ML
10 INJECTION, SOLUTION INTRAVENOUS EVERY 4 HOURS PRN
Status: DISCONTINUED | OUTPATIENT
Start: 2018-03-18 | End: 2018-03-22 | Stop reason: HOSPADM

## 2018-03-18 RX ADMIN — DOXAZOSIN 8 MG: 4 TABLET ORAL at 08:44

## 2018-03-18 RX ADMIN — SPIRONOLACTONE 50 MG: 25 TABLET, FILM COATED ORAL at 08:42

## 2018-03-18 RX ADMIN — INSULIN GLARGINE 8 UNITS: 100 INJECTION, SOLUTION SUBCUTANEOUS at 10:51

## 2018-03-18 RX ADMIN — LABETALOL HYDROCHLORIDE 400 MG: 200 TABLET, FILM COATED ORAL at 05:43

## 2018-03-18 RX ADMIN — VALSARTAN 160 MG: 160 TABLET ORAL at 08:43

## 2018-03-18 RX ADMIN — CALCIUM ACETATE 667 MG: 667 CAPSULE ORAL at 16:03

## 2018-03-18 RX ADMIN — PANTOPRAZOLE SODIUM 40 MG: 40 TABLET, DELAYED RELEASE ORAL at 16:00

## 2018-03-18 RX ADMIN — PANTOPRAZOLE SODIUM 40 MG: 40 TABLET, DELAYED RELEASE ORAL at 05:43

## 2018-03-18 RX ADMIN — INSULIN LISPRO 1 UNITS: 100 INJECTION, SOLUTION INTRAVENOUS; SUBCUTANEOUS at 22:20

## 2018-03-18 RX ADMIN — SIMVASTATIN 20 MG: 20 TABLET, FILM COATED ORAL at 22:19

## 2018-03-18 RX ADMIN — LABETALOL HYDROCHLORIDE 400 MG: 200 TABLET, FILM COATED ORAL at 16:01

## 2018-03-18 RX ADMIN — HYDRALAZINE HYDROCHLORIDE 10 MG: 20 INJECTION INTRAMUSCULAR; INTRAVENOUS at 08:37

## 2018-03-18 RX ADMIN — BETAMETHASONE DIPROPIONATE: 0.5 OINTMENT TOPICAL at 08:41

## 2018-03-18 RX ADMIN — Medication 6 UNITS: at 17:27

## 2018-03-18 RX ADMIN — HYDROCORTISONE: 25 CREAM TOPICAL at 08:46

## 2018-03-18 RX ADMIN — TORSEMIDE 40 MG: 20 TABLET ORAL at 08:42

## 2018-03-18 RX ADMIN — ISOSORBIDE MONONITRATE 120 MG: 60 TABLET ORAL at 08:42

## 2018-03-18 RX ADMIN — ISOSORBIDE MONONITRATE 120 MG: 60 TABLET ORAL at 16:00

## 2018-03-18 RX ADMIN — HYDROCORTISONE: 25 CREAM TOPICAL at 01:30

## 2018-03-18 RX ADMIN — CLONIDINE HYDROCHLORIDE 0.2 MG: 0.2 TABLET ORAL at 08:42

## 2018-03-18 RX ADMIN — CLOPIDOGREL 75 MG: 75 TABLET, FILM COATED ORAL at 08:42

## 2018-03-18 RX ADMIN — CALCIUM ACETATE 667 MG: 667 CAPSULE ORAL at 08:41

## 2018-03-18 RX ADMIN — ASPIRIN 81 MG: 81 TABLET, CHEWABLE ORAL at 08:43

## 2018-03-18 RX ADMIN — HYDRALAZINE HYDROCHLORIDE 100 MG: 50 TABLET ORAL at 16:01

## 2018-03-18 RX ADMIN — DILTIAZEM HYDROCHLORIDE 90 MG: 60 TABLET, FILM COATED ORAL at 08:42

## 2018-03-18 RX ADMIN — Medication 10 ML: at 08:38

## 2018-03-18 RX ADMIN — DILTIAZEM HYDROCHLORIDE 90 MG: 60 TABLET, FILM COATED ORAL at 16:00

## 2018-03-18 RX ADMIN — CALCIUM ACETATE 667 MG: 667 CAPSULE ORAL at 16:00

## 2018-03-18 RX ADMIN — HYDRALAZINE HYDROCHLORIDE 10 MG: 20 INJECTION INTRAMUSCULAR; INTRAVENOUS at 01:02

## 2018-03-18 RX ADMIN — HYDRALAZINE HYDROCHLORIDE 100 MG: 50 TABLET ORAL at 08:42

## 2018-03-18 ASSESSMENT — PAIN SCALES - GENERAL
PAINLEVEL_OUTOF10: 0
PAINLEVEL_OUTOF10: 0

## 2018-03-18 NOTE — PROGRESS NOTES
using voice recognition software. It may contain minor errors which are inherent in voice recognition technology. ** Final report electronically signed by Dr. Shirlene Larson on 3/7/2018 1:23 PM    Xr Chest Standard (2 Vw)    Result Date: 3/11/2018  PROCEDURE: XR CHEST (2 VW) CLINICAL INFORMATION: Follow-up congestive heart failure. COMPARISON: 12/4/2017. TECHNIQUE: PA and lateral views of the chest performed. FINDINGS: POSTSURGICAL CHANGES: None. LINES/TUBES/MECHANICAL DEVICES: 1. There is a stable right subclavian central venous Mediport with the distal tip overlying the superior vena cava. TRACHEA/HEART/MEDIASTINUM/HILUM: 1. There is stable mild enlargement of the cardiac silhouette. LUNG BULL: 1. There is resolved pulmonary vascular congestion. There are interval improved infiltrates with mild residual patchy infiltrates within the left suprahilar region and right infrahilar region. There are small bilateral pleural effusions. OTHER: None. PNEUMOTHORAX: None. OSSEOUS STRUCTURES: 1. No acute osseous abnormality. 2. There is a stable old fracture of the mid left clavicle. 1. There is resolved pulmonary vascular congestion. There are interval improved infiltrates with mild residual patchy infiltrates within the left suprahilar region and right infrahilar region. There are small bilateral pleural effusions. **This report has been created using voice recognition software. It may contain minor errors which are inherent in voice recognition technology. ** Final report electronically signed by Dr. Aminah Hernandez on 3/11/2018 1:02 PM    Xr Tibia Fibula Left (2 Views)    Result Date: 2/15/2018  PROCEDURE: XR TIBIA FIBULA LEFT (2 VIEWS) CLINICAL INFORMATION: fall, pain, ecchymosis, . COMPARISON: No prior study. TECHNIQUE: AP and lateral views of the left lower leg. FINDINGS: Bones/joints: No fracture or dislocation. Mild degenerative changes. Normal alignment. Soft tissues: No significant soft tissue swelling.       No acute elevation; likely demand ischemia  5. Mild hyperkalemia, resolved   6. Acute anemia, undetermined etiology. No overt bleeding. Suspected secondary to chemotherapy  7. Leukopenia 2/2 chemo  8. Hypertensive urgency  9. Abdominal discomfort, CT scan of the abdomen revealed mesenteric edema, moderate soft tissue anasarca likely secondary to CHF. Improved      comorbidities:  · Metastatic colon cancer to the liver Status post colectomy,  Chemotherapy  ·  dyslipidemia  · Essential hypertension  · Coronary artery disease  · Obesity Body mass index is 31.16 kg/m².     Plan:  1. Repeat HD tomorrow  2. PRN hydralazine and labetalol  3. Titrate BP regimen with HD  4. Trend lytes closely  5. Respiratory care  6. Outpt PCI  7. Glycemic control  8. PT/OT  9.  D/c planning    Electronically signed by Carrington Lindsey MD on 3/18/2018 at 2:32 PM

## 2018-03-18 NOTE — PROGRESS NOTES
Nephrology  Progress Note    Pt Name: Rosie Montelongo  MRN: [id number]  [id number]  YOB: 1965  Admit Date: 3/7/2018 10:40 AM  Date of evaluation: 3/18/2018  Primary Care Physician: Rashi Wing   5K-28/028-A       Subjective:     No N/V   No SOB , No chest pain   Blood pressure slightly better          Diet: DIET CARDIAC; Low Sodium (2 GM); Daily Fluid Restriction: 2000 ml; Renal  Diet NPO, After Midnight    Medications:   Scheduled Meds:   spironolactone  50 mg Oral Daily    valsartan  160 mg Oral Daily    insulin lispro  0-6 Units Subcutaneous TID WC    insulin lispro  0-3 Units Subcutaneous Nightly    cloNIDine  0.2 mg Oral BID    torsemide  40 mg Oral Daily    isosorbide mononitrate  120 mg Oral BID    labetalol  400 mg Oral 3 times per day    calcium acetate  667 mg Oral TID WC    clopidogrel  75 mg Oral Daily    aspirin  81 mg Oral Daily    polyethylene glycol  17 g Oral Daily    sodium chloride flush  10 mL Intravenous 2 times per day    pantoprazole  40 mg Oral BID AC    morphine  4 mg Intravenous Once    ondansetron  4 mg Intravenous Once    betamethasone dipropionate   Topical BID    diltiazem  90 mg Oral 4x Daily    doxazosin  8 mg Oral BID    hydrALAZINE  100 mg Oral TID    hydrocortisone   Topical BID    simvastatin  20 mg Oral Nightly    insulin glargine  8 Units Subcutaneous Daily     Continuous Infusions:   dextrose         Objective:   Vitals:   BP (!) 162/52   Pulse 60   Temp 97.3 °F (36.3 °C) (Oral)   Resp 18   Ht 5' 9\" (1.753 m)   Wt 225 lb 12 oz (102.4 kg)   SpO2 94%   BMI 33.34 kg/m²     I&O's:    Intake/Output Summary (Last 24 hours) at 03/18/18 1807  Last data filed at 03/18/18 0323   Gross per 24 hour   Intake              500 ml   Output              200 ml   Net              300 ml     I/O last 3 completed shifts:   In: 1500 [P.O.:1500]  Out: 600 [Urine:600]     Date 03/18/18 0000 - 03/18/18 5908   Shift 8314-5059 [id number] 24 Hour to uremic symptoms , significant extra-vasculry volume expanded , resistant HTN on 4 different max dose of Blood pressure  meds , he was started on IHD ,  Plan for another session of IHD  On Monday .    Consult IR for [facility] cath placement tomorrow   NPO after midnight   On max dose Isosorbide, Cardizem , hydralazine, labetalol , Cardura , clonidine   , also Demadex 40 mg po daily , still BP in high ranges   Continue aldactone  50  mg po daily  Increase   Valsartan 160 mg po BID  Continue  fluid restriction  Continue strict Is/Os  Continue Low salt diet   Continue phoslo 667 mg po TID with  Meals       Will follow subha jennings    Electronically signed by Rozina Frias MD on 3/18/2018 at 5:45PM

## 2018-03-19 LAB
ALBUMIN SERPL-MCNC: 1.9 G/DL (ref 3.5–5.1)
ANION GAP SERPL CALCULATED.3IONS-SCNC: 14 MEQ/L (ref 8–16)
ANISOCYTOSIS: ABNORMAL
BASOPHILS # BLD: 0.9 %
BASOPHILS ABSOLUTE: 0 THOU/MM3 (ref 0–0.1)
BUN BLDV-MCNC: 52 MG/DL (ref 7–22)
CALCIUM SERPL-MCNC: 7.7 MG/DL (ref 8.5–10.5)
CHLORIDE BLD-SCNC: 103 MEQ/L (ref 98–111)
CO2: 21 MEQ/L (ref 23–33)
CREAT SERPL-MCNC: 4.4 MG/DL (ref 0.4–1.2)
EOSINOPHIL # BLD: 2.6 %
EOSINOPHILS ABSOLUTE: 0.1 THOU/MM3 (ref 0–0.4)
GFR SERPL CREATININE-BSD FRML MDRD: 14 ML/MIN/1.73M2
GLUCOSE BLD-MCNC: 135 MG/DL (ref 70–108)
GLUCOSE BLD-MCNC: 136 MG/DL (ref 70–108)
GLUCOSE BLD-MCNC: 143 MG/DL (ref 70–108)
GLUCOSE BLD-MCNC: 163 MG/DL (ref 70–108)
GLUCOSE BLD-MCNC: 202 MG/DL (ref 70–108)
HCT VFR BLD CALC: 26.8 % (ref 42–52)
HEMOGLOBIN: 8.8 GM/DL (ref 14–18)
INR BLD: 1.19 (ref 0.85–1.13)
LYMPHOCYTES # BLD: 11 %
LYMPHOCYTES ABSOLUTE: 0.5 THOU/MM3 (ref 1–4.8)
MCH RBC QN AUTO: 27.5 PG (ref 27–31)
MCHC RBC AUTO-ENTMCNC: 32.8 GM/DL (ref 33–37)
MCV RBC AUTO: 83.7 FL (ref 80–94)
METANEPHRINES PLASMA: NORMAL
MONOCYTES # BLD: 14.3 %
MONOCYTES ABSOLUTE: 0.7 THOU/MM3 (ref 0.4–1.3)
NUCLEATED RED BLOOD CELLS: 0 /100 WBC
ORGANISM: ABNORMAL
ORGANISM: ABNORMAL
PDW BLD-RTO: 18.9 % (ref 11.5–14.5)
PHOSPHORUS: 4.9 MG/DL (ref 2.4–4.7)
PLATELET # BLD: 120 THOU/MM3 (ref 130–400)
PMV BLD AUTO: 9.8 FL (ref 7.4–10.4)
POTASSIUM SERPL-SCNC: 3.4 MEQ/L (ref 3.5–5.2)
RBC # BLD: 3.2 MILL/MM3 (ref 4.7–6.1)
RENIN ACTIVITY: 0.7 NG/ML/HR
SEG NEUTROPHILS: 71.2 %
SEGMENTED NEUTROPHILS ABSOLUTE COUNT: 3.5 THOU/MM3 (ref 1.8–7.7)
SODIUM BLD-SCNC: 138 MEQ/L (ref 135–145)
URINE CULTURE REFLEX: ABNORMAL
URINE CULTURE REFLEX: ABNORMAL
WBC # BLD: 4.9 THOU/MM3 (ref 4.8–10.8)

## 2018-03-19 PROCEDURE — 85610 PROTHROMBIN TIME: CPT

## 2018-03-19 PROCEDURE — 99232 SBSQ HOSP IP/OBS MODERATE 35: CPT | Performed by: INTERNAL MEDICINE

## 2018-03-19 PROCEDURE — 85025 COMPLETE CBC W/AUTO DIFF WBC: CPT

## 2018-03-19 PROCEDURE — 2580000003 HC RX 258: Performed by: INTERNAL MEDICINE

## 2018-03-19 PROCEDURE — 6370000000 HC RX 637 (ALT 250 FOR IP): Performed by: NURSE PRACTITIONER

## 2018-03-19 PROCEDURE — 6370000000 HC RX 637 (ALT 250 FOR IP): Performed by: INTERNAL MEDICINE

## 2018-03-19 PROCEDURE — 80069 RENAL FUNCTION PANEL: CPT

## 2018-03-19 PROCEDURE — 97535 SELF CARE MNGMENT TRAINING: CPT

## 2018-03-19 PROCEDURE — 82948 REAGENT STRIP/BLOOD GLUCOSE: CPT

## 2018-03-19 PROCEDURE — 97530 THERAPEUTIC ACTIVITIES: CPT

## 2018-03-19 PROCEDURE — 1200000003 HC TELEMETRY R&B

## 2018-03-19 RX ADMIN — INSULIN LISPRO 1 UNITS: 100 INJECTION, SOLUTION INTRAVENOUS; SUBCUTANEOUS at 21:26

## 2018-03-19 RX ADMIN — Medication 10 ML: at 21:09

## 2018-03-19 RX ADMIN — PANTOPRAZOLE SODIUM 40 MG: 40 TABLET, DELAYED RELEASE ORAL at 15:16

## 2018-03-19 RX ADMIN — HYDRALAZINE HYDROCHLORIDE 100 MG: 50 TABLET ORAL at 21:08

## 2018-03-19 RX ADMIN — CALCIUM ACETATE 667 MG: 667 CAPSULE ORAL at 12:45

## 2018-03-19 RX ADMIN — INSULIN GLARGINE 8 UNITS: 100 INJECTION, SOLUTION SUBCUTANEOUS at 12:45

## 2018-03-19 RX ADMIN — BETAMETHASONE DIPROPIONATE: 0.5 OINTMENT TOPICAL at 21:09

## 2018-03-19 RX ADMIN — DILTIAZEM HYDROCHLORIDE 90 MG: 60 TABLET, FILM COATED ORAL at 00:16

## 2018-03-19 RX ADMIN — CALCIUM ACETATE 667 MG: 667 CAPSULE ORAL at 15:16

## 2018-03-19 RX ADMIN — DILTIAZEM HYDROCHLORIDE 90 MG: 60 TABLET, FILM COATED ORAL at 21:08

## 2018-03-19 RX ADMIN — HYDROCORTISONE: 25 CREAM TOPICAL at 00:16

## 2018-03-19 RX ADMIN — DOXAZOSIN 8 MG: 4 TABLET ORAL at 21:08

## 2018-03-19 RX ADMIN — HYDROCORTISONE: 25 CREAM TOPICAL at 21:10

## 2018-03-19 RX ADMIN — VALSARTAN 160 MG: 160 TABLET ORAL at 21:11

## 2018-03-19 RX ADMIN — CLONIDINE HYDROCHLORIDE 0.2 MG: 0.2 TABLET ORAL at 00:15

## 2018-03-19 RX ADMIN — ISOSORBIDE MONONITRATE 120 MG: 60 TABLET ORAL at 15:16

## 2018-03-19 RX ADMIN — HYDRALAZINE HYDROCHLORIDE 100 MG: 50 TABLET ORAL at 15:16

## 2018-03-19 RX ADMIN — LABETALOL HYDROCHLORIDE 400 MG: 200 TABLET, FILM COATED ORAL at 21:08

## 2018-03-19 RX ADMIN — Medication 10 ML: at 00:17

## 2018-03-19 RX ADMIN — HYDRALAZINE HYDROCHLORIDE 100 MG: 50 TABLET ORAL at 00:17

## 2018-03-19 RX ADMIN — BETAMETHASONE DIPROPIONATE: 0.5 OINTMENT TOPICAL at 00:15

## 2018-03-19 RX ADMIN — PANTOPRAZOLE SODIUM 40 MG: 40 TABLET, DELAYED RELEASE ORAL at 05:29

## 2018-03-19 RX ADMIN — DILTIAZEM HYDROCHLORIDE 90 MG: 60 TABLET, FILM COATED ORAL at 15:16

## 2018-03-19 RX ADMIN — CLONIDINE HYDROCHLORIDE 0.2 MG: 0.2 TABLET ORAL at 21:08

## 2018-03-19 RX ADMIN — SIMVASTATIN 20 MG: 20 TABLET, FILM COATED ORAL at 21:08

## 2018-03-19 RX ADMIN — CLONIDINE HYDROCHLORIDE 0.2 MG: 0.2 TABLET ORAL at 15:17

## 2018-03-19 ASSESSMENT — PAIN SCALES - GENERAL
PAINLEVEL_OUTOF10: 0
PAINLEVEL_OUTOF10: 0

## 2018-03-19 NOTE — PROGRESS NOTES
[address] [address]  PHYSICAL THERAPY MISSED TREATMENT NOTE  ACUTE CARE  [facility] 5K       pt at dialysis during AM attempt this date       Missed Treatment  Gladis Saini PTA [id number]

## 2018-03-19 NOTE — PROGRESS NOTES
Hospitalist Progress Note    Patient:  Philip [facility]      Unit/Bed:5K-28/028-A    YOB: 1965    MRN: [id number]       Acct: [de-identified]     PCP: Kelsi Azul    Date of Admission: 3/7/2018    Chief Complaint:   Chief Complaint   Patient presents with    Fall    Abdominal Pain       Subjective:   Denies new acute complaints at this time. BP improved. S/p HD in am.    Medications:  Reviewed    Infusion Medications    dextrose       Scheduled Medications    valsartan  160 mg Oral 2 times per day    spironolactone  50 mg Oral Daily    insulin lispro  0-6 Units Subcutaneous TID WC    insulin lispro  0-3 Units Subcutaneous Nightly    cloNIDine  0.2 mg Oral BID    torsemide  40 mg Oral Daily    isosorbide mononitrate  120 mg Oral BID    labetalol  400 mg Oral 3 times per day    calcium acetate  667 mg Oral TID WC    clopidogrel  75 mg Oral Daily    aspirin  81 mg Oral Daily    polyethylene glycol  17 g Oral Daily    sodium chloride flush  10 mL Intravenous 2 times per day    pantoprazole  40 mg Oral BID AC    morphine  4 mg Intravenous Once    ondansetron  4 mg Intravenous Once    betamethasone dipropionate   Topical BID    diltiazem  90 mg Oral 4x Daily    doxazosin  8 mg Oral BID    hydrALAZINE  100 mg Oral TID    hydrocortisone   Topical BID    simvastatin  20 mg Oral Nightly    insulin glargine  8 Units Subcutaneous Daily     PRN Meds: labetalol, hydrALAZINE, ondansetron, sennosides-docusate sodium, sodium chloride flush, acetaminophen, magnesium sulfate, potassium chloride **OR** potassium chloride **OR** potassium chloride, glucose, dextrose, glucagon (rDNA), dextrose      Intake/Output Summary (Last 24 hours) at 03/19/18 1657  Last data filed at 03/19/18 1132   Gross per 24 hour   Intake              800 ml   Output             3700 ml   Net            -2900 ml       Diet:  DIET CARDIAC; Low Sodium (2 GM);  Daily Fluid Restriction: 2000 ml; Renal    Exam:  /60 technology. ** Final report electronically signed by Dr. Gloria Florence on 3/7/2018 1:23 PM    Xr Chest Standard (2 Vw)    Result Date: 3/11/2018  PROCEDURE: XR CHEST (2 VW) CLINICAL INFORMATION: Follow-up congestive heart failure. COMPARISON: 12/4/2017. TECHNIQUE: PA and lateral views of the chest performed. FINDINGS: POSTSURGICAL CHANGES: None. LINES/TUBES/MECHANICAL DEVICES: 1. There is a stable right subclavian central venous Mediport with the distal tip overlying the superior vena cava. TRACHEA/HEART/MEDIASTINUM/HILUM: 1. There is stable mild enlargement of the cardiac silhouette. LUNG BULL: 1. There is resolved pulmonary vascular congestion. There are interval improved infiltrates with mild residual patchy infiltrates within the left suprahilar region and right infrahilar region. There are small bilateral pleural effusions. OTHER: None. PNEUMOTHORAX: None. OSSEOUS STRUCTURES: 1. No acute osseous abnormality. 2. There is a stable old fracture of the mid left clavicle. 1. There is resolved pulmonary vascular congestion. There are interval improved infiltrates with mild residual patchy infiltrates within the left suprahilar region and right infrahilar region. There are small bilateral pleural effusions. **This report has been created using voice recognition software. It may contain minor errors which are inherent in voice recognition technology. ** Final report electronically signed by Dr. Elba Yung on 3/11/2018 1:02 PM    Xr Tibia Fibula Left (2 Views)    Result Date: 2/15/2018  PROCEDURE: XR TIBIA FIBULA LEFT (2 VIEWS) CLINICAL INFORMATION: fall, pain, ecchymosis, . COMPARISON: No prior study. TECHNIQUE: AP and lateral views of the left lower leg. FINDINGS: Bones/joints: No fracture or dislocation. Mild degenerative changes. Normal alignment. Soft tissues: No significant soft tissue swelling. No acute fracture. **This report has been created using voice recognition software.  It may contain minor errors which are inherent in voice recognition technology. ** Final report electronically signed by Dr. Cheryl Sheehan on 2/15/2018 3:26 PM    Ct Chest Wo Contrast    Result Date: 3/7/2018  PROCEDURE: CT CHEST WO CONTRAST, CT ABDOMEN PELVIS WO CONTRAST CLINICAL INFORMATION: injury, chest pain/SOB. COMPARISON: November 25, 2017 TECHNIQUE: 5 mm noncontrast axial images were obtained through the chest. Sagittal and coronal reconstructions were obtained. All CT scans at this facility use dose modulation, iterative reconstruction, and/or weight-based dosing when appropriate to reduce radiation dose to as low as reasonably achievable. FINDINGS: Limitations: Evaluation of the mediastinum and vascular structures is limited due to the lack of IV contrast. Chest: Bilateral pleural effusions right greater than left with adjacent consolidation, correlate for atelectasis, CHF versus infiltrate/pneumonia. No displaced fracture. Small-moderate pericardial effusion. Mild cardiomegaly. Coronary artery calcifications. Few calcified subcarinal and hilar lymph nodes correlate for prior granulomatous infection. Thoracic aorta appears normal caliber, 3.6 cm however evaluation is limited without intravenous contrast. Degenerative changes thoracic spine. No acute fracture. Abdomen pelvis: Small hiatal hernia. Granulomatous calcifications in the spleen. Liver, gallbladder, adrenal glands and pancreas are unremarkable. Kidneys are symmetric. Aorta is normal caliber with atherosclerosis. There is diffuse soft tissue anasarca. Postsurgical changes sigmoid colon. Moderate stool throughout the colon. Correlate for constipation. There is mild, diffuse mesenteric edema and fluid within the paracolic gutters bilaterally, nonspecific. Correlation with symptoms and follow-up as clinically indicated. Degenerative changes lumbar spine. No acute fracture. Prostate calcifications.   IMPRESSION chest[de-identified]  1. Bilateral pleural effusions right greater than left with EXTREMITY VENOUS LEFT CLINICAL INFORMATION: swelling, erythema and ecchymosis, . COMPARISON: 12/04/2017 TECHNIQUE: Venous doppler ultrasound was performed of the left lower extremity using gray scale, color flow and spectral doppler imaging. FINDINGS: There is normal color flow, spectral analysis and compressibility of the common femoral vein, superficial femoral vein and popliteal vein on the left . There is normal color flow and compressibility in the posterior tibial veins, anterior tibial veins and peroneal veins. There is normal color flow, spectral analysis and compressibility in the contralateral common femoral vein. No evidence of a DVT. **This report has been created using voice recognition software. It may contain minor errors which are inherent in voice recognition technology. ** Final report electronically signed by Dr. Lashonda Cooney on 2/15/2018 4:31 PM      Diet: DIET CARDIAC; Low Sodium (2 GM); Daily Fluid Restriction: 2000 ml; Renal     Code Status: Full Code    Assessment/Plan:    Active Hospital Problems    Diagnosis Date Noted    Coronary artery disease involving native coronary artery of native heart without angina pectoris [I25.10]      Priority: High    Hypertension [I10]      Priority: High    Pericardial effusion [I31.3]     Acute congestive heart failure (HCC) [I50.9]     Pancytopenia (HCC) [D61.818] 03/07/2018    Stage 3 chronic kidney disease [N18.3] 02/03/2018    NAVEEN (acute kidney injury) ([facility] [address] [N17.9] 01/31/2018    Metastatic colon cancer to liver (HCC) [C18.9, C78.7]        Assessment:   1. Acute hypoxic respiratory failure, likely secondary to acute diastolic CHF / fluid overload; improved  2. Acute on chronic kidney disease IV. No obstruction on renal ultrasound. Started on HD 3/15  3. Small to moderate pericardial effusion per CT scan. 4. Nonspecific troponin elevation; likely demand ischemia  5. Mild hyperkalemia, resolved   6. Acute anemia, undetermined etiology.   No

## 2018-03-19 NOTE — PROGRESS NOTES
time ago. With encouragement, pt agreeable to OT session and requesting to use bathroom. Comments: RN ok'd session this date. Overall Orientation Status: Within Normal Limits    Pain:  Pain Assessment  Patient Currently in Pain: Denies       Objective  Overall Cognitive Status: WFL      ADL  Grooming:  (pt refused to wash hands after completing hygiene tasks despite education)  LE Dressing: Setup (donning socks while seated at EOB; encouragement provided for pt to attempt completing on own)  Toileting: Supervision (hygiene after bm; completed while seated)          Bed mobility  Supine to Sit: Stand by assistance (increased time to complte; HOB slightly elevated)  Scooting: Supervision (advancing hips forward to EOB)    Transfers  Sit to stand: Contact guard assistance (from EOB; cues for safe hand placement as pt attempted to pull up on RW during transfer)  Stand to sit: Contact guard assistance (to bedside chair with cues to reach back for chair)  Toilet Transfers  Toilet - Technique: Ambulating  Equipment Used: Standard toilet (with grab bar use on R)  Toilet Transfer: Contact guard assistance    Balance  Sitting Balance: Supervision (at EOB X~5 minutes; pt c/o slight dizziness while seated at EOB although reported had subsided before mobility)  Standing Balance: Contact guard assistance     Time: X15 seconds X2 sets  Activity: prep for mobility  Comment: RW for support     Functional Mobility  Functional - Mobility Device: Rolling Walker  Activity: To/from bathroom  Assist Level: Contact guard assistance  Functional Mobility Comments: Slow pace, forward flexed posture with cues to maintain upright posture. Steady without LOB. Activity Tolerance:  Activity Tolerance: Patient Tolerated treatment well;Patient limited by fatigue  Activity Tolerance: After pt transitioned to EOB, c/o slight dizziness with instructions given for slow transitions when moving to prevent dizziness.  Pt required ~10 minutes

## 2018-03-19 NOTE — CARE COORDINATION
3/19/18, 2:30 PM      Alex [address] day: 12  Location: [facility]28/028- Reason for admit: NAVEEN (acute kidney injury) [facility]) [N17.9]   Procedure: 3/16/18 Hemodialysis  3/19/18 Hemodialysis  Treatment Plan of Care: Nephrology following, hemodialysis, blood pressure medication adjustments, permanent tunneled hemodialysis catheter placement in radiology, PT/OT, discharge planning. Core Measures: VTE  PCP: Bill Manriquez  Discharge Plan: Home with his mother.

## 2018-03-20 LAB
ALBUMIN SERPL-MCNC: 2.4 G/DL (ref 3.5–5.1)
ANION GAP SERPL CALCULATED.3IONS-SCNC: 12 MEQ/L (ref 8–16)
ANISOCYTOSIS: ABNORMAL
BANDED NEUTROPHILS ABSOLUTE COUNT: 0.2 THOU/MM3
BANDS PRESENT: 6 %
BASOPHILS # BLD: 1 %
BASOPHILS ABSOLUTE: 0 THOU/MM3 (ref 0–0.1)
BUN BLDV-MCNC: 39 MG/DL (ref 7–22)
CALCIUM SERPL-MCNC: 8.2 MG/DL (ref 8.5–10.5)
CHLORIDE BLD-SCNC: 102 MEQ/L (ref 98–111)
CO2: 25 MEQ/L (ref 23–33)
CREAT SERPL-MCNC: 3.7 MG/DL (ref 0.4–1.2)
DIFFERENTIAL, MANUAL: NORMAL
EOSINOPHIL # BLD: 5 %
EOSINOPHILS ABSOLUTE: 0.1 THOU/MM3 (ref 0–0.4)
GFR SERPL CREATININE-BSD FRML MDRD: 17 ML/MIN/1.73M2
GLUCOSE BLD-MCNC: 193 MG/DL (ref 70–108)
GLUCOSE BLD-MCNC: 198 MG/DL (ref 70–108)
GLUCOSE BLD-MCNC: 219 MG/DL (ref 70–108)
GLUCOSE BLD-MCNC: 234 MG/DL (ref 70–108)
GLUCOSE BLD-MCNC: 262 MG/DL (ref 70–108)
HCT VFR BLD CALC: 28.7 % (ref 42–52)
HEMOGLOBIN: 9.6 GM/DL (ref 14–18)
LYMPHOCYTES # BLD: 25 %
LYMPHOCYTES ABSOLUTE: 0.6 THOU/MM3 (ref 1–4.8)
MCH RBC QN AUTO: 27.8 PG (ref 27–31)
MCHC RBC AUTO-ENTMCNC: 33.5 GM/DL (ref 33–37)
MCV RBC AUTO: 83.1 FL (ref 80–94)
MONOCYTES # BLD: 14 %
MONOCYTES ABSOLUTE: 0.4 THOU/MM3 (ref 0.4–1.3)
NUCLEATED RED BLOOD CELLS: 0 /100 WBC
OVALOCYTES: ABNORMAL
PDW BLD-RTO: 17.9 % (ref 11.5–14.5)
PHOSPHORUS: 4.8 MG/DL (ref 2.4–4.7)
PLATELET # BLD: 122 THOU/MM3 (ref 130–400)
PLATELET ESTIMATE: ADEQUATE
PMV BLD AUTO: 8.9 FL (ref 7.4–10.4)
POIKILOCYTES: SLIGHT
POTASSIUM SERPL-SCNC: 3.4 MEQ/L (ref 3.5–5.2)
RBC # BLD: 3.45 MILL/MM3 (ref 4.7–6.1)
SCHISTOCYTES: ABNORMAL
SEG NEUTROPHILS: 49 %
SEGMENTED NEUTROPHILS ABSOLUTE COUNT: 1.2 THOU/MM3 (ref 1.8–7.7)
SODIUM BLD-SCNC: 139 MEQ/L (ref 135–145)
SPHEROCYTES: ABNORMAL
TARGET CELLS: ABNORMAL
WBC # BLD: 2.5 THOU/MM3 (ref 4.8–10.8)

## 2018-03-20 PROCEDURE — 6360000002 HC RX W HCPCS: Performed by: INTERNAL MEDICINE

## 2018-03-20 PROCEDURE — 80069 RENAL FUNCTION PANEL: CPT

## 2018-03-20 PROCEDURE — 2580000003 HC RX 258: Performed by: INTERNAL MEDICINE

## 2018-03-20 PROCEDURE — 6370000000 HC RX 637 (ALT 250 FOR IP): Performed by: FAMILY MEDICINE

## 2018-03-20 PROCEDURE — 6370000000 HC RX 637 (ALT 250 FOR IP): Performed by: NURSE PRACTITIONER

## 2018-03-20 PROCEDURE — 97116 GAIT TRAINING THERAPY: CPT

## 2018-03-20 PROCEDURE — 1200000003 HC TELEMETRY R&B

## 2018-03-20 PROCEDURE — 99233 SBSQ HOSP IP/OBS HIGH 50: CPT | Performed by: INTERNAL MEDICINE

## 2018-03-20 PROCEDURE — 6370000000 HC RX 637 (ALT 250 FOR IP): Performed by: INTERNAL MEDICINE

## 2018-03-20 PROCEDURE — 97110 THERAPEUTIC EXERCISES: CPT

## 2018-03-20 PROCEDURE — 85025 COMPLETE CBC W/AUTO DIFF WBC: CPT

## 2018-03-20 PROCEDURE — 82948 REAGENT STRIP/BLOOD GLUCOSE: CPT

## 2018-03-20 RX ORDER — CLINDAMYCIN PHOSPHATE 600 MG/50ML
600 INJECTION INTRAVENOUS
Status: CANCELLED | OUTPATIENT
Start: 2018-03-20

## 2018-03-20 RX ORDER — POTASSIUM CHLORIDE 20 MEQ/1
20 TABLET, EXTENDED RELEASE ORAL ONCE
Status: COMPLETED | OUTPATIENT
Start: 2018-03-20 | End: 2018-03-20

## 2018-03-20 RX ORDER — SODIUM CHLORIDE 450 MG/100ML
INJECTION, SOLUTION INTRAVENOUS CONTINUOUS
Status: CANCELLED | OUTPATIENT
Start: 2018-03-20

## 2018-03-20 RX ORDER — AMLODIPINE BESYLATE 10 MG/1
10 TABLET ORAL DAILY
Status: DISCONTINUED | OUTPATIENT
Start: 2018-03-20 | End: 2018-03-22 | Stop reason: HOSPADM

## 2018-03-20 RX ORDER — MIDAZOLAM HYDROCHLORIDE 1 MG/ML
1 INJECTION INTRAMUSCULAR; INTRAVENOUS ONCE
Status: CANCELLED | OUTPATIENT
Start: 2018-03-20 | End: 2018-03-20

## 2018-03-20 RX ADMIN — CLONIDINE HYDROCHLORIDE 0.2 MG: 0.2 TABLET ORAL at 20:55

## 2018-03-20 RX ADMIN — HYDRALAZINE HYDROCHLORIDE 100 MG: 50 TABLET ORAL at 16:03

## 2018-03-20 RX ADMIN — POTASSIUM CHLORIDE 20 MEQ: 1500 TABLET, EXTENDED RELEASE ORAL at 21:01

## 2018-03-20 RX ADMIN — VALSARTAN 160 MG: 160 TABLET ORAL at 20:55

## 2018-03-20 RX ADMIN — DOXAZOSIN 8 MG: 4 TABLET ORAL at 20:56

## 2018-03-20 RX ADMIN — INSULIN GLARGINE 8 UNITS: 100 INJECTION, SOLUTION SUBCUTANEOUS at 09:01

## 2018-03-20 RX ADMIN — ISOSORBIDE MONONITRATE 120 MG: 60 TABLET ORAL at 17:14

## 2018-03-20 RX ADMIN — PANTOPRAZOLE SODIUM 40 MG: 40 TABLET, DELAYED RELEASE ORAL at 16:03

## 2018-03-20 RX ADMIN — CLONIDINE HYDROCHLORIDE 0.2 MG: 0.2 TABLET ORAL at 08:48

## 2018-03-20 RX ADMIN — VALSARTAN 160 MG: 160 TABLET ORAL at 08:47

## 2018-03-20 RX ADMIN — PANTOPRAZOLE SODIUM 40 MG: 40 TABLET, DELAYED RELEASE ORAL at 05:24

## 2018-03-20 RX ADMIN — SPIRONOLACTONE 50 MG: 25 TABLET, FILM COATED ORAL at 08:48

## 2018-03-20 RX ADMIN — CALCIUM ACETATE 667 MG: 667 CAPSULE ORAL at 17:14

## 2018-03-20 RX ADMIN — AMLODIPINE BESYLATE 10 MG: 10 TABLET ORAL at 17:14

## 2018-03-20 RX ADMIN — Medication 10 ML: at 08:50

## 2018-03-20 RX ADMIN — DILTIAZEM HYDROCHLORIDE 90 MG: 60 TABLET, FILM COATED ORAL at 08:48

## 2018-03-20 RX ADMIN — TORSEMIDE 40 MG: 20 TABLET ORAL at 08:48

## 2018-03-20 RX ADMIN — Medication 2 UNITS: at 12:15

## 2018-03-20 RX ADMIN — HYDRALAZINE HYDROCHLORIDE 100 MG: 50 TABLET ORAL at 08:48

## 2018-03-20 RX ADMIN — CALCIUM ACETATE 667 MG: 667 CAPSULE ORAL at 12:15

## 2018-03-20 RX ADMIN — BETAMETHASONE DIPROPIONATE: 0.5 OINTMENT TOPICAL at 21:05

## 2018-03-20 RX ADMIN — Medication 1 UNITS: at 08:59

## 2018-03-20 RX ADMIN — HYDRALAZINE HYDROCHLORIDE 10 MG: 20 INJECTION INTRAMUSCULAR; INTRAVENOUS at 04:00

## 2018-03-20 RX ADMIN — ISOSORBIDE MONONITRATE 120 MG: 60 TABLET ORAL at 08:48

## 2018-03-20 RX ADMIN — CALCIUM ACETATE 667 MG: 667 CAPSULE ORAL at 08:48

## 2018-03-20 RX ADMIN — INSULIN LISPRO 1 UNITS: 100 INJECTION, SOLUTION INTRAVENOUS; SUBCUTANEOUS at 21:03

## 2018-03-20 RX ADMIN — DOXAZOSIN 8 MG: 4 TABLET ORAL at 08:47

## 2018-03-20 RX ADMIN — SIMVASTATIN 20 MG: 20 TABLET, FILM COATED ORAL at 20:55

## 2018-03-20 RX ADMIN — HYDRALAZINE HYDROCHLORIDE 100 MG: 50 TABLET ORAL at 20:55

## 2018-03-20 RX ADMIN — Medication 3 UNITS: at 17:15

## 2018-03-20 RX ADMIN — Medication 10 ML: at 20:57

## 2018-03-20 ASSESSMENT — PAIN SCALES - GENERAL
PAINLEVEL_OUTOF10: 0

## 2018-03-20 NOTE — PROGRESS NOTES
Requires some encouragement to participate. Pain:  Denies. Social/Functional:  Lives With: Family (with his mother)  Type of Home:  (Independent living [facility])  Home Layout: One level  Home Access: Level entry     Objective:       Transfers  Sit to Stand: Stand by assistance  Stand to sit: Stand by assistance       Ambulation 1  Surface: level tile  Device: Rolling Walker  Assistance: Contact guard assistance  Quality of Gait: Slow macarena. Increased reliance on AD. Mildly unsteady but no LOB. Cuing to stay within LUIS ALBERTO of AD for decreased fall risk. Distance: 125 feet x1          Exercises:  Exercises  Comments: Pt given and educated to HEP and then completed BLE ankle pumps, LAQ, marches, reclined quad set, glut set, hip abd/add, heel slides, SLR all x10 reps to increase strength for improved functional mobility. Activity Tolerance:  Activity Tolerance: Patient limited by endurance; Patient limited by fatigue    Assessment: Body structures, Functions, Activity limitations: Decreased functional mobility , Decreased strength, Decreased endurance, Decreased balance  Assessment: Pt tolerated session fairly well. Unsteady up on feet but no LOB. Pt given and educated to HEP. Prognosis: Good  REQUIRES PT FOLLOW UP: Yes  Discharge Recommendations: Patient would benefit from continued therapy after discharge, Continue to assess pending progress    Patient Education:  Patient Education: Gait with AD. Safety with gait. Equipment Recommendations: Other: Pt and mother state they have a RW at home available. Safety:  Type of devices:  All fall risk precautions in place, Call light within reach, Chair alarm in place, Gait belt, Left in chair  Restraints  Initially in place: No    Plan:  Times per week: 3-5 X GM  Times per day: Daily  Specific instructions for Next Treatment: ther ex, mobility, gait, balance, endurance   Current Treatment Recommendations: Strengthening, Gait Training, Balance Training, Functional Mobility Training, Transfer Training, Endurance Training, Home Exercise Program    Goals:  Patient goals : Get releif of pain    Short term goals  Time Frame for Short term goals: 2 weeks   Short term goal 1: supine to sit and return with SBA to get in and out of bed   Short term goal 2: sit to stand with SBA to get on and off various surfaces  Short term goal 3: ambulate with AD over 80 feet with S to walk household distances     Long term goals  Time Frame for Long term goals : NA due to short ELOS

## 2018-03-20 NOTE — PROGRESS NOTES
RBCUA 3-5 03/16/2018    BLOODU TRACE 03/16/2018    SPECGRAV 1.018 02/01/2018    GLUCOSEU >= 1000 03/16/2018       Radiology:  US DUP ABD PEL RETRO SCROT COMPLETE   Final Result   1. No evidence of renal artery stenosis of either the right or the left kidney. 2.  Normal appearing right and left kidneys. 3.  Small to moderate-sized right pleural effusion. **This report has been created using voice recognition software. It may contain minor errors which are inherent in voice recognition technology. **      Final report electronically signed by Dr. Kannan Jeong on 3/16/2018 6:13 AM      IR FLUORO GUIDED CVA DEVICE PLACEMENT   Final Result   1. Successful placement of a 24 centimeter temporary left IJ hemodialysis catheter as described above. **This report has been created using voice recognition software. It may contain minor errors which are inherent in voice recognition technology. **      Final report electronically signed by Dr. Tee Mcmanus on 3/14/2018 5:42 PM      XR CHEST STANDARD (2 VW)   Final Result   1. There is resolved pulmonary vascular congestion. There are interval improved infiltrates with mild residual patchy infiltrates within the left suprahilar region and right infrahilar region. There are small bilateral pleural effusions. **This report has been created using voice recognition software. It may contain minor errors which are inherent in voice recognition technology. **      Final report electronically signed by Dr. Elly Kelly on 3/11/2018 1:02 PM      US RENAL COMPLETE   Final Result   1. Normal sonographic appearance of the kidneys. 2. Mild elevation of the left resistive index. This is nonspecific but may be related to underlying medical renal disease. **This report has been created using voice recognition software. It may contain minor errors which are inherent in voice recognition technology. **      Final report electronically signed by

## 2018-03-20 NOTE — CARE COORDINATION
3/20/18, 1:33 PM      Steve Pacheco day: 13  Location: [facility]- Reason for admit: NAVEEN (acute kidney injury) [facility]) [N17.9]   Procedure: 3/16/18 Hemodialysis  3/19/18 Hemodialysis  3/20/18 Hemodialysis  Treatment Plan of Care: Oncology, Nephrology and Cardiology following, fluid restriction, DM and BP management, Potassium replacement protocol, PT/OT, tunneled catheter placement in radiology tomorrow, Aspirin and Plavix on hold, consult to Dr. Ye Green at discharge for fistula establishment. Core Measures: VTE  PCP: Piotr Will  Discharge Plan: Home with his mother.

## 2018-03-21 LAB
ALBUMIN SERPL-MCNC: 2.3 G/DL (ref 3.5–5.1)
ANION GAP SERPL CALCULATED.3IONS-SCNC: 12 MEQ/L (ref 8–16)
ANISOCYTOSIS: ABNORMAL
APTT: 27.2 SECONDS (ref 22–38)
BASOPHILIA: SLIGHT
BASOPHILS # BLD: 2.2 %
BASOPHILS ABSOLUTE: 0.1 THOU/MM3 (ref 0–0.1)
BUN BLDV-MCNC: 44 MG/DL (ref 7–22)
CALCIUM SERPL-MCNC: 8 MG/DL (ref 8.5–10.5)
CHLORIDE BLD-SCNC: 103 MEQ/L (ref 98–111)
CO2: 25 MEQ/L (ref 23–33)
CREAT SERPL-MCNC: 4 MG/DL (ref 0.4–1.2)
CRENATED RBC'S: ABNORMAL
EOSINOPHIL # BLD: 6.5 %
EOSINOPHILS ABSOLUTE: 0.2 THOU/MM3 (ref 0–0.4)
GFR SERPL CREATININE-BSD FRML MDRD: 16 ML/MIN/1.73M2
GLUCOSE BLD-MCNC: 146 MG/DL (ref 70–108)
GLUCOSE BLD-MCNC: 160 MG/DL (ref 70–108)
GLUCOSE BLD-MCNC: 206 MG/DL (ref 70–108)
GLUCOSE BLD-MCNC: 397 MG/DL (ref 70–108)
HCT VFR BLD CALC: 28.5 % (ref 42–52)
HEMOGLOBIN: 9.2 GM/DL (ref 14–18)
INR BLD: 1.12 (ref 0.85–1.13)
LYMPHOCYTES # BLD: 20.2 %
LYMPHOCYTES ABSOLUTE: 0.5 THOU/MM3 (ref 1–4.8)
MCH RBC QN AUTO: 27 PG (ref 27–31)
MCHC RBC AUTO-ENTMCNC: 32.2 GM/DL (ref 33–37)
MCV RBC AUTO: 84 FL (ref 80–94)
MONOCYTES # BLD: 29 %
MONOCYTES ABSOLUTE: 0.8 THOU/MM3 (ref 0.4–1.3)
NUCLEATED RED BLOOD CELLS: 0 /100 WBC
OVALOCYTES: ABNORMAL
PDW BLD-RTO: 18.7 % (ref 11.5–14.5)
PHOSPHORUS: 4.3 MG/DL (ref 2.4–4.7)
PLATELET # BLD: 138 THOU/MM3 (ref 130–400)
PLATELET ESTIMATE: ADEQUATE
PMV BLD AUTO: 9.1 FL (ref 7.4–10.4)
POIKILOCYTES: SLIGHT
POTASSIUM SERPL-SCNC: 3.5 MEQ/L (ref 3.5–5.2)
RBC # BLD: 3.4 MILL/MM3 (ref 4.7–6.1)
SCAN OF BLOOD SMEAR: NORMAL
SCHISTOCYTES: ABNORMAL
SEG NEUTROPHILS: 42.1 %
SEGMENTED NEUTROPHILS ABSOLUTE COUNT: 1.1 THOU/MM3 (ref 1.8–7.7)
SODIUM BLD-SCNC: 140 MEQ/L (ref 135–145)
WBC # BLD: 2.7 THOU/MM3 (ref 4.8–10.8)

## 2018-03-21 PROCEDURE — 02H633Z INSERTION OF INFUSION DEVICE INTO RIGHT ATRIUM, PERCUTANEOUS APPROACH: ICD-10-PCS | Performed by: RADIOLOGY

## 2018-03-21 PROCEDURE — 2500000003 HC RX 250 WO HCPCS

## 2018-03-21 PROCEDURE — 85025 COMPLETE CBC W/AUTO DIFF WBC: CPT

## 2018-03-21 PROCEDURE — 2580000003 HC RX 258: Performed by: RADIOLOGY

## 2018-03-21 PROCEDURE — 6370000000 HC RX 637 (ALT 250 FOR IP): Performed by: INTERNAL MEDICINE

## 2018-03-21 PROCEDURE — 77001 FLUOROGUIDE FOR VEIN DEVICE: CPT | Performed by: RADIOLOGY

## 2018-03-21 PROCEDURE — 2500000003 HC RX 250 WO HCPCS: Performed by: RADIOLOGY

## 2018-03-21 PROCEDURE — C1881 DIALYSIS ACCESS SYSTEM: HCPCS

## 2018-03-21 PROCEDURE — C1769 GUIDE WIRE: HCPCS

## 2018-03-21 PROCEDURE — 6370000000 HC RX 637 (ALT 250 FOR IP): Performed by: NURSE PRACTITIONER

## 2018-03-21 PROCEDURE — 99233 SBSQ HOSP IP/OBS HIGH 50: CPT | Performed by: INTERNAL MEDICINE

## 2018-03-21 PROCEDURE — C1750 CATH, HEMODIALYSIS,LONG-TERM: HCPCS

## 2018-03-21 PROCEDURE — 6360000002 HC RX W HCPCS

## 2018-03-21 PROCEDURE — 1200000003 HC TELEMETRY R&B

## 2018-03-21 PROCEDURE — 85730 THROMBOPLASTIN TIME PARTIAL: CPT

## 2018-03-21 PROCEDURE — 2580000003 HC RX 258: Performed by: INTERNAL MEDICINE

## 2018-03-21 PROCEDURE — 82948 REAGENT STRIP/BLOOD GLUCOSE: CPT

## 2018-03-21 PROCEDURE — 97116 GAIT TRAINING THERAPY: CPT

## 2018-03-21 PROCEDURE — 36558 INSERT TUNNELED CV CATH: CPT | Performed by: RADIOLOGY

## 2018-03-21 PROCEDURE — 97110 THERAPEUTIC EXERCISES: CPT

## 2018-03-21 PROCEDURE — 6370000000 HC RX 637 (ALT 250 FOR IP)

## 2018-03-21 PROCEDURE — 6360000002 HC RX W HCPCS: Performed by: RADIOLOGY

## 2018-03-21 PROCEDURE — 85610 PROTHROMBIN TIME: CPT

## 2018-03-21 PROCEDURE — 6370000000 HC RX 637 (ALT 250 FOR IP): Performed by: RADIOLOGY

## 2018-03-21 PROCEDURE — 80069 RENAL FUNCTION PANEL: CPT

## 2018-03-21 PROCEDURE — 90935 HEMODIALYSIS ONE EVALUATION: CPT

## 2018-03-21 RX ORDER — DIAPER,BRIEF,INFANT-TODD,DISP
EACH MISCELLANEOUS ONCE
Status: COMPLETED | OUTPATIENT
Start: 2018-03-21 | End: 2018-03-21

## 2018-03-21 RX ORDER — CLINDAMYCIN PHOSPHATE 600 MG/50ML
600 INJECTION INTRAVENOUS
Status: COMPLETED | OUTPATIENT
Start: 2018-03-21 | End: 2018-03-21

## 2018-03-21 RX ORDER — MIDAZOLAM HYDROCHLORIDE 1 MG/ML
1 INJECTION INTRAMUSCULAR; INTRAVENOUS ONCE
Status: COMPLETED | OUTPATIENT
Start: 2018-03-21 | End: 2018-03-21

## 2018-03-21 RX ORDER — SODIUM CHLORIDE 450 MG/100ML
INJECTION, SOLUTION INTRAVENOUS CONTINUOUS
Status: DISCONTINUED | OUTPATIENT
Start: 2018-03-21 | End: 2018-03-22 | Stop reason: HOSPADM

## 2018-03-21 RX ADMIN — SODIUM CHLORIDE: 4.5 INJECTION, SOLUTION INTRAVENOUS at 14:30

## 2018-03-21 RX ADMIN — CLINDAMYCIN PHOSPHATE 600 MG: 12 INJECTION, SOLUTION INTRAMUSCULAR; INTRAVENOUS at 14:30

## 2018-03-21 RX ADMIN — Medication 10 ML: at 20:26

## 2018-03-21 RX ADMIN — Medication 10 ML: at 11:41

## 2018-03-21 RX ADMIN — DOXAZOSIN 8 MG: 4 TABLET ORAL at 11:40

## 2018-03-21 RX ADMIN — CALCIUM ACETATE 667 MG: 667 CAPSULE ORAL at 17:20

## 2018-03-21 RX ADMIN — HYDRALAZINE HYDROCHLORIDE 100 MG: 50 TABLET ORAL at 11:40

## 2018-03-21 RX ADMIN — VALSARTAN 160 MG: 160 TABLET ORAL at 11:40

## 2018-03-21 RX ADMIN — HYDROMORPHONE HYDROCHLORIDE 1 MG: 1 INJECTION, SOLUTION INTRAMUSCULAR; INTRAVENOUS; SUBCUTANEOUS at 15:00

## 2018-03-21 RX ADMIN — ISOSORBIDE MONONITRATE 120 MG: 60 TABLET ORAL at 11:40

## 2018-03-21 RX ADMIN — CLONIDINE HYDROCHLORIDE 0.2 MG: 0.2 TABLET ORAL at 11:40

## 2018-03-21 RX ADMIN — TORSEMIDE 40 MG: 20 TABLET ORAL at 11:40

## 2018-03-21 RX ADMIN — CLONIDINE HYDROCHLORIDE 0.2 MG: 0.2 TABLET ORAL at 20:25

## 2018-03-21 RX ADMIN — SIMVASTATIN 20 MG: 20 TABLET, FILM COATED ORAL at 20:25

## 2018-03-21 RX ADMIN — PANTOPRAZOLE SODIUM 40 MG: 40 TABLET, DELAYED RELEASE ORAL at 17:20

## 2018-03-21 RX ADMIN — DOXAZOSIN 8 MG: 4 TABLET ORAL at 20:25

## 2018-03-21 RX ADMIN — SPIRONOLACTONE 50 MG: 25 TABLET, FILM COATED ORAL at 11:41

## 2018-03-21 RX ADMIN — ISOSORBIDE MONONITRATE 120 MG: 60 TABLET ORAL at 17:20

## 2018-03-21 RX ADMIN — HYDRALAZINE HYDROCHLORIDE 100 MG: 50 TABLET ORAL at 20:25

## 2018-03-21 RX ADMIN — PANTOPRAZOLE SODIUM 40 MG: 40 TABLET, DELAYED RELEASE ORAL at 06:08

## 2018-03-21 RX ADMIN — MIDAZOLAM 1 MG: 1 INJECTION INTRAMUSCULAR; INTRAVENOUS at 15:00

## 2018-03-21 RX ADMIN — SODIUM CHLORIDE 50000 UNITS: 900 IRRIGANT IRRIGATION at 15:09

## 2018-03-21 RX ADMIN — VALSARTAN 160 MG: 160 TABLET ORAL at 20:25

## 2018-03-21 RX ADMIN — Medication 1 UNITS: at 17:20

## 2018-03-21 RX ADMIN — AMLODIPINE BESYLATE 10 MG: 10 TABLET ORAL at 11:40

## 2018-03-21 RX ADMIN — BACITRACIN ZINC 1 G: 500 OINTMENT TOPICAL at 15:26

## 2018-03-21 RX ADMIN — HYDRALAZINE HYDROCHLORIDE 100 MG: 50 TABLET ORAL at 13:58

## 2018-03-21 RX ADMIN — INSULIN LISPRO 3 UNITS: 100 INJECTION, SOLUTION INTRAVENOUS; SUBCUTANEOUS at 21:33

## 2018-03-21 ASSESSMENT — PAIN SCALES - GENERAL
PAINLEVEL_OUTOF10: 0

## 2018-03-21 ASSESSMENT — PAIN DESCRIPTION - LOCATION: LOCATION: KNEE;ABDOMEN

## 2018-03-21 ASSESSMENT — PAIN DESCRIPTION - PAIN TYPE: TYPE: ACUTE PAIN

## 2018-03-21 ASSESSMENT — PAIN DESCRIPTION - ORIENTATION: ORIENTATION: RIGHT;LOWER

## 2018-03-21 NOTE — PROGRESS NOTES
[employer] THERAPY MISSED TREATMENT NOTE  [facility] 5K  5K-28/028-A      Date: 3/21/2018  Patient Name: Jeff Salmeron        CSN: [id number]   : 1965  (48 y.o.)  Gender: male   Referring Practitioner: Dr. Nayely Ozuna MD  Diagnosis: Acute Kidney Injury         REASON FOR MISSED TREATMENT:  Hold treatment per nursing request.  Pt was C/O feeling very tired. His nurse asked to hold therapy this afternoon. He had dialysis this morning and had a catheter placed this afternoon earlier. Will retry tomorrow.

## 2018-03-21 NOTE — PROGRESS NOTES
Pain:  Yes. Pain Assessment  Pain Level:  (Not asked to rate. )  Pain Type: Acute pain  Pain Location: Knee; Abdomen  Pain Orientation: Right; Lower       Social/Functional:  Lives With: Family (with his mother)  Type of Home:  (Independent living [facility])  Home Layout: One level  Home Access: Level entry     Objective:  Rolling to Left: Stand by assistance (Verbal cues for log roll technique. )  Supine to Sit: Minimal assistance (at trunk from left side position on flat bed. Patient used bed rail. )    Transfers  Sit to Stand: Contact guard assistance  Stand to sit: Contact guard assistance  Stand Pivot Transfers: Contact guard assistance (using rolling walker. )       Ambulation 1  Surface: level tile  Device: Rolling Walker  Assistance: Contact guard assistance  Quality of Gait: Continuous ambulation without hesitation. Patient walking behind vs within walker frame. Mild unsteadiness . Distance: 130 ft x 1          Exercises:  Exercises  Comments: Patient completed the following therapeutic exercises: ankle pumps, low trunk rotations, straight leg raises, heel slides. Shoulder flexion overhead. Chest press. 10 reps. Activity Tolerance:  Activity Tolerance: Patient limited by endurance  Activity Tolerance: Fair    Assessment: Body structures, Functions, Activity limitations: Decreased functional mobility , Decreased strength, Decreased endurance, Decreased balance  Assessment: Pt tolerated session fairly well. Unsteady up on feet but no LOB. Pt given and educated to HEP. Prognosis: Good  REQUIRES PT FOLLOW UP: Yes  Discharge Recommendations: Patient would benefit from continued therapy after discharge, Continue to assess pending progress    Patient Education:  Patient Education: Gait with AD. Safety with gait. Equipment Recommendations: Other: Pt and mother state they have a RW at home available. Safety:  Type of devices:  All fall risk precautions in place, Call light within reach, Left in chair  Restraints  Initially in place: No    Plan:  Times per week: 3-5 X GM  Times per day: Daily  Specific instructions for Next Treatment: ther ex, mobility, gait, balance, endurance   Current Treatment Recommendations: Strengthening, Gait Training, Balance Training, Functional Mobility Training, Transfer Training, Endurance Training, Home Exercise Program    Goals:  Patient goals : Get releif of pain    Short term goals  Time Frame for Short term goals: 2 weeks   Short term goal 1: supine to sit and return with SBA to get in and out of bed   Short term goal 2: sit to stand with SBA to get on and off various surfaces  Short term goal 3: ambulate with AD over 80 feet with S to walk household distances     Long term goals  Time Frame for Long term goals : NA due to short ELOS

## 2018-03-21 NOTE — PROGRESS NOTES
glucose, dextrose, glucagon (rDNA), dextrose      Intake/Output Summary (Last 24 hours) at 03/21/18 1348  Last data filed at 03/21/18 1112   Gross per 24 hour   Intake             1550 ml   Output             3900 ml   Net            -2350 ml       Diet:  Diet NPO Time Specified Exceptions are: Other (See Comment)    Exam:  BP (!) 193/88   Pulse 61   Temp 97.9 °F (36.6 °C) (Oral)   Resp 18   Ht 5' 9\" (1.753 m)   Wt 201 lb 15.1 oz (91.6 kg)   SpO2 96%   BMI 29.82 kg/m²     General appearance: No apparent distress, appears stated age and cooperative. HEENT: Pupils equal, round, and reactive to light. Conjunctivae/corneas clear. Neck: Supple, with full range of motion. No jugular venous distention. Trachea midline. Respiratory:  tachypneic; Rales/Wheezes/Rhonchi. Cardiovascular: Regular rate and rhythm with normal S1/S2 without murmurs, rubs or gallops. Abdomen: Soft, non-tender, non-distended with normal bowel sounds. Musculoskeletal: passive and active ROM x 4 extremities. edema. .  Skin: Skin color, texture, turgor normal.  No rashes or lesions. Neurologic:  Neurovascularly intact without any focal sensory/motor deficits. Cranial nerves: II-XII intact, grossly non-focal.  Psychiatric: Alert and oriented, thought content appropriate, normal insight  Capillary Refill: Brisk,< 3 seconds   Peripheral Pulses: +2 palpable, equal bilaterally       Labs:   Recent Labs      03/19/18   0609  03/20/18   0605  03/21/18   0021   WBC  4.9  2.5*  2.7*   HGB  8.8*  9.6*  9.2*   HCT  26.8*  28.7*  28.5*   PLT  120*  122*  138     Recent Labs      03/19/18   0609  03/20/18   0605  03/21/18   0641   NA  138  139  140   K  3.4*  3.4*  3.5   CL  103  102  103   CO2  21*  25  25   BUN  52*  39*  44*   CREATININE  4.4*  3.7*  4.0*   CALCIUM  7.7*  8.2*  8.0*   PHOS  4.9*  4.8*  4.3     No results for input(s): AST, ALT, BILIDIR, BILITOT, ALKPHOS in the last 72 hours.   Recent Labs      03/19/18   0609  03/21/18   0021   INR

## 2018-03-21 NOTE — PROGRESS NOTES
Formulation and discussion of sedation / procedure plans, risks, benefits, side effects and alternatives with patient and/or responsible adult completed.     Electronically signed by Etelvina Ortiz MD on 3/21/2018 at 3:26 PM

## 2018-03-21 NOTE — FLOWSHEET NOTE
03/15/18 0811 03/15/18 1122   Vital Signs   BP (!) 185/83 (!) 177/84   Temp 98.1 °F (36.7 °C) 97.9 °F (36.6 °C)   Pulse 55 53   Weight 237 lb 3.4 oz (107.6 kg) 230 lb 9.6 oz (104.6 kg)   Weight Method Bed scale Bed scale   Percent Weight Change 12.42 -2.79   Post-Hemodialysis Assessment   Post-Treatment Procedures --  Blood returned;Catheter Capped, clamped with Saline x2 ports   Machine Disinfection Process --  Acid/Vinegar Clean;Heat Disinfect; Exterior Machine Disinfection   Total Liters Processed (l/min) --  42.3 l/min   Dialyzer Clearance --  Clear   Duration of Treatment (minutes) --  180 minutes   Heparin amount administered during treatment (units) --  0 units   Hemodialysis Intake (ml) --  400 ml   Hemodialysis Output (ml) --  3400 ml   NET Removed (ml) --  3000 ml   Tolerated Treatment --  Good   stable tx. Removed 3.0 Liters of fluid, pt tolerated well. Cath drsg changed. Bilateral cath ports flushed with 0.9% normal saline and clamped. tx record printed and placed into bin to be scanned into pt's EMR.
03/15/18 1835   Encounter Summary   Services provided to: Patient   Referral/Consult From: [facility] Family members   [facility] None   Continue Visiting Yes  (3/15/18 Anxious)   Complexity of Encounter Moderate   Length of Encounter 15 minutes   Routine   Type Follow up   Assessment Anxious   Intervention Nurtured hope   Outcome Expressed gratitude   Spiritual/Scientologist   Type Spiritual support     S. Patient lifted up his head and immediately said, \"The dialysis from the morning hours is making me to feel very tire. . I am a bit anxious today. Patient also said that he has no [facility]az[facility] home at this time. O  Patient's body language indicated that he was not in the mood for a long visit at the time of my entry. Prayer was offered and and words of encouragement was offered. I did not see anything such as get well card any where in the room. A.  This patient appeared to be struggling through his illness. He was anxious over what was going on as in stated that he was tired from his dialysis. Plan:  Patient and his family can benefit from continue emotional and spiritual support including prayer. It will also be helpful that during the next follow up, for  to encourage patient to verbalize his thought and feeling about how he is finding hope and meanings through his illness.
03/19/18 0820 03/19/18 1132   Vital Signs   /67 (!) 174/76   Temp 98.8 °F (37.1 °C) 97.7 °F (36.5 °C)   Pulse 52 56   Weight 219 lb 2.2 oz (99.4 kg) 212 lb 8.4 oz (96.4 kg)   Weight Method Bed scale Bed scale   Percent Weight Change -2.93 -3.02   Post-Hemodialysis Assessment   Post-Treatment Procedures --  Blood returned; Access bleeding time < 10 minutes   Machine Disinfection Process --  Acid/Vinegar Clean;Heat Disinfect; Exterior Machine Disinfection   Total Liters Processed (l/min) --  47.1 l/min   Dialyzer Clearance --  Lightly streaked   Duration of Treatment (minutes) --  180 minutes   Heparin amount administered during treatment (units) --  0 units   Hemodialysis Intake (ml) --  400 ml   Hemodialysis Output (ml) --  3400 ml   NET Removed (ml) --  3000 ml   Tolerated Treatment --  Good   Stable tx. Pt tolerated fluid removal well. Cath drsg  Changed. Tegos changed.
03/21/18 0730 03/21/18 1112   Vital Signs   BP (!) 187/86 (!) 183/83   Temp 98.5 °F (36.9 °C) 97.5 °F (36.4 °C)   Pulse 64 57   Weight 207 lb 7.3 oz (94.1 kg) 201 lb 15.1 oz (91.6 kg)   Weight Method Bed scale Bed scale   Percent Weight Change --  -2.66   Post-Hemodialysis Assessment   Post-Treatment Procedures --  Blood returned;Catheter Capped, clamped with Saline x2 ports   Machine Disinfection Process --  Exterior Machine Disinfection   Rinseback Volume (ml) --  400 ml   Total Liters Processed (l/min) --  66.3 l/min   Dialyzer Clearance --  Lightly streaked   Duration of Treatment (minutes) --  210 minutes   Heparin amount administered during treatment (units) --  0 units   Hemodialysis Intake (ml) --  400 ml   Hemodialysis Output (ml) --  2900 ml   NET Removed (ml) --  2500 ml   Tolerated Treatment --  Good     Stable 3.5hr run. Pt tolerated the net fluid removal of 2.5L without any adverse events. Treatment sheet completed, printed and placed in bin for EMR scanning.
denies pain. Speech appropriate and clear. No neck distention. Skin pink, dry, and warm. Capillary refill less than 3 sec. Hand grasp strong and equal bilaterally. Lung sounds clear bilaterally. Heart sounds regular. Symmetrical chest expansion. Abdomen soft and round, non-tender w/o massses. Bowel sounds active x4. Edema in lower extremities, +3 pitting. +2 edema in judy area. [facility] still on both legs up to knee. Pedal push and pull strong and equal bilaterally. No numbness/tingling no restricted movement. Personal belongings within reach. Call light within reach. Mother at pts side. 1415 Informed pt that I was leaving for the day. No concerns voiced. Pt denies pain. Mother still at pts side. Reported off to smsPREP. Call light within reach. Personal belongings within reach.

## 2018-03-21 NOTE — PROGRESS NOTES
Betsey Sheffield MD, 10 mg at 03/20/18 0400    spironolactone (ALDACTONE) tablet 50 mg, 50 mg, Oral, Daily, Luc Ryan MD, 50 mg at 03/21/18 1141    ondansetron (ZOFRAN-ODT) disintegrating tablet 4 mg, 4 mg, Oral, Q6H PRN, Lisseth Rose MD    insulin lispro (HUMALOG) injection vial 0-6 Units, 0-6 Units, Subcutaneous, TID WC, Siobhan Kinney MD, 3 Units at 03/20/18 1715    insulin lispro (HUMALOG) injection vial 0-3 Units, 0-3 Units, Subcutaneous, Nightly, Siobhan Kinney MD, 1 Units at 03/20/18 2103    cloNIDine (CATAPRES) tablet 0.2 mg, 0.2 mg, Oral, BID, Luc Ryan MD, 0.2 mg at 03/21/18 1140    torsemide (DEMADEX) tablet 40 mg, 40 mg, Oral, Daily, Luc Ryan MD, 40 mg at 03/21/18 1140    isosorbide mononitrate (IMDUR) extended release tablet 120 mg, 120 mg, Oral, BID, Hadley Kriss Harshfield, CNP, 120 mg at 03/21/18 1140    calcium acetate (PHOSLO) capsule 667 mg, 667 mg, Oral, TID WC, Luc Ryan MD, 667 mg at 03/20/18 1714    polyethylene glycol (GLYCOLAX) packet 17 g, 17 g, Oral, Daily, Sanam Michelle MD, 17 g at 03/10/18 0910    sennosides-docusate sodium (SENOKOT-S) 8.6-50 MG tablet 2 tablet, 2 tablet, Oral, Daily PRN, Sanam Michelle MD    sodium chloride flush 0.9 % injection 10 mL, 10 mL, Intravenous, 2 times per day, Lisseth Rose MD, 10 mL at 03/21/18 1141    sodium chloride flush 0.9 % injection 10 mL, 10 mL, Intravenous, PRN, Lisseth Rose MD, 10 mL at 03/09/18 1348    acetaminophen (TYLENOL) tablet 650 mg, 650 mg, Oral, Q4H PRN, Lisseth Rose MD, 650 mg at 03/08/18 1627    magnesium sulfate 1 g in dextrose 5 % 100 mL IVPB, 1 g, Intravenous, PRN, Lisseth Rose MD    potassium chloride (KLOR-CON M) extended release tablet 40 mEq, 40 mEq, Oral, PRN **OR** potassium chloride 20 MEQ/15ML (10%) oral solution 40 mEq, 40 mEq, Oral, PRN **OR** potassium chloride 10 mEq/100 mL IVPB (Peripheral Line), 10 mEq, Intravenous, PRN, Lisseth Rose MD  [facility] undergo the planned procedure sedation and anesthesia. (Refer to nursing sedation/analgesia documentation record)  [x]Formulation and discussion of sedation/procedure plan, risks, and expectations with patient and/or responsible adult completed. [x]Patient examined immediately prior to the procedure.  (Refer to nursing sedation/analgesia documentation record)    Tommie Sicard, MD  Electronically signed 3/21/2018 at 3:26 PM

## 2018-03-22 VITALS
HEART RATE: 67 BPM | WEIGHT: 202.38 LBS | DIASTOLIC BLOOD PRESSURE: 60 MMHG | RESPIRATION RATE: 16 BRPM | HEIGHT: 69 IN | BODY MASS INDEX: 29.98 KG/M2 | TEMPERATURE: 98.2 F | OXYGEN SATURATION: 97 % | SYSTOLIC BLOOD PRESSURE: 129 MMHG

## 2018-03-22 PROBLEM — N18.30 STAGE 3 CHRONIC KIDNEY DISEASE (HCC): Status: RESOLVED | Noted: 2018-02-03 | Resolved: 2018-03-22

## 2018-03-22 PROBLEM — N18.6 ESRD (END STAGE RENAL DISEASE) (HCC): Status: ACTIVE | Noted: 2018-01-31

## 2018-03-22 LAB
ALBUMIN SERPL-MCNC: 2 G/DL (ref 3.5–5.1)
ANION GAP SERPL CALCULATED.3IONS-SCNC: 13 MEQ/L (ref 8–16)
ANISOCYTOSIS: ABNORMAL
BASOPHILIA: ABNORMAL
BASOPHILS # BLD: 5 %
BASOPHILS ABSOLUTE: 0.1 THOU/MM3 (ref 0–0.1)
BUN BLDV-MCNC: 26 MG/DL (ref 7–22)
CALCIUM SERPL-MCNC: 7.9 MG/DL (ref 8.5–10.5)
CHLORIDE BLD-SCNC: 103 MEQ/L (ref 98–111)
CO2: 20 MEQ/L (ref 23–33)
CREAT SERPL-MCNC: 3.3 MG/DL (ref 0.4–1.2)
CRENATED RBC'S: ABNORMAL
DIFFERENTIAL, MANUAL: NORMAL
ELLIPTOCYTES: ABNORMAL
EOSINOPHIL # BLD: 13 %
EOSINOPHILS ABSOLUTE: 0.3 THOU/MM3 (ref 0–0.4)
GFR SERPL CREATININE-BSD FRML MDRD: 20 ML/MIN/1.73M2
GLUCOSE BLD-MCNC: 157 MG/DL (ref 70–108)
GLUCOSE BLD-MCNC: 160 MG/DL (ref 70–108)
GLUCOSE BLD-MCNC: 177 MG/DL (ref 70–108)
HCT VFR BLD CALC: 27.9 % (ref 42–52)
HEMOGLOBIN: 9.1 GM/DL (ref 14–18)
HYPOCHROMIA: ABNORMAL
LYMPHOCYTES # BLD: 32 %
LYMPHOCYTES ABSOLUTE: 0.8 THOU/MM3 (ref 1–4.8)
MCH RBC QN AUTO: 27.4 PG (ref 27–31)
MCHC RBC AUTO-ENTMCNC: 32.5 GM/DL (ref 33–37)
MCV RBC AUTO: 84.1 FL (ref 80–94)
MICROCYTES: SLIGHT
MONOCYTES # BLD: 19 %
MONOCYTES ABSOLUTE: 0.5 THOU/MM3 (ref 0.4–1.3)
NUCLEATED RED BLOOD CELLS: 0 /100 WBC
PDW BLD-RTO: 18.5 % (ref 11.5–14.5)
PHOSPHORUS: 3.2 MG/DL (ref 2.4–4.7)
PLATELET # BLD: 159 THOU/MM3 (ref 130–400)
PMV BLD AUTO: 8.7 FL (ref 7.4–10.4)
POIKILOCYTES: ABNORMAL
POTASSIUM SERPL-SCNC: 3.6 MEQ/L (ref 3.5–5.2)
RBC # BLD: 3.31 MILL/MM3 (ref 4.7–6.1)
SCHISTOCYTES: ABNORMAL
SEG NEUTROPHILS: 31 %
SEGMENTED NEUTROPHILS ABSOLUTE COUNT: 0.8 THOU/MM3 (ref 1.8–7.7)
SODIUM BLD-SCNC: 136 MEQ/L (ref 135–145)
SPHEROCYTES: ABNORMAL
STOMATOCYTES: ABNORMAL
WBC # BLD: 2.6 THOU/MM3 (ref 4.8–10.8)

## 2018-03-22 PROCEDURE — 85025 COMPLETE CBC W/AUTO DIFF WBC: CPT

## 2018-03-22 PROCEDURE — 82948 REAGENT STRIP/BLOOD GLUCOSE: CPT

## 2018-03-22 PROCEDURE — 80069 RENAL FUNCTION PANEL: CPT

## 2018-03-22 PROCEDURE — 6370000000 HC RX 637 (ALT 250 FOR IP): Performed by: INTERNAL MEDICINE

## 2018-03-22 PROCEDURE — 99239 HOSP IP/OBS DSCHRG MGMT >30: CPT | Performed by: HOSPITALIST

## 2018-03-22 PROCEDURE — 2580000003 HC RX 258: Performed by: INTERNAL MEDICINE

## 2018-03-22 PROCEDURE — APPNB15 APP NON BILLABLE TIME 0-15 MINS: Performed by: PHYSICIAN ASSISTANT

## 2018-03-22 PROCEDURE — 6360000002 HC RX W HCPCS: Performed by: HOSPITALIST

## 2018-03-22 PROCEDURE — 6370000000 HC RX 637 (ALT 250 FOR IP): Performed by: NURSE PRACTITIONER

## 2018-03-22 PROCEDURE — 6370000000 HC RX 637 (ALT 250 FOR IP): Performed by: HOSPITALIST

## 2018-03-22 RX ORDER — AMLODIPINE BESYLATE 10 MG/1
10 TABLET ORAL DAILY
Qty: 30 TABLET | Refills: 3 | Status: SHIPPED | OUTPATIENT
Start: 2018-03-23 | End: 2020-06-15

## 2018-03-22 RX ORDER — VALSARTAN 160 MG/1
160 TABLET ORAL DAILY
Qty: 30 TABLET | Refills: 1 | Status: ON HOLD | OUTPATIENT
Start: 2018-03-22 | End: 2018-09-17 | Stop reason: HOSPADM

## 2018-03-22 RX ORDER — HEPARIN SODIUM (PORCINE) LOCK FLUSH IV SOLN 100 UNIT/ML 100 UNIT/ML
500 SOLUTION INTRAVENOUS
Status: COMPLETED | OUTPATIENT
Start: 2018-03-22 | End: 2018-03-22

## 2018-03-22 RX ORDER — TORSEMIDE 20 MG/1
40 TABLET ORAL DAILY
Qty: 30 TABLET | Refills: 1 | Status: SHIPPED | OUTPATIENT
Start: 2018-03-23

## 2018-03-22 RX ORDER — CLONIDINE HYDROCHLORIDE 0.1 MG/1
0.2 TABLET ORAL 2 TIMES DAILY
Qty: 60 TABLET | Refills: 3 | Status: SHIPPED | OUTPATIENT
Start: 2018-03-22

## 2018-03-22 RX ADMIN — Medication 1 UNITS: at 09:03

## 2018-03-22 RX ADMIN — AMLODIPINE BESYLATE 10 MG: 10 TABLET ORAL at 09:04

## 2018-03-22 RX ADMIN — SPIRONOLACTONE 50 MG: 25 TABLET, FILM COATED ORAL at 09:05

## 2018-03-22 RX ADMIN — ISOSORBIDE MONONITRATE 120 MG: 60 TABLET ORAL at 16:07

## 2018-03-22 RX ADMIN — CLONIDINE HYDROCHLORIDE 0.2 MG: 0.2 TABLET ORAL at 09:05

## 2018-03-22 RX ADMIN — Medication 1 UNITS: at 12:44

## 2018-03-22 RX ADMIN — HYDRALAZINE HYDROCHLORIDE 100 MG: 50 TABLET ORAL at 13:56

## 2018-03-22 RX ADMIN — CALCIUM ACETATE 667 MG: 667 CAPSULE ORAL at 12:43

## 2018-03-22 RX ADMIN — HYDRALAZINE HYDROCHLORIDE 100 MG: 50 TABLET ORAL at 09:04

## 2018-03-22 RX ADMIN — BETAMETHASONE DIPROPIONATE: 0.5 OINTMENT TOPICAL at 09:04

## 2018-03-22 RX ADMIN — POLYETHYLENE GLYCOL 3350 17 G: 17 POWDER, FOR SOLUTION ORAL at 09:05

## 2018-03-22 RX ADMIN — VALSARTAN 160 MG: 160 TABLET ORAL at 09:04

## 2018-03-22 RX ADMIN — INSULIN GLARGINE 8 UNITS: 100 INJECTION, SOLUTION SUBCUTANEOUS at 09:03

## 2018-03-22 RX ADMIN — HYDROCORTISONE: 25 CREAM TOPICAL at 09:15

## 2018-03-22 RX ADMIN — CALCIUM ACETATE 667 MG: 667 CAPSULE ORAL at 07:54

## 2018-03-22 RX ADMIN — Medication 10 ML: at 09:15

## 2018-03-22 RX ADMIN — PANTOPRAZOLE SODIUM 40 MG: 40 TABLET, DELAYED RELEASE ORAL at 16:07

## 2018-03-22 RX ADMIN — PANTOPRAZOLE SODIUM 40 MG: 40 TABLET, DELAYED RELEASE ORAL at 07:54

## 2018-03-22 RX ADMIN — DOXAZOSIN 8 MG: 4 TABLET ORAL at 09:04

## 2018-03-22 RX ADMIN — TORSEMIDE 40 MG: 20 TABLET ORAL at 09:05

## 2018-03-22 RX ADMIN — ISOSORBIDE MONONITRATE 120 MG: 60 TABLET ORAL at 09:04

## 2018-03-22 RX ADMIN — HEPARIN 500 UNITS: 100 SYRINGE at 17:31

## 2018-03-22 ASSESSMENT — PAIN SCALES - GENERAL
PAINLEVEL_OUTOF10: 0

## 2018-03-22 NOTE — CARE COORDINATION
Additional dialysis catheter placement procedure notes faxed to [facility]. Awaiting her call back for scheduled dialysis tx times.  Electronically signed by Jacque Flores RN on 3/22/18 at 2:08 PM

## 2018-03-22 NOTE — PLAN OF CARE
Problem: Pain Control  Goal: Maintain pain level at or below patient's acceptable level (or 5 if patient is unable to determine acceptable level)  Outcome: Met This Shift  No complaints    Problem: Cardiovascular  Goal: No DVT, peripheral vascular complications  Outcome: Ongoing  On plavix and asa  Goal: Hemodynamic stability  Outcome: Ongoing  Monitoring blood pressures    Problem: GI  Goal: No bowel complications  Outcome: Ongoing  Loose stools    Problem: Musculor/Skeletal Functional Status  Goal: Highest potential functional level  Outcome: Met This Shift    Goal: Absence of falls  Outcome: Met This Shift      Problem: Intellectual/Education/Knowledge Deficit  Goal: Teaching initiated upon admission  Outcome: Met This Shift      Problem: Falls - Risk of  Goal: Absence of falls  Outcome: Met This Shift      Comments: Care plan reviewed with patient . Patient  verbalize understanding of the plan of care and contribute to goal setting.
Problem: Pain:  Goal: Pain level will decrease  Pain level will decrease   Outcome: Met This Shift  No [pain complaints this shift    Problem: Pain Control  Goal: Maintain pain level at or below patient's acceptable level (or 5 if patient is unable to determine acceptable level)  Outcome: Met This Shift      Problem: Cardiovascular  Goal: No DVT, peripheral vascular complications  Outcome: Ongoing  Patient wearing serg hose  Goal: Hemodynamic stability  Outcome: Ongoing    Goal: Agreement to quit smoking  Outcome: Completed Date Met: 03/15/18    Goal: Weight maintained or lost  Outcome: Completed Date Met: 03/15/18    Goal: Understanding of dietary restrictions  Outcome: Completed Date Met: 03/15/18      Problem: Musculor/Skeletal Functional Status  Goal: Absence of falls  Outcome: Met This Shift  Patient uses bed and chair alarm. Patient uses call light appropriatly    Problem: Falls - Risk of  Goal: Absence of falls  Outcome: Met This Shift  Patient uses bed and chair alarm. Patient uses call light appropriatly    Problem: Risk for Impaired Skin Integrity  Goal: Tissue integrity - skin and mucous membranes  Structural intactness and normal physiological function of skin and  mucous membranes. Outcome: Ongoing  patient    Comments: Care plan reviewed with patient . Patient  verbalize understanding of the plan of care and contribute to goal setting.
Problem: Pain:  Goal: Pain level will decrease  Pain level will decrease   Outcome: Met This Shift  Pt denies pain this shift. Pain goal = 2/10. Pain goal achieved this shift. Goal: Control of acute pain  Control of acute pain   Outcome: Met This Shift      Problem: Pain Control  Goal: Maintain pain level at or below patient's acceptable level (or 5 if patient is unable to determine acceptable level)  Outcome: Met This Shift    Goal: Improvement in pain related behaviors BP/HR WNL  Outcome: Met This Shift      Problem: Cardiovascular  Goal: No DVT, peripheral vascular complications  Outcome: Met This Shift  Pt without DVT this admission. Encourage ambulation in halls. Blood thinners on hold for dialysis cath placement. Goal: Hemodynamic stability  Outcome: Met This Shift    Goal: Anticoagulate/Hct stable  Outcome: Met This Shift      Problem: Respiratory  Goal: No pulmonary complications  Outcome: Ongoing  Pt without pulmonary complications this shift. Encourage cough and deep breathing. Goal: Supplemental O2 requirements decreased  Outcome: Completed Date Met: 03/20/18    Goal: Agreement to quit smoking  Outcome: Ongoing    Goal: Pneumonia vaccine at discharge as needed  Outcome: Ongoing      Problem: GI  Goal: No bowel complications  Outcome: Ongoing  Pt with intermittent diarrhea. Pt denies BM this shift. Goal: Bowel movement at least every other day  Outcome: Ongoing  Per report, pt with BM this AM.     Problem: Musculor/Skeletal Functional Status  Goal: Highest potential functional level  Outcome: Ongoing  Pt ambulated in halls with PT. Pt up with walker and assistance in room. Goal: Absence of falls  Outcome: Met This Shift  Pt free of falls this shift. Problem: Intellectual/Education/Knowledge Deficit  Goal: Teaching initiated upon admission  Outcome: Ongoing      Problem: Falls - Risk of  Goal: Absence of falls  Outcome: Met This Shift  Pt free of falls this shift.      Comments: Care plan
Problem: Pain:  Goal: Pain level will decrease  Pain level will decrease   Outcome: Ongoing  C/O abdominal and knee pain in right leg. Abd pain better after large BM today. Problem: Pain Control  Goal: Maintain pain level at or below patient's acceptable level (or 5 if patient is unable to determine acceptable level)  Outcome: Ongoing  SCDs ordered, has not worn today    Problem: Cardiovascular  Goal: No DVT, peripheral vascular complications  Outcome: Ongoing    Goal: Anticoagulate/Hct stable  Outcome: Ongoing  Aranesp ordered, hemoglobin was 8.1 this morning. Problem: Respiratory  Goal: No pulmonary complications  Outcome: Ongoing  LS clear, SOB with exertion. Remains on 4L NC. Goal: O2 Sat > 90%  Outcome: Ongoing  Greater than 90% on supplemental O2    Problem: GI  Goal: No bowel complications  Outcome: Ongoing  Had one very large BM this shift. Problem: Musculor/Skeletal Functional Status  Goal: Highest potential functional level  Outcome: Ongoing  Weakness in R leg, does well ambulating with walker and 1-2 assist. Therapy seeing him. Goal: Absence of falls  Outcome: Ongoing  No fall this shift, call light in reach. Ambulates with walker and 1-2 assist.     Problem: Intellectual/Education/Knowledge Deficit  Goal: Teaching initiated upon admission  Outcome: Ongoing  Handouts given regarding new meds. Problem: Falls - Risk of  Goal: Absence of falls  Outcome: Ongoing  No fall this shift, call light in reach. Ambulates with walker and 1-2 assist.     Problem: Risk for Impaired Skin Integrity  Goal: Tissue integrity - skin and mucous membranes  Structural intactness and normal physiological function of skin and  mucous membranes. Outcome: Ongoing  No new skin issues assessed. Comments: Care plan reviewed with patient and mother Kash Cook  Patient and mother verbalize understanding of the plan of care and contribute to goal setting.
Problem: Pain:  Goal: Pain level will decrease  Pain level will decrease   Outcome: Ongoing  No pain this shift. Problem: Cardiovascular  Goal: No DVT, peripheral vascular complications  Outcome: Ongoing  No signs/symptoms of DVT's. Plavix being administered. Problem: Respiratory  Goal: No pulmonary complications  Outcome: Ongoing  On room air, oxygen saturation WNL. Problem: GI  Goal: No bowel complications  Outcome: Ongoing  Active bowel sounds x4. Loose stools at times. Problem: Musculor/Skeletal Functional Status  Goal: Absence of falls  Outcome: Ongoing  No falls this shift. Bed alarm on. Call light within reach. Hourly rounding. Weakness in lower extremities. Problem: Discharge Planning  Intervention: Discharge to appropriate level of care  Home with mother. Problem: Falls - Risk of  Goal: Absence of falls  Outcome: Ongoing  No falls this shift. Bed alarm on. Call light within reach. Hourly rounding. Weakness in lower extremities. Problem: Risk for Impaired Skin Integrity  Goal: Tissue integrity - skin and mucous membranes  Structural intactness and normal physiological function of skin and  mucous membranes. Outcome: Ongoing      Comments: Care plan reviewed with patient. Patient verbalize understanding of the plan of care and contribute to goal setting.
Problem: Pain:  Goal: Pain level will decrease  Pain level will decrease   Outcome: Ongoing  Patient states pain relief from PRN pain medications. Pain reassessed one hour post PRN pain medication given. Patient rates pain 0 on ED 0-10 scale. Problem: Cardiovascular  Goal: No DVT, peripheral vascular complications  Outcome: Met This Shift  Patient without s/s of DVT. Patient able to take prescribed anticoagulants/AYO hose. Problem: Respiratory  Goal: O2 Sat > 90%  Outcome: Met This Shift  Patients oxygen saturation 95 % on room air. No shortness of breath noted. Lung sounds clear, able C&DB as ordered. Problem: GI  Goal: No bowel complications  Outcome: Met This Shift  Patient bowel sounds active. Passing flatus. Pt taking prescribed medication to assist with BM. Problem: Musculor/Skeletal Functional Status  Goal: Highest potential functional level  Outcome: Ongoing  Patient ambulates with 1 assist. Gait steady. Patient able to perform passive ROM. Goal: Absence of falls  Outcome: Met This Shift  Fall assessment completed. Patient using call light appropriately to call for assistance with ambulation to bathroom. Personal items within reach. Patient is also compliant with use of non-skid slippers. Problem: Intellectual/Education/Knowledge Deficit  Goal: Teaching initiated upon admission  Outcome: Ongoing  Patient demonstrates baseline knowledge of admitting diagnosis. Patient is active with plan of care. Problem: Falls - Risk of  Goal: Absence of falls  Outcome: Met This Shift  Fall assessment completed. Patient using call light appropriately to call for assistance with ambulation to bathroom. Personal items within reach. Patient is also compliant with use of non-skid slippers. Comments: Care plan reviewed with patient . Patient  verbalize understanding of the plan of care and contribute to goal setting.
Integrity  Goal: Tissue integrity - skin and mucous membranes  Structural intactness and normal physiological function of skin and  mucous membranes. Outcome: Ongoing  No skin breakdown this shift. Patient being assisted with turning. Patients states understanding of repositioning every two hours. Comments: Care plan reviewed with patient. Patient verbalize understanding of the plan of care and contribute to goal setting.
breakdown this shift. Patient being assisted with turning. Patients states understanding of repositioning every two hours. Comments: Care plan reviewed with patient and family. Patient and family verbalize understanding of the plan of care and contribute to goal setting.
intactness and normal physiological function of skin and  mucous membranes. Outcome: Ongoing  Patient is up to chair and ambulates as tolerated. Patient turns self and uses chair cushion while up. Problem: DISCHARGE BARRIERS  Goal: Patient's continuum of care needs are met  Outcome: Ongoing  Patient voices no additional needs at this time. Comments: Plan of care discussed with patient and patients mother.

## 2018-03-22 NOTE — CARE COORDINATION
Phone call received from [place] at [facility] ([phone]) hemodialysis unit. She states that Jackie Mcknigth has requested to go there for his hemodialysis tx since his Nephrologist, Dr. Afshin Perry is over there. Verified this with Jackie Mcknight and he states yes, he wants treatment at [facility]. He also states he wants to have [facility] HH at discharge. Update to . Information requested faxed to [place]. She states she will present the information to the director and will update me when he is accepted to there unit.  Electronically signed by Jessee Proctor RN on 3/22/18 at 10:53 AM

## 2018-04-11 PROBLEM — Z01.810 PREOP CARDIOVASCULAR EXAM: Status: RESOLVED | Noted: 2017-12-19 | Resolved: 2018-04-11

## 2018-05-14 ENCOUNTER — OFFICE VISIT (OUTPATIENT)
Dept: CARDIOLOGY CLINIC | Age: 53
End: 2018-05-14
Payer: MEDICAID

## 2018-05-14 VITALS
DIASTOLIC BLOOD PRESSURE: 58 MMHG | HEART RATE: 66 BPM | BODY MASS INDEX: 27.52 KG/M2 | HEIGHT: 69 IN | SYSTOLIC BLOOD PRESSURE: 98 MMHG | WEIGHT: 185.8 LBS

## 2018-05-14 DIAGNOSIS — Z85.038 HISTORY OF COLON CANCER: ICD-10-CM

## 2018-05-14 DIAGNOSIS — I25.10 CORONARY ARTERY DISEASE INVOLVING NATIVE CORONARY ARTERY OF NATIVE HEART WITHOUT ANGINA PECTORIS: Primary | ICD-10-CM

## 2018-05-14 DIAGNOSIS — D64.9 ANEMIA, UNSPECIFIED TYPE: ICD-10-CM

## 2018-05-14 DIAGNOSIS — N18.30 CHRONIC KIDNEY DISEASE, STAGE III (MODERATE) (HCC): ICD-10-CM

## 2018-05-14 DIAGNOSIS — Z90.49 STATUS POST COLON RESECTION: ICD-10-CM

## 2018-05-14 PROCEDURE — 3017F COLORECTAL CA SCREEN DOC REV: CPT | Performed by: PHYSICIAN ASSISTANT

## 2018-05-14 PROCEDURE — 1036F TOBACCO NON-USER: CPT | Performed by: PHYSICIAN ASSISTANT

## 2018-05-14 PROCEDURE — G8417 CALC BMI ABV UP PARAM F/U: HCPCS | Performed by: PHYSICIAN ASSISTANT

## 2018-05-14 PROCEDURE — 99213 OFFICE O/P EST LOW 20 MIN: CPT | Performed by: PHYSICIAN ASSISTANT

## 2018-05-14 PROCEDURE — G8598 ASA/ANTIPLAT THER USED: HCPCS | Performed by: PHYSICIAN ASSISTANT

## 2018-05-14 PROCEDURE — G8427 DOCREV CUR MEDS BY ELIG CLIN: HCPCS | Performed by: PHYSICIAN ASSISTANT

## 2018-05-16 ENCOUNTER — TELEPHONE (OUTPATIENT)
Dept: CARDIOLOGY CLINIC | Age: 53
End: 2018-05-16

## 2018-06-28 ENCOUNTER — OFFICE VISIT (OUTPATIENT)
Dept: CARDIOLOGY CLINIC | Age: 53
End: 2018-06-28
Payer: MEDICARE

## 2018-06-28 VITALS
HEART RATE: 80 BPM | BODY MASS INDEX: 31.99 KG/M2 | WEIGHT: 216 LBS | SYSTOLIC BLOOD PRESSURE: 130 MMHG | HEIGHT: 69 IN | DIASTOLIC BLOOD PRESSURE: 62 MMHG

## 2018-06-28 DIAGNOSIS — I10 HYPERTENSION GOAL BP (BLOOD PRESSURE) < 130/80: ICD-10-CM

## 2018-06-28 DIAGNOSIS — E11.8 TYPE 2 DIABETES MELLITUS WITH COMPLICATION, UNSPECIFIED LONG TERM INSULIN USE STATUS: ICD-10-CM

## 2018-06-28 DIAGNOSIS — E78.5 DYSLIPIDEMIA, GOAL LDL BELOW 100: ICD-10-CM

## 2018-06-28 DIAGNOSIS — I25.10 MULTI-VESSEL CORONARY ARTERY STENOSIS: ICD-10-CM

## 2018-06-28 DIAGNOSIS — Z85.038 HISTORY OF COLON CANCER: ICD-10-CM

## 2018-06-28 DIAGNOSIS — Z90.49 STATUS POST COLON RESECTION: ICD-10-CM

## 2018-06-28 DIAGNOSIS — I25.10 CORONARY ARTERY DISEASE INVOLVING NATIVE CORONARY ARTERY OF NATIVE HEART WITHOUT ANGINA PECTORIS: Primary | ICD-10-CM

## 2018-06-28 PROCEDURE — 3046F HEMOGLOBIN A1C LEVEL >9.0%: CPT | Performed by: INTERNAL MEDICINE

## 2018-06-28 PROCEDURE — G8598 ASA/ANTIPLAT THER USED: HCPCS | Performed by: INTERNAL MEDICINE

## 2018-06-28 PROCEDURE — 3017F COLORECTAL CA SCREEN DOC REV: CPT | Performed by: INTERNAL MEDICINE

## 2018-06-28 PROCEDURE — 1036F TOBACCO NON-USER: CPT | Performed by: INTERNAL MEDICINE

## 2018-06-28 PROCEDURE — 99213 OFFICE O/P EST LOW 20 MIN: CPT | Performed by: INTERNAL MEDICINE

## 2018-06-28 PROCEDURE — G8417 CALC BMI ABV UP PARAM F/U: HCPCS | Performed by: INTERNAL MEDICINE

## 2018-06-28 PROCEDURE — 2022F DILAT RTA XM EVC RTNOPTHY: CPT | Performed by: INTERNAL MEDICINE

## 2018-06-28 PROCEDURE — G8427 DOCREV CUR MEDS BY ELIG CLIN: HCPCS | Performed by: INTERNAL MEDICINE

## 2018-06-28 NOTE — PROGRESS NOTES
alert, oriented to person, place, and time  Neck - supple, no significant adenopathy, no JVD, or carotid bruits  Chest - clear to auscultation, no wheezes, rales or rhonchi  Heart - normal rate, regular rhythm, normal S1, S2, no murmurs  Abdomen - soft, nontender, nondistended, no masses   Neurological - alert, oriented, normal speech, no focal findings   Musculoskeletal - no joint tenderness, deformity or swelling  Extremities - peripheral pulses normal, no pedal edema  Skin - normal coloration and turgor, no rashes      EKG:  normal sinus rhythm  Nonspecific ST-T changes    Echo:   Results for orders placed during the hospital encounter of 11/21/17   Echocardiogram 2D/ M-Mode/ Colorflow/ Do    Narrative Transthoracic Echocardiography Report (TTE)     Demographics      Patient Name  Eloise Barney      Gender             Male                 MODESTO CHAUDHARI      MR #          [id number]      Race                                                  Ethnicity      Account #     [de-identified]      Room Number        2610      Accession     [id number]      Date of Study      11/22/2017   Number      Date of Birth 1965     Referring          Adis Kulkarni MD                                Physician          Piper Gaspar      Age           50 year(s)     Sonographer        ROSA ELENA Ibarra, RDCS,                                                   RDMS, RVT                                   Interpreting       Richard Greene DO                                Physician     Procedure    Type of Study      TTE procedure:ECHOCARDIOGRAM COMPLETE 2D W DOPPLER W COLOR. Procedure Date  Date: 11/22/2017 Start: 08:02 AM    Study Location: Bedside  Technical Quality: Adequate visualization    Indications:NSTEMI.     Additional Medical History:NSTEMI, accelerated HTN, thyroid disease,  hypertension, hyperlipidemia, diabetes, chronic kidney disease    Patient Status: Routine    Height: 69 inches Weight: 230.01 pounds BSA: 2.19 m^2 BMI: 33.97 kg/m^2    BP: 167/75 mmHg     Conclusions      Summary   Mild concentric left ventricular hypertrophy. Systolic function was low normal.   Ejection fraction is visually estimated at 50%. Normal right ventricular size and function. Right ventricular systolic pressure of 42 mm Hg consistent with mild   pulmonary hypertension. Leaflets exhibited mild to moderately increased thickness and mildly   reduced cuspal separation of the aortic valve. Mild to moderate aortic regurgitation is noted. Aortic valve leaflets are Mildly calcified. Small to moderate circumferential pericardial effusion without tamponade   physiology is noted . and in subcostal view of 1.22 cm in upper region and 1.08 cm in lower   region. May consider serial monitoring if clinically indicated   Mildly dilated left atrium. Small to moderate circumferential pericardial effusion without tamponade   physiology is noted . and in subcostal view of 1.22 cm in upper region and 1.08 cm in lower   region. May consider serial monitoring if clinically indicated      Signature      ----------------------------------------------------------------   Electronically signed by Tenzin Jorge DO (Interpreting   physician) on 11/22/2017 at 07:18 PM   ----------------------------------------------------------------      Findings      Mitral Valve   Structurally normal mitral valve. Trace mitral regurgitation is present. Aortic Valve   Leaflets exhibited mild to moderately increased thickness and mildly   reduced cuspal separation of the aortic valve. Mild to moderate aortic regurgitation is noted. Aortic valve leaflets are Mildly calcified. Tricuspid Valve   Tricuspid valve is structurally normal.   Trivial tricuspid regurgitation visualized. Pulmonic Valve   Pulmonic valve is structurally normal.   Trivial pulmonic regurgitation visualized. Left Atrium   Mildly dilated left atrium.       Left Ventricle   Mild concentric left ventricular hypertrophy. Systolic function was low normal.   Ejection fraction is visually estimated at 50%. Right Atrium   The right atrium is of normal size. Right Ventricle   Normal right ventricular size and function. Right ventricular systolic pressure of 42 mm Hg consistent with mild   pulmonary hypertension. Pericardial Effusion   Small to moderate circumferential pericardial effusion without tamponade   physiology is noted . and in subcostal view of 1.22 cm in upper region and 1.08 cm in lower   region. May consider serial monitoring if clinically indicated      Aorta / Great Vessels   Aortic root dimension within normal limits.      M-Mode/2D Measurements & Calculations      LV Diastolic    LV Systolic Dimension: 4.1  AV Cusp Separation: 2.2 cmLA   Dimension: 5.5  cm                          Dimension: 4.7 cmAO Root   cm              LV Volume Diastolic: 907 ml Dimension: 3.6 cm   LV FS:25.5 %    LV Volume Systolic: 18.0 ml   LV PW           LV EDV/LV EDV Index: 141   Diastolic: 1.3  WK/25 Q^9FE ESV/LV ESV   cm              Index: 74.2 ml/34 m^2       RV Diastolic Dimension: 2.9 cm   Septum          EF Calculated: 82.8 %   Diastolic: 1.5                              LA/Aorta: 1.31   cm                                          Ascending Aorta: 3.2 cm     Doppler Measurements & Calculations      MV Peak E-Wave: 111 cm/s   AV Peak Velocity: 160  LVOT Peak Velocity: 115   MV Peak A-Wave: 64.7 cm/s  cm/s                   cm/s   MV E/A Ratio: 1.72         AV Peak Gradient:      LVOT Peak Gradient: 5   MV Peak Gradient: 4.93     10.24 mmHg             mmHg   mmHg                                                     TV Peak E-Wave: 60.2   MV Deceleration Time: 165                         cm/s   msec                                              TV Peak A-Wave: 67.1                              AV P1/2t: 540 msec     cm/s      MV E' Septal Velocity:                            TV Peak Gradient: 1.45   3.41 cm/s                                         mmHg   MV A' Septal Velocity:     AV DVI (Vmax):0.72     TR Velocity:282 cm/s   5.07 cm/s                                         TR Gradient:31.81 mmHg   MV E' Lateral Velocity:                           PV Peak Velocity: 81.4   8.97 cm/s                                         cm/s   MV A' Lateral Velocity:                           PV Peak Gradient: 2.65   9.36 cm/s                                         mmHg   E/E' septal: 32.55   E/E' lateral: 12.37   MR Velocity: 476 cm/s                             ND ED Velocity: 192 cm/s     [id number]        Cath in November 2017  Multi-vessel Coronary Artery Disease. LAD - 80%  RCA - 70%  Lcx - 80%  LM - Mild ds      Lab Results   Component Value Date    ALKPHOS 106 03/07/2018    ALT 7 03/07/2018    AST 6 03/07/2018    PROT 4.2 03/07/2018    BILITOT 0.2 03/07/2018    BILIDIR <0.2 03/07/2018    LABALBU 2.0 03/22/2018       Lab Results   Component Value Date    LABA1C 5.8 11/22/2017       Lab Results   Component Value Date    TRIG 71 11/22/2017    HDL 42 11/22/2017    LDLCALC 99 11/22/2017       Lab Results   Component Value Date    TSH 1.510 11/21/2017         Assessment/Plan:    Atherosclerotic Heart Disease:  Multivessel Coronary artery disease  Seems to be stable. Denies angina or change in breathing pattern. No need for any intervention at this time. Reassess in few months after chemotherapy. We will intervene if patient is symptomatic. · Antiplatelet Agent: Continue  Plavix 75 mg daily . · Statin Therapy: Continue atorvastatin Zocor 20 mg nightly. · Norvasc 10 mg daily  · Imdur 120 mg twice a day       Dyslipidemia:   On statin, followed periodically. Patient need periodic lipid and liver profile.   Patient is a advised to have their lipids and liver panel

## 2018-07-22 ENCOUNTER — APPOINTMENT (OUTPATIENT)
Dept: CT IMAGING | Age: 53
End: 2018-07-22
Payer: MEDICARE

## 2018-07-22 ENCOUNTER — HOSPITAL ENCOUNTER (EMERGENCY)
Age: 53
Discharge: HOME | End: 2018-07-22
Attending: FAMILY MEDICINE
Payer: MEDICARE

## 2018-07-22 VITALS
WEIGHT: 201 LBS | SYSTOLIC BLOOD PRESSURE: 119 MMHG | OXYGEN SATURATION: 100 % | TEMPERATURE: 98.1 F | DIASTOLIC BLOOD PRESSURE: 55 MMHG | HEIGHT: 69 IN | HEART RATE: 83 BPM | BODY MASS INDEX: 29.77 KG/M2 | RESPIRATION RATE: 20 BRPM

## 2018-07-22 DIAGNOSIS — C78.7 METASTATIC COLON CANCER TO LIVER (HCC): ICD-10-CM

## 2018-07-22 DIAGNOSIS — S09.90XA INJURY OF HEAD, INITIAL ENCOUNTER: ICD-10-CM

## 2018-07-22 DIAGNOSIS — N18.6 ESRD (END STAGE RENAL DISEASE) (HCC): ICD-10-CM

## 2018-07-22 DIAGNOSIS — S01.81XA FACIAL LACERATION, INITIAL ENCOUNTER: Primary | ICD-10-CM

## 2018-07-22 DIAGNOSIS — C18.9 METASTATIC COLON CANCER TO LIVER (HCC): ICD-10-CM

## 2018-07-22 LAB
ANION GAP SERPL CALCULATED.3IONS-SCNC: 15 MEQ/L (ref 8–16)
BASOPHILS # BLD: 1 %
BASOPHILS ABSOLUTE: 0.1 THOU/MM3 (ref 0–0.1)
BUN BLDV-MCNC: 65 MG/DL (ref 7–22)
CALCIUM SERPL-MCNC: 9 MG/DL (ref 8.5–10.5)
CHLORIDE BLD-SCNC: 103 MEQ/L (ref 98–111)
CO2: 22 MEQ/L (ref 23–33)
CREAT SERPL-MCNC: 5.2 MG/DL (ref 0.4–1.2)
EKG ATRIAL RATE: 86 BPM
EKG P AXIS: 52 DEGREES
EKG P-R INTERVAL: 130 MS
EKG Q-T INTERVAL: 398 MS
EKG QRS DURATION: 94 MS
EKG QTC CALCULATION (BAZETT): 476 MS
EKG R AXIS: 100 DEGREES
EKG T AXIS: 17 DEGREES
EKG VENTRICULAR RATE: 86 BPM
EOSINOPHIL # BLD: 1.7 %
EOSINOPHILS ABSOLUTE: 0.1 THOU/MM3 (ref 0–0.4)
ERYTHROCYTE [DISTWIDTH] IN BLOOD BY AUTOMATED COUNT: 17 % (ref 11.5–14.5)
ERYTHROCYTE [DISTWIDTH] IN BLOOD BY AUTOMATED COUNT: 52.8 FL (ref 35–45)
GFR SERPL CREATININE-BSD FRML MDRD: 12 ML/MIN/1.73M2
GLUCOSE BLD-MCNC: 172 MG/DL (ref 70–108)
HCT VFR BLD CALC: 29.9 % (ref 42–52)
HEMOGLOBIN: 9.1 GM/DL (ref 14–18)
IMMATURE GRANS (ABS): 0.01 THOU/MM3 (ref 0–0.07)
IMMATURE GRANULOCYTES: 0.2 %
LYMPHOCYTES # BLD: 11.8 %
LYMPHOCYTES ABSOLUTE: 0.7 THOU/MM3 (ref 1–4.8)
MCH RBC QN AUTO: 25.9 PG (ref 26–33)
MCHC RBC AUTO-ENTMCNC: 30.4 GM/DL (ref 32.2–35.5)
MCV RBC AUTO: 84.9 FL (ref 80–94)
MONOCYTES # BLD: 7.4 %
MONOCYTES ABSOLUTE: 0.4 THOU/MM3 (ref 0.4–1.3)
NUCLEATED RED BLOOD CELLS: 0 /100 WBC
OSMOLALITY CALCULATION: 302.2 MOSMOL/KG (ref 275–300)
PLATELET # BLD: 120 THOU/MM3 (ref 130–400)
PMV BLD AUTO: 12.1 FL (ref 9.4–12.4)
POTASSIUM SERPL-SCNC: 5.5 MEQ/L (ref 3.5–5.2)
RBC # BLD: 3.52 MILL/MM3 (ref 4.7–6.1)
SEG NEUTROPHILS: 77.9 %
SEGMENTED NEUTROPHILS ABSOLUTE COUNT: 4.7 THOU/MM3 (ref 1.8–7.7)
SODIUM BLD-SCNC: 140 MEQ/L (ref 135–145)
TROPONIN T: 0.15 NG/ML
WBC # BLD: 6 THOU/MM3 (ref 4.8–10.8)

## 2018-07-22 PROCEDURE — 85025 COMPLETE CBC W/AUTO DIFF WBC: CPT

## 2018-07-22 PROCEDURE — 93010 ELECTROCARDIOGRAM REPORT: CPT | Performed by: INTERNAL MEDICINE

## 2018-07-22 PROCEDURE — 84484 ASSAY OF TROPONIN QUANT: CPT

## 2018-07-22 PROCEDURE — 6370000000 HC RX 637 (ALT 250 FOR IP): Performed by: FAMILY MEDICINE

## 2018-07-22 PROCEDURE — 99284 EMERGENCY DEPT VISIT MOD MDM: CPT

## 2018-07-22 PROCEDURE — 36415 COLL VENOUS BLD VENIPUNCTURE: CPT

## 2018-07-22 PROCEDURE — 70450 CT HEAD/BRAIN W/O DYE: CPT

## 2018-07-22 PROCEDURE — 80048 BASIC METABOLIC PNL TOTAL CA: CPT

## 2018-07-22 PROCEDURE — 12011 RPR F/E/E/N/L/M 2.5 CM/<: CPT

## 2018-07-22 PROCEDURE — 93005 ELECTROCARDIOGRAM TRACING: CPT | Performed by: FAMILY MEDICINE

## 2018-07-22 RX ORDER — HYDROCODONE BITARTRATE AND ACETAMINOPHEN 5; 325 MG/1; MG/1
1 TABLET ORAL EVERY 6 HOURS PRN
Qty: 12 TABLET | Refills: 0 | Status: SHIPPED | OUTPATIENT
Start: 2018-07-22 | End: 2018-07-25

## 2018-07-22 RX ORDER — ACETAMINOPHEN 325 MG/1
650 TABLET ORAL ONCE
Status: COMPLETED | OUTPATIENT
Start: 2018-07-22 | End: 2018-07-22

## 2018-07-22 RX ORDER — HYDROCODONE BITARTRATE AND ACETAMINOPHEN 5; 325 MG/1; MG/1
1 TABLET ORAL ONCE
Status: COMPLETED | OUTPATIENT
Start: 2018-07-22 | End: 2018-07-22

## 2018-07-22 RX ADMIN — HYDROCODONE BITARTRATE AND ACETAMINOPHEN 1 TABLET: 5; 325 TABLET ORAL at 12:26

## 2018-07-22 RX ADMIN — ACETAMINOPHEN 650 MG: 325 TABLET ORAL at 12:26

## 2018-07-22 ASSESSMENT — ENCOUNTER SYMPTOMS
BLOOD IN STOOL: 0
SORE THROAT: 0
ABDOMINAL PAIN: 0
CHEST TIGHTNESS: 0
SHORTNESS OF BREATH: 0
EYE DISCHARGE: 0
EYE PAIN: 0
COUGH: 0

## 2018-07-22 ASSESSMENT — PAIN DESCRIPTION - LOCATION
LOCATION: FACE;HEAD
LOCATION: FACE;HEAD

## 2018-07-22 ASSESSMENT — PAIN DESCRIPTION - PAIN TYPE
TYPE: ACUTE PAIN
TYPE: ACUTE PAIN

## 2018-07-22 ASSESSMENT — PAIN SCALES - GENERAL
PAINLEVEL_OUTOF10: 8
PAINLEVEL_OUTOF10: 10

## 2018-07-22 ASSESSMENT — PAIN DESCRIPTION - FREQUENCY
FREQUENCY: CONTINUOUS
FREQUENCY: CONTINUOUS

## 2018-07-22 ASSESSMENT — PAIN DESCRIPTION - DESCRIPTORS
DESCRIPTORS: HEADACHE;POUNDING;THROBBING
DESCRIPTORS: HEADACHE;POUNDING;THROBBING

## 2018-07-22 ASSESSMENT — PAIN DESCRIPTION - ONSET: ONSET: ON-GOING

## 2018-07-22 NOTE — ED NOTES
Patient presents to the ED via EMS. Patient reports he was walking through his house this morning and forgot to use his cane. Patient states he became mildly dizzy and fell and hit his face. Patient reports at the time he had on his glasses that broke. It is believed that the arm of the glasses went into the eyelid of the L eye. Lacerations noted to L eyelid and L cheek. Patient denies blurred or double vision following the fall. Denies neck or back pain. Denies LOC. Patient known diabetic with POC glucose reported at 186. Patient known diabetic that receives dialysis on Tues, Thursday and Saturday. Patient states he did go to dialysis yesterday. EKG obtained and monitor applied.       Maria A Martinez RN  07/22/18 [id number]

## 2018-07-22 NOTE — ED PROVIDER NOTES
Hematological: Does not bruise/bleed easily. Psychiatric/Behavioral: Negative for confusion, hallucinations and sleep disturbance. PAST MEDICAL HISTORY    has a past medical history of ASCVD (arteriosclerotic cardiovascular disease); [facility] CKD (chronic kidney disease) stage 3, GFR 30-59 ml/min; DM2 (diabetes mellitus, type 2) ([facility] Dyslipidemia; Hypertension; and Thyroid disease. SURGICAL HISTORY      has a past surgical history that includes skin biopsy; Endoscopy, colon, diagnostic; eye surgery (Left, 2013); pr egd transoral biopsy single/multiple (N/A, 11/23/2017); pr colonoscopy w/biopsy single/multiple (Left, 11/24/2017); Small intestine surgery (N/A, 11/27/2017); and INSERTION / REMOVAL / REPLACEMENT VENOUS ACCESS CATHETER (Right, 12/4/2017).     CURRENT MEDICATIONS       Previous Medications    AMLODIPINE (NORVASC) 10 MG TABLET    Take 1 tablet by mouth daily    BLOOD PRESSURE MONITOR KIT    CHECK BP TWICE DAILY IF SBP >140 CALL PCP    CALCIUM ACETATE (PHOSLO) 667 MG CAPSULE    Take 1 capsule by mouth 3 times daily (with meals)    CLONIDINE (CATAPRES) 0.1 MG TABLET    Take 2 tablets by mouth 2 times daily    CLOPIDOGREL (PLAVIX) 75 MG TABLET    Take 1 tablet by mouth daily    DOXAZOSIN (CARDURA) 8 MG TABLET    Take 1 tablet by mouth 2 times daily    FAMOTIDINE (PEPCID) 20 MG TABLET    Take 1 tablet by mouth 2 times daily    HYDRALAZINE (APRESOLINE) 100 MG TABLET    Take 1 tablet by mouth 3 times daily    HYDROCORTISONE 1 % CREAM    Apply topically daily as needed    INSULIN GLARGINE (TOUJEO SOLOSTAR) 300 UNIT/ML INJECTION PEN    Inject 10 Units into the skin daily    ISOSORBIDE MONONITRATE (IMDUR) 120 MG EXTENDED RELEASE TABLET    Take 1 tablet by mouth 2 times daily    SIMVASTATIN (ZOCOR) 20 MG TABLET    Take 20 mg by mouth nightly    TORSEMIDE (DEMADEX) 20 MG TABLET    Take 2 tablets by mouth daily    VALSARTAN (DIOVAN) 160 MG TABLET    Take 1 tablet by mouth daily       ALLERGIES failure and renal dialysis issues, the patient is comfortable with discharge to home he has not had any dizziness lightheadedness chest pain or shortness of breath he in fact notes that he feels or has felt better and therefore was up walking around without his walker like he should've been tripped over his walker and not quite fell. CRITICAL CARE:   None      CONSULTS:  None     PROCEDURES:  Lac Repair  Date/Time: 7/22/2018 12:23 PM  Performed by: Macarena Jones  Authorized by: Macarena Jones     Consent:     Consent obtained:  Verbal    Consent given by:  Patient  Anesthesia (see MAR for exact dosages): Anesthesia method:  Local infiltration    Local anesthetic:  Lidocaine 1% w/o epi  Laceration details:     Location:  Face    Face location:  L upper eyelid    Length (cm):  2  Repair type:     Repair type:  Simple  Pre-procedure details:     Preparation:  Patient was prepped and draped in usual sterile fashion  Treatment:     Area cleansed with:  Saline    Amount of cleaning:  Standard    Irrigation solution:  Sterile saline    Irrigation method:  Syringe  Skin repair:     Repair method:  Sutures    Suture size:  6-0    Suture material:  Nylon    Suture technique:  Simple interrupted    Number of sutures:  7  Approximation:     Approximation:  Close  Post-procedure details:     Dressing:  Open (no dressing)    Patient tolerance of procedure: Tolerated well, no immediate complications           FINAL IMPRESSION      1. Facial laceration, initial encounter    2. Injury of head, initial encounter    3. ESRD (end stage renal disease) ([facility]    4.  Metastatic colon cancer to liver [facility])          DISPOSITION/PLAN   discharge    PATIENT REFERRED TO:  Piper Ewing[address]  [phone]    Schedule an appointment as soon as possible for a visit in 2 days  With primary care doctor to follow up from ER      DISCHARGE MEDICATIONS:  New Prescriptions    HYDROCODONE-ACETAMINOPHEN (Juliette Francois) [id number]

## 2018-07-22 NOTE — ED NOTES
Patient resting in bed with no complaints of head/facial pain. Patient reports irritation to L knee and redness/abrasion to L knee. Patient concerned due to not noticing it before. Patient requesting Dr. Harolyn Riedel back to room.       Theresa Stevens RN  07/22/18 [id number]

## 2018-09-14 ENCOUNTER — APPOINTMENT (OUTPATIENT)
Dept: GENERAL RADIOLOGY | Age: 53
DRG: 981 | End: 2018-09-14
Payer: MEDICARE

## 2018-09-14 ENCOUNTER — APPOINTMENT (OUTPATIENT)
Dept: CT IMAGING | Age: 53
DRG: 981 | End: 2018-09-14
Payer: MEDICARE

## 2018-09-14 ENCOUNTER — HOSPITAL ENCOUNTER (INPATIENT)
Age: 53
LOS: 3 days | Discharge: HOME HEALTH | DRG: 981 | End: 2018-09-17
Attending: FAMILY MEDICINE | Admitting: INTERNAL MEDICINE
Payer: MEDICARE

## 2018-09-14 DIAGNOSIS — D64.9 ANEMIA, UNSPECIFIED TYPE: ICD-10-CM

## 2018-09-14 DIAGNOSIS — Z91.15 NON-COMPLIANCE WITH RENAL DIALYSIS (HCC): ICD-10-CM

## 2018-09-14 DIAGNOSIS — I44.2 THIRD DEGREE HEART BLOCK (HCC): Primary | ICD-10-CM

## 2018-09-14 DIAGNOSIS — E87.5 HYPERKALEMIA: ICD-10-CM

## 2018-09-14 DIAGNOSIS — R42 DIZZINESS: ICD-10-CM

## 2018-09-14 DIAGNOSIS — R00.1 SYMPTOMATIC BRADYCARDIA: ICD-10-CM

## 2018-09-14 LAB
ABO: NORMAL
ALBUMIN SERPL-MCNC: 3.1 G/DL (ref 3.5–5.1)
ALP BLD-CCNC: 88 U/L (ref 38–126)
ALT SERPL-CCNC: 23 U/L (ref 11–66)
AMMONIA: 54 UMOL/L (ref 11–60)
ANION GAP SERPL CALCULATED.3IONS-SCNC: 13 MEQ/L (ref 8–16)
ANTIBODY SCREEN: NORMAL
AST SERPL-CCNC: 18 U/L (ref 5–40)
BASE EXCESS MIXED: -9.3 MMOL/L (ref -2–3)
BILIRUB SERPL-MCNC: 0.4 MG/DL (ref 0.3–1.2)
BILIRUBIN DIRECT: < 0.2 MG/DL (ref 0–0.3)
BUN BLDV-MCNC: 121 MG/DL (ref 7–22)
CALCIUM SERPL-MCNC: 8.7 MG/DL (ref 8.5–10.5)
CHLORIDE BLD-SCNC: 112 MEQ/L (ref 98–111)
CO2: 14 MEQ/L (ref 23–33)
COLLECTED BY:: ABNORMAL
CREAT SERPL-MCNC: 5.9 MG/DL (ref 0.4–1.2)
DEVICE: ABNORMAL
EKG ATRIAL RATE: 61 BPM
EKG Q-T INTERVAL: 498 MS
EKG QRS DURATION: 108 MS
EKG QTC CALCULATION (BAZETT): 400 MS
EKG R AXIS: -44 DEGREES
EKG T AXIS: 84 DEGREES
EKG VENTRICULAR RATE: 39 BPM
FIO2, MIXED VENOUS: 4
GFR SERPL CREATININE-BSD FRML MDRD: 10 ML/MIN/1.73M2
GLUCOSE BLD-MCNC: 266 MG/DL (ref 70–108)
GLUCOSE BLD-MCNC: 269 MG/DL (ref 70–108)
HCO3, MIXED: 17 MMOL/L (ref 23–28)
HEMOCCULT STL QL: NEGATIVE
INR BLD: 1.2 (ref 0.85–1.13)
LACTIC ACID: 2.8 MMOL/L (ref 0.5–2.2)
LIPASE: 20.2 U/L (ref 5.6–51.3)
MAGNESIUM: 1.9 MG/DL (ref 1.6–2.4)
O2 SAT, MIXED: 43 %
OSMOLALITY CALCULATION: 325.5 MOSMOL/KG (ref 275–300)
PCO2, MIXED VENOUS: 38 MMHG (ref 41–51)
PH, MIXED: 7.26 (ref 7.31–7.41)
PO2 MIXED: 28 MMHG (ref 25–40)
POTASSIUM SERPL-SCNC: 8.5 MEQ/L (ref 3.5–5.2)
PRO-BNP: ABNORMAL PG/ML (ref 0–900)
RH FACTOR: NORMAL
SCAN OF BLOOD SMEAR: NORMAL
SITE: ABNORMAL
SODIUM BLD-SCNC: 139 MEQ/L (ref 135–145)
TOTAL PROTEIN: 5.7 G/DL (ref 6.1–8)
TROPONIN T: 0.16 NG/ML
TSH SERPL DL<=0.05 MIU/L-ACNC: 3.6 UIU/ML (ref 0.4–4.2)

## 2018-09-14 PROCEDURE — 02HK3NZ INSERTION OF INTRACARDIAC PACEMAKER INTO RIGHT VENTRICLE, PERCUTANEOUS APPROACH: ICD-10-PCS | Performed by: INTERNAL MEDICINE

## 2018-09-14 PROCEDURE — 6360000002 HC RX W HCPCS: Performed by: FAMILY MEDICINE

## 2018-09-14 PROCEDURE — 6360000002 HC RX W HCPCS

## 2018-09-14 PROCEDURE — 83880 ASSAY OF NATRIURETIC PEPTIDE: CPT

## 2018-09-14 PROCEDURE — 36415 COLL VENOUS BLD VENIPUNCTURE: CPT

## 2018-09-14 PROCEDURE — 74176 CT ABD & PELVIS W/O CONTRAST: CPT

## 2018-09-14 PROCEDURE — 82272 OCCULT BLD FECES 1-3 TESTS: CPT

## 2018-09-14 PROCEDURE — 84484 ASSAY OF TROPONIN QUANT: CPT

## 2018-09-14 PROCEDURE — 2709999900 HC NON-CHARGEABLE SUPPLY

## 2018-09-14 PROCEDURE — 86901 BLOOD TYPING SEROLOGIC RH(D): CPT

## 2018-09-14 PROCEDURE — 86923 COMPATIBILITY TEST ELECTRIC: CPT

## 2018-09-14 PROCEDURE — 72125 CT NECK SPINE W/O DYE: CPT

## 2018-09-14 PROCEDURE — 85025 COMPLETE CBC W/AUTO DIFF WBC: CPT

## 2018-09-14 PROCEDURE — 96375 TX/PRO/DX INJ NEW DRUG ADDON: CPT

## 2018-09-14 PROCEDURE — 83735 ASSAY OF MAGNESIUM: CPT

## 2018-09-14 PROCEDURE — 87340 HEPATITIS B SURFACE AG IA: CPT

## 2018-09-14 PROCEDURE — 86706 HEP B SURFACE ANTIBODY: CPT

## 2018-09-14 PROCEDURE — P9016 RBC LEUKOCYTES REDUCED: HCPCS

## 2018-09-14 PROCEDURE — 93010 ELECTROCARDIOGRAM REPORT: CPT | Performed by: INTERNAL MEDICINE

## 2018-09-14 PROCEDURE — 2500000003 HC RX 250 WO HCPCS: Performed by: FAMILY MEDICINE

## 2018-09-14 PROCEDURE — 83690 ASSAY OF LIPASE: CPT

## 2018-09-14 PROCEDURE — 82140 ASSAY OF AMMONIA: CPT

## 2018-09-14 PROCEDURE — 86704 HEP B CORE ANTIBODY TOTAL: CPT

## 2018-09-14 PROCEDURE — 2000000000 HC ICU R&B

## 2018-09-14 PROCEDURE — 33210 INSERT ELECTRD/PM CATH SNGL: CPT | Performed by: INTERNAL MEDICINE

## 2018-09-14 PROCEDURE — 96374 THER/PROPH/DIAG INJ IV PUSH: CPT

## 2018-09-14 PROCEDURE — 6370000000 HC RX 637 (ALT 250 FOR IP): Performed by: FAMILY MEDICINE

## 2018-09-14 PROCEDURE — 71045 X-RAY EXAM CHEST 1 VIEW: CPT

## 2018-09-14 PROCEDURE — 82248 BILIRUBIN DIRECT: CPT

## 2018-09-14 PROCEDURE — 99285 EMERGENCY DEPT VISIT HI MDM: CPT

## 2018-09-14 PROCEDURE — 82948 REAGENT STRIP/BLOOD GLUCOSE: CPT

## 2018-09-14 PROCEDURE — 84443 ASSAY THYROID STIM HORMONE: CPT

## 2018-09-14 PROCEDURE — 86900 BLOOD TYPING SEROLOGIC ABO: CPT

## 2018-09-14 PROCEDURE — 86850 RBC ANTIBODY SCREEN: CPT

## 2018-09-14 PROCEDURE — 80053 COMPREHEN METABOLIC PANEL: CPT

## 2018-09-14 PROCEDURE — 85610 PROTHROMBIN TIME: CPT

## 2018-09-14 PROCEDURE — 87040 BLOOD CULTURE FOR BACTERIA: CPT

## 2018-09-14 PROCEDURE — 83605 ASSAY OF LACTIC ACID: CPT

## 2018-09-14 PROCEDURE — 94640 AIRWAY INHALATION TREATMENT: CPT

## 2018-09-14 PROCEDURE — 2580000003 HC RX 258: Performed by: FAMILY MEDICINE

## 2018-09-14 PROCEDURE — 93005 ELECTROCARDIOGRAM TRACING: CPT | Performed by: FAMILY MEDICINE

## 2018-09-14 PROCEDURE — 96376 TX/PRO/DX INJ SAME DRUG ADON: CPT

## 2018-09-14 PROCEDURE — 70450 CT HEAD/BRAIN W/O DYE: CPT

## 2018-09-14 RX ORDER — FENTANYL CITRATE 50 UG/ML
100 INJECTION, SOLUTION INTRAMUSCULAR; INTRAVENOUS ONCE
Status: COMPLETED | OUTPATIENT
Start: 2018-09-14 | End: 2018-09-14

## 2018-09-14 RX ORDER — NICOTINE POLACRILEX 4 MG
15 LOZENGE BUCCAL PRN
Status: DISCONTINUED | OUTPATIENT
Start: 2018-09-14 | End: 2018-09-17 | Stop reason: HOSPADM

## 2018-09-14 RX ORDER — DEXTROSE MONOHYDRATE 25 G/50ML
25 INJECTION, SOLUTION INTRAVENOUS ONCE
Status: COMPLETED | OUTPATIENT
Start: 2018-09-14 | End: 2018-09-14

## 2018-09-14 RX ORDER — DEXTROSE MONOHYDRATE 25 G/50ML
12.5 INJECTION, SOLUTION INTRAVENOUS PRN
Status: DISCONTINUED | OUTPATIENT
Start: 2018-09-14 | End: 2018-09-17 | Stop reason: HOSPADM

## 2018-09-14 RX ORDER — SODIUM POLYSTYRENE SULFONATE 15 G/60ML
15 SUSPENSION ORAL; RECTAL ONCE
Status: COMPLETED | OUTPATIENT
Start: 2018-09-14 | End: 2018-09-14

## 2018-09-14 RX ORDER — BENZOIN
TINCTURE TOPICAL ONCE
Status: DISCONTINUED | OUTPATIENT
Start: 2018-09-14 | End: 2018-09-14 | Stop reason: SDUPTHER

## 2018-09-14 RX ORDER — BENZOIN/ALOE VERA/STORAX/TOLU 10-2-8-4%
TINCTURE TOPICAL ONCE
Status: DISCONTINUED | OUTPATIENT
Start: 2018-09-14 | End: 2018-09-14

## 2018-09-14 RX ORDER — FENTANYL CITRATE 50 UG/ML
50 INJECTION, SOLUTION INTRAMUSCULAR; INTRAVENOUS ONCE
Status: COMPLETED | OUTPATIENT
Start: 2018-09-14 | End: 2018-09-14

## 2018-09-14 RX ORDER — ONDANSETRON 4 MG/1
4 TABLET, ORALLY DISINTEGRATING ORAL ONCE
Status: COMPLETED | OUTPATIENT
Start: 2018-09-14 | End: 2018-09-14

## 2018-09-14 RX ORDER — CALCIUM GLUCONATE 94 MG/ML
1 INJECTION, SOLUTION INTRAVENOUS ONCE
Status: COMPLETED | OUTPATIENT
Start: 2018-09-14 | End: 2018-09-14

## 2018-09-14 RX ORDER — DEXTROSE MONOHYDRATE 50 MG/ML
100 INJECTION, SOLUTION INTRAVENOUS PRN
Status: DISCONTINUED | OUTPATIENT
Start: 2018-09-14 | End: 2018-09-17 | Stop reason: HOSPADM

## 2018-09-14 RX ORDER — 0.9 % SODIUM CHLORIDE 0.9 %
250 INTRAVENOUS SOLUTION INTRAVENOUS ONCE
Status: DISCONTINUED | OUTPATIENT
Start: 2018-09-14 | End: 2018-09-17 | Stop reason: HOSPADM

## 2018-09-14 RX ORDER — FENTANYL CITRATE 50 UG/ML
INJECTION, SOLUTION INTRAMUSCULAR; INTRAVENOUS
Status: COMPLETED
Start: 2018-09-14 | End: 2018-09-14

## 2018-09-14 RX ORDER — ATROPINE SULFATE 0.1 MG/ML
INJECTION INTRAVENOUS
Status: DISCONTINUED
Start: 2018-09-14 | End: 2018-09-14 | Stop reason: WASHOUT

## 2018-09-14 RX ORDER — BENZOIN
TINCTURE TOPICAL ONCE
Status: DISCONTINUED | OUTPATIENT
Start: 2018-09-14 | End: 2018-09-17 | Stop reason: HOSPADM

## 2018-09-14 RX ADMIN — Medication 10 UNITS: at 21:19

## 2018-09-14 RX ADMIN — SODIUM BICARBONATE: 84 INJECTION, SOLUTION INTRAVENOUS at 22:37

## 2018-09-14 RX ADMIN — SODIUM POLYSTYRENE SULFONATE 15 G: 15 SUSPENSION ORAL; RECTAL at 22:04

## 2018-09-14 RX ADMIN — CALCIUM GLUCONATE 1 G: 98 INJECTION, SOLUTION INTRAVENOUS at 21:22

## 2018-09-14 RX ADMIN — CALCIUM GLUCONATE 1 G: 98 INJECTION, SOLUTION INTRAVENOUS at 19:36

## 2018-09-14 RX ADMIN — FENTANYL CITRATE 50 MCG: 50 INJECTION INTRAMUSCULAR; INTRAVENOUS at 19:35

## 2018-09-14 RX ADMIN — ONDANSETRON 4 MG: 4 TABLET, ORALLY DISINTEGRATING ORAL at 20:59

## 2018-09-14 RX ADMIN — ALBUTEROL SULFATE 10 MG: 2.5 SOLUTION RESPIRATORY (INHALATION) at 21:32

## 2018-09-14 RX ADMIN — FENTANYL CITRATE 100 MCG: 50 INJECTION INTRAMUSCULAR; INTRAVENOUS at 20:02

## 2018-09-14 RX ADMIN — SODIUM BICARBONATE 50 MEQ: 84 INJECTION, SOLUTION INTRAVENOUS at 21:15

## 2018-09-14 RX ADMIN — DEXTROSE MONOHYDRATE 25 G: 25 INJECTION, SOLUTION INTRAVENOUS at 21:16

## 2018-09-14 RX ADMIN — FENTANYL CITRATE 100 MCG: 50 INJECTION, SOLUTION INTRAMUSCULAR; INTRAVENOUS at 20:02

## 2018-09-14 ASSESSMENT — ENCOUNTER SYMPTOMS
WHEEZING: 0
ABDOMINAL PAIN: 0
NAUSEA: 0
SORE THROAT: 0
COUGH: 0
EYE REDNESS: 0
SHORTNESS OF BREATH: 0
VOMITING: 0
BACK PAIN: 0
RHINORRHEA: 0
DIARRHEA: 0
EYE DISCHARGE: 0

## 2018-09-14 ASSESSMENT — PAIN DESCRIPTION - PAIN TYPE
TYPE: ACUTE PAIN
TYPE: ACUTE PAIN

## 2018-09-14 ASSESSMENT — PAIN SCALES - GENERAL
PAINLEVEL_OUTOF10: 0
PAINLEVEL_OUTOF10: 8
PAINLEVEL_OUTOF10: 6
PAINLEVEL_OUTOF10: 10
PAINLEVEL_OUTOF10: 7

## 2018-09-14 ASSESSMENT — PAIN DESCRIPTION - DESCRIPTORS
DESCRIPTORS: DISCOMFORT
DESCRIPTORS: DISCOMFORT

## 2018-09-14 ASSESSMENT — PAIN DESCRIPTION - LOCATION
LOCATION: GENERALIZED;PENIS
LOCATION: GENERALIZED

## 2018-09-14 NOTE — ED PROVIDER NOTES
Negative for confusion and suicidal ideas. The patient is not nervous/anxious. PAST MEDICAL HISTORY    has a past medical history of ASCVD (arteriosclerotic cardiovascular disease); Cancer ([facility] CKD (chronic kidney disease) stage 3, GFR 30-59 ml/min; DM2 (diabetes mellitus, type 2) ([facility] Dyslipidemia; Hypertension; and Thyroid disease. SURGICAL HISTORY      has a past surgical history that includes skin biopsy; Endoscopy, colon, diagnostic; eye surgery (Left, 2013); pr egd transoral biopsy single/multiple (N/A, 11/23/2017); pr colonoscopy w/biopsy single/multiple (Left, 11/24/2017); Small intestine surgery (N/A, 11/27/2017); and INSERTION / REMOVAL / REPLACEMENT VENOUS ACCESS CATHETER (Right, 12/4/2017).     CURRENT MEDICATIONS       Current Discharge Medication List      CONTINUE these medications which have NOT CHANGED    Details   valsartan (DIOVAN) 160 MG tablet Take 1 tablet by mouth daily  Qty: 30 tablet, Refills: 1      torsemide (DEMADEX) 20 MG tablet Take 2 tablets by mouth daily  Qty: 30 tablet, Refills: 1      calcium acetate (PHOSLO) 667 MG capsule Take 1 capsule by mouth 3 times daily (with meals)  Qty: 90 capsule, Refills: 1      cloNIDine (CATAPRES) 0.1 MG tablet Take 2 tablets by mouth 2 times daily  Qty: 60 tablet, Refills: 3      hydrocortisone 1 % cream Apply topically daily as needed      isosorbide mononitrate (IMDUR) 120 MG extended release tablet Take 1 tablet by mouth 2 times daily  Qty: 60 tablet, Refills: 5      doxazosin (CARDURA) 8 MG tablet Take 1 tablet by mouth 2 times daily  Qty: 60 tablet, Refills: 3      hydrALAZINE (APRESOLINE) 100 MG tablet Take 1 tablet by mouth 3 times daily  Qty: 90 tablet, Refills: 3      clopidogrel (PLAVIX) 75 MG tablet Take 1 tablet by mouth daily  Qty: 30 tablet, Refills: 3      insulin glargine (TOUJEO SOLOSTAR) 300 UNIT/ML injection pen Inject 10 Units into the skin daily      simvastatin (ZOCOR) 20 MG tablet Take 20 mg by mouth nightly No deformity of the central canal is appreciated. **This report has been created using voice recognition software. It may contain minor errors which are inherent in voice recognition technology. **      Final report electronically signed by Dr. Sherry Larsen on 9/15/2018 12:07 AM      CT HEAD WO CONTRAST   Final Result    No evidence of an acute process. **This report has been created using voice recognition software. It may contain minor errors which are inherent in voice recognition technology. **      Final report electronically signed by Dr. Sherry Larsen on 9/15/2018 12:05 AM      XR CHEST PORTABLE   Final Result   1. Status post right IJ pacing lead placement seen near the right atrial/ventricular junction. 2. Stable cardiomegaly and mild venous congestion. **This report has been created using voice recognition software. It may contain minor errors which are inherent in voice recognition technology. **      Final report electronically signed by Dr. Sherry Larsen on 9/14/2018 9:14 PM      XR CHEST PORTABLE   Final Result   Cardiomegaly. Prominent interstitial markings suggesting interstitial edema and perihilar/ bibasilar airspace opacities right more so than left. Superimposed infection cannot be excluded. **This report has been created using voice recognition software. It may contain minor errors which are inherent in voice recognition technology. **      Final report electronically signed by Dr. Red Page on 9/14/2018 8:13 PM          LABS:   Labs Reviewed   CBC WITH AUTO DIFFERENTIAL - Abnormal; Notable for the following:        Result Value    RBC 2.15 (*)     Hemoglobin 6.0 (*)     Hematocrit 19.0 (*)     MCHC 31.6 (*)     RDW-CV 17.9 (*)     RDW-SD 58.1 (*)     Platelets 84 (*)     All other components within normal limits   BASIC METABOLIC PANEL - Abnormal; Notable for the following:     Potassium 8.5 (*)     Chloride 112 (*)     CO2 14 (*)     Glucose 266 (*)     BUN ANION GAP   SCAN OF BLOOD SMEAR   BLOOD OCCULT STOOL SCREEN #1   HEPATITIS B SURFACE ANTIGEN   HEPATITIS B SURFACE ANTIBODY   URINE RT REFLEX TO CULTURE   URINE DRUG SCREEN   BLOOD GAS, ARTERIAL   BASIC METABOLIC PANEL   TRANSFERRIN   HEMOGLOBIN   HEPATITIS B CORE ANTIBODY, TOTAL   MRSA BY PCR   BASIC METABOLIC PANEL   CBC   POCT GLUCOSE   POCT GLUCOSE   POCT GLUCOSE   POCT GLUCOSE   TYPE AND SCREEN   PREPARE RBC (CROSSMATCH)    Narrative:     [id number]     issued  [id number]     released   PREPARE RBC (CROSSMATCH)    Narrative:     [id number]     released  [id number]     issued       EMERGENCY DEPARTMENT COURSE:   Vitals:    Vitals:    09/15/18 0000 09/15/18 0100 09/15/18 0152 09/15/18 0200   BP: 138/62 138/68 (!) 142/72 (!) 142/72   Pulse: 71 70 70 70   Resp: 18 18 16 16   Temp:  98.5 °F (36.9 °C) 98.1 °F (36.7 °C)    TempSrc:  Rectal     SpO2: 96% 99%  100%   Weight:       Height:           7:42 PM: The patient was seen and evaluated. 9:41 PM: Nephrology was paged. MDM:  The patient was seen and evaluated by me at bedside for dizziness and LOC. The patient was bradycardiac upon EMS arrival and the patient was paced. Bia Quiet Upon arrival to ED rate was 37 and blood pressure was 105/50. The patient was transcutaneous paced in ED. Transvenous Pacing Electrode w/ Bipolar Balloon Pacing Electrode placed per Dr Scott Nicemahogany. The patient's condition improved. Right 3rd degree block suspected. Appropriate labs and imaging were ordered and reviewed. CXR revealed post right IJ pacing lead placement seen near the right atrial/ventricular junction. HGB was 6.0. Potassium was 8.5 repeat Potassium 6.5. TEMPNEUROtrending down 6.5.. Nephrology instructed to preform dialysis then transfuse. The patient was transfused 2 units. The patient was acceptable to admission. Dr. Lonnie Galvan of the Hospitalist service was consulted regarding this patient and did graciously accept this patient in the hospital for admission.   The patient was admitted in stable condition.     Medications   benzoin compound solution ( Topical Not Given 9/15/18 0206)   glucose (GLUTOSE) 40 % oral gel 15 g (not administered)   dextrose 50 % solution 12.5 g (not administered)   glucagon (rDNA) injection 1 mg (not administered)   dextrose 5 % solution (not administered)   sodium bicarbonate 150 mEq in dextrose 5 % 1,000 mL infusion ( Intravenous Rate/Dose Verify 9/14/18 2349)   0.9 % sodium chloride bolus (250 mLs Intravenous Not Given 9/15/18 0206)   amLODIPine (NORVASC) tablet 10 mg (not administered)   calcium acetate (PHOSLO) capsule 667 mg (not administered)   cloNIDine (CATAPRES) tablet 0.2 mg (not administered)   clopidogrel (PLAVIX) tablet 75 mg (not administered)   doxazosin (CARDURA) tablet 8 mg (not administered)   famotidine (PEPCID) tablet 20 mg (not administered)   hydrALAZINE (APRESOLINE) tablet 100 mg (not administered)   isosorbide mononitrate (IMDUR) extended release tablet 120 mg (not administered)   simvastatin (ZOCOR) tablet 20 mg (not administered)   torsemide (DEMADEX) tablet 40 mg (not administered)   valsartan (DIOVAN) tablet 160 mg (not administered)   pantoprazole (PROTONIX) injection 40 mg (not administered)   ondansetron (ZOFRAN) injection 4 mg (not administered)   acetaminophen (TYLENOL) tablet 650 mg (not administered)   fentaNYL (SUBLIMAZE) injection 50 mcg (50 mcg Intravenous Given 9/14/18 1935)   calcium gluconate 10 % injection 1 g (1 g Intravenous Given 9/14/18 1936)   fentaNYL (SUBLIMAZE) injection 100 mcg (100 mcg Intravenous Given 9/14/18 2002)   ondansetron (ZOFRAN-ODT) disintegrating tablet 4 mg (4 mg Oral Given 9/14/18 2059)   insulin regular (HUMULIN R;NOVOLIN R) injection 10 Units (10 Units Intravenous Given 9/14/18 2119)     And   dextrose 50 % solution 25 g (25 g Intravenous Given 9/14/18 2116)   sodium bicarbonate 8.4 % injection 50 mEq (50 mEq Intravenous Given 9/14/18 2115)   calcium gluconate 10 % injection 1 g (1 g

## 2018-09-14 NOTE — ED NOTES
Pacing began via monitor.  80ppm, 140mA - per same as EMS settings   Laura Humphreys RN  09/14/18 [address] Samina Brink RN  09/14/18 6183

## 2018-09-15 LAB
ABSOLUTE RETIC #: 39 THOU/MM3 (ref 20–115)
AMPHETAMINE+METHAMPHETAMINE URINE SCREEN: NEGATIVE
ANION GAP SERPL CALCULATED.3IONS-SCNC: 12 MEQ/L (ref 8–16)
ANION GAP SERPL CALCULATED.3IONS-SCNC: 15 MEQ/L (ref 8–16)
BACTERIA: ABNORMAL /HPF
BARBITURATE QUANTITATIVE URINE: NEGATIVE
BASOPHILS # BLD: 0.4 %
BASOPHILS ABSOLUTE: 0 THOU/MM3 (ref 0–0.1)
BENZODIAZEPINE QUANTITATIVE URINE: NEGATIVE
BILIRUBIN URINE: NEGATIVE
BLOOD, URINE: NEGATIVE
BUN BLDV-MCNC: 47 MG/DL (ref 7–22)
BUN BLDV-MCNC: 62 MG/DL (ref 7–22)
CALCIUM SERPL-MCNC: 8.8 MG/DL (ref 8.5–10.5)
CALCIUM SERPL-MCNC: 8.8 MG/DL (ref 8.5–10.5)
CANNABINOID QUANTITATIVE URINE: NEGATIVE
CASTS 2: ABNORMAL /LPF
CASTS UA: ABNORMAL /LPF
CHARACTER, URINE: ABNORMAL
CHLORIDE BLD-SCNC: 106 MEQ/L (ref 98–111)
CHLORIDE BLD-SCNC: 107 MEQ/L (ref 98–111)
CO2: 21 MEQ/L (ref 23–33)
CO2: 25 MEQ/L (ref 23–33)
COCAINE METABOLITE QUANTITATIVE URINE: NEGATIVE
COLOR: YELLOW
CREAT SERPL-MCNC: 2.9 MG/DL (ref 0.4–1.2)
CREAT SERPL-MCNC: 3.2 MG/DL (ref 0.4–1.2)
CRYSTALS, UA: ABNORMAL
EKG ATRIAL RATE: 23 BPM
EKG P AXIS: -93 DEGREES
EKG Q-T INTERVAL: 516 MS
EKG QRS DURATION: 214 MS
EKG QTC CALCULATION (BAZETT): 560 MS
EKG R AXIS: 48 DEGREES
EKG T AXIS: 88 DEGREES
EKG VENTRICULAR RATE: 71 BPM
ELLIPTOCYTES: ABNORMAL
EOSINOPHIL # BLD: 1.9 %
EOSINOPHILS ABSOLUTE: 0.2 THOU/MM3 (ref 0–0.4)
EPITHELIAL CELLS, UA: ABNORMAL /HPF
ERYTHROCYTE [DISTWIDTH] IN BLOOD BY AUTOMATED COUNT: 17.2 % (ref 11.5–14.5)
ERYTHROCYTE [DISTWIDTH] IN BLOOD BY AUTOMATED COUNT: 17.9 % (ref 11.5–14.5)
ERYTHROCYTE [DISTWIDTH] IN BLOOD BY AUTOMATED COUNT: 52.9 FL (ref 35–45)
ERYTHROCYTE [DISTWIDTH] IN BLOOD BY AUTOMATED COUNT: 58.1 FL (ref 35–45)
FERRITIN: 356 NG/ML (ref 22–322)
GFR SERPL CREATININE-BSD FRML MDRD: 21 ML/MIN/1.73M2
GFR SERPL CREATININE-BSD FRML MDRD: 23 ML/MIN/1.73M2
GLUCOSE BLD-MCNC: 115 MG/DL (ref 70–108)
GLUCOSE BLD-MCNC: 119 MG/DL (ref 70–108)
GLUCOSE BLD-MCNC: 132 MG/DL (ref 70–108)
GLUCOSE BLD-MCNC: 157 MG/DL (ref 70–108)
GLUCOSE BLD-MCNC: 160 MG/DL (ref 70–108)
GLUCOSE URINE: 100 MG/DL
HBV SURFACE AB TITR SER: NEGATIVE {TITER}
HCT VFR BLD CALC: 19 % (ref 42–52)
HCT VFR BLD CALC: 22.6 % (ref 42–52)
HEMOGLOBIN: 6 GM/DL (ref 14–18)
HEMOGLOBIN: 7.5 GM/DL (ref 14–18)
HEPATITIS B SURFACE ANTIGEN: NEGATIVE
IMMATURE GRANS (ABS): 0.04 THOU/MM3 (ref 0–0.07)
IMMATURE GRANULOCYTES: 0.5 %
IMMATURE RETIC FRACT: 17 % (ref 2.3–13.4)
IRON: 22 UG/DL (ref 65–195)
KETONES, URINE: NEGATIVE
LEUKOCYTE ESTERASE, URINE: NEGATIVE
LYMPHOCYTES # BLD: 11.8 %
LYMPHOCYTES ABSOLUTE: 1 THOU/MM3 (ref 1–4.8)
MCH RBC QN AUTO: 27.9 PG (ref 26–33)
MCH RBC QN AUTO: 28 PG (ref 26–33)
MCHC RBC AUTO-ENTMCNC: 31.6 GM/DL (ref 32.2–35.5)
MCHC RBC AUTO-ENTMCNC: 33.2 GM/DL (ref 32.2–35.5)
MCV RBC AUTO: 84.3 FL (ref 80–94)
MCV RBC AUTO: 88.4 FL (ref 80–94)
MISCELLANEOUS 2: ABNORMAL
MONOCYTES # BLD: 5.6 %
MONOCYTES ABSOLUTE: 0.5 THOU/MM3 (ref 0.4–1.3)
MRSA SCREEN RT-PCR: NEGATIVE
NITRITE, URINE: NEGATIVE
NUCLEATED RED BLOOD CELLS: 0 /100 WBC
OPIATES, URINE: NEGATIVE
OSMOLALITY CALCULATION: 299.1 MOSMOL/KG (ref 275–300)
OXYCODONE: NEGATIVE
PATHOLOGIST REVIEW: ABNORMAL
PH UA: 6
PHENCYCLIDINE QUANTITATIVE URINE: NEGATIVE
PLATELET # BLD: 107 THOU/MM3 (ref 130–400)
PLATELET # BLD: 84 THOU/MM3 (ref 130–400)
PLATELET ESTIMATE: ABNORMAL
PMV BLD AUTO: 11.6 FL (ref 9.4–12.4)
PMV BLD AUTO: 12.4 FL (ref 9.4–12.4)
POIKILOCYTES: ABNORMAL
POTASSIUM SERPL-SCNC: 4.8 MEQ/L (ref 3.5–5.2)
POTASSIUM SERPL-SCNC: 5.1 MEQ/L (ref 3.5–5.2)
POTASSIUM SERPL-SCNC: 6.5 MEQ/L (ref 3.5–5.2)
PROTEIN UA: 300
RBC # BLD: 2.15 MILL/MM3 (ref 4.7–6.1)
RBC # BLD: 2.68 MILL/MM3 (ref 4.7–6.1)
RBC URINE: ABNORMAL /HPF
RENAL EPITHELIAL, UA: ABNORMAL
RETIC HEMOGLOBIN: 28.8 PG (ref 28.2–35.7)
RETICULOCYTE ABSOLUTE COUNT: 1.4 % (ref 0.5–2)
SEG NEUTROPHILS: 79.8 %
SEGMENTED NEUTROPHILS ABSOLUTE COUNT: 6.5 THOU/MM3 (ref 1.8–7.7)
SODIUM BLD-SCNC: 143 MEQ/L (ref 135–145)
SODIUM BLD-SCNC: 143 MEQ/L (ref 135–145)
SPECIFIC GRAVITY, URINE: 1.01 (ref 1–1.03)
TROPONIN T: 0.18 NG/ML
UROBILINOGEN, URINE: 0.2 EU/DL
WBC # BLD: 6.9 THOU/MM3 (ref 4.8–10.8)
WBC # BLD: 8.1 THOU/MM3 (ref 4.8–10.8)
WBC UA: ABNORMAL /HPF
YEAST: ABNORMAL

## 2018-09-15 PROCEDURE — 6360000002 HC RX W HCPCS: Performed by: INTERNAL MEDICINE

## 2018-09-15 PROCEDURE — 87641 MR-STAPH DNA AMP PROBE: CPT

## 2018-09-15 PROCEDURE — 80307 DRUG TEST PRSMV CHEM ANLYZR: CPT

## 2018-09-15 PROCEDURE — P9016 RBC LEUKOCYTES REDUCED: HCPCS

## 2018-09-15 PROCEDURE — 36415 COLL VENOUS BLD VENIPUNCTURE: CPT

## 2018-09-15 PROCEDURE — C9113 INJ PANTOPRAZOLE SODIUM, VIA: HCPCS | Performed by: INTERNAL MEDICINE

## 2018-09-15 PROCEDURE — 82728 ASSAY OF FERRITIN: CPT

## 2018-09-15 PROCEDURE — 93010 ELECTROCARDIOGRAM REPORT: CPT | Performed by: INTERNAL MEDICINE

## 2018-09-15 PROCEDURE — 2709999900 HC NON-CHARGEABLE SUPPLY

## 2018-09-15 PROCEDURE — 90935 HEMODIALYSIS ONE EVALUATION: CPT

## 2018-09-15 PROCEDURE — 83010 ASSAY OF HAPTOGLOBIN QUANT: CPT

## 2018-09-15 PROCEDURE — 6370000000 HC RX 637 (ALT 250 FOR IP): Performed by: INTERNAL MEDICINE

## 2018-09-15 PROCEDURE — G0257 UNSCHED DIALYSIS ESRD PT HOS: HCPCS

## 2018-09-15 PROCEDURE — 85046 RETICYTE/HGB CONCENTRATE: CPT

## 2018-09-15 PROCEDURE — 84484 ASSAY OF TROPONIN QUANT: CPT

## 2018-09-15 PROCEDURE — 99291 CRITICAL CARE FIRST HOUR: CPT | Performed by: INTERNAL MEDICINE

## 2018-09-15 PROCEDURE — 2000000000 HC ICU R&B

## 2018-09-15 PROCEDURE — 84132 ASSAY OF SERUM POTASSIUM: CPT

## 2018-09-15 PROCEDURE — 80048 BASIC METABOLIC PNL TOTAL CA: CPT

## 2018-09-15 PROCEDURE — 5A1D70Z PERFORMANCE OF URINARY FILTRATION, INTERMITTENT, LESS THAN 6 HOURS PER DAY: ICD-10-PCS | Performed by: INTERNAL MEDICINE

## 2018-09-15 PROCEDURE — 82948 REAGENT STRIP/BLOOD GLUCOSE: CPT

## 2018-09-15 PROCEDURE — 84466 ASSAY OF TRANSFERRIN: CPT

## 2018-09-15 PROCEDURE — 85027 COMPLETE CBC AUTOMATED: CPT

## 2018-09-15 PROCEDURE — 81001 URINALYSIS AUTO W/SCOPE: CPT

## 2018-09-15 PROCEDURE — 87081 CULTURE SCREEN ONLY: CPT

## 2018-09-15 PROCEDURE — 83540 ASSAY OF IRON: CPT

## 2018-09-15 PROCEDURE — 99223 1ST HOSP IP/OBS HIGH 75: CPT | Performed by: INTERNAL MEDICINE

## 2018-09-15 PROCEDURE — 36430 TRANSFUSION BLD/BLD COMPNT: CPT

## 2018-09-15 RX ORDER — VALSARTAN 160 MG/1
160 TABLET ORAL DAILY
Status: DISCONTINUED | OUTPATIENT
Start: 2018-09-15 | End: 2018-09-15 | Stop reason: CLARIF

## 2018-09-15 RX ORDER — ONDANSETRON 2 MG/ML
4 INJECTION INTRAMUSCULAR; INTRAVENOUS EVERY 6 HOURS PRN
Status: DISCONTINUED | OUTPATIENT
Start: 2018-09-15 | End: 2018-09-17 | Stop reason: HOSPADM

## 2018-09-15 RX ORDER — HYDRALAZINE HYDROCHLORIDE 50 MG/1
100 TABLET, FILM COATED ORAL 3 TIMES DAILY
Status: DISCONTINUED | OUTPATIENT
Start: 2018-09-15 | End: 2018-09-17 | Stop reason: HOSPADM

## 2018-09-15 RX ORDER — CANDESARTAN 16 MG/1
16 TABLET ORAL DAILY
Status: DISCONTINUED | OUTPATIENT
Start: 2018-09-15 | End: 2018-09-16

## 2018-09-15 RX ORDER — TORSEMIDE 20 MG/1
40 TABLET ORAL DAILY
Status: DISCONTINUED | OUTPATIENT
Start: 2018-09-15 | End: 2018-09-17 | Stop reason: HOSPADM

## 2018-09-15 RX ORDER — CLONIDINE HYDROCHLORIDE 0.2 MG/1
0.2 TABLET ORAL 2 TIMES DAILY
Status: DISCONTINUED | OUTPATIENT
Start: 2018-09-15 | End: 2018-09-17 | Stop reason: HOSPADM

## 2018-09-15 RX ORDER — PANTOPRAZOLE SODIUM 40 MG/10ML
40 INJECTION, POWDER, LYOPHILIZED, FOR SOLUTION INTRAVENOUS DAILY
Status: DISCONTINUED | OUTPATIENT
Start: 2018-09-15 | End: 2018-09-15

## 2018-09-15 RX ORDER — DOXAZOSIN MESYLATE 4 MG/1
8 TABLET ORAL 2 TIMES DAILY
Status: DISCONTINUED | OUTPATIENT
Start: 2018-09-15 | End: 2018-09-17 | Stop reason: HOSPADM

## 2018-09-15 RX ORDER — AMLODIPINE BESYLATE 10 MG/1
10 TABLET ORAL DAILY
Status: DISCONTINUED | OUTPATIENT
Start: 2018-09-15 | End: 2018-09-17 | Stop reason: HOSPADM

## 2018-09-15 RX ORDER — CLOPIDOGREL BISULFATE 75 MG/1
75 TABLET ORAL DAILY
Status: DISCONTINUED | OUTPATIENT
Start: 2018-09-15 | End: 2018-09-15

## 2018-09-15 RX ORDER — POLYETHYLENE GLYCOL 3350 17 G/17G
17 POWDER, FOR SOLUTION ORAL
Status: DISCONTINUED | OUTPATIENT
Start: 2018-09-15 | End: 2018-09-16

## 2018-09-15 RX ORDER — FAMOTIDINE 20 MG/1
20 TABLET, FILM COATED ORAL DAILY
Status: DISCONTINUED | OUTPATIENT
Start: 2018-09-15 | End: 2018-09-17 | Stop reason: HOSPADM

## 2018-09-15 RX ORDER — ACETAMINOPHEN 325 MG/1
650 TABLET ORAL EVERY 4 HOURS PRN
Status: DISCONTINUED | OUTPATIENT
Start: 2018-09-15 | End: 2018-09-17 | Stop reason: HOSPADM

## 2018-09-15 RX ORDER — CLOPIDOGREL BISULFATE 75 MG/1
75 TABLET ORAL DAILY
Status: DISCONTINUED | OUTPATIENT
Start: 2018-09-17 | End: 2018-09-17 | Stop reason: HOSPADM

## 2018-09-15 RX ORDER — ISOSORBIDE MONONITRATE 60 MG/1
120 TABLET, EXTENDED RELEASE ORAL 2 TIMES DAILY
Status: DISCONTINUED | OUTPATIENT
Start: 2018-09-15 | End: 2018-09-17 | Stop reason: HOSPADM

## 2018-09-15 RX ORDER — SIMVASTATIN 20 MG
20 TABLET ORAL NIGHTLY
Status: DISCONTINUED | OUTPATIENT
Start: 2018-09-15 | End: 2018-09-17 | Stop reason: HOSPADM

## 2018-09-15 RX ADMIN — HYDRALAZINE HYDROCHLORIDE 100 MG: 50 TABLET, FILM COATED ORAL at 09:14

## 2018-09-15 RX ADMIN — HYDRALAZINE HYDROCHLORIDE 100 MG: 50 TABLET, FILM COATED ORAL at 19:18

## 2018-09-15 RX ADMIN — POLYETHYLENE GLYCOL 3350 17 G: 17 POWDER, FOR SOLUTION ORAL at 19:17

## 2018-09-15 RX ADMIN — CLONIDINE HYDROCHLORIDE 0.2 MG: 0.2 TABLET ORAL at 05:22

## 2018-09-15 RX ADMIN — CLONIDINE HYDROCHLORIDE 0.2 MG: 0.2 TABLET ORAL at 20:45

## 2018-09-15 RX ADMIN — ISOSORBIDE MONONITRATE 120 MG: 60 TABLET ORAL at 20:45

## 2018-09-15 RX ADMIN — TORSEMIDE 40 MG: 20 TABLET ORAL at 09:14

## 2018-09-15 RX ADMIN — POLYETHYLENE GLYCOL 3350 17 G: 17 POWDER, FOR SOLUTION ORAL at 18:19

## 2018-09-15 RX ADMIN — CLOPIDOGREL BISULFATE 75 MG: 75 TABLET ORAL at 09:25

## 2018-09-15 RX ADMIN — ISOSORBIDE MONONITRATE 120 MG: 60 TABLET ORAL at 09:14

## 2018-09-15 RX ADMIN — HYDRALAZINE HYDROCHLORIDE 100 MG: 50 TABLET, FILM COATED ORAL at 14:04

## 2018-09-15 RX ADMIN — POLYETHYLENE GLYCOL 3350 17 G: 17 POWDER, FOR SOLUTION ORAL at 23:57

## 2018-09-15 RX ADMIN — POLYETHYLENE GLYCOL 3350 17 G: 17 POWDER, FOR SOLUTION ORAL at 23:07

## 2018-09-15 RX ADMIN — POLYETHYLENE GLYCOL 3350 17 G: 17 POWDER, FOR SOLUTION ORAL at 23:58

## 2018-09-15 RX ADMIN — SIMVASTATIN 20 MG: 20 TABLET, FILM COATED ORAL at 20:45

## 2018-09-15 RX ADMIN — CALCIUM ACETATE 667 MG: 667 CAPSULE ORAL at 18:25

## 2018-09-15 RX ADMIN — BISACODYL 10 MG: 5 TABLET, COATED ORAL at 19:18

## 2018-09-15 RX ADMIN — PANTOPRAZOLE SODIUM 40 MG: 40 INJECTION, POWDER, FOR SOLUTION INTRAVENOUS at 09:02

## 2018-09-15 RX ADMIN — AMLODIPINE BESYLATE 10 MG: 10 TABLET ORAL at 05:22

## 2018-09-15 RX ADMIN — CALCIUM ACETATE 667 MG: 667 CAPSULE ORAL at 09:02

## 2018-09-15 RX ADMIN — POLYETHYLENE GLYCOL 3350 17 G: 17 POWDER, FOR SOLUTION ORAL at 20:53

## 2018-09-15 RX ADMIN — POLYETHYLENE GLYCOL 3350 17 G: 17 POWDER, FOR SOLUTION ORAL at 20:52

## 2018-09-15 RX ADMIN — CANDESARTAN CILEXETIL 16 MG: 16 TABLET ORAL at 09:15

## 2018-09-15 RX ADMIN — POLYETHYLENE GLYCOL 3350 17 G: 17 POWDER, FOR SOLUTION ORAL at 20:54

## 2018-09-15 ASSESSMENT — PAIN SCALES - GENERAL
PAINLEVEL_OUTOF10: 0

## 2018-09-15 NOTE — CONSULTS
SINGLE/MULTIPLE N/A 11/23/2017    EGD BIOPSY performed by Dorsey Runner, MD at [address]      mole on nose removed    SMALL INTESTINE SURGERY N/A 11/27/2017    SIGMOID COLON RESECTION, APPENDECTOMY, LIVER BIOPSY performed by Odalis Hunt MD at [address] [facility] History:   Family History   Problem Relation Age of Onset    Cancer Father     Heart Disease Father     High Blood Pressure Mother        Social History:  reports that he has never smoked. He has never used smokeless tobacco. He reports that he does not drink alcohol or use drugs. Allergies:  Patient has no known allergies.     Current Medications:      amLODIPine (NORVASC) tablet 10 mg Daily   calcium acetate (PHOSLO) capsule 667 mg TID WC   cloNIDine (CATAPRES) tablet 0.2 mg BID   clopidogrel (PLAVIX) tablet 75 mg Daily   doxazosin (CARDURA) tablet 8 mg BID   famotidine (PEPCID) tablet 20 mg Daily   hydrALAZINE (APRESOLINE) tablet 100 mg TID   isosorbide mononitrate (IMDUR) extended release tablet 120 mg BID   simvastatin (ZOCOR) tablet 20 mg Nightly   torsemide (DEMADEX) tablet 40 mg Daily   pantoprazole (PROTONIX) injection 40 mg Daily   ondansetron (ZOFRAN) injection 4 mg Q6H PRN   acetaminophen (TYLENOL) tablet 650 mg Q4H PRN   candesartan (ATACAND) tablet 16 mg Daily   benzoin compound solution Once   glucose (GLUTOSE) 40 % oral gel 15 g PRN   dextrose 50 % solution 12.5 g PRN   glucagon (rDNA) injection 1 mg PRN   dextrose 5 % solution PRN   sodium bicarbonate 150 mEq in dextrose 5 % 1,000 mL infusion Continuous   0.9 % sodium chloride bolus Once       Review of Systems:   Constitutional: no distress  HEENT:  No eyes, nose or ear dianage  Neck: no sore throat  Lungs: no shortness of breath  Heart: no chest pain  Abdomen: no abdominal pain, nausea, vomiting diarrhea  Extremities: no edema  Neurologic: weakness noted  Skin : no rashes  Hematology : no bruising or bleeding  Genitourinary : no urinary symptoms for input(s): APTT in the last 72 hours. ABGs: No results found for: PHART, PO2ART, WHW1KRL, DQW5ASK, BEART, F5JYNUZS   Lactic Acid:   Lab Results   Component Value Date    LACTA 2.8 09/14/2018      Amylase:   Lab Results   Component Value Date    AMYLASE 8 03/09/2018      Lipase:   Lab Results   Component Value Date    LIPASE 20.2 09/14/2018    CBC: Recent Labs      09/14/18   2030  09/15/18   0410   WBC  8.1  6.9   HGB  6.0*  7.5*   PLT  84*  107*     Calcium:  Recent Labs      09/15/18   0410   CALCIUM  8.8     Ionized Calcium:No results for input(s): IONCA in the last 72 hours. Magnesium:  Recent Labs      09/14/18 2030   MG  1.9     Phosphorus:No results for input(s): PHOS in the last 72 hours. UA: No results for input(s): SPECGRAV, PHUR, COLORU, CLARITYU, MUCUS, PROTEINU, BLOODU, RBCUA, WBCUA, BACTERIA, NITRU, GLUCOSEU, BILIRUBINUR, UROBILINOGEN, KETUA, LABCAST, LABCASTTY, AMORPHOS in the last 72 hours. Invalid input(s): CRYSTALS    Imaging: Reviewed  Micro:   Lab Results   Component Value Date    BC No growth-preliminary 09/14/2018       Impression/ Plan:  1, ACUTE METABOLIC ENCEPHALOPATHY  2, VOLUME OVERLOAD  3, ACUTE HYPERKALEMIA  4, MISSED DIALYSIS  5, ESRD REQUIRING DIALYSIS  6, ACIDOSIS  7, ANEMIA - PROBABLY COMBINATION OF CKD/ ? Gl BLEED    - Patient was dialyzed emergently yesterday  - Dialysis again today  - will reassess tomorrow  - d/c candestartan  - renal diet  - agree with blood transfusion  - recommend GI consult      Thank you   for allowing us to participate in care of Jenifer Calvo   A total of 60 minutes were spent.     Electronically signed by Ernestina Messer MD on 9/15/2018 at 9:31 AM

## 2018-09-15 NOTE — CONSULTS
[facility]    Patient's Name/Date of Birth: Louisa Kebede / 1965 (40 y.o.)    Date: September 15, 2018     Referring Provider: Mague Starks MD    CHIEF COMPLAINT: Symptomatic Bradycardia      HPI: This is a pleasant 48 y.o. male presents with hx of ESRD, hx of colon cancer, and anemia who presented with symptomatic bradycardia. ECG shows junctional rhythm. He is getting active chemotherapy. No chest pain. He was being paced externally with good capture. TVP placed via R IJ in the ED at bedside. He has not had dialysis for over 5 days. Hyperkalemia of 8.5 with BNP 56649. Echo:   Results for orders placed during the hospital encounter of 11/21/17   Echocardiogram 2D/ M-Mode/ Colorflow/ Do    Narrative Transthoracic Echocardiography Report (TTE)     Demographics      Patient Name  Sita Galo      Gender             Male                 MODESTO CHAUDHARI      MR #          [id number]      Race                                                  Ethnicity      Account #     [de-identified]      Room Number        4942      Accession     [id number]      Date of Study      11/22/2017   Number      Date of Birth 1965     Referring          Paresh Ayala MD                                Physician          Rvoerto Kruse      Age           50 year(s)     Sonographer        ROSA ELENA Neal, RDCS,                                                   RDMS, RVT                                   Interpreting       Merline Lips DO                                Physician     Procedure    Type of Study      TTE procedure:ECHOCARDIOGRAM COMPLETE 2D W DOPPLER W COLOR. Procedure Date  Date: 11/22/2017 Start: 08:02 AM    Study Location: Bedside  Technical Quality: Adequate visualization    Indications:NSTEMI.     Additional Medical History:NSTEMI, accelerated HTN, thyroid disease,  hypertension, hyperlipidemia, diabetes, chronic kidney disease    Patient Status: Routine    Height: 69 inches Weight: 230.01 pounds BSA: 2.19 m^2 BMI: 33.97 kg/m^2    BP: 167/75 mmHg     Conclusions      Summary   Mild concentric left ventricular hypertrophy. Systolic function was low normal.   Ejection fraction is visually estimated at 50%. Normal right ventricular size and function. Right ventricular systolic pressure of 42 mm Hg consistent with mild   pulmonary hypertension. Leaflets exhibited mild to moderately increased thickness and mildly   reduced cuspal separation of the aortic valve. Mild to moderate aortic regurgitation is noted. Aortic valve leaflets are Mildly calcified. Small to moderate circumferential pericardial effusion without tamponade   physiology is noted . and in subcostal view of 1.22 cm in upper region and 1.08 cm in lower   region. May consider serial monitoring if clinically indicated   Mildly dilated left atrium. Small to moderate circumferential pericardial effusion without tamponade   physiology is noted . and in subcostal view of 1.22 cm in upper region and 1.08 cm in lower   region. May consider serial monitoring if clinically indicated      Signature      ----------------------------------------------------------------   Electronically signed by Tenzin Jorge DO (Interpreting   physician) on 11/22/2017 at 07:18 PM   ----------------------------------------------------------------      Findings      Mitral Valve   Structurally normal mitral valve. Trace mitral regurgitation is present. Aortic Valve   Leaflets exhibited mild to moderately increased thickness and mildly   reduced cuspal separation of the aortic valve. Mild to moderate aortic regurgitation is noted. Aortic valve leaflets are Mildly calcified. Tricuspid Valve   Tricuspid valve is structurally normal.   Trivial tricuspid regurgitation visualized. Pulmonic Valve   Pulmonic valve is structurally normal.   Trivial pulmonic regurgitation visualized.       Left Atrium   Mildly dilated left atrium. Left Ventricle   Mild concentric left ventricular hypertrophy. Systolic function was low normal.   Ejection fraction is visually estimated at 50%. Right Atrium   The right atrium is of normal size. Right Ventricle   Normal right ventricular size and function. Right ventricular systolic pressure of 42 mm Hg consistent with mild   pulmonary hypertension. Pericardial Effusion   Small to moderate circumferential pericardial effusion without tamponade   physiology is noted . and in subcostal view of 1.22 cm in upper region and 1.08 cm in lower   region. May consider serial monitoring if clinically indicated      Aorta / Great Vessels   Aortic root dimension within normal limits.      M-Mode/2D Measurements & Calculations      LV Diastolic    LV Systolic Dimension: 4.1  AV Cusp Separation: 2.2 cmLA   Dimension: 5.5  cm                          Dimension: 4.7 cmAO Root   cm              LV Volume Diastolic: 218 ml Dimension: 3.6 cm   LV FS:25.5 %    LV Volume Systolic: 79.3 ml   LV PW           LV EDV/LV EDV Index: 007   Diastolic: 1.3  UP/06 V^0FK ESV/LV ESV   cm              Index: 74.2 ml/34 m^2       RV Diastolic Dimension: 2.9 cm   Septum          EF Calculated: 70.4 %   Diastolic: 1.5                              LA/Aorta: 1.31   cm                                          Ascending Aorta: 3.2 cm     Doppler Measurements & Calculations      MV Peak E-Wave: 111 cm/s   AV Peak Velocity: 160  LVOT Peak Velocity: 115   MV Peak A-Wave: 64.7 cm/s  cm/s                   cm/s   MV E/A Ratio: 1.72         AV Peak Gradient:      LVOT Peak Gradient: 5   MV Peak Gradient: 4.93     10.24 mmHg             mmHg   mmHg                                                     TV Peak E-Wave: 60.2   MV Deceleration Time: 165                         cm/s   msec                                              TV Peak A-Wave: 67.1                              AV P1/2t: 540 msec cm/s      MV E' Septal Velocity:                            TV Peak Gradient: 1.45   3.41 cm/s                                         mmHg   MV A' Septal Velocity:     AV DVI (Vmax):0.72     TR Velocity:282 cm/s   5.07 cm/s                                         TR Gradient:31.81 mmHg   MV E' Lateral Velocity:                           PV Peak Velocity: 81.4   8.97 cm/s                                         cm/s   MV A' Lateral Velocity:                           PV Peak Gradient: 2.65   9.36 cm/s                                         mmHg   E/E' septal: 32.55   E/E' lateral: 12.37   MR Velocity: 476 cm/s                             NM ED Velocity: 192 cm/s     [email or web address] [id number]        All labs, EKG's, diagnostic testing and images as well as cardiac cath, stress testing were reviewed during this encounter    Past Medical History:   Diagnosis Date    ASCVD (arteriosclerotic cardiovascular disease)     Cancer ([facility] [address] 11/2017    Dr. Laverne Burch    CKD (chronic kidney disease) stage 3, GFR 30-59 ml/min     see's Solis Alarcon    DM2 (diabetes mellitus, type 2) ([facility] [address]     Dyslipidemia     Hypertension     Thyroid disease      Past Surgical History:   Procedure Laterality Date    ENDOSCOPY, COLON, DIAGNOSTIC      EYE SURGERY Left 2013    laser surgery    INSERTION / REMOVAL / REPLACEMENT VENOUS ACCESS CATHETER Right 12/4/2017    MEDIPORT INSERTION performed by Surekha Carrasco MD at [address] Left 11/24/2017    COLONOSCOPY WITH BIOPSY performed by Shashi Rachel MD at [address] [facility]    NM EGD TRANSORAL BIOPSY SINGLE/MULTIPLE N/A 11/23/2017    EGD BIOPSY performed by Shashi Rachel MD at [address]      [facility] on nose removed    SMALL INTESTINE SURGERY N/A 11/27/2017    SIGMOID COLON RESECTION, APPENDECTOMY, LIVER BIOPSY performed by Vamshi Penaloza MD at [address] [place] [address]-Administered Medications   Medication Dose Route Frequency Provider Last Rate Last Dose    amLODIPine (NORVASC) tablet 10 mg  10 mg Oral Daily Mague Starks MD   10 mg at 09/15/18 0522    calcium acetate (PHOSLO) capsule 667 mg  667 mg Oral TID  Mague Starks MD   667 mg at 09/15/18 1825    cloNIDine (CATAPRES) tablet 0.2 mg  0.2 mg Oral BID Mague Starks MD   0.2 mg at 09/15/18 0522    doxazosin (CARDURA) tablet 8 mg  8 mg Oral BID Mague Starks MD        famotidine (PEPCID) tablet 20 mg  20 mg Oral Daily Mague Starks MD        hydrALAZINE (APRESOLINE) tablet 100 mg  100 mg Oral TID Mague Starks MD   100 mg at 09/15/18 1918    isosorbide mononitrate (IMDUR) extended release tablet 120 mg  120 mg Oral BID Mague Starks MD   120 mg at 09/15/18 0914    simvastatin (ZOCOR) tablet 20 mg  20 mg Oral Nightly Mague Starks MD        torsemide [facility]) tablet 40 mg  40 mg Oral Daily Mague Starks MD   40 mg at 09/15/18 0914    ondansetron (ZOFRAN) injection 4 mg  4 mg Intravenous Q6H PRN Mague Starks MD        acetaminophen (TYLENOL) tablet 650 mg  650 mg Oral Q4H PRN Mague Starks MD        candesartan (ATACAND) tablet 16 mg  16 mg Oral Daily Mague Starks MD   16 mg at 09/15/18 0915    insulin lispro (HUMALOG) injection vial 0-6 Units  0-6 Units Subcutaneous TID  Naomi Bowen MD        insulin lispro (HUMALOG) injection vial 0-3 Units  0-3 Units Subcutaneous Nightly Eliud Givens MD        [START ON 9/17/2018] clopidogrel (PLAVIX) tablet 75 mg  75 mg Oral Daily Ernesto Sinclair MD        polyethylene glycol (GLYCOLAX) powder 17 g  17 g Oral Q1H Ernesto Sinclair MD   17 g at 09/15/18 1917    benzoin compound solution   Topical Once Lear MD Rosalinda        glucose (GLUTOSE) 40 % oral gel 15 g  15 g Oral PRN Sander Tellez MD        dextrose 50 % solution 12.5 g  12.5 g Oral Daily    calcium acetate  667 mg Oral TID     cloNIDine  0.2 mg Oral BID    doxazosin  8 mg Oral BID    famotidine  20 mg Oral Daily    hydrALAZINE  100 mg Oral TID    isosorbide mononitrate  120 mg Oral BID    simvastatin  20 mg Oral Nightly    torsemide  40 mg Oral Daily    candesartan  16 mg Oral Daily    insulin lispro  0-6 Units Subcutaneous TID     insulin lispro  0-3 Units Subcutaneous Nightly    [START ON 9/17/2018] clopidogrel  75 mg Oral Daily    polyethylene glycol  17 g Oral Q1H    benzoin compound   Topical Once    sodium chloride  250 mL Intravenous Once     Continuous Infusions:   dextrose       PRN Meds:.ondansetron, acetaminophen, glucose, dextrose, glucagon (rDNA), dextrose    No Known Allergies  Family History   Problem Relation Age of Onset    Cancer Father     Heart Disease Father     High Blood Pressure Mother      Social History     Social History    Marital status: Single     Spouse name: N/A    Number of children: N/A    Years of education: 15     Occupational History    factory work [employer]     Social History Main Topics    Smoking status: Never Smoker    Smokeless tobacco: Never Used    Alcohol use No    Drug use: No    Sexual activity: No     Other Topics Concern    Not on file     Social History Narrative    No narrative on file     ROS:   Constitutional: Denies any recent wt change. Eyes:  Denies any blurring or double vision, no glaucoma  Ears/Nose/Mouth/Throat:  Denies any chronic sinus/rhinitis, bleeding gums  Cardiovascular:  As described above.     Respiratory:  Denies any frequent cough, wheezing or coughing up blood  Genitourinary:  Denies difficulty with urination and kidney stones  Gastrointestinal:  Denies any chronic problems with abdominal pain, nausea, vomiting or diarrhea  Musculoskeletal:  Denies any joint pain, back pain, or difficulty walking  Integumentary:  Denies any rash  Neurological:  No numbness or tingling  Endocrine: Denies any polydipsia. Hematologic/Lymphatic:  Denies any hemorrhage or lymphatic drainage problems. Labs:  CBC: Recent Labs      09/14/18   2030  09/15/18   0410   WBC  8.1  6.9   HGB  6.0*  7.5*   HCT  19.0*  22.6*   MCV  88.4  84.3   PLT  84*  107*     BMP: Recent Labs      09/14/18   2030  09/15/18   0200  09/15/18   0410   NA  139   --   143   K  8.5*  6.5*  4.8   CL  112*   --   107   CO2  14*   --   21*   BUN  121*   --   62*   CREATININE  5.9*   --   2.9*   MG  1.9   --    --      Accucheck Glucoses:   Recent Labs      09/14/18   1958  09/15/18   1144  09/15/18   1648   POCGLU  269*  160*  115*     Cardiac Enzymes: No results for input(s): CKTOTAL, CKMB, CKMBINDEX, TROPONINI in the last 72 hours. PT/INR:   Recent Labs      09/14/18 2030   INR  1.20*     APTT: No results for input(s): APTT in the last 72 hours.   Liver Profile:  Lab Results   Component Value Date    AST 18 09/14/2018    ALT 23 09/14/2018    BILIDIR <0.2 09/14/2018    BILITOT 0.4 09/14/2018    ALKPHOS 88 09/14/2018     Lab Results   Component Value Date    CHOL 155 11/22/2017    HDL 42 11/22/2017    TRIG 71 11/22/2017     TSH:   Lab Results   Component Value Date    TSH 3.600 09/14/2018     UA: Lab Results   Component Value Date    NITRITE Negative 08/20/2015    COLORU YELLOW 03/16/2018    PHUR 6.0 03/16/2018    LABCAST NONE SEEN 02/01/2018    LABCAST NONE SEEN 02/01/2018    WBCUA 10-15 03/16/2018    RBCUA 3-5 03/16/2018    YEAST NONE SEEN 03/16/2018    BACTERIA FEW 03/16/2018    SPECGRAV 1.018 02/01/2018    LEUKOCYTESUR NEGATIVE 03/16/2018    UROBILINOGEN 0.2 03/16/2018    BILIRUBINUR NEGATIVE 03/16/2018    BLOODU TRACE 03/16/2018    GLUCOSEU >= 1000 03/16/2018    AMORPHOUS URATES 11/30/2017         Physical Exam:  Vitals:    09/15/18 1805   BP: (!) 150/69   Pulse: 66   Resp: 15   Temp:    SpO2: 94%      Intake/Output Summary (Last 24 hours) at 09/15/18 1926  Last data filed at 09/15/18 1834   Gross per 24 hour   Intake             303 ml   Output             5100 ml   Net            -2066 ml      General:  No acute distress  Neck: Supple, no JVD, no carotid bruits  Heart: Regular rhythm normal S1 and S2, no rubs, murmurs or gallops; right chemo port on the chest; left subclavian dialysis catheter  Lungs: clear to ascultation no rales, wheezes, or rhonchi  Abdomen: positive bowel sounds, soft, non-tender, non-distended, no bruits, no masses  Extremities:no clubbing, cyanosis or edema  Neurologic: alert and oriented x 3, cranial nerves 2-12 grossly intact, motor and sensory intact, moving all extremities  Skin: No rashes    Assessment:  Symptomatic Bradycardia  Preserved EF  Hx of multivessel CAD - not revascularized  Hyperkalemia  ESRD - missed dialysis x 2  Colon Cancer - on active chemotherapy      Plan:  1. Continue TVP  2. Treat metabolic derangements  3. Repeat ECG once all derangements resolved  4. Avoid all AVN blockers  5. Should follow-up Dr. Ashleigh You once anemia and bradycardia have been treated; he has multivessel CAD and this needs to be treated with revascularization, possible CABG vs high-risk PCI. 6. If bradycardia does not resolve, EP consultation needed for pacemaker  7. Further recommendations based on results and clinical course  8. Thank you for allowing us to participate in the care of this patient. Please do not hesitate to call us with questions.     Electronically signed by Paulo Hatch MD on 9/15/2018 at 7:26 PM    Interventional Cardiology - [facility]

## 2018-09-15 NOTE — PROCEDURES
None.    MEDICATIONS:  See EMR. SUMMARY:  Successful insertion of bedside transvenous balloon-tipped  pacemaker via right internal jugular venous approach. PLAN:  1. Obtain post ECG. 2.  Chest x-ray. 3.  ICU admission. 4.  Evaluate patient's electrolytes as he has history of hemodialysis to  ensure there is no secondary cause for his bradycardia/complete heart  block. 5.  If the patient does not return to normal conduction rhythm after  reversal of all offending agents, consideration for permanent pacemaker. All the above was explained to the patient and the patient's family. They were agreeable and amenable to the above plan.         Rodney Fragoso MD    D: 09/14/2018 20:42:14       T: 09/14/2018 22:15:26     [email or web address]  Job#: [id number]     Doc#: [id number]    CC:

## 2018-09-15 NOTE — FLOWSHEET NOTE
(charting for primary RN [facility])    2345-Pt arrived from ED. Connected to monitor. 2350-Assessment complete per primary RN.     0000-Dialysis Rn at bedside. 0010-Pacer secured at bedside.

## 2018-09-15 NOTE — CONSULTS
Intensive Care Unit     Critical Care Medicine Consult Note      Name:  Libby Peace Date/Time of Admission: 2018  7:18 PM   CSN: [id number] Attending Provider: Augustus Saenz MD  Room/Bed: 4D-15/015-A : 1965 Age: 48 y.o. REASON FOR CONSULT  ICU management, severe hyperkalemia, symptomatic junctional bradycardia, ESRD on HD, Poor compliance, uncontrolled HTN, acute anemia ? GI loss, hx of colon cancer    HPI:  I was asked by Dr. Augustus Saenz MD  to see Libby Peace in consultation. History was obtained from  patient, chart review and nursing. Libby Peace is a 48 y.o. male who presents with nausea, dizziness, generalized malaise. Has hx of ESRD on hemodialysis [id number]. Missed couple of sessions (last HD was ). He developed malaise, weakness, nausea and dizziness. Says he fell at home unclear if passing out spell. He was brought to [address] and labs revealed severe hyperkalemia (K 8.5), Bun 121, Creat 5.9, HC03 14. hb was 6.0. BP was elevated with systolic 909M. Head CT was unremarkable. He denied hematemesis or bloody BMs. Patient was admitted to ICU and had urgent dialysis. K improved to 4.8  He required temporary transvenous pacer due to bradycardia in the 30s. This morning he denies any chest pain or SOB. Has hx of DM2 and uses Toujeo 10 units at night. Outside records were carefully reviewed. REVIEW OF SYSTEMS    See HPI for further details. The rest of the complete review of systems otherwise negative.       Past Medical History:   Diagnosis Date    ASCVD (arteriosclerotic cardiovascular disease)     Cancer ([facility] [address] 2017    Dr. Teo Lal    CKD (chronic kidney disease) stage 3, GFR 30-59 ml/min     see's Atiya Loser    DM2 (diabetes mellitus, type 2) ([facility] [address]     Dyslipidemia     Hypertension     Thyroid disease          Past Surgical History:   Procedure Laterality Date    ENDOSCOPY, COLON, DIAGNOSTIC      EYE SURGERY Left

## 2018-09-15 NOTE — ED TRIAGE NOTES
Pt presents to the ED via [address] with c/o nausea. Upon arrival to the ED pt is bradycardic and on pacer at 80ppm, and 140mA. Pt transferred to cot to check rhythm, 34 bpm, determined pt needed placed back on ED pacer. Pacer set at equivalent settings of EMS. Pt diaphoretic and pale, hypotensive, and lethargic. Pt answered questions appropriately. Pt reports he is a hemodialysis pt 3x/week and has not went this week due to not feeling well. Pt last had dialysis on 9/7/18. Pt reports his nephrologist is Dr Sandro Holley. Pt reports he has a Hx of prostate CA, last chemo \"approximately a month ago. \" Pts oncologist is Dr Maki Burgess. Dr BUSTOS to bedside for pt assessment. EKG complete.

## 2018-09-15 NOTE — H&P
3/23/18 6/28/18  Bailey De Leon MD   torsemide (DEMADEX) 20 MG tablet Take 2 tablets by mouth daily 3/23/18   Bailey De Leon MD   calcium acetate (PHOSLO) 667 MG capsule Take 1 capsule by mouth 3 times daily (with meals) 3/22/18   Bailey De Leon MD   cloNIDine (CATAPRES) 0.1 MG tablet Take 2 tablets by mouth 2 times daily 3/22/18   Bailey De Leon MD   hydrocortisone 1 % cream Apply topically daily as needed    Historical Provider, MD   isosorbide mononitrate (IMDUR) 120 MG extended release tablet Take 1 tablet by mouth 2 times daily 2/3/18   Phil Capellan MD   doxazosin (CARDURA) 8 MG tablet Take 1 tablet by mouth 2 times daily 2/3/18   Phil Capellan MD   hydrALAZINE (APRESOLINE) 100 MG tablet Take 1 tablet by mouth 3 times daily 12/7/17   Phil Capellan MD   clopidogrel (PLAVIX) 75 MG tablet Take 1 tablet by mouth daily 12/8/17   Phil Capellan MD   famotidine (PEPCID) 20 MG tablet Take 1 tablet by mouth 2 times daily 12/7/17   Phil Capellan MD   insulin glargine (TOUJEO SOLOSTAR) 300 UNIT/ML injection pen Inject 10 Units into the skin daily    Historical Provider, MD   Blood Pressure Monitor KIT CHECK BP TWICE DAILY IF SBP >140 CALL PCP 12/6/15   Aaron Frost MD   simvastatin (ZOCOR) 20 MG tablet Take 20 mg by mouth nightly    Historical Provider, MD       Allergies:  Patient has no known allergies. Social History:      The patient currently lives at home    TOBACCO:   reports that he has never smoked. He has never used smokeless tobacco.  ETOH:   reports that he does not drink alcohol. Family History:      Reviewed in detail and negative for DM, CAD, Cancer, CVA. Positive as follows:    Family History   Problem Relation Age of Onset    Cancer Father     Heart Disease Father     High Blood Pressure Mother        Diet:       REVIEW OF SYSTEMS:   Pertinent positives as noted in the HPI. All other systems reviewed and negative.     PHYSICAL EXAM:    BP hypoxic respiratory failure due to non cardiogenic mild pulmonary edema. Wean as tolerated. 5. Acute blood loss anemia: Baseline Hb 9. Now 6. Anemia of chronic disease due to ESRD with possible EMILY. Will order transferrin. Will leave aranesp decision to renal post Transf sat in am. Negative Guaiac in stool. Recheck H post transfusion. Chemo related? Normal bilirrubin. 6. Thrombocytopenia:84k. No med found as culprit  in med rec. Sec to chemo? HIT? Will follow trend. Not sepsis. Pending UA, Utox. 7. Per Renal we will hold off on prbc transfusion since patient will have it done while he has fluid removal tonight. Resume bumex. Repeat BMP post HD tonight. Ordered for 1 am.   8. Metabolic Acidosis due to ESRD. 9. HTN HLP: Resume amlodpine, clonidine, hydralazine, valsartan, siimvastatin  10. GI and DVT ppx        Thank you Janeth Nichols for the opportunity to be involved in this patient's care.     Electronically signed by Sadie Carrington MD on 9/14/2018 at 10:14 PM

## 2018-09-15 NOTE — FLOWSHEET NOTE
09/14/18 2353 09/15/18 0413   Vital Signs   BP (!) 152/68 (!) 160/78   Temp 98.5 °F (36.9 °C) 98.1 °F (36.7 °C)   Pulse 71 70   Weight 230 lb 9.6 oz (104.6 kg) 227 lb 1.2 oz (103 kg)   Weight Method Bed scale Bed scale   Percent Weight Change 0 -1.53   Post-Hemodialysis Assessment   Post-Treatment Procedures --  Blood returned;Catheter Capped, clamped with Saline x2 ports   Total Liters Processed (l/min) --  51.5 l/min   Dialyzer Clearance --  Lightly streaked   Duration of Treatment (minutes) --  240 minutes   Hemodialysis Intake (ml) --  1000 ml   Hemodialysis Output (ml) --  3000 ml   NET Removed (ml) --  2000 ml   Tolerated Treatment --  Good   4 hour treatment completed. 2L of fluid removed. 2 units of PRBC's given during treatment. Patient tolerated treatment well with minimal issues. Dressing changed, lines flushed with 10 ml of 0.9 NS, clamped and tego secured. Report given to primary care nurse. Charting to be scanned into EMR.

## 2018-09-16 ENCOUNTER — APPOINTMENT (OUTPATIENT)
Dept: GENERAL RADIOLOGY | Age: 53
DRG: 981 | End: 2018-09-16
Payer: MEDICARE

## 2018-09-16 LAB
ALBUMIN SERPL-MCNC: 3.3 G/DL (ref 3.5–5.1)
ALP BLD-CCNC: 84 U/L (ref 38–126)
ALT SERPL-CCNC: 19 U/L (ref 11–66)
ANION GAP SERPL CALCULATED.3IONS-SCNC: 11 MEQ/L (ref 8–16)
AST SERPL-CCNC: 11 U/L (ref 5–40)
BILIRUB SERPL-MCNC: 0.4 MG/DL (ref 0.3–1.2)
BUN BLDV-MCNC: 50 MG/DL (ref 7–22)
CALCIUM SERPL-MCNC: 8.8 MG/DL (ref 8.5–10.5)
CHLORIDE BLD-SCNC: 105 MEQ/L (ref 98–111)
CO2: 25 MEQ/L (ref 23–33)
CREAT SERPL-MCNC: 3.4 MG/DL (ref 0.4–1.2)
EKG ATRIAL RATE: 60 BPM
EKG P AXIS: 46 DEGREES
EKG P-R INTERVAL: 134 MS
EKG Q-T INTERVAL: 470 MS
EKG QRS DURATION: 96 MS
EKG QTC CALCULATION (BAZETT): 470 MS
EKG R AXIS: 6 DEGREES
EKG T AXIS: 53 DEGREES
EKG VENTRICULAR RATE: 60 BPM
ERYTHROCYTE [DISTWIDTH] IN BLOOD BY AUTOMATED COUNT: 17.2 % (ref 11.5–14.5)
ERYTHROCYTE [DISTWIDTH] IN BLOOD BY AUTOMATED COUNT: 54 FL (ref 35–45)
GFR SERPL CREATININE-BSD FRML MDRD: 19 ML/MIN/1.73M2
GLUCOSE BLD-MCNC: 104 MG/DL (ref 70–108)
GLUCOSE BLD-MCNC: 111 MG/DL (ref 70–108)
GLUCOSE BLD-MCNC: 117 MG/DL (ref 70–108)
GLUCOSE BLD-MCNC: 118 MG/DL (ref 70–108)
GLUCOSE BLD-MCNC: 128 MG/DL (ref 70–108)
HCT VFR BLD CALC: 23.8 % (ref 42–52)
HEMOGLOBIN: 7.8 GM/DL (ref 14–18)
HEPATITIS B CORE TOTAL ANTIBODY: NEGATIVE
MCH RBC QN AUTO: 27.7 PG (ref 26–33)
MCHC RBC AUTO-ENTMCNC: 32.8 GM/DL (ref 32.2–35.5)
MCV RBC AUTO: 84.4 FL (ref 80–94)
PLATELET # BLD: 111 THOU/MM3 (ref 130–400)
PMV BLD AUTO: 10.8 FL (ref 9.4–12.4)
POTASSIUM SERPL-SCNC: 5.6 MEQ/L (ref 3.5–5.2)
RBC # BLD: 2.82 MILL/MM3 (ref 4.7–6.1)
SODIUM BLD-SCNC: 141 MEQ/L (ref 135–145)
TOTAL PROTEIN: 6 G/DL (ref 6.1–8)
TRANSFERRIN: 155 MG/DL (ref 200–400)
WBC # BLD: 5.5 THOU/MM3 (ref 4.8–10.8)

## 2018-09-16 PROCEDURE — 80053 COMPREHEN METABOLIC PANEL: CPT

## 2018-09-16 PROCEDURE — 3609012400 HC EGD TRANSORAL BIOPSY SINGLE/MULTIPLE: Performed by: INTERNAL MEDICINE

## 2018-09-16 PROCEDURE — 99152 MOD SED SAME PHYS/QHP 5/>YRS: CPT | Performed by: INTERNAL MEDICINE

## 2018-09-16 PROCEDURE — 85027 COMPLETE CBC AUTOMATED: CPT

## 2018-09-16 PROCEDURE — 6370000000 HC RX 637 (ALT 250 FOR IP): Performed by: INTERNAL MEDICINE

## 2018-09-16 PROCEDURE — 3609009500 HC COLONOSCOPY DIAGNOSTIC OR SCREENING: Performed by: INTERNAL MEDICINE

## 2018-09-16 PROCEDURE — 99233 SBSQ HOSP IP/OBS HIGH 50: CPT | Performed by: INTERNAL MEDICINE

## 2018-09-16 PROCEDURE — 82948 REAGENT STRIP/BLOOD GLUCOSE: CPT

## 2018-09-16 PROCEDURE — 0DJD8ZZ INSPECTION OF LOWER INTESTINAL TRACT, VIA NATURAL OR ARTIFICIAL OPENING ENDOSCOPIC: ICD-10-PCS | Performed by: INTERNAL MEDICINE

## 2018-09-16 PROCEDURE — 74018 RADEX ABDOMEN 1 VIEW: CPT

## 2018-09-16 PROCEDURE — 93005 ELECTROCARDIOGRAM TRACING: CPT | Performed by: PHYSICIAN ASSISTANT

## 2018-09-16 PROCEDURE — 2709999900 HC NON-CHARGEABLE SUPPLY

## 2018-09-16 PROCEDURE — 6360000002 HC RX W HCPCS: Performed by: INTERNAL MEDICINE

## 2018-09-16 PROCEDURE — 99232 SBSQ HOSP IP/OBS MODERATE 35: CPT | Performed by: PHYSICIAN ASSISTANT

## 2018-09-16 PROCEDURE — 0DB98ZX EXCISION OF DUODENUM, VIA NATURAL OR ARTIFICIAL OPENING ENDOSCOPIC, DIAGNOSTIC: ICD-10-PCS | Performed by: INTERNAL MEDICINE

## 2018-09-16 PROCEDURE — 1200000003 HC TELEMETRY R&B

## 2018-09-16 PROCEDURE — 99153 MOD SED SAME PHYS/QHP EA: CPT | Performed by: INTERNAL MEDICINE

## 2018-09-16 PROCEDURE — 93010 ELECTROCARDIOGRAM REPORT: CPT | Performed by: INTERNAL MEDICINE

## 2018-09-16 PROCEDURE — 88305 TISSUE EXAM BY PATHOLOGIST: CPT

## 2018-09-16 PROCEDURE — 36415 COLL VENOUS BLD VENIPUNCTURE: CPT

## 2018-09-16 PROCEDURE — G0257 UNSCHED DIALYSIS ESRD PT HOS: HCPCS

## 2018-09-16 RX ORDER — MIDAZOLAM HYDROCHLORIDE 1 MG/ML
INJECTION INTRAMUSCULAR; INTRAVENOUS PRN
Status: DISCONTINUED | OUTPATIENT
Start: 2018-09-16 | End: 2018-09-17 | Stop reason: HOSPADM

## 2018-09-16 RX ORDER — FENTANYL CITRATE 50 UG/ML
INJECTION, SOLUTION INTRAMUSCULAR; INTRAVENOUS PRN
Status: DISCONTINUED | OUTPATIENT
Start: 2018-09-16 | End: 2018-09-17 | Stop reason: HOSPADM

## 2018-09-16 RX ADMIN — POLYETHYLENE GLYCOL 3350 17 G: 17 POWDER, FOR SOLUTION ORAL at 02:00

## 2018-09-16 RX ADMIN — HYDRALAZINE HYDROCHLORIDE 100 MG: 50 TABLET, FILM COATED ORAL at 15:43

## 2018-09-16 RX ADMIN — ISOSORBIDE MONONITRATE 120 MG: 60 TABLET ORAL at 09:00

## 2018-09-16 RX ADMIN — CALCIUM ACETATE 667 MG: 667 CAPSULE ORAL at 17:43

## 2018-09-16 RX ADMIN — POLYETHYLENE GLYCOL 3350 17 G: 17 POWDER, FOR SOLUTION ORAL at 05:00

## 2018-09-16 RX ADMIN — SIMVASTATIN 20 MG: 20 TABLET, FILM COATED ORAL at 22:02

## 2018-09-16 RX ADMIN — POLYETHYLENE GLYCOL 3350 17 G: 17 POWDER, FOR SOLUTION ORAL at 04:00

## 2018-09-16 RX ADMIN — CALCIUM ACETATE 667 MG: 667 CAPSULE ORAL at 09:01

## 2018-09-16 RX ADMIN — POLYETHYLENE GLYCOL 3350 17 G: 17 POWDER, FOR SOLUTION ORAL at 08:53

## 2018-09-16 RX ADMIN — TORSEMIDE 40 MG: 20 TABLET ORAL at 09:05

## 2018-09-16 RX ADMIN — POLYETHYLENE GLYCOL 3350 17 G: 17 POWDER, FOR SOLUTION ORAL at 06:00

## 2018-09-16 RX ADMIN — CLONIDINE HYDROCHLORIDE 0.2 MG: 0.2 TABLET ORAL at 22:03

## 2018-09-16 RX ADMIN — POLYETHYLENE GLYCOL 3350 17 G: 17 POWDER, FOR SOLUTION ORAL at 09:02

## 2018-09-16 RX ADMIN — FAMOTIDINE 20 MG: 20 TABLET, FILM COATED ORAL at 08:53

## 2018-09-16 RX ADMIN — ISOSORBIDE MONONITRATE 120 MG: 60 TABLET ORAL at 22:01

## 2018-09-16 RX ADMIN — POLYETHYLENE GLYCOL 3350 17 G: 17 POWDER, FOR SOLUTION ORAL at 08:41

## 2018-09-16 RX ADMIN — CLONIDINE HYDROCHLORIDE 0.2 MG: 0.2 TABLET ORAL at 09:00

## 2018-09-16 RX ADMIN — POLYETHYLENE GLYCOL 3350 17 G: 17 POWDER, FOR SOLUTION ORAL at 10:39

## 2018-09-16 RX ADMIN — POLYETHYLENE GLYCOL 3350 17 G: 17 POWDER, FOR SOLUTION ORAL at 03:11

## 2018-09-16 RX ADMIN — CALCIUM ACETATE 667 MG: 667 CAPSULE ORAL at 15:43

## 2018-09-16 RX ADMIN — HYDRALAZINE HYDROCHLORIDE 100 MG: 50 TABLET, FILM COATED ORAL at 22:02

## 2018-09-16 RX ADMIN — HYDRALAZINE HYDROCHLORIDE 100 MG: 50 TABLET, FILM COATED ORAL at 09:00

## 2018-09-16 RX ADMIN — AMLODIPINE BESYLATE 10 MG: 10 TABLET ORAL at 09:01

## 2018-09-16 RX ADMIN — POLYETHYLENE GLYCOL 3350 17 G: 17 POWDER, FOR SOLUTION ORAL at 06:24

## 2018-09-16 RX ADMIN — DOXAZOSIN 8 MG: 4 TABLET ORAL at 22:02

## 2018-09-16 ASSESSMENT — PAIN SCALES - GENERAL
PAINLEVEL_OUTOF10: 0
PAINLEVEL_OUTOF10: 0

## 2018-09-16 NOTE — PROGRESS NOTES
Patient received from ICU with belongings and chart. Patient oriented to room. Skin assessment completed. Vitals obtained.

## 2018-09-16 NOTE — BRIEF OP NOTE
Brief Postoperative Note  ______________________________________________________________    Patient: Edy Lynn  YOB: 1965  MRN: [id number]  Date of Procedure: 9/16/2018    Pre-Op Diagnosis: HYPERKALEMIA    Post-Op Diagnosis: Same       Procedure(s):  EGD BIOPSY  COLONOSCOPY DIAGNOSTIC OR SCREENING    Anesthesia: Anesthesia type not filed in the log. Surgeon(s):  Jeremiah Connelly MD    Staff:  Scrub Person First: Jhonatan Echavarria     Estimated Blood Loss: * No values recorded between 9/16/2018 12:00 AM and 9/16/2018 76:77 AM * mL    Complications: Bleeding    Specimens:   ID Type Source Tests Collected by Time Destination   A : small bowel bx, r/o malabsorption Tissue Duodenum SURGICAL PATHOLOGY Jeremiah Connelly MD 9/16/2018 1115        Implants:  * No implants in log *      Drains:      Findings: normal egd, colon poor  prep no active bleeding .     Jeremiah Connelly MD  Date: 9/16/2018  Time: 11:35 AM

## 2018-09-16 NOTE — PROGRESS NOTES
09/16/18 1530   Gross per 24 hour   Intake             1900 ml   Output             2600 ml   Net             -700 ml       Diet:  DIET CLEAR LIQUID;    Exam:  BP (!) 157/72   Pulse 78   Temp 97.6 °F (36.4 °C) (Oral)   Resp 18   Ht 5' 9\" (1.753 m)   Wt 215 lb 13.3 oz (97.9 kg)   SpO2 97%   BMI 31.87 kg/m²     General appearance: No apparent distress, appears stated age and cooperative. HEENT: Pupils equal, round, and reactive to light. Conjunctivae/corneas clear. Neck: Supple, with full range of motion. No jugular venous distention. Trachea midline. Respiratory:  Normal respiratory effort. Clear to auscultation, bilaterally without Rales/Wheezes/Rhonchi. Cardiovascular: Regular rate and rhythm with normal S1/S2 without murmurs, rubs or gallops. Abdomen: Soft, non-tender, non-distended with normal bowel sounds. Musculoskeletal: passive and active ROM x 4 extremities. Skin: Skin color, texture, turgor normal.  No rashes or lesions. Neurologic:  Neurovascularly intact without any focal sensory/motor deficits. Cranial nerves: II-XII intact, grossly non-focal.  Psychiatric: Alert and oriented, thought content appropriate, normal insight  Capillary Refill: Brisk,< 3 seconds   Peripheral Pulses: +2 palpable, equal bilaterally       Labs:   Recent Labs      09/14/18   2030  09/15/18   0410  09/16/18   0552   WBC  8.1  6.9  5.5   HGB  6.0*  7.5*  7.8*   HCT  19.0*  22.6*  23.8*   PLT  84*  107*  111*     Recent Labs      09/15/18   0410  09/15/18   1938  09/16/18   0552   NA  143  143  141   K  4.8  5.1  5.6*   CL  107  106  105   CO2  21*  25  25   BUN  62*  47*  50*   CREATININE  2.9*  3.2*  3.4*   CALCIUM  8.8  8.8  8.8     Recent Labs      09/14/18 2030 09/16/18   0552   AST  18  11   ALT  23  19   BILIDIR  <0.2   --    BILITOT  0.4  0.4   ALKPHOS  88  84     Recent Labs      09/14/18 2030   INR  1.20*     No results for input(s): Patric Imus in the last 72 hours.     Urinalysis:    Lab Results Component Value Date    NITRU NEGATIVE 09/15/2018    WBCUA 2-4 09/15/2018    BACTERIA NONE 09/15/2018    RBCUA 3-5 09/15/2018    BLOODU NEGATIVE 09/15/2018    SPECGRAV 1.018 02/01/2018    GLUCOSEU 100 09/15/2018       Radiology:  XR ABDOMEN (KUB) (SINGLE AP VIEW)   Final Result      Nonspecific, nonobstructive bowel gas pattern with multiple loops of gas-filled colon. This may correspond to an ileus. Further clinical correlation would be beneficial.               **This report has been created using voice recognition software. It may contain minor errors which are inherent in voice recognition technology. **      Final report electronically signed by Dr. Julianna Lin on 9/16/2018 8:49 AM      CT ABDOMEN PELVIS WO CONTRAST Additional Contrast? None   Final Result   1. Abundant stool retention compatible with constipation. 2. Bilateral perinephric stranding with urinary bladder wall thickening correlate with cystitis and superimposed developing nephritis. 3. Nonobstructing punctate renal calculi present. 4. Cardiac enlargement with congestive failure changes. Bibasilar atelectasis and effusions are present. **This report has been created using voice recognition software. It may contain minor errors which are inherent in voice recognition technology. **      Final report electronically signed by Dr. Lavinia Paez on 9/15/2018 12:26 AM      CT Cervical Spine WO Contrast   Final Result   Degenerative changes of the cervical spine without evidence of acute fracture or gross subluxation. No deformity of the central canal is appreciated. **This report has been created using voice recognition software. It may contain minor errors which are inherent in voice recognition technology. **      Final report electronically signed by Dr. Lavinia Paez on 9/15/2018 12:07 AM      CT HEAD WO CONTRAST   Final Result    No evidence of an acute process.                **This report has been created using 9/16/2018 at 7:43 PM

## 2018-09-16 NOTE — FLOWSHEET NOTE
Transvenous pacer pulled per medical student. Cordis pulled along with it. Pressure held. No signs of bleeding.

## 2018-09-16 NOTE — PROGRESS NOTES
Cardiology Progress Note      Patient:  Milly Deluna  YOB: 1965  MRN: [id number]   Acct: [de-identified]  Admit Date:  9/14/2018  Primary Cardiologist: dr Kenia Graf  Seen by dr Marimar Collier    Chief Complaint: symptomatic bradycardia  hpi per dr Marimar Collier \"This is a pleasant 48 y.o. male presents with hx of ESRD, hx of colon cancer, and anemia who presented with symptomatic bradycardia. ECG shows junctional rhythm. He is getting active chemotherapy. No chest pain. He was being paced externally with good capture. TVP placed via R IJ in the ED at bedside. He has not had dialysis for over 5 days. Hyperkalemia of 8.5 with BNP 99840. \"    Subjective (Events in last 24 hours): pt awake and alert. NAD.   No cp or sob  Has TVP - not being paced    Objective:   BP (!) 172/75   Pulse 69   Temp 98.2 °F (36.8 °C) (Oral)   Resp 13   Ht 5' 9\" (1.753 m)   Wt 225 lb 15.5 oz (102.5 kg)   SpO2 95%   BMI 33.37 kg/m²        TELEMETRY: nsr 71    Physical Exam:  General Appearance: alert and oriented to person, place and time, in no acute distress  Cardiovascular: normal rate, regular rhythm, normal S1 and S2, no murmurs, rubs, clicks, or gallops, distal pulses intact, no carotid bruits, no JVD  Pulmonary/Chest: clear to auscultation bilaterally- no wheezes, rales or rhonchi, normal air movement, no respiratory distress  Abdomen: soft, non-tender, non-distended, normal bowel sounds, no masses Extremities: no cyanosis, clubbing or edema, pulse   Skin: warm and dry, no rash or erythema  Head: normocephalic and atraumatic  Eyes: pupils equal, round, and reactive to light  Neck: supple and non-tender without mass, no thyromegaly   Musculoskeletal: normal range of motion, no joint swelling, deformity or tenderness  Neurological: alert, oriented, normal speech, no focal findings or movement disorder noted    Medications:    amLODIPine  10 mg Oral Daily    calcium acetate  667 mg Oral TID WC    cloNIDine  0.2 mg Oral BID   

## 2018-09-16 NOTE — PROGRESS NOTES
EGD and colonoscopy completed. Photos taken. Pt tolerated well. Biopsies obtained from small bowel and 1 jar sent to lab.

## 2018-09-17 VITALS
BODY MASS INDEX: 31.8 KG/M2 | SYSTOLIC BLOOD PRESSURE: 103 MMHG | DIASTOLIC BLOOD PRESSURE: 54 MMHG | RESPIRATION RATE: 18 BRPM | WEIGHT: 214.7 LBS | HEIGHT: 69 IN | TEMPERATURE: 97.9 F | OXYGEN SATURATION: 95 % | HEART RATE: 65 BPM

## 2018-09-17 PROBLEM — D63.1 ANEMIA DUE TO CHRONIC KIDNEY DISEASE: Status: ACTIVE | Noted: 2018-09-17

## 2018-09-17 PROBLEM — J96.01 ACUTE RESPIRATORY FAILURE WITH HYPOXIA (HCC): Status: ACTIVE | Noted: 2018-09-17

## 2018-09-17 PROBLEM — E87.70 VOLUME OVERLOAD: Status: ACTIVE | Noted: 2018-09-17

## 2018-09-17 PROBLEM — J96.01 ACUTE RESPIRATORY FAILURE WITH HYPOXIA (HCC): Status: RESOLVED | Noted: 2018-09-17 | Resolved: 2018-09-17

## 2018-09-17 PROBLEM — I50.33 ACUTE ON CHRONIC DIASTOLIC HEART FAILURE (HCC): Status: ACTIVE | Noted: 2018-09-17

## 2018-09-17 PROBLEM — E87.5 HYPERKALEMIA: Status: RESOLVED | Noted: 2018-02-03 | Resolved: 2018-09-17

## 2018-09-17 PROBLEM — E87.70 VOLUME OVERLOAD: Status: RESOLVED | Noted: 2018-09-17 | Resolved: 2018-09-17

## 2018-09-17 PROBLEM — I50.33 ACUTE ON CHRONIC DIASTOLIC HEART FAILURE (HCC): Status: RESOLVED | Noted: 2018-09-17 | Resolved: 2018-09-17

## 2018-09-17 PROBLEM — N18.9 ANEMIA DUE TO CHRONIC KIDNEY DISEASE: Status: ACTIVE | Noted: 2018-09-17

## 2018-09-17 LAB
ALBUMIN SERPL-MCNC: 3.5 G/DL (ref 3.5–5.1)
ANION GAP SERPL CALCULATED.3IONS-SCNC: 13 MEQ/L (ref 8–16)
BASOPHILS # BLD: 0.6 %
BASOPHILS ABSOLUTE: 0 THOU/MM3 (ref 0–0.1)
BUN BLDV-MCNC: 36 MG/DL (ref 7–22)
CALCIUM SERPL-MCNC: 9.1 MG/DL (ref 8.5–10.5)
CHLORIDE BLD-SCNC: 103 MEQ/L (ref 98–111)
CO2: 24 MEQ/L (ref 23–33)
CREAT SERPL-MCNC: 3.5 MG/DL (ref 0.4–1.2)
EOSINOPHIL # BLD: 6 %
EOSINOPHILS ABSOLUTE: 0.3 THOU/MM3 (ref 0–0.4)
ERYTHROCYTE [DISTWIDTH] IN BLOOD BY AUTOMATED COUNT: 16.7 % (ref 11.5–14.5)
ERYTHROCYTE [DISTWIDTH] IN BLOOD BY AUTOMATED COUNT: 52.4 FL (ref 35–45)
GFR SERPL CREATININE-BSD FRML MDRD: 19 ML/MIN/1.73M2
GLUCOSE BLD-MCNC: 109 MG/DL (ref 70–108)
GLUCOSE BLD-MCNC: 109 MG/DL (ref 70–108)
GLUCOSE BLD-MCNC: 146 MG/DL (ref 70–108)
GLUCOSE BLD-MCNC: 162 MG/DL (ref 70–108)
HCT VFR BLD CALC: 24.6 % (ref 42–52)
HEMOGLOBIN: 8 GM/DL (ref 14–18)
IMMATURE GRANS (ABS): 0.01 THOU/MM3 (ref 0–0.07)
IMMATURE GRANULOCYTES: 0.2 %
LYMPHOCYTES # BLD: 19.5 %
LYMPHOCYTES ABSOLUTE: 0.9 THOU/MM3 (ref 1–4.8)
MAGNESIUM: 1.8 MG/DL (ref 1.6–2.4)
MCH RBC QN AUTO: 28.2 PG (ref 26–33)
MCHC RBC AUTO-ENTMCNC: 32.5 GM/DL (ref 32.2–35.5)
MCV RBC AUTO: 86.6 FL (ref 80–94)
MONOCYTES # BLD: 7.5 %
MONOCYTES ABSOLUTE: 0.4 THOU/MM3 (ref 0.4–1.3)
MRSA SCREEN: NORMAL
NUCLEATED RED BLOOD CELLS: 0 /100 WBC
PHOSPHORUS: 4.7 MG/DL (ref 2.4–4.7)
PLATELET # BLD: 116 THOU/MM3 (ref 130–400)
PMV BLD AUTO: 11.5 FL (ref 9.4–12.4)
POTASSIUM SERPL-SCNC: 5.3 MEQ/L (ref 3.5–5.2)
RBC # BLD: 2.84 MILL/MM3 (ref 4.7–6.1)
SEG NEUTROPHILS: 66.2 %
SEGMENTED NEUTROPHILS ABSOLUTE COUNT: 3.2 THOU/MM3 (ref 1.8–7.7)
SODIUM BLD-SCNC: 140 MEQ/L (ref 135–145)
VRE CULTURE: NORMAL
WBC # BLD: 4.8 THOU/MM3 (ref 4.8–10.8)

## 2018-09-17 PROCEDURE — 85025 COMPLETE CBC W/AUTO DIFF WBC: CPT

## 2018-09-17 PROCEDURE — 82948 REAGENT STRIP/BLOOD GLUCOSE: CPT

## 2018-09-17 PROCEDURE — 6370000000 HC RX 637 (ALT 250 FOR IP): Performed by: INTERNAL MEDICINE

## 2018-09-17 PROCEDURE — 36415 COLL VENOUS BLD VENIPUNCTURE: CPT

## 2018-09-17 PROCEDURE — 83735 ASSAY OF MAGNESIUM: CPT

## 2018-09-17 PROCEDURE — 99239 HOSP IP/OBS DSCHRG MGMT >30: CPT | Performed by: INTERNAL MEDICINE

## 2018-09-17 PROCEDURE — 80069 RENAL FUNCTION PANEL: CPT

## 2018-09-17 RX ORDER — PANTOPRAZOLE SODIUM 40 MG/1
40 TABLET, DELAYED RELEASE ORAL DAILY
Qty: 30 TABLET | Refills: 3 | Status: SHIPPED | OUTPATIENT
Start: 2018-09-17

## 2018-09-17 RX ORDER — PANTOPRAZOLE SODIUM 40 MG/1
40 TABLET, DELAYED RELEASE ORAL DAILY
Qty: 30 TABLET | Refills: 3 | Status: SHIPPED | OUTPATIENT
Start: 2018-09-17 | End: 2018-09-17

## 2018-09-17 RX ADMIN — DOXAZOSIN 8 MG: 4 TABLET ORAL at 10:25

## 2018-09-17 RX ADMIN — CLOPIDOGREL BISULFATE 75 MG: 75 TABLET ORAL at 10:26

## 2018-09-17 RX ADMIN — CLONIDINE HYDROCHLORIDE 0.2 MG: 0.2 TABLET ORAL at 10:26

## 2018-09-17 RX ADMIN — ISOSORBIDE MONONITRATE 120 MG: 60 TABLET ORAL at 10:25

## 2018-09-17 RX ADMIN — CALCIUM ACETATE 667 MG: 667 CAPSULE ORAL at 12:59

## 2018-09-17 RX ADMIN — CALCIUM ACETATE 667 MG: 667 CAPSULE ORAL at 10:27

## 2018-09-17 RX ADMIN — AMLODIPINE BESYLATE 10 MG: 10 TABLET ORAL at 10:26

## 2018-09-17 RX ADMIN — TORSEMIDE 40 MG: 20 TABLET ORAL at 10:26

## 2018-09-17 RX ADMIN — FAMOTIDINE 20 MG: 20 TABLET, FILM COATED ORAL at 10:26

## 2018-09-17 RX ADMIN — HYDRALAZINE HYDROCHLORIDE 100 MG: 50 TABLET, FILM COATED ORAL at 10:26

## 2018-09-17 NOTE — PROGRESS NOTES
NORMAL, REGULAR RATE AND RHYTHM, NO AUDIBLE MURMURS  Abdomen: SOFT, NON TENDER, slightly DISTENDED, NO ORGANOMEGALY FELT, hyperactive bowel sounds  Extremities: NO EDEMA  Neurologic: GROSSLY NON FOCAL  Skin: NO RASHES  Hematology: NO BLEEDING, NO BRUISING  Genitourinary: no lesions    LABS:    CBC:   Recent Labs      09/14/18   2030  09/15/18   0410  09/16/18   0552   WBC  8.1  6.9  5.5   HGB  6.0*  7.5*  7.8*   PLT  84*  107*  111*     BMP:  Recent Labs      09/14/18   2030   09/15/18   0410  09/15/18   1938  09/16/18   0552   NA  139   --   143  143  141   K  8.5*   < >  4.8  5.1  5.6*   CL  112*   --   107  106  105   CO2  14*   --   21*  25  25   BUN  121*   --   62*  47*  50*   CREATININE  5.9*   --   2.9*  3.2*  3.4*   GLUCOSE  266*   --   157*  132*  117*   CALCIUM  8.7   --   8.8  8.8  8.8   MG  1.9   --    --    --    --     < > = values in this interval not displayed. TSH:   Recent Labs      09/14/18 2030   TSH  3.600     HgBa1c: No results for input(s): LABA1C in the last 72 hours. Hepatic: Recent Labs      09/14/18 2030 09/16/18   0552   LABALBU  3.1*  3.3*   AST  18  11   ALT  23  19   BILITOT  0.4  0.4   ALKPHOS  88  84     Troponin: No results for input(s): TROPONINI in the last 72 hours. BNP: No results for input(s): BNP in the last 72 hours. Lipids: No results for input(s): CHOL, HDL in the last 72 hours. Invalid input(s): LDLCALCU  INR:   Recent Labs      09/14/18 2030   INR  1.20*       Images and Other:  reviewed      Assessment and Plan:   1, ILEUS  ON KUB - seems to be resolving  3, MILD HYPERKALEMIA  5, ESRD REQUIRING DIALYSIS  6, ACIDOSIS  7, ANEMIA - PROBABLY COMBINATION OF CKD/ ? Gl BLEED  8, HYPERTENSION    - Dialysis again today  - Plan for continuation of dialysis TTS  - renal diet  - s/p EGD and colonoscopy  - s/p Blood transfusions  - Plan for Aranesp tomorrow.   - Avoid ACEI/ARBs  - attempt fluid removal with dialysis      Thank you for allowing us to participate in

## 2018-09-17 NOTE — CARE COORDINATION
9/17/18, 3:12 PM    DISCHARGE BARRIERS    SW informed by CM that patient would like [place] at discharge. SW in room to speak with patient. He has had [facility] in the past and would like again, patient declined [place] list. No home care order completed at this time. Patient is a potential discharge for today. SW to follow-up on orders in the morning.
sodium chloride  250 mL Intravenous Once     Continuous Infusions:   dextrose        Pertinent Info/Orders/Treatment Plan:  Lactic acid 2.8, Troponin 0.162, , Creatinine 5.9, K+ 8.5, BNP 55701.0, Hgb 6.0 (Transfused PRBC'S in Hemodialysis), Today's BUN is 50, Creatinine is 3. 4. Diabetes management, Cardiology consult, Nephrology consult, telemetry, GI Consult. Diet: DIET CLEAR LIQUID;   DVT Prophylaxis: SCD's ordered and on  Smoking status:  reports that he has never smoked. He has never used smokeless tobacco.   Influenza Vaccination Screening Completed: no  Pneumonia Vaccination Screening Completed: no. Primary RN notified  PCP: Christopher Reich  Readmission: no  Readmission Risk Score: 37%    Discharge Planning  Current Residence:  Private Residence  Living Arrangements:  Parent   Support Systems:  Parent  Current Services PTA:     Potential Assistance Needed:  [address] Medications:  No  Does patient want to participate in local refill/ meds to beds program?  No  Type of Home Care Services:  None  Patient expects to be discharged to:  home  Expected Discharge date:  09/19/18  Follow Up Appointment: Best Day/ Time: Monday AM    Discharge Plan: Met with Gilberto Coates. He currently lives at home with his mother, he has a cane. He receives his hemodialysis at [facility]. He is requesting [facility] at discharge. SW consult placed.      Evaluation: yes

## 2018-09-17 NOTE — FLOWSHEET NOTE
09/17/18 1510   Provider Notification   Reason for Communication Review case   Provider Name Mastori   Provider Notification Physician   Method of Communication Secure Message   Notification Time [phone] seen patient today OK for discharge from their stand point, are you ok for DC?

## 2018-09-17 NOTE — PROGRESS NOTES
CLINICAL PHARMACY: DISCHARGE MED RECONCILIATION/REVIEW    [facility]) Select Patient?: No  Total # of Interventions Recommended: 0  Total # Interventions Accepted: 0  Intervention Severity:   - Level 1 Intervention Present?: No   - Level 2 #: 0   - Level 3 #: 0   Time Spent (min): 15    Mary Joe PharmD, BCPS  9/17/2018  4:09 PM

## 2018-09-19 LAB — HAPTOGLOBIN: 235 MG/DL (ref 30–200)

## 2018-09-20 LAB
BLOOD CULTURE, ROUTINE: NORMAL
BLOOD CULTURE, ROUTINE: NORMAL

## 2018-09-27 ENCOUNTER — TELEPHONE (OUTPATIENT)
Dept: CARDIOLOGY CLINIC | Age: 53
End: 2018-09-27

## 2018-09-27 ENCOUNTER — HOSPITAL ENCOUNTER (OUTPATIENT)
Age: 53
Discharge: HOME | End: 2018-09-27
Payer: MEDICARE

## 2018-09-27 ENCOUNTER — OFFICE VISIT (OUTPATIENT)
Dept: CARDIOLOGY CLINIC | Age: 53
End: 2018-09-27
Payer: MEDICARE

## 2018-09-27 VITALS — DIASTOLIC BLOOD PRESSURE: 58 MMHG | SYSTOLIC BLOOD PRESSURE: 110 MMHG | HEART RATE: 68 BPM

## 2018-09-27 DIAGNOSIS — Z99.2 ESRD (END STAGE RENAL DISEASE) ON DIALYSIS (HCC): ICD-10-CM

## 2018-09-27 DIAGNOSIS — I10 ESSENTIAL HYPERTENSION: ICD-10-CM

## 2018-09-27 DIAGNOSIS — I21.4 NSTEMI (NON-ST ELEVATED MYOCARDIAL INFARCTION) (HCC): ICD-10-CM

## 2018-09-27 DIAGNOSIS — R00.1 BRADYCARDIA: Primary | ICD-10-CM

## 2018-09-27 DIAGNOSIS — E78.5 HYPERLIPIDEMIA, UNSPECIFIED HYPERLIPIDEMIA TYPE: ICD-10-CM

## 2018-09-27 DIAGNOSIS — C18.9 METASTATIC COLON CANCER TO LIVER (HCC): ICD-10-CM

## 2018-09-27 DIAGNOSIS — I25.10 CORONARY ARTERY DISEASE INVOLVING NATIVE CORONARY ARTERY OF NATIVE HEART WITHOUT ANGINA PECTORIS: ICD-10-CM

## 2018-09-27 DIAGNOSIS — N18.6 ESRD (END STAGE RENAL DISEASE) ON DIALYSIS (HCC): ICD-10-CM

## 2018-09-27 DIAGNOSIS — C78.7 METASTATIC COLON CANCER TO LIVER (HCC): ICD-10-CM

## 2018-09-27 LAB
ERYTHROCYTE [DISTWIDTH] IN BLOOD BY AUTOMATED COUNT: 17.3 % (ref 11.5–14.5)
ERYTHROCYTE [DISTWIDTH] IN BLOOD BY AUTOMATED COUNT: 55.8 FL (ref 35–45)
HCT VFR BLD CALC: 27.9 % (ref 42–52)
HEMOGLOBIN: 8.8 GM/DL (ref 14–18)
MCH RBC QN AUTO: 28.6 PG (ref 26–33)
MCHC RBC AUTO-ENTMCNC: 31.5 GM/DL (ref 32.2–35.5)
MCV RBC AUTO: 90.6 FL (ref 80–94)
PLATELET # BLD: 109 THOU/MM3 (ref 130–400)
PMV BLD AUTO: 11.2 FL (ref 9.4–12.4)
RBC # BLD: 3.08 MILL/MM3 (ref 4.7–6.1)
WBC # BLD: 5.6 THOU/MM3 (ref 4.8–10.8)

## 2018-09-27 PROCEDURE — G8417 CALC BMI ABV UP PARAM F/U: HCPCS | Performed by: NURSE PRACTITIONER

## 2018-09-27 PROCEDURE — 99213 OFFICE O/P EST LOW 20 MIN: CPT | Performed by: NURSE PRACTITIONER

## 2018-09-27 PROCEDURE — 1036F TOBACCO NON-USER: CPT | Performed by: NURSE PRACTITIONER

## 2018-09-27 PROCEDURE — 1111F DSCHRG MED/CURRENT MED MERGE: CPT | Performed by: NURSE PRACTITIONER

## 2018-09-27 PROCEDURE — 85027 COMPLETE CBC AUTOMATED: CPT

## 2018-09-27 PROCEDURE — G8598 ASA/ANTIPLAT THER USED: HCPCS | Performed by: NURSE PRACTITIONER

## 2018-09-27 PROCEDURE — G8427 DOCREV CUR MEDS BY ELIG CLIN: HCPCS | Performed by: NURSE PRACTITIONER

## 2018-09-27 PROCEDURE — 3017F COLORECTAL CA SCREEN DOC REV: CPT | Performed by: NURSE PRACTITIONER

## 2018-09-27 PROCEDURE — 36415 COLL VENOUS BLD VENIPUNCTURE: CPT

## 2018-09-27 RX ORDER — NITROGLYCERIN 0.4 MG/1
TABLET SUBLINGUAL
Qty: 25 TABLET | Refills: 3 | Status: SHIPPED | OUTPATIENT
Start: 2018-09-27

## 2018-09-27 NOTE — PROGRESS NOTES
[facility] PROFESSIONAL SERVICES  [facility]   [address] [address]   Dept: [phone]   Dept Fax: [phone]: [phone]      Chief Complaint   Patient presents with    Follow-Up from Hospital     hyperkalemia    Coronary Artery Disease   F/U from hospitalization 9/14/18 for complete heart block 2/2 electrolyte disorders/hyperkalemia that resolved with A temporary transvenous pacemaker and emergent dialysis. His mother is with him today. He has a history of ESRD on dialysis, colon cancer and anemia. He states he is finished with chemotherapy. He has known multi-vessel CAD being considered for possible CABG vs high risk PCI once medically stable. He has done well since discharge. Denies chest pain, sob, LLE, lightheadedness, dizziness or syncope. His compliant with medications, diet, and dialysis. He denies any bleeding issues with DAPT. Cardiologist:  Dr. Jean Pierre Ramos:   No fever, no chills, No fatigue or weight loss  Pulmonary:    No dyspnea, no wheezing  Cardiac:    Denies recent chest pain   GI:     No nausea or vomiting, no abdominal pain  Neuro:    No dizziness or light headedness  Musculoskeletal:  No recent active issues  Extremities:   No edema, good peripheral pulses      Past Medical History:   Diagnosis Date    ASCVD (arteriosclerotic cardiovascular disease)     Cancer ([facility] [address] [facility] 11/2017    Dr. Yoel Juarez    CKD (chronic kidney disease) stage 3, GFR 30-59 ml/min     see's Wava Levo    DM2 (diabetes mellitus, type 2) ([facility]     Dyslipidemia     Hypertension     Thyroid disease        No Known Allergies    Current Outpatient Prescriptions   Medication Sig Dispense Refill    nitroGLYCERIN (NITROSTAT) 0.4 MG SL tablet up to max of 3 total doses.  If no relief after 1 dose, call 911. 25 tablet 3    pantoprazole (PROTONIX) 40 MG tablet Take 1 tablet by mouth daily 30 tablet 3    amLODIPine (NORVASC) 10 MG tablet Take 1 tablet by

## 2018-09-27 NOTE — PROGRESS NOTES
Pt here for 1 week f/u [address] [facility] hyperkalemia 3rd degree block    Pt denies chest pain,  Dizziness, sob, peripheral edema, heart palpitations

## 2018-10-05 ENCOUNTER — OFFICE VISIT (OUTPATIENT)
Dept: CARDIOLOGY CLINIC | Age: 53
End: 2018-10-05
Payer: MEDICARE

## 2018-10-05 VITALS
HEART RATE: 79 BPM | HEIGHT: 69 IN | BODY MASS INDEX: 33.18 KG/M2 | SYSTOLIC BLOOD PRESSURE: 189 MMHG | DIASTOLIC BLOOD PRESSURE: 77 MMHG | WEIGHT: 224 LBS

## 2018-10-05 DIAGNOSIS — N18.30 CHRONIC KIDNEY DISEASE, STAGE III (MODERATE) (HCC): ICD-10-CM

## 2018-10-05 DIAGNOSIS — I25.10 MULTI-VESSEL CORONARY ARTERY STENOSIS: Primary | ICD-10-CM

## 2018-10-05 PROCEDURE — 1111F DSCHRG MED/CURRENT MED MERGE: CPT | Performed by: INTERNAL MEDICINE

## 2018-10-05 PROCEDURE — 3017F COLORECTAL CA SCREEN DOC REV: CPT | Performed by: INTERNAL MEDICINE

## 2018-10-05 PROCEDURE — G8417 CALC BMI ABV UP PARAM F/U: HCPCS | Performed by: INTERNAL MEDICINE

## 2018-10-05 PROCEDURE — 1036F TOBACCO NON-USER: CPT | Performed by: INTERNAL MEDICINE

## 2018-10-05 PROCEDURE — G8598 ASA/ANTIPLAT THER USED: HCPCS | Performed by: INTERNAL MEDICINE

## 2018-10-05 PROCEDURE — G8428 CUR MEDS NOT DOCUMENT: HCPCS | Performed by: INTERNAL MEDICINE

## 2018-10-05 PROCEDURE — G8484 FLU IMMUNIZE NO ADMIN: HCPCS | Performed by: INTERNAL MEDICINE

## 2018-10-05 PROCEDURE — 99213 OFFICE O/P EST LOW 20 MIN: CPT | Performed by: INTERNAL MEDICINE

## 2018-10-05 NOTE — PROGRESS NOTES
11/22/2017   Number      Date of Birth 1965     Referring          Maria Fernanda Acosta MD                                Physician          Mary Carmen Pappas      Age           50 year(s)     Sonographer        ROSA ELENA Kay, CHRISCS,                                                   RDMS, RVT                                   Interpreting       Sheree Manriquez DO                                Physician     Procedure    Type of Study      TTE procedure:ECHOCARDIOGRAM COMPLETE 2D W DOPPLER W COLOR. Procedure Date  Date: 11/22/2017 Start: 08:02 AM    Study Location: Bedside  Technical Quality: Adequate visualization    Indications:NSTEMI. Additional Medical History:NSTEMI, accelerated HTN, thyroid disease,  hypertension, hyperlipidemia, diabetes, chronic kidney disease    Patient Status: Routine    Height: 69 inches Weight: 230.01 pounds BSA: 2.19 m^2 BMI: 33.97 kg/m^2    BP: 167/75 mmHg     Conclusions      Summary   Mild concentric left ventricular hypertrophy. Systolic function was low normal.   Ejection fraction is visually estimated at 50%. Normal right ventricular size and function. Right ventricular systolic pressure of 42 mm Hg consistent with mild   pulmonary hypertension. Leaflets exhibited mild to moderately increased thickness and mildly   reduced cuspal separation of the aortic valve. Mild to moderate aortic regurgitation is noted. Aortic valve leaflets are Mildly calcified. Small to moderate circumferential pericardial effusion without tamponade   physiology is noted . and in subcostal view of 1.22 cm in upper region and 1.08 cm in lower   region. May consider serial monitoring if clinically indicated   Mildly dilated left atrium. Small to moderate circumferential pericardial effusion without tamponade   physiology is noted . and in subcostal view of 1.22 cm in upper region and 1.08 cm in lower   region.    May consider serial monitoring if clinically indicated      Signature

## 2018-12-18 ENCOUNTER — HOSPITAL ENCOUNTER (OUTPATIENT)
Age: 53
Setting detail: OUTPATIENT SURGERY
Discharge: HOME | End: 2018-12-18
Attending: INTERNAL MEDICINE | Admitting: INTERNAL MEDICINE
Payer: MEDICARE

## 2018-12-18 ENCOUNTER — ANESTHESIA (OUTPATIENT)
Dept: ENDOSCOPY | Age: 53
End: 2018-12-18
Payer: MEDICARE

## 2018-12-18 ENCOUNTER — ANESTHESIA EVENT (OUTPATIENT)
Dept: ENDOSCOPY | Age: 53
End: 2018-12-18
Payer: MEDICARE

## 2018-12-18 VITALS
DIASTOLIC BLOOD PRESSURE: 67 MMHG | BODY MASS INDEX: 33.33 KG/M2 | OXYGEN SATURATION: 95 % | HEART RATE: 66 BPM | WEIGHT: 225 LBS | RESPIRATION RATE: 16 BRPM | TEMPERATURE: 98.4 F | HEIGHT: 69 IN | SYSTOLIC BLOOD PRESSURE: 151 MMHG

## 2018-12-18 VITALS
SYSTOLIC BLOOD PRESSURE: 136 MMHG | OXYGEN SATURATION: 100 % | RESPIRATION RATE: 15 BRPM | DIASTOLIC BLOOD PRESSURE: 65 MMHG

## 2018-12-18 PROCEDURE — 3700000000 HC ANESTHESIA ATTENDED CARE: Performed by: INTERNAL MEDICINE

## 2018-12-18 PROCEDURE — 7100000000 HC PACU RECOVERY - FIRST 15 MIN: Performed by: INTERNAL MEDICINE

## 2018-12-18 PROCEDURE — 2580000003 HC RX 258: Performed by: INTERNAL MEDICINE

## 2018-12-18 PROCEDURE — 3609027000 HC COLONOSCOPY: Performed by: INTERNAL MEDICINE

## 2018-12-18 PROCEDURE — 6360000002 HC RX W HCPCS: Performed by: NURSE ANESTHETIST, CERTIFIED REGISTERED

## 2018-12-18 PROCEDURE — 82948 REAGENT STRIP/BLOOD GLUCOSE: CPT

## 2018-12-18 PROCEDURE — 7100000001 HC PACU RECOVERY - ADDTL 15 MIN: Performed by: INTERNAL MEDICINE

## 2018-12-18 PROCEDURE — 2500000003 HC RX 250 WO HCPCS: Performed by: NURSE ANESTHETIST, CERTIFIED REGISTERED

## 2018-12-18 PROCEDURE — 2709999900 HC NON-CHARGEABLE SUPPLY: Performed by: INTERNAL MEDICINE

## 2018-12-18 PROCEDURE — 3700000001 HC ADD 15 MINUTES (ANESTHESIA): Performed by: INTERNAL MEDICINE

## 2018-12-18 RX ORDER — LIDOCAINE HYDROCHLORIDE 20 MG/ML
INJECTION, SOLUTION INFILTRATION; PERINEURAL PRN
Status: DISCONTINUED | OUTPATIENT
Start: 2018-12-18 | End: 2018-12-18 | Stop reason: SDUPTHER

## 2018-12-18 RX ORDER — PROPOFOL 10 MG/ML
INJECTION, EMULSION INTRAVENOUS PRN
Status: DISCONTINUED | OUTPATIENT
Start: 2018-12-18 | End: 2018-12-18 | Stop reason: SDUPTHER

## 2018-12-18 RX ORDER — SODIUM CHLORIDE 450 MG/100ML
INJECTION, SOLUTION INTRAVENOUS CONTINUOUS
Status: DISCONTINUED | OUTPATIENT
Start: 2018-12-18 | End: 2018-12-18 | Stop reason: HOSPADM

## 2018-12-18 RX ADMIN — PROPOFOL 100 MG: 10 INJECTION, EMULSION INTRAVENOUS at 08:47

## 2018-12-18 RX ADMIN — PROPOFOL 100 MG: 10 INJECTION, EMULSION INTRAVENOUS at 08:52

## 2018-12-18 RX ADMIN — SODIUM CHLORIDE: 4.5 INJECTION, SOLUTION INTRAVENOUS at 07:00

## 2018-12-18 RX ADMIN — PROPOFOL 100 MG: 10 INJECTION, EMULSION INTRAVENOUS at 08:56

## 2018-12-18 RX ADMIN — PROPOFOL 50 MG: 10 INJECTION, EMULSION INTRAVENOUS at 09:05

## 2018-12-18 RX ADMIN — LIDOCAINE HYDROCHLORIDE 50 MG: 20 INJECTION, SOLUTION INFILTRATION; PERINEURAL at 08:47

## 2018-12-18 RX ADMIN — PROPOFOL 100 MG: 10 INJECTION, EMULSION INTRAVENOUS at 09:01

## 2018-12-18 ASSESSMENT — PAIN - FUNCTIONAL ASSESSMENT: PAIN_FUNCTIONAL_ASSESSMENT: 0-10

## 2018-12-18 ASSESSMENT — PAIN SCALES - GENERAL
PAINLEVEL_OUTOF10: 0
PAINLEVEL_OUTOF10: 0

## 2018-12-18 NOTE — PROCEDURES
in picture #A1. Ascending colon looks normal as shown in  picture #A2. Transverse colon looks normal as shown in picture #A3 and  #A4. Descending colon looks normal as shown in picture #A5 and #A6. Colocolonic anastomotic site looks clean as shown in picture #A8. On  retroflex, rectum looks normal.  Air was withdrawn as the scope was  removed. The patient tolerated the procedure well without any immediate  complications. Vitals including blood pressure, heart rate, and pulse  ox were stable during the procedure and after the procedure. IMPRESSION:  Colonoscopy to cecum, normal colon, end-to-end sigmoid  colon anastomotic site looks clean. RECOMMENDATIONS:  High-fiber diet, fiber supplements. Followup  colonoscopy in three years because of the personal history of colon  cancer. Thank you, Gary Fung, for letting me to do procedure on this patient. Do  not hesitate to call to me if you have any questions. My  recommendations are above.         Dex Mack M.D.    D: 12/18/2018 9:23:48       T: 12/18/2018 10:41:25     [id number]  Job#: [id number]     Doc#: [id number]    CC:  Ree Padilla, CNP Rexine Severs, M.D.

## 2018-12-18 NOTE — ANESTHESIA PRE PROCEDURE
hydrocortisone 1 % cream Apply topically daily as needed    Historical Provider, MD   Blood Pressure Monitor KIT CHECK BP TWICE DAILY IF SBP >140 CALL PCP 12/6/15   Aaron Mullen MD       Current medications:    Current Facility-Administered Medications   Medication Dose Route Frequency Provider Last Rate Last Dose    0.45 % sodium chloride infusion   Intravenous Continuous Sharyle Quest, MD        0.45 % sodium chloride infusion   Intravenous Continuous Sharyle Quest, MD 30 mL/hr at 12/18/18 0700         Allergies:  No Known Allergies    Problem List:    Patient Active Problem List   Diagnosis Code    Accelerated hypertension I10    NSTEMI (non-ST elevated myocardial infarction) ([facility] I21.4    Hypertension I10    Coronary artery disease involving native coronary artery of native heart I25.10    Metastatic colon cancer to liver (HCC) C18.9, C78.7    History of non-ST elevation myocardial infarction (NSTEMI) I25.2    Physical deconditioning R53.81    ESRD (end stage renal disease) ([facility] N18.6    Anemia D64.9    Leucopenia D72.819    Pancytopenia ([facility] D61.818    AV junctional bradycardia R00.1    Non-compliance with renal dialysis ([facility] Z91.15    Anemia due to chronic kidney disease N18.9, D63.1       Past Medical History:        Diagnosis Date    ASCVD (arteriosclerotic cardiovascular disease)     Cancer ([facility] 11/2017    Dr. Aggie Galvan    CKD (chronic kidney disease) stage 3, GFR 30-59 ml/min ([facility])     see's [place] Risk DM2 (diabetes mellitus, type 2) ([facility]     Dyslipidemia     Hemodialysis patient ([facility]     History of blood transfusion     Hyperlipidemia     Hypertension     Thyroid disease        Past Surgical History:        Procedure Laterality Date    ENDOSCOPY, COLON, DIAGNOSTIC      EYE SURGERY Left 2013    laser surgery    INSERTION / REMOVAL / REPLACEMENT VENOUS ACCESS CATHETER Right 12/4/2017    MEDIPORT INSERTION performed by Pranav Pang

## 2018-12-18 NOTE — BRIEF OP NOTE
Brief Postoperative Note  ______________________________________________________________    Patient: Naresh Mckay  YOB: 1965  MRN: [id number]  Date of Procedure: 12/18/2018    Pre-Op Diagnosis: PERSONAL HX OF COLON CA    Post-Op Diagnosis: Same       Procedure(s):  COLONOSCOPY    Anesthesia: Monitor Anesthesia Care    Surgeon(s):  Trinity Thomas MD    Assistant: yes    Estimated Blood Loss (mL): less than 50     Complications: None    Specimens:   * No specimens in log *    Implants:  * No implants in log *      Drains:   [REMOVED] Urethral Catheter 16 fr (Removed)       Findings: nl colon anastamosis clean.       Trinity Thomas MD  Date: 12/18/2018  Time: 9:19 AM

## 2019-05-14 ENCOUNTER — OFFICE VISIT (OUTPATIENT)
Dept: CARDIOLOGY CLINIC | Age: 54
End: 2019-05-14
Payer: MEDICARE

## 2019-05-14 VITALS
WEIGHT: 240 LBS | SYSTOLIC BLOOD PRESSURE: 138 MMHG | HEIGHT: 69 IN | DIASTOLIC BLOOD PRESSURE: 84 MMHG | HEART RATE: 72 BPM | BODY MASS INDEX: 35.55 KG/M2

## 2019-05-14 DIAGNOSIS — Z99.2 ESRD (END STAGE RENAL DISEASE) ON DIALYSIS (HCC): ICD-10-CM

## 2019-05-14 DIAGNOSIS — N18.6 ESRD (END STAGE RENAL DISEASE) ON DIALYSIS (HCC): ICD-10-CM

## 2019-05-14 DIAGNOSIS — I25.10 CORONARY ARTERY DISEASE INVOLVING NATIVE CORONARY ARTERY OF NATIVE HEART WITHOUT ANGINA PECTORIS: ICD-10-CM

## 2019-05-14 DIAGNOSIS — I10 ESSENTIAL HYPERTENSION: ICD-10-CM

## 2019-05-14 DIAGNOSIS — I25.2 HISTORY OF NON-ST ELEVATION MYOCARDIAL INFARCTION (NSTEMI): ICD-10-CM

## 2019-05-14 DIAGNOSIS — Z85.038 HISTORY OF COLON CANCER: ICD-10-CM

## 2019-05-14 DIAGNOSIS — E78.5 DYSLIPIDEMIA, GOAL LDL BELOW 100: ICD-10-CM

## 2019-05-14 DIAGNOSIS — I25.10 MULTI-VESSEL CORONARY ARTERY STENOSIS: Primary | ICD-10-CM

## 2019-05-14 PROCEDURE — G8599 NO ASA/ANTIPLAT THER USE RNG: HCPCS | Performed by: NURSE PRACTITIONER

## 2019-05-14 PROCEDURE — 1036F TOBACCO NON-USER: CPT | Performed by: NURSE PRACTITIONER

## 2019-05-14 PROCEDURE — G8417 CALC BMI ABV UP PARAM F/U: HCPCS | Performed by: NURSE PRACTITIONER

## 2019-05-14 PROCEDURE — 3017F COLORECTAL CA SCREEN DOC REV: CPT | Performed by: NURSE PRACTITIONER

## 2019-05-14 PROCEDURE — 99213 OFFICE O/P EST LOW 20 MIN: CPT | Performed by: NURSE PRACTITIONER

## 2019-05-14 PROCEDURE — G8427 DOCREV CUR MEDS BY ELIG CLIN: HCPCS | Performed by: NURSE PRACTITIONER

## 2019-12-09 ENCOUNTER — OFFICE VISIT (OUTPATIENT)
Dept: CARDIOLOGY CLINIC | Age: 54
End: 2019-12-09
Payer: MEDICARE

## 2019-12-09 VITALS
SYSTOLIC BLOOD PRESSURE: 122 MMHG | HEIGHT: 69 IN | BODY MASS INDEX: 35.81 KG/M2 | HEART RATE: 94 BPM | DIASTOLIC BLOOD PRESSURE: 64 MMHG | WEIGHT: 241.8 LBS

## 2019-12-09 DIAGNOSIS — Z99.2 ESRD (END STAGE RENAL DISEASE) ON DIALYSIS (HCC): ICD-10-CM

## 2019-12-09 DIAGNOSIS — N18.6 ESRD (END STAGE RENAL DISEASE) ON DIALYSIS (HCC): ICD-10-CM

## 2019-12-09 DIAGNOSIS — I25.10 MULTI-VESSEL CORONARY ARTERY STENOSIS: Primary | ICD-10-CM

## 2019-12-09 PROCEDURE — 1036F TOBACCO NON-USER: CPT | Performed by: INTERNAL MEDICINE

## 2019-12-09 PROCEDURE — 93000 ELECTROCARDIOGRAM COMPLETE: CPT | Performed by: INTERNAL MEDICINE

## 2019-12-09 PROCEDURE — G8598 ASA/ANTIPLAT THER USED: HCPCS | Performed by: INTERNAL MEDICINE

## 2019-12-09 PROCEDURE — 99213 OFFICE O/P EST LOW 20 MIN: CPT | Performed by: INTERNAL MEDICINE

## 2019-12-09 PROCEDURE — G8417 CALC BMI ABV UP PARAM F/U: HCPCS | Performed by: INTERNAL MEDICINE

## 2019-12-09 PROCEDURE — G8427 DOCREV CUR MEDS BY ELIG CLIN: HCPCS | Performed by: INTERNAL MEDICINE

## 2019-12-09 PROCEDURE — 3017F COLORECTAL CA SCREEN DOC REV: CPT | Performed by: INTERNAL MEDICINE

## 2019-12-09 PROCEDURE — G8484 FLU IMMUNIZE NO ADMIN: HCPCS | Performed by: INTERNAL MEDICINE

## 2019-12-09 RX ORDER — ASPIRIN 81 MG/1
81 TABLET ORAL DAILY
Qty: 90 TABLET | Refills: 1 | Status: SHIPPED | OUTPATIENT
Start: 2019-12-09 | End: 2021-08-12

## 2019-12-09 RX ORDER — ATORVASTATIN CALCIUM 40 MG/1
40 TABLET, FILM COATED ORAL DAILY
Qty: 30 TABLET | Refills: 3 | Status: SHIPPED | OUTPATIENT
Start: 2019-12-09 | End: 2019-12-09 | Stop reason: SDUPTHER

## 2019-12-09 RX ORDER — METOPROLOL SUCCINATE 25 MG/1
25 TABLET, EXTENDED RELEASE ORAL DAILY
Qty: 30 TABLET | Refills: 3 | Status: SHIPPED | OUTPATIENT
Start: 2019-12-09 | End: 2019-12-09 | Stop reason: SDUPTHER

## 2019-12-11 RX ORDER — ATORVASTATIN CALCIUM 40 MG/1
40 TABLET, FILM COATED ORAL DAILY
Qty: 90 TABLET | Refills: 3 | Status: SHIPPED | OUTPATIENT
Start: 2019-12-11 | End: 2020-12-01

## 2019-12-11 RX ORDER — METOPROLOL SUCCINATE 25 MG/1
25 TABLET, EXTENDED RELEASE ORAL DAILY
Qty: 90 TABLET | Refills: 3 | Status: SHIPPED | OUTPATIENT
Start: 2019-12-11 | End: 2020-12-01

## 2020-06-15 ENCOUNTER — OFFICE VISIT (OUTPATIENT)
Dept: CARDIOLOGY CLINIC | Age: 55
End: 2020-06-15
Payer: MEDICARE

## 2020-06-15 VITALS
WEIGHT: 256.38 LBS | HEIGHT: 69 IN | SYSTOLIC BLOOD PRESSURE: 99 MMHG | DIASTOLIC BLOOD PRESSURE: 50 MMHG | BODY MASS INDEX: 37.97 KG/M2 | HEART RATE: 83 BPM

## 2020-06-15 PROCEDURE — 3017F COLORECTAL CA SCREEN DOC REV: CPT | Performed by: INTERNAL MEDICINE

## 2020-06-15 PROCEDURE — G8428 CUR MEDS NOT DOCUMENT: HCPCS | Performed by: INTERNAL MEDICINE

## 2020-06-15 PROCEDURE — 99213 OFFICE O/P EST LOW 20 MIN: CPT | Performed by: INTERNAL MEDICINE

## 2020-06-15 PROCEDURE — 1036F TOBACCO NON-USER: CPT | Performed by: INTERNAL MEDICINE

## 2020-06-15 PROCEDURE — G8417 CALC BMI ABV UP PARAM F/U: HCPCS | Performed by: INTERNAL MEDICINE

## 2020-06-15 RX ORDER — AMLODIPINE BESYLATE 5 MG/1
5 TABLET ORAL DAILY
Qty: 30 TABLET | Refills: 3 | Status: SHIPPED | OUTPATIENT
Start: 2020-06-15 | End: 2020-09-02 | Stop reason: SDUPTHER

## 2020-06-15 RX ORDER — AMLODIPINE BESYLATE 5 MG/1
5 TABLET ORAL DAILY
Qty: 90 TABLET | OUTPATIENT
Start: 2020-06-15 | End: 2020-07-15

## 2020-06-15 RX ORDER — ISOSORBIDE MONONITRATE 60 MG/1
120 TABLET, EXTENDED RELEASE ORAL 2 TIMES DAILY
Qty: 60 TABLET | Refills: 3 | Status: SHIPPED | OUTPATIENT
Start: 2020-06-15 | End: 2020-08-04

## 2020-06-15 NOTE — PROGRESS NOTES
[address] [place] [address],[place] [address] [address] [place] [address]  Dept: [phone]  Dept Fax: [phone]  Loc: [phone]    Visit Date: 6/15/2020    Mr. Vinnie Ovalle is a 50 y.o. male  who presented for:  F/u    HPI:   HPI   49 yo M with multivessel CAD/NSTEMI 2017, ESRD, and hx of colon cancer who presents for follow-up. Had hx of GI bleed, thus he was medically managed. On DAPT, no bleeding currently. Finished dialysis, BP in the 90s. No chest pain, angina, RANDLE, orthopnea, PND, sob at rest, palpitations, LE edema, or syncope. Current Outpatient Medications:     atorvastatin (LIPITOR) 40 MG tablet, TAKE 1 TABLET BY MOUTH DAILY, Disp: 90 tablet, Rfl: 3    metoprolol succinate (TOPROL XL) 25 MG extended release tablet, TAKE 1 TABLET BY MOUTH DAILY, Disp: 90 tablet, Rfl: 3    aspirin EC 81 MG EC tablet, Take 1 tablet by mouth daily, Disp: 90 tablet, Rfl: 1    nitroGLYCERIN (NITROSTAT) 0.4 MG SL tablet, up to max of 3 total doses. If no relief after 1 dose, call 911. (Patient taking differently: Place 0.4 mg under the tongue as needed up to max of 3 total doses.  If no relief after 1 dose, call 911.), Disp: 25 tablet, Rfl: 3    pantoprazole (PROTONIX) 40 MG tablet, Take 1 tablet by mouth daily, Disp: 30 tablet, Rfl: 3    amLODIPine (NORVASC) 10 MG tablet, Take 1 tablet by mouth daily, Disp: 30 tablet, Rfl: 3    torsemide (DEMADEX) 20 MG tablet, Take 2 tablets by mouth daily, Disp: 30 tablet, Rfl: 1    calcium acetate (PHOSLO) 667 MG capsule, Take 1 capsule by mouth 3 times daily (with meals), Disp: 90 capsule, Rfl: 1    cloNIDine (CATAPRES) 0.1 MG tablet, Take 2 tablets by mouth 2 times daily, Disp: 60 tablet, Rfl: 3    isosorbide mononitrate (IMDUR) 120 MG extended release tablet, Take 1 tablet by mouth 2 times daily, Disp: 60 tablet, Rfl: 5    doxazosin (CARDURA) 8 MG tablet, Take 1 tablet by mouth 2 times daily, Disp: 60 tablet, Rfl: 3    hydrALAZINE (APRESOLINE) 100 MG tablet, Take 1 tablet by mouth 3 times daily, Disp: 90 tablet, Rfl: 3    clopidogrel (PLAVIX) 75 MG tablet, Take 1 tablet by mouth daily, Disp: 30 tablet, Rfl: 3    insulin glargine (TOUJEO SOLOSTAR) 300 UNIT/ML injection pen, Inject 10 Units into the skin daily, Disp: , Rfl:     Blood Pressure Monitor KIT, CHECK BP TWICE DAILY IF SBP >140 CALL PCP, Disp: 1 kit, Rfl: 0    Past Medical History  Sha Lazo  has a past medical history of ASCVD (arteriosclerotic cardiovascular disease), Cancer ([facility] [address] CKD (chronic kidney disease) stage 3, GFR 30-59 ml/min ([facility] DM2 (diabetes mellitus, type 2) ([facility] Dyslipidemia, Hemodialysis patient ([facility] History of blood transfusion, Hyperlipidemia, Hypertension, and Thyroid disease. Social History  Sha Lazo  reports that he has never smoked. He has never used smokeless tobacco. He reports that he does not drink alcohol or use drugs. Family History  Sha Lazo family history includes Cancer in his father; Heart Disease in his father; High Blood Pressure in his mother. There is no family history of bicuspid aortic valve, aneurysms, heart transplant, pacemakers, defibrillators, or sudden cardiac death.       Past Surgical History   Past Surgical History:   Procedure Laterality Date    COLONOSCOPY Left 12/18/2018    COLONOSCOPY performed by Julia Gilliam MD at [facility] [place] [facility]    ENDOSCOPY, COLON, DIAGNOSTIC      EYE SURGERY Left 2013    laser surgery    INSERTION / REMOVAL / REPLACEMENT VENOUS ACCESS CATHETER Right 12/4/2017    MEDIPORT INSERTION performed by Lauren Duncan MD at [facility]. FLX DX W/COLLJ [place] 1978 PFRMD Left 9/16/2018    COLONOSCOPY DIAGNOSTIC OR SCREENING performed by Julia Gilliam MD at [address] [place] [address] COLONOSCOPY W/BIOPSY SINGLE/MULTIPLE Left 11/24/2017    [facility]SY [facility] [place] [facility]    [place] EGD TRANSORAL BIOPSY SINGLE/MULTIPLE N/A 11/23/2017    EGD No distension. There is no tenderness. Musculoskeletal: Normal range of motion. No edema. Neurological: Alert and oriented to person, place, and time. No cranial nerve deficit. Coordination normal.   Skin: Skin is warm and dry. Psychiatric: Normal mood and affect.        No results found for: CKTOTAL, CKMB, CKMBINDEX    Lab Results   Component Value Date    WBC 5.6 09/27/2018    RBC 3.08 09/27/2018    HGB 8.8 09/27/2018    HCT 27.9 09/27/2018    MCV 90.6 09/27/2018    MCH 28.6 09/27/2018    MCHC 31.5 09/27/2018    RDW 18.5 03/22/2018     09/27/2018    MPV 11.2 09/27/2018       Lab Results   Component Value Date     09/17/2018    K 5.3 09/17/2018    K 5.0 02/02/2018     09/17/2018    CO2 24 09/17/2018    BUN 36 09/17/2018    LABALBU 3.5 09/17/2018    CREATININE 3.5 09/17/2018    CALCIUM 9.1 09/17/2018    LABGLOM 19 09/17/2018    GLUCOSE 109 09/17/2018       Lab Results   Component Value Date    ALKPHOS 84 09/16/2018    ALT 19 09/16/2018    AST 11 09/16/2018    PROT 6.0 09/16/2018    BILITOT 0.4 09/16/2018    BILIDIR <0.2 09/14/2018    LABALBU 3.5 09/17/2018       Lab Results   Component Value Date    MG 1.8 09/17/2018       Lab Results   Component Value Date    INR 1.20 (H) 09/14/2018    INR 1.12 03/21/2018    INR 1.19 (H) 03/19/2018         Lab Results   Component Value Date    LABA1C 5.8 11/22/2017       Lab Results   Component Value Date    TRIG 71 11/22/2017    HDL 42 11/22/2017    LDLCALC 99 11/22/2017       Lab Results   Component Value Date    TSH 3.600 09/14/2018         Testing Reviewed:      I have individually reviewed the cardiac test below:    ECHO:   Results for orders placed during the hospital encounter of 11/21/17   Echocardiogram 2D/ M-Mode/ Colorflow/ Do    Narrative Transthoracic Echocardiography Report (TTE)     Demographics      Patient Name  Duke Cueto      Gender             Male                 MODESTO CHAUDHARI      MR #          [id number]      Race                Ethnicity      Account #     [de-identified]      Room Number        6845      Accession     [id number]      Date of Study      11/22/2017   Number      Date of Birth 1965     Referring          Liberty Colorado MD                                Physician          Yolanda Reza      Age           50 year(s)     Sonographer        ROSA ELENA Heredia, KIERA,                                                   CHRISMS, RVT                                   Interpreting       Leigha Kaba DO                                Physician     Procedure    Type of Study      TTE procedure:ECHOCARDIOGRAM COMPLETE 2D W DOPPLER W COLOR. Procedure Date  Date: 11/22/2017 Start: 08:02 AM    Study Location: Bedside  Technical Quality: Adequate visualization    Indications:NSTEMI. Additional Medical History:NSTEMI, accelerated HTN, thyroid disease,  hypertension, hyperlipidemia, diabetes, chronic kidney disease    Patient Status: Routine    Height: 69 inches Weight: 230.01 pounds BSA: 2.19 m^2 BMI: 33.97 kg/m^2    BP: 167/75 mmHg     Conclusions      Summary   Mild concentric left ventricular hypertrophy. Systolic function was low normal.   Ejection fraction is visually estimated at 50%. Normal right ventricular size and function. Right ventricular systolic pressure of 42 mm Hg consistent with mild   pulmonary hypertension. Leaflets exhibited mild to moderately increased thickness and mildly   reduced cuspal separation of the aortic valve. Mild to moderate aortic regurgitation is noted. Aortic valve leaflets are Mildly calcified. Small to moderate circumferential pericardial effusion without tamponade   physiology is noted . and in subcostal view of 1.22 cm in upper region and 1.08 cm in lower   region. May consider serial monitoring if clinically indicated   Mildly dilated left atrium. Small to moderate circumferential pericardial effusion without tamponade   physiology is noted .    and in subcostal view of 1.22 cm in upper region and 1.08 cm in lower   region. May consider serial monitoring if clinically indicated      Signature      ----------------------------------------------------------------   Electronically signed by Unique Myers DO (Interpreting   physician) on 11/22/2017 at 07:18 PM   ----------------------------------------------------------------      Findings      Mitral Valve   Structurally normal mitral valve. Trace mitral regurgitation is present. Aortic Valve   Leaflets exhibited mild to moderately increased thickness and mildly   reduced cuspal separation of the aortic valve. Mild to moderate aortic regurgitation is noted. Aortic valve leaflets are Mildly calcified. Tricuspid Valve   Tricuspid valve is structurally normal.   Trivial tricuspid regurgitation visualized. Pulmonic Valve   Pulmonic valve is structurally normal.   Trivial pulmonic regurgitation visualized. Left Atrium   Mildly dilated left atrium. Left Ventricle   Mild concentric left ventricular hypertrophy. Systolic function was low normal.   Ejection fraction is visually estimated at 50%. Right Atrium   The right atrium is of normal size. Right Ventricle   Normal right ventricular size and function. Right ventricular systolic pressure of 42 mm Hg consistent with mild   pulmonary hypertension. Pericardial Effusion   Small to moderate circumferential pericardial effusion without tamponade   physiology is noted . and in subcostal view of 1.22 cm in upper region and 1.08 cm in lower   region. May consider serial monitoring if clinically indicated      Aorta / Great Vessels   Aortic root dimension within normal limits.      M-Mode/2D Measurements & Calculations      LV Diastolic    LV Systolic Dimension: 4.1  AV Cusp Separation: 2.2 cmLA   Dimension: 5.5  cm                          Dimension: 4.7 cmAO Root   cm              LV Volume Diastolic: 326 ml Dimension: 3.6

## 2020-08-04 RX ORDER — ISOSORBIDE MONONITRATE 60 MG/1
TABLET, EXTENDED RELEASE ORAL
Qty: 120 TABLET | Refills: 4 | Status: SHIPPED | OUTPATIENT
Start: 2020-08-04 | End: 2020-12-28 | Stop reason: SDUPTHER

## 2020-08-28 NOTE — PROGRESS NOTES
Renal Progress Note  2/3/2018 1:27 PM  Pt Name: Chana Grady  MRN: [id number]  [id number]  YOB: 1965  Admit Date: 1/31/2018 12:18 PM  Date of evaluation: 2/3/2018  Primary Care Physician: Dorota Gresham   [id number]       Subjective: Interval History:     No SOB , no chest pain ,No N/V   Feeling fine   Eleazar problem with his urination     Diet: DIET CARB CONTROL; Carb Control: 4 carbs/meal (approximate 1800 kcals/day); Low Sodium (2 GM); Renal    Medications:   Scheduled Meds:   cloNIDine  0.1 mg Oral BID    doxazosin  8 mg Oral 2 times per day    isosorbide mononitrate  120 mg Oral BID    aspirin  325 mg Oral Daily    clopidogrel  75 mg Oral Daily    diltiazem  90 mg Oral 4x Daily    hydrALAZINE  100 mg Oral TID    insulin glargine  10 Units Subcutaneous Nightly    metoprolol tartrate  50 mg Oral BID    simvastatin  20 mg Oral Nightly    heparin (porcine)  5,000 Units Subcutaneous 3 times per day    insulin lispro  0-6 Units Subcutaneous TID WC    insulin lispro  0-3 Units Subcutaneous Nightly     Continuous Infusions:       Objective:   Vitals:   BP (!) 187/84   Pulse 58   Temp 97.9 °F (36.6 °C) (Oral)   Resp 20   Ht 5' 9\" (1.753 m)   Wt 251 lb 1.6 oz (113.9 kg)   SpO2 93%   BMI 37.08 kg/m²     I&O's:    Intake/Output Summary (Last 24 hours) at 02/03/18 1327  Last data filed at 02/03/18 1346   Gross per 24 hour   Intake           2199.7 ml   Output              400 ml   Net           1799.7 ml     I/O last 3 completed shifts:   In: 2799.7 [P.O.:1080; I.V.:1719.7]  Out: 1075 [Urine:1075]     Date 02/03/18 0000 - 02/03/18 2359   Shift 3824-3683 4297-4599 4849-5788 24 Hour Total   I  N  T  A  K  E   P.O.  (mL/kg/hr) 240  (0.3)   240    I.V.  (mL/kg) 570.5  (5)   570.5  (5)    Shift Total  (mL/kg) 810.5  (7.1)   810.5  (7.1)   O  U  T  P  U  T   Urine  (mL/kg/hr) 400  (0.4)   400    Shift Total  (mL/kg) 400  (3.5)   400  (3.5)   Weight (kg) 113.9 113.9 113.9 113.9       General 28-Aug-2020

## 2020-09-03 RX ORDER — AMLODIPINE BESYLATE 5 MG/1
5 TABLET ORAL DAILY
Qty: 90 TABLET | Refills: 0 | Status: SHIPPED | OUTPATIENT
Start: 2020-09-03 | End: 2020-10-05

## 2020-10-05 RX ORDER — AMLODIPINE BESYLATE 5 MG/1
5 TABLET ORAL DAILY
Qty: 30 TABLET | Refills: 3 | Status: SHIPPED | OUTPATIENT
Start: 2020-10-05 | End: 2021-02-01

## 2020-11-03 PROBLEM — I10 HYPERTENSION: Status: RESOLVED | Noted: 2020-11-03 | Resolved: 2020-11-03

## 2020-11-23 ENCOUNTER — HOSPITAL ENCOUNTER (OUTPATIENT)
Age: 55
Discharge: HOME | End: 2020-11-23
Payer: MEDICARE

## 2020-11-23 LAB
CHOLESTEROL, FASTING: 153 MG/DL (ref 100–199)
HDLC SERPL-MCNC: 31 MG/DL
LDL CHOLESTEROL CALCULATED: ABNORMAL MG/DL
TRIGLYCERIDE, FASTING: 502 MG/DL (ref 0–199)

## 2020-11-23 PROCEDURE — 80061 LIPID PANEL: CPT

## 2020-11-23 PROCEDURE — 36415 COLL VENOUS BLD VENIPUNCTURE: CPT

## 2020-12-01 RX ORDER — METOPROLOL SUCCINATE 25 MG/1
25 TABLET, EXTENDED RELEASE ORAL DAILY
Qty: 90 TABLET | Refills: 0 | Status: SHIPPED | OUTPATIENT
Start: 2020-12-01 | End: 2021-03-01 | Stop reason: SDUPTHER

## 2020-12-01 RX ORDER — ATORVASTATIN CALCIUM 40 MG/1
40 TABLET, FILM COATED ORAL DAILY
Qty: 90 TABLET | Refills: 0 | Status: SHIPPED | OUTPATIENT
Start: 2020-12-01 | End: 2020-12-03

## 2020-12-03 ENCOUNTER — OFFICE VISIT (OUTPATIENT)
Dept: CARDIOLOGY CLINIC | Age: 55
End: 2020-12-03
Payer: MEDICARE

## 2020-12-03 VITALS
HEIGHT: 69 IN | BODY MASS INDEX: 35.37 KG/M2 | HEART RATE: 85 BPM | WEIGHT: 238.8 LBS | SYSTOLIC BLOOD PRESSURE: 130 MMHG | DIASTOLIC BLOOD PRESSURE: 74 MMHG

## 2020-12-03 PROCEDURE — G8427 DOCREV CUR MEDS BY ELIG CLIN: HCPCS | Performed by: INTERNAL MEDICINE

## 2020-12-03 PROCEDURE — G8417 CALC BMI ABV UP PARAM F/U: HCPCS | Performed by: INTERNAL MEDICINE

## 2020-12-03 PROCEDURE — G8484 FLU IMMUNIZE NO ADMIN: HCPCS | Performed by: INTERNAL MEDICINE

## 2020-12-03 PROCEDURE — 1036F TOBACCO NON-USER: CPT | Performed by: INTERNAL MEDICINE

## 2020-12-03 PROCEDURE — 93000 ELECTROCARDIOGRAM COMPLETE: CPT | Performed by: INTERNAL MEDICINE

## 2020-12-03 PROCEDURE — 99213 OFFICE O/P EST LOW 20 MIN: CPT | Performed by: INTERNAL MEDICINE

## 2020-12-03 PROCEDURE — 3017F COLORECTAL CA SCREEN DOC REV: CPT | Performed by: INTERNAL MEDICINE

## 2020-12-03 RX ORDER — CHLORAL HYDRATE 500 MG
2000 CAPSULE ORAL 2 TIMES DAILY
Qty: 90 CAPSULE | Refills: 3 | Status: SHIPPED | OUTPATIENT
Start: 2020-12-03

## 2020-12-03 RX ORDER — ATORVASTATIN CALCIUM 40 MG/1
80 TABLET, FILM COATED ORAL DAILY
Qty: 90 TABLET | Refills: 3 | Status: SHIPPED | OUTPATIENT
Start: 2020-12-03 | End: 2021-08-12

## 2020-12-03 RX ORDER — RIVAROXABAN 2.5 MG/1
2.5 TABLET, FILM COATED ORAL 2 TIMES DAILY
Qty: 60 TABLET | Refills: 3 | Status: SHIPPED | OUTPATIENT
Start: 2020-12-03 | End: 2021-04-05 | Stop reason: SDUPTHER

## 2020-12-03 ASSESSMENT — ENCOUNTER SYMPTOMS
DIARRHEA: 0
COUGH: 0
SHORTNESS OF BREATH: 0
VOMITING: 0
ABDOMINAL PAIN: 0
DOUBLE VISION: 0
BLURRED VISION: 0

## 2020-12-03 NOTE — PROGRESS NOTES
[address] [place] [address],[place] [address] [place] [address]  Dept: [phone]  Dept Fax: [phone]  Loc: [phone]    Visit Date: 12/3/2020  Patient Name: Duke Arevalo    Chief Complaint   Patient presents with    6 Month Follow-Up    Coronary Artery Disease       HPI:   Patient is a 50 y.o. male of multivessel CAD/NSTEMI 2017, ESRD, and history of colon cancer presents for follow-up. History of multiple GI bleeds, on DAPT with no current bleeding. Finished dialysis. Recent drug-coated balloon angioplasty and right superficial femoral artery at [facility] on 8/27/2020. Denies any chest pain, shortness of breath, RANDLE, fever, chills, palpitations or blurred vision. Current Outpatient Medications:     atorvastatin (LIPITOR) 40 MG tablet, Take 2 tablets by mouth daily, Disp: 90 tablet, Rfl: 3    Omega-3 Fatty Acids (FISH OIL) 1000 MG CAPS, Take 2 capsules by mouth 2 times daily, Disp: 90 capsule, Rfl: 3    rivaroxaban (XARELTO) 2.5 MG TABS tablet, Take 1 tablet by mouth 2 times daily, Disp: 60 tablet, Rfl: 3    metoprolol succinate (TOPROL XL) 25 MG extended release tablet, TAKE 1 TABLET BY MOUTH DAILY, Disp: 90 tablet, Rfl: 0    amLODIPine (NORVASC) 5 MG tablet, Take 1 tablet by mouth daily, Disp: 30 tablet, Rfl: 3    isosorbide mononitrate (IMDUR) 60 MG extended release tablet, TAKE 2 TABLETS BY MOUTH TWICE DAILY, Disp: 120 tablet, Rfl: 4    aspirin EC 81 MG EC tablet, Take 1 tablet by mouth daily, Disp: 90 tablet, Rfl: 1    nitroGLYCERIN (NITROSTAT) 0.4 MG SL tablet, up to max of 3 total doses. If no relief after 1 dose, call 911. (Patient taking differently: Place 0.4 mg under the tongue as needed up to max of 3 total doses.  If no relief after 1 dose, call 911.), Disp: 25 tablet, Rfl: 3    pantoprazole (PROTONIX) 40 MG tablet, Take 1 tablet by mouth daily, Disp: 30 tablet, Rfl: 3    torsemide (DEMADEX) 20 MG tablet, Take 2 tablets by mouth daily, Disp: 30 tablet, Rfl: 1    calcium acetate (PHOSLO) 667 MG capsule, Take 1 capsule by mouth 3 times daily (with meals), Disp: 90 capsule, Rfl: 1    cloNIDine (CATAPRES) 0.1 MG tablet, Take 2 tablets by mouth 2 times daily, Disp: 60 tablet, Rfl: 3    hydrALAZINE (APRESOLINE) 100 MG tablet, Take 1 tablet by mouth 3 times daily, Disp: 90 tablet, Rfl: 3    insulin glargine (TOUJEO SOLOSTAR) 300 UNIT/ML injection pen, Inject 10 Units into the skin daily, Disp: , Rfl:     Blood Pressure Monitor KIT, CHECK BP TWICE DAILY IF SBP >140 CALL PCP, Disp: 1 kit, Rfl: 0    doxazosin (CARDURA) 8 MG tablet, Take 1 tablet by mouth 2 times daily, Disp: 60 tablet, Rfl: 3    clopidogrel (PLAVIX) 75 MG tablet, Take 1 tablet by mouth daily, Disp: 30 tablet, Rfl: 3    Past Medical History  Naz Arce  has a past medical history of ASCVD (arteriosclerotic cardiovascular disease), Cancer ([facility] CKD (chronic kidney disease) stage 3, GFR 30-59 ml/min, DM2 (diabetes mellitus, type 2) ([facility] Dyslipidemia, Hemodialysis patient ([facility] History of blood transfusion, Hyperlipidemia, Hypertension, and Thyroid disease. Social History  Naz Arce  reports that he has never smoked. He has never used smokeless tobacco. He reports that he does not drink alcohol or use drugs. Family History  Zachary's family history includes Cancer in his father; Heart Disease in his father; High Blood Pressure in his mother. Past Surgical History   Naz Arce  has a past surgical history that includes skin biopsy; Endoscopy, colon, diagnostic; eye surgery (Left, 2013); pr egd transoral biopsy single/multiple (N/A, 11/23/2017); pr colonoscopy w/biopsy single/multiple (Left, 11/24/2017); Small intestine surgery (N/A, 11/27/2017); INSERTION / REMOVAL / REPLACEMENT VENOUS ACCESS CATHETER (Right, 12/4/2017); pr colonoscopy flx dx w/collj spec when pfrmd (Left, 9/16/2018);  Upper gastrointestinal endoscopy (Left, 9/16/2018); and Colonoscopy (Left, 12/18/2018). :     Review of Systems   Constitution: Negative for chills and fever. HENT: Negative for congestion. Eyes: Negative for blurred vision and double vision. Cardiovascular: Negative for chest pain, dyspnea on exertion, palpitations and syncope. Respiratory: Negative for cough and shortness of breath. Skin: Negative for rash. Musculoskeletal: Negative for muscle weakness. Gastrointestinal: Negative for abdominal pain, diarrhea and vomiting. Genitourinary: Negative for dysuria and hematuria. Neurological: Negative for numbness and paresthesias. Psychiatric/Behavioral: Negative for altered mental status. Objective:     /74   Pulse 85   Ht 5' 9\" (1.753 m)   Wt 238 lb 12.8 oz (108.3 kg)   BMI 35.26 kg/m²     Wt Readings from Last 3 Encounters:   12/03/20 238 lb 12.8 oz (108.3 kg)   06/15/20 256 lb 6 oz (116.3 kg)   12/09/19 241 lb 12.8 oz (109.7 kg)     BP Readings from Last 3 Encounters:   12/03/20 130/74   06/15/20 (!) 99/50   12/09/19 122/64       Physical Exam  Vitals signs reviewed. Constitutional:       General: He is not in acute distress. Appearance: Normal appearance. He is not ill-appearing or toxic-appearing. HENT:      Head: Normocephalic and atraumatic. Right Ear: External ear normal.      Left Ear: External ear normal.      Nose: Nose normal. No congestion. Mouth/Throat:      Mouth: Mucous membranes are moist.   Eyes:      General: No scleral icterus. Extraocular Movements: Extraocular movements intact. Neck:      Musculoskeletal: Normal range of motion. No neck rigidity or muscular tenderness. Cardiovascular:      Rate and Rhythm: Normal rate and regular rhythm. Pulses: Normal pulses. Heart sounds: Normal heart sounds. Comments: Very faint right PT, left PT 1+, palpable thrill over right AV fistula site. Pulmonary:      Effort: Pulmonary effort is normal. No respiratory distress.       Breath sounds: Normal breath sounds. No wheezing. Chest:      Chest wall: No tenderness. Abdominal:      General: Abdomen is flat. Bowel sounds are normal.      Palpations: Abdomen is soft. Tenderness: There is no abdominal tenderness. Musculoskeletal: Normal range of motion. Right lower leg: No edema. Left lower leg: No edema. Lymphadenopathy:      Cervical: No cervical adenopathy. Skin:     General: Skin is warm and dry. Capillary Refill: Capillary refill takes less than 2 seconds. Findings: No rash. Neurological:      General: No focal deficit present. Mental Status: He is alert and oriented to person, place, and time. Psychiatric:         Mood and Affect: Mood normal.         Behavior: Behavior normal.         Results for orders placed or performed during the hospital encounter of 11/23/20   Lipid, Fasting   Result Value Ref Range    Cholesterol, Fasting 153 100 - 199 mg/dl    Triglyceride, Fasting 502 (H) 0 - 199 mg/dl    HDL 31 mg/dL    LDL Calculated SEE BELOW mg/dL       Cardiac Tests:    I have individually reviewed the below cardiac tests    EKG:  ECHO:   Results for orders placed during the hospital encounter of 11/21/17   Echocardiogram 2D/ M-Mode/ Colorflow/ Do    Narrative Transthoracic Echocardiography Report (TTE)     Demographics      Patient Name  Oli Ray      Gender             Male                 MODESTO CHAUDHARI      MR #          [id number]      Race                                                  Ethnicity      Account #     [de-identified]      Room Number        4430      Accession     [id number]      Date of Study      11/22/2017   Number      Date of Birth 1965     Referring          Gino Gooden MD                                Physician          Aly Hernandez      Age           50 year(s)     Sonographer        ROSA ELENA Patten, KIERA,                                                   RDMS, RVT                                   Interpreting       Linus Cockayne DO 64.7 cm/s  cm/s                   cm/s   MV E/A Ratio: 1.72         AV Peak Gradient:      LVOT Peak Gradient: 5   MV Peak Gradient: 4.93     10.24 mmHg             mmHg   mmHg                                                     TV Peak E-Wave: 60.2   MV Deceleration Time: 165                         cm/s   msec                                              TV Peak A-Wave: 67.1                              AV P1/2t: 540 msec     cm/s      MV E' Septal Velocity:                            TV Peak Gradient: 1.45   3.41 cm/s                                         mmHg   MV A' Septal Velocity:     AV DVI (Vmax):0.72     TR Velocity:282 cm/s   5.07 cm/s                                         TR Gradient:31.81 mmHg   MV E' Lateral Velocity:                           PV Peak Velocity: 81.4   8.97 cm/s                                         cm/s   MV A' Lateral Velocity:                           PV Peak Gradient: 2.65   9.36 cm/s                                         mmHg   E/E' septal: 32.55   E/E' lateral: 12.37   MR Velocity: 476 cm/s                             ID ED Velocity: 192 cm/s     [email or web address] [id number]       Assessment/Plan   PAD -bilateral superficial femoral artery disease with chronic occlusion of patient's right superficial femoral artery, treated with drug-coated balloon angioplasty on 8/27/2020 at [facility] -we will start patient on Xarelto 2.5 mg twice daily. The patient was advised on risk/benefits of the new Rx and he agreed to proceed with the medication(s). ESRD  HLD -recent lipid panel showed triglycerides in the 500s and an LDL unreadable due to elevated triglycerides, will increase Lipitor to 80 mg and repeat lipid panel in 3 months. Add FishOil 2 gm BID.    History of colon cancer  Hypertension  Preserved EF: EF 50 to 55%, echo showed LVH, mild dilated LA.  Discussed diet/exercise/BP/weight loss/health lifestyle choices/lipids; the patient understands the goals and will try to comply. This position:  6 months    Attending Supervising Physician's Attestation Statement  I performed a history and physical examination on the patient and discussed the management with the resident physician. I reviewed and agree with the findings and plan as documented in the resident's note.   Electronically signed by Kenna Mathis MD on 12/8/20 at 2:00 PM EST      Electronically signed by Leslie Weeks MD [facility]  12/3/2020 at 3:43 PM EST

## 2020-12-08 RX ORDER — RIVAROXABAN 2.5 MG/1
2.5 TABLET, FILM COATED ORAL 2 TIMES DAILY
Qty: 56 TABLET | Refills: 0 | COMMUNITY
Start: 2020-12-08

## 2020-12-28 RX ORDER — ISOSORBIDE MONONITRATE 60 MG/1
TABLET, EXTENDED RELEASE ORAL
Qty: 360 TABLET | Refills: 0 | Status: SHIPPED | OUTPATIENT
Start: 2020-12-28 | End: 2021-03-29 | Stop reason: SDUPTHER

## 2020-12-28 RX ORDER — ISOSORBIDE MONONITRATE 60 MG/1
TABLET, EXTENDED RELEASE ORAL
Qty: 120 TABLET | Refills: 1 | Status: SHIPPED | OUTPATIENT
Start: 2020-12-28 | End: 2020-12-28

## 2021-02-01 RX ORDER — AMLODIPINE BESYLATE 5 MG/1
5 TABLET ORAL DAILY
Qty: 30 TABLET | Refills: 5 | Status: SHIPPED | OUTPATIENT
Start: 2021-02-01 | End: 2021-07-23 | Stop reason: SDUPTHER

## 2021-03-01 RX ORDER — METOPROLOL SUCCINATE 25 MG/1
25 TABLET, EXTENDED RELEASE ORAL DAILY
Qty: 90 TABLET | Refills: 0 | Status: SHIPPED | OUTPATIENT
Start: 2021-03-01 | End: 2021-06-07 | Stop reason: SDUPTHER

## 2021-03-02 ENCOUNTER — HOSPITAL ENCOUNTER (OUTPATIENT)
Age: 56
Discharge: HOME | End: 2021-03-02
Payer: MEDICARE

## 2021-03-02 DIAGNOSIS — E78.5 HYPERLIPIDEMIA, UNSPECIFIED HYPERLIPIDEMIA TYPE: ICD-10-CM

## 2021-03-02 LAB
CHOLESTEROL, FASTING: 209 MG/DL (ref 100–199)
HDLC SERPL-MCNC: 33 MG/DL
LDL CHOLESTEROL CALCULATED: 106 MG/DL
TRIGLYCERIDE, FASTING: 349 MG/DL (ref 0–199)

## 2021-03-02 PROCEDURE — 36415 COLL VENOUS BLD VENIPUNCTURE: CPT

## 2021-03-02 PROCEDURE — 80061 LIPID PANEL: CPT

## 2021-03-29 RX ORDER — ISOSORBIDE MONONITRATE 60 MG/1
TABLET, EXTENDED RELEASE ORAL
Qty: 360 TABLET | Refills: 1 | Status: SHIPPED | OUTPATIENT
Start: 2021-03-29 | End: 2021-09-24

## 2021-04-05 RX ORDER — RIVAROXABAN 2.5 MG/1
2.5 TABLET, FILM COATED ORAL 2 TIMES DAILY
Qty: 60 TABLET | Refills: 3 | Status: SHIPPED | OUTPATIENT
Start: 2021-04-05 | End: 2021-07-30

## 2021-04-12 NOTE — PLAN OF CARE
Form completed and signed with attending co-signature and placed in PSR bin.   Problem: DISCHARGE BARRIERS  Goal: Patient's continuum of care needs are met  Outcome: Ongoing  Home with mom and new HH. See SW progress notes.

## 2021-06-07 RX ORDER — METOPROLOL SUCCINATE 25 MG/1
25 TABLET, EXTENDED RELEASE ORAL DAILY
Qty: 90 TABLET | Refills: 0 | Status: SHIPPED | OUTPATIENT
Start: 2021-06-07 | End: 2021-09-03

## 2021-07-23 RX ORDER — AMLODIPINE BESYLATE 5 MG/1
5 TABLET ORAL DAILY
Qty: 30 TABLET | Refills: 0 | Status: SHIPPED | OUTPATIENT
Start: 2021-07-23 | End: 2021-08-23

## 2021-07-30 RX ORDER — RIVAROXABAN 2.5 MG/1
TABLET, FILM COATED ORAL
Qty: 60 TABLET | Refills: 0 | Status: SHIPPED | OUTPATIENT
Start: 2021-07-30 | End: 2021-08-30

## 2021-08-12 ENCOUNTER — OFFICE VISIT (OUTPATIENT)
Dept: CARDIOLOGY CLINIC | Age: 56
End: 2021-08-12
Payer: MEDICARE

## 2021-08-12 VITALS
BODY MASS INDEX: 37.06 KG/M2 | WEIGHT: 250.2 LBS | HEIGHT: 69 IN | SYSTOLIC BLOOD PRESSURE: 126 MMHG | HEART RATE: 86 BPM | DIASTOLIC BLOOD PRESSURE: 70 MMHG

## 2021-08-12 DIAGNOSIS — I73.9 PAD (PERIPHERAL ARTERY DISEASE) (HCC): Primary | ICD-10-CM

## 2021-08-12 DIAGNOSIS — E78.5 HYPERLIPIDEMIA, UNSPECIFIED HYPERLIPIDEMIA TYPE: ICD-10-CM

## 2021-08-12 DIAGNOSIS — I25.10 CORONARY ARTERY DISEASE INVOLVING NATIVE CORONARY ARTERY OF NATIVE HEART WITHOUT ANGINA PECTORIS: ICD-10-CM

## 2021-08-12 PROCEDURE — 99213 OFFICE O/P EST LOW 20 MIN: CPT | Performed by: INTERNAL MEDICINE

## 2021-08-12 RX ORDER — FENOFIBRATE 145 MG/1
145 TABLET, COATED ORAL DAILY
Qty: 30 TABLET | Refills: 3 | Status: SHIPPED | OUTPATIENT
Start: 2021-08-12 | End: 2021-12-06

## 2021-08-12 RX ORDER — ATORVASTATIN CALCIUM 40 MG/1
80 TABLET, FILM COATED ORAL DAILY
Qty: 90 TABLET | Refills: 3 | Status: SHIPPED | OUTPATIENT
Start: 2021-08-12 | End: 2022-02-03

## 2021-08-12 NOTE — PROGRESS NOTES
[address] [facility] [address] [facility] [address]  Dept: [phone]  Dept Fax: [phone]  Loc: [phone]    Visit Date: 8/12/2021    Mr. oJse Michelle is a 49 y.o. male  who presented for:  Chief Complaint   Patient presents with    6 Month Follow-Up       HPI:   HPI   49 y.o. male of multivessel CAD/NSTEMI 2017, ESRD, and history of colon cancer presents for follow-up. Recent drug-coated balloon angioplasty and right superficial femoral artery at [facility] on 8/27/2020. No chest pain, angina, RANDLE, orthopnea, PND, sob at rest, palpitations, LE edema, or syncope. Denies leg pain, ulcers, color change, temperature change, or change in walking distance. Current Outpatient Medications:     XARELTO 2.5 MG TABS tablet, TAKE 1 TABLET BY MOUTH TWICE DAILY, Disp: 60 tablet, Rfl: 0    amLODIPine (NORVASC) 5 MG tablet, Take 1 tablet by mouth daily, Disp: 30 tablet, Rfl: 0    metoprolol succinate (TOPROL XL) 25 MG extended release tablet, Take 1 tablet by mouth daily, Disp: 90 tablet, Rfl: 0    isosorbide mononitrate (IMDUR) 60 MG extended release tablet, TAKE 2 TABLETS BY MOUTH TWICE DAILY, Disp: 360 tablet, Rfl: 1    rivaroxaban (XARELTO) 2.5 MG TABS tablet, Take 1 tablet by mouth 2 times daily, Disp: 56 tablet, Rfl: 0    atorvastatin (LIPITOR) 40 MG tablet, Take 2 tablets by mouth daily, Disp: 90 tablet, Rfl: 3    Omega-3 Fatty Acids (FISH OIL) 1000 MG CAPS, Take 2 capsules by mouth 2 times daily, Disp: 90 capsule, Rfl: 3    aspirin EC 81 MG EC tablet, Take 1 tablet by mouth daily, Disp: 90 tablet, Rfl: 1    nitroGLYCERIN (NITROSTAT) 0.4 MG SL tablet, up to max of 3 total doses. If no relief after 1 dose, call 911. (Patient taking differently: Place 0.4 mg under the tongue as needed up to max of 3 total doses.  If no relief after 1 dose, call 911.), Disp: 25 tablet, Rfl: 3    pantoprazole (PROTONIX) 40 MG tablet, Take 1 tablet by mouth daily, Disp: 30 tablet, Rfl: 3    torsemide (DEMADEX) 20 MG tablet, Take 2 tablets by mouth daily, Disp: 30 tablet, Rfl: 1    calcium acetate (PHOSLO) 667 MG capsule, Take 1 capsule by mouth 3 times daily (with meals), Disp: 90 capsule, Rfl: 1    cloNIDine (CATAPRES) 0.1 MG tablet, Take 2 tablets by mouth 2 times daily, Disp: 60 tablet, Rfl: 3    doxazosin (CARDURA) 8 MG tablet, Take 1 tablet by mouth 2 times daily, Disp: 60 tablet, Rfl: 3    hydrALAZINE (APRESOLINE) 100 MG tablet, Take 1 tablet by mouth 3 times daily, Disp: 90 tablet, Rfl: 3    clopidogrel (PLAVIX) 75 MG tablet, Take 1 tablet by mouth daily, Disp: 30 tablet, Rfl: 3    insulin glargine (TOUJEO SOLOSTAR) 300 UNIT/ML injection pen, Inject 10 Units into the skin daily, Disp: , Rfl:     Blood Pressure Monitor KIT, CHECK BP TWICE DAILY IF SBP >140 CALL PCP, Disp: 1 kit, Rfl: 0    Past Medical History  Vanita Ramos  has a past medical history of ASCVD (arteriosclerotic cardiovascular disease), Cancer ([facility] CKD (chronic kidney disease) stage 3, GFR 30-59 ml/min ([facility]), DM2 (diabetes mellitus, type 2) [facility] Dyslipidemia, Hemodialysis patient ([facility] History of blood transfusion, Hyperlipidemia, Hypertension, and Thyroid disease. Social History  Vanita Ramos  reports that he has never smoked. He has never used smokeless tobacco. He reports that he does not drink alcohol and does not use drugs. Family History  Vanita Ramos family history includes Cancer in his father; Heart Disease in his father; High Blood Pressure in his mother. There is no family history of bicuspid aortic valve, aneurysms, heart transplant, pacemakers, defibrillators, or sudden cardiac death.       Past Surgical History   Past Surgical History:   Procedure Laterality Date    COLONOSCOPY Left 12/18/2018    COLONOSCOPY performed [facility] Denae You MD at [facility]    ENDOSCOPY, COLON, DIAGNOSTIC      EYE SURGERY Left 2013    laser surgery    INSERTION / REMOVAL / REPLACEMENT VENOUS ACCESS CATHETER Right 12/4/2017    MEDIPORT INSERTION performed by Raeann Cook MD at [facility] FLX DX W/COLLJ S[place]olsmohamud 1978 PFRMD Left 9/16/2018    COLONOSCOPY DIAGNOSTIC OR SCREENING performed by Belinda Wei MD at [address] [place] [address] COLONOSCOPY W/BIOPSY SINGLE/MULTIPLE Left 11/24/2017    COLONOSCOPY WITH BIOPSY performed [facility] Bel[facility] MD at [facility] [place] [facility]    GA EGD TRANSORAL BIOPSY SINGLE/MULTIPLE N/A 11/23/2017    EGD BIOPSY performed by Belinda Wei MD at [address] [place] [address] on nose removed    SMALL INTESTINE SURGERY N/A 11/27/2017    SIGMOID COLON RESECTION, APPENDECTOMY, LIVER BIOPSY performed by Raeann Cook MD at [place] [address] Left 9/16/2018    [facility] [place] [facility]       Review of Systems   Constitutional: Negative for chills and fever  HENT: Negative for congestion, sinus pressure, sneezing and sore throat. Eyes: Negative for pain, discharge, redness and itching. Respiratory: Negative for apnea, cough  Gastrointestinal: Negative for blood in stool, constipation, diarrhea   Endocrine: Negative for cold intolerance, heat intolerance, polydipsia. Genitourinary: Negative for dysuria, enuresis, flank pain and hematuria. Musculoskeletal: Negative for arthralgias, joint swelling and neck pain. Neurological: Negative for numbness and headaches. Psychiatric/Behavioral: Negative for agitation, confusion, decreased concentration and dysphoric mood. Objective:     /70   Pulse 86   Ht 5' 9\" (1.753 m)   Wt 250 lb 3.2 oz (113.5 kg)   BMI 36.95 kg/m²     Wt Readings from Last 3 Encounters:   08/12/21 250 lb 3.2 oz (113.5 kg)   12/03/20 238 lb 12.8 oz (108.3 kg)   06/15/20 256 lb 6 oz (116.3 kg)     BP Readings from Last 3 Encounters:   08/12/21 126/70   12/03/20 130/74   06/15/20 (!) 99/50       Nursing note and vitals reviewed.     Physical Exam Constitutional: Oriented to person, place, and time. Appears well-developed and well-nourished. HENT:   Head: Normocephalic and atraumatic. Eyes: EOM are normal. Pupils are equal, round, and reactive to light. Neck: Normal range of motion. Neck supple. No JVD present. Cardiovascular: Normal rate, regular rhythm, normal heart sounds and intact distal pulses. No murmur heard. Pulmonary/Chest: Effort normal and breath sounds normal. No respiratory distress. No wheezes. No rales. Abdominal: Soft. Bowel sounds are normal. No distension. There is no tenderness. Musculoskeletal: Normal range of motion. No edema. Neurological: Alert and oriented to person, place, and time. No cranial nerve deficit. Coordination normal.   Skin: Skin is warm and dry. Psychiatric: Normal mood and affect.        No results found for: CKTOTAL, CKMB, CKMBINDEX    Lab Results   Component Value Date    WBC 5.6 09/27/2018    RBC 3.08 09/27/2018    HGB 8.8 09/27/2018    HCT 27.9 09/27/2018    MCV 90.6 09/27/2018    MCH 28.6 09/27/2018    MCHC 31.5 09/27/2018    RDW 18.5 03/22/2018     09/27/2018    MPV 11.2 09/27/2018       Lab Results   Component Value Date     09/17/2018    K 5.3 09/17/2018    K 5.0 02/02/2018     09/17/2018    CO2 24 09/17/2018    BUN 36 09/17/2018    LABALBU 3.5 09/17/2018    CREATININE 3.5 09/17/2018    CALCIUM 9.1 09/17/2018    LABGLOM 19 09/17/2018    GLUCOSE 109 09/17/2018       Lab Results   Component Value Date    ALKPHOS 84 09/16/2018    ALT 19 09/16/2018    AST 11 09/16/2018    PROT 6.0 09/16/2018    BILITOT 0.4 09/16/2018    BILIDIR <0.2 09/14/2018    LABALBU 3.5 09/17/2018       Lab Results   Component Value Date    MG 1.8 09/17/2018       Lab Results   Component Value Date    INR 1.20 (H) 09/14/2018    INR 1.12 03/21/2018    INR 1.19 (H) 03/19/2018         Lab Results   Component Value Date    LABA1C 5.8 11/22/2017       Lab Results   Component Value Date    TRIG 71 11/22/2017 HDL 33 03/02/2021    LDLCALC 106 03/02/2021       Lab Results   Component Value Date    TSH 3.600 09/14/2018         Testing Reviewed:      I have individually reviewed the cardiac test below:    ECHO: Results for orders placed during the hospital encounter of 11/21/17    Echocardiogram 2D/ M-Mode/ Colorflow/ Do    Narrative  Transthoracic Echocardiography Report (TTE)    Demographics    Patient Name  Eliu Garrett      Gender             Male  MODESTO CHAUDHARI    MR #          [id number]      Race                   Ethnicity    Account #     [de-identified]      Room Number        1531    Accession     [id number]      Date of Study      11/22/2017  Number    Date of Birth 1965     Referring          Virgen Arciniega MD  Physician          Marc López    Age           50 year(s)     Sonographer        ROSA ELENA Thomas, KIERA,  RDMS, RVT    Interpreting       Imani Casillas DO  Physician    Procedure    Type of Study    TTE procedure:ECHOCARDIOGRAM COMPLETE 2D W DOPPLER W COLOR. Procedure Date  Date: 11/22/2017 Start: 08:02 AM    Study Location: Bedside  Technical Quality: Adequate visualization    Indications:NSTEMI. Additional Medical History:NSTEMI, accelerated HTN, thyroid disease,  hypertension, hyperlipidemia, diabetes, chronic kidney disease    Patient Status: Routine    Height: 69 inches Weight: 230.01 pounds BSA: 2.19 m^2 BMI: 33.97 kg/m^2    BP: 167/75 mmHg    Conclusions    Summary  Mild concentric left ventricular hypertrophy. Systolic function was low normal.  Ejection fraction is visually estimated at 50%. Normal right ventricular size and function. Right ventricular systolic pressure of 42 mm Hg consistent with mild  pulmonary hypertension. Leaflets exhibited mild to moderately increased thickness and mildly  reduced cuspal separation of the aortic valve. Mild to moderate aortic regurgitation is noted. Aortic valve leaflets are Mildly calcified.   Small to moderate circumferential pericardial effusion without tamponade  physiology is noted . and in subcostal view of 1.22 cm in upper region and 1.08 cm in lower  region. May consider serial monitoring if clinically indicated  Mildly dilated left atrium. Small to moderate circumferential pericardial effusion without tamponade  physiology is noted . and in subcostal view of 1.22 cm in upper region and 1.08 cm in lower  region. May consider serial monitoring if clinically indicated    Signature    ----------------------------------------------------------------  Electronically signed by Genaro Amin DO (Interpreting  physician) on 11/22/2017 at 07:18 PM  ----------------------------------------------------------------    Findings    Mitral Valve  Structurally normal mitral valve. Trace mitral regurgitation is present. Aortic Valve  Leaflets exhibited mild to moderately increased thickness and mildly  reduced cuspal separation of the aortic valve. Mild to moderate aortic regurgitation is noted. Aortic valve leaflets are Mildly calcified. Tricuspid Valve  Tricuspid valve is structurally normal.  Trivial tricuspid regurgitation visualized. Pulmonic Valve  Pulmonic valve is structurally normal.  Trivial pulmonic regurgitation visualized. Left Atrium  Mildly dilated left atrium. Left Ventricle  Mild concentric left ventricular hypertrophy. Systolic function was low normal.  Ejection fraction is visually estimated at 50%. Right Atrium  The right atrium is of normal size. Right Ventricle  Normal right ventricular size and function. Right ventricular systolic pressure of 42 mm Hg consistent with mild  pulmonary hypertension. Pericardial Effusion  Small to moderate circumferential pericardial effusion without tamponade  physiology is noted . and in subcostal view of 1.22 cm in upper region and 1.08 cm in lower  region.   May consider serial monitoring if clinically indicated    Aorta / Great Vessels  Aortic root dimension within normal limits. M-Mode/2D Measurements & Calculations    LV Diastolic    LV Systolic Dimension: 4.1  AV Cusp Separation: 2.2 cmLA  Dimension: 5.5  cm                          Dimension: 4.7 cmAO Root  cm              LV Volume Diastolic: 432 ml Dimension: 3.6 cm  LV FS:25.5 %    LV Volume Systolic: 18.2 ml  LV PW           LV EDV/LV EDV Index: 681  Diastolic: 1.3  TO/09 Y^0OX ESV/LV ESV  cm              Index: 74.2 ml/34 m^2       RV Diastolic Dimension: 2.9 cm  Septum          EF Calculated: 95.0 %  Diastolic: 1.5                              LA/Aorta: 1.31  cm                                          Ascending Aorta: 3.2 cm    Doppler Measurements & Calculations    MV Peak E-Wave: 111 cm/s   AV Peak Velocity: 160  LVOT Peak Velocity: 115  MV Peak A-Wave: 64.7 cm/s  cm/s                   cm/s  MV E/A Ratio: 1.72         AV Peak Gradient:      LVOT Peak Gradient: 5  MV Peak Gradient: 4.93     10.24 mmHg             mmHg  mmHg  TV Peak E-Wave: 60.2  MV Deceleration Time: 165                         cm/s  msec                                              TV Peak A-Wave: 67.1  AV P1/2t: 540 msec     cm/s    MV E' Septal Velocity:                            TV Peak Gradient: 1.45  3.41 cm/s                                         mmHg  MV A' Septal Velocity:     AV DVI (Vmax):0.72     TR Velocity:282 cm/s  5.07 cm/s                                         TR Gradient:31.81 mmHg  MV E' Lateral Velocity:                           PV Peak Velocity: 81.4  8.97 cm/s                                         cm/s  MV A' Lateral Velocity:                           PV Peak Gradient: 2.65  9.36 cm/s                                         mmHg  E/E' septal: 32.55  E/E' lateral: 12.37  MR Velocity: 476 cm/s                             GA ED Velocity: 192 cm/s    [id number]%[id number]%3d       Assessment/Plan Multivessel CAD/NSTEMI 2017 - no PCI done, medically treated  PAD -bilateral superficial femoral artery disease with chronic occlusion of patient's right superficial femoral artery, treated with drug-coated balloon angioplasty on 8/27/2020 at [facility]  ESRD  HLD   Hyper   History of colon cancer  Hypertension  Preserved EF: EF 50 to 55%  Add Tricor 145 mg q day, continue the rest of the meds. The patient was advised on risk/benefits of the new Rx and he agreed to proceed with the medication(s). Needs to work on diet and exercise. No bleeding. Follows [facility]/Shamika for leg who is monitoring. D/c ASA. Discussed diet/exercise/BP/weight loss/health lifestyle choices/lipids; the patient understands the goals and will try to comply.     Disposition:  1 year       Electronically signed by Jose Carlos Portillo MD   8/12/2021 at 12:50 PM EDT

## 2021-08-12 NOTE — PROGRESS NOTES
Patient denies having any chest pain, shortness of breath, dizziness, lightheadedness or palpitations.

## 2021-08-23 RX ORDER — AMLODIPINE BESYLATE 5 MG/1
5 TABLET ORAL DAILY
Qty: 90 TABLET | Refills: 0 | Status: SHIPPED | OUTPATIENT
Start: 2021-08-23 | End: 2021-11-19

## 2021-08-30 RX ORDER — RIVAROXABAN 2.5 MG/1
TABLET, FILM COATED ORAL
Qty: 60 TABLET | Refills: 0 | Status: SHIPPED | OUTPATIENT
Start: 2021-08-30 | End: 2021-09-27

## 2021-09-03 RX ORDER — METOPROLOL SUCCINATE 25 MG/1
25 TABLET, EXTENDED RELEASE ORAL DAILY
Qty: 90 TABLET | Refills: 3 | Status: SHIPPED | OUTPATIENT
Start: 2021-09-03 | End: 2022-08-25 | Stop reason: SDUPTHER

## 2021-09-24 RX ORDER — ISOSORBIDE MONONITRATE 60 MG/1
TABLET, EXTENDED RELEASE ORAL
Qty: 360 TABLET | Refills: 3 | Status: SHIPPED | OUTPATIENT
Start: 2021-09-24 | End: 2022-09-18 | Stop reason: SDUPTHER

## 2021-09-27 RX ORDER — RIVAROXABAN 2.5 MG/1
TABLET, FILM COATED ORAL
Qty: 60 TABLET | Refills: 11 | Status: SHIPPED | OUTPATIENT
Start: 2021-09-27 | End: 2022-10-12

## 2021-11-19 RX ORDER — AMLODIPINE BESYLATE 5 MG/1
5 TABLET ORAL DAILY
Qty: 90 TABLET | Refills: 3 | Status: SHIPPED | OUTPATIENT
Start: 2021-11-19 | End: 2022-09-22

## 2021-12-06 RX ORDER — FENOFIBRATE 145 MG/1
145 TABLET, COATED ORAL DAILY
Qty: 30 TABLET | Refills: 9 | Status: SHIPPED | OUTPATIENT
Start: 2021-12-06 | End: 2022-09-19

## 2022-01-11 ENCOUNTER — TELEPHONE (OUTPATIENT)
Dept: CARDIOLOGY CLINIC | Age: 57
End: 2022-01-11

## 2022-01-11 NOTE — TELEPHONE ENCOUNTER
Pre op Risk Assessment    Procedure colonoscopy  Physician Dr. Amy Gray  Date of surgery/procedure 2-17-22    Last OV 8-  Last Stress 11-24-17  Last Echo 3-8-18  Last Cath 9-14-18  Last Stent   Is patient on blood thinners plavix, xarelto  Hold Meds/how many days 5 days   Fax [phone]

## 2022-02-03 RX ORDER — ATORVASTATIN CALCIUM 40 MG/1
80 TABLET, FILM COATED ORAL DAILY
Qty: 90 TABLET | Refills: 3 | Status: SHIPPED | OUTPATIENT
Start: 2022-02-03 | End: 2022-03-23 | Stop reason: SDUPTHER

## 2022-02-17 ENCOUNTER — HOSPITAL ENCOUNTER (OUTPATIENT)
Age: 57
Setting detail: OUTPATIENT SURGERY
Discharge: HOME | End: 2022-02-17
Attending: INTERNAL MEDICINE | Admitting: INTERNAL MEDICINE
Payer: MEDICARE

## 2022-02-17 ENCOUNTER — ANESTHESIA (OUTPATIENT)
Dept: ENDOSCOPY | Age: 57
End: 2022-02-17
Payer: MEDICARE

## 2022-02-17 ENCOUNTER — ANESTHESIA EVENT (OUTPATIENT)
Dept: ENDOSCOPY | Age: 57
End: 2022-02-17
Payer: MEDICARE

## 2022-02-17 VITALS
HEIGHT: 69 IN | SYSTOLIC BLOOD PRESSURE: 124 MMHG | BODY MASS INDEX: 36.2 KG/M2 | RESPIRATION RATE: 16 BRPM | DIASTOLIC BLOOD PRESSURE: 68 MMHG | WEIGHT: 244.4 LBS | HEART RATE: 69 BPM | TEMPERATURE: 97 F | OXYGEN SATURATION: 98 %

## 2022-02-17 VITALS
OXYGEN SATURATION: 100 % | SYSTOLIC BLOOD PRESSURE: 155 MMHG | DIASTOLIC BLOOD PRESSURE: 100 MMHG | RESPIRATION RATE: 14 BRPM

## 2022-02-17 PROCEDURE — 3609010600 HC COLONOSCOPY POLYPECTOMY SNARE/COLD BIOPSY: Performed by: INTERNAL MEDICINE

## 2022-02-17 PROCEDURE — 2580000003 HC RX 258: Performed by: INTERNAL MEDICINE

## 2022-02-17 PROCEDURE — 2720000010 HC SURG SUPPLY STERILE: Performed by: INTERNAL MEDICINE

## 2022-02-17 PROCEDURE — 2500000003 HC RX 250 WO HCPCS: Performed by: NURSE ANESTHETIST, CERTIFIED REGISTERED

## 2022-02-17 PROCEDURE — 6360000002 HC RX W HCPCS: Performed by: NURSE ANESTHETIST, CERTIFIED REGISTERED

## 2022-02-17 PROCEDURE — 2709999900 HC NON-CHARGEABLE SUPPLY: Performed by: INTERNAL MEDICINE

## 2022-02-17 PROCEDURE — 88305 TISSUE EXAM BY PATHOLOGIST: CPT

## 2022-02-17 PROCEDURE — 7100000011 HC PHASE II RECOVERY - ADDTL 15 MIN: Performed by: INTERNAL MEDICINE

## 2022-02-17 PROCEDURE — 3700000001 HC ADD 15 MINUTES (ANESTHESIA): Performed by: INTERNAL MEDICINE

## 2022-02-17 PROCEDURE — 3700000000 HC ANESTHESIA ATTENDED CARE: Performed by: INTERNAL MEDICINE

## 2022-02-17 PROCEDURE — 7100000010 HC PHASE II RECOVERY - FIRST 15 MIN: Performed by: INTERNAL MEDICINE

## 2022-02-17 RX ORDER — PROPOFOL 10 MG/ML
INJECTION, EMULSION INTRAVENOUS PRN
Status: DISCONTINUED | OUTPATIENT
Start: 2022-02-17 | End: 2022-02-17 | Stop reason: SDUPTHER

## 2022-02-17 RX ORDER — LIDOCAINE HYDROCHLORIDE 20 MG/ML
INJECTION, SOLUTION INFILTRATION; PERINEURAL PRN
Status: DISCONTINUED | OUTPATIENT
Start: 2022-02-17 | End: 2022-02-17 | Stop reason: SDUPTHER

## 2022-02-17 RX ORDER — SODIUM CHLORIDE 9 MG/ML
INJECTION, SOLUTION INTRAVENOUS CONTINUOUS
Status: DISCONTINUED | OUTPATIENT
Start: 2022-02-17 | End: 2022-02-17 | Stop reason: HOSPADM

## 2022-02-17 RX ADMIN — PROPOFOL 20 MG: 10 INJECTION, EMULSION INTRAVENOUS at 07:59

## 2022-02-17 RX ADMIN — SODIUM CHLORIDE: 9 INJECTION, SOLUTION INTRAVENOUS at 07:29

## 2022-02-17 RX ADMIN — PROPOFOL 20 MG: 10 INJECTION, EMULSION INTRAVENOUS at 07:57

## 2022-02-17 RX ADMIN — PROPOFOL 20 MG: 10 INJECTION, EMULSION INTRAVENOUS at 08:07

## 2022-02-17 RX ADMIN — PROPOFOL 60 MG: 10 INJECTION, EMULSION INTRAVENOUS at 07:53

## 2022-02-17 RX ADMIN — PROPOFOL 20 MG: 10 INJECTION, EMULSION INTRAVENOUS at 08:03

## 2022-02-17 RX ADMIN — PROPOFOL 20 MG: 10 INJECTION, EMULSION INTRAVENOUS at 08:11

## 2022-02-17 RX ADMIN — LIDOCAINE HYDROCHLORIDE 100 MG: 20 INJECTION, SOLUTION INFILTRATION; PERINEURAL at 07:53

## 2022-02-17 RX ADMIN — PROPOFOL 20 MG: 10 INJECTION, EMULSION INTRAVENOUS at 08:01

## 2022-02-17 RX ADMIN — PROPOFOL 40 MG: 10 INJECTION, EMULSION INTRAVENOUS at 07:55

## 2022-02-17 RX ADMIN — PROPOFOL 20 MG: 10 INJECTION, EMULSION INTRAVENOUS at 08:05

## 2022-02-17 ASSESSMENT — PAIN - FUNCTIONAL ASSESSMENT: PAIN_FUNCTIONAL_ASSESSMENT: 0-10

## 2022-02-17 ASSESSMENT — PAIN SCALES - GENERAL
PAINLEVEL_OUTOF10: 0
PAINLEVEL_OUTOF10: 0

## 2022-02-17 NOTE — OP NOTE
[address] [id number]                                OPERATIVE REPORT    PATIENT NAME: Mitchell England                  :        1965  MED REC NO:   [id number]                           ROOM:  ACCOUNT NO:   [de-identified]                           ADMIT DATE: 2022  PROVIDER:     CALIXTO Chambers OF PROCEDURE:  2022    PROCEDURE:  Colonoscopy. INDICATION:  The patient is 56-year-old pleasant male, personal history  of colon cancer. He is undergoing followup colonoscopy. Please see my  brief history for details and for physical examination. ASA CLASSIFICATION:  As per Anesthesia. Please see Anesthesia note for  details. INSTRUMENT:  PCF-H190AL pediatric colonoscope. PHOTOGRAPHS:  Yes. BIOPSIES:  Yes. ESTIMATED BLOOD LOSS:  Less than 10 mL. CONSENT:  Procedure indications and complications including but not  limited to perforation, bleeding, infection, adverse reaction to  medicine, very slight chance of missing significant lesions discussed  with the patient and the patient expressed his understanding and a  written consent was obtained. DESCRIPTION OF PROCEDURE:  The patient was placed in the left lateral  decubitus position in the endo room number 11. The patient was placed  on appropriate monitoring of vitals including blood pressure, heart rate  and pulse ox. After the rectal examination, after the adequate total  intravenous anesthesia was given by Anesthesia, the PCF-H190AL pediatric  colonoscope was inserted into the rectum and advanced up to the cecum  without any difficulty. On careful inspection, the preparation was  fair. Certain areas of the colon, liquid stool with large chunks of  fecal material that cannot be removed. On withdrawal of the scope, the  terminal ileum looks normal as shown in picture number A4.   Appendiceal  orifice and cecum looks normal as shown in picture number A2 and A3. In  the proximal ascending colon, the patient has one 8 mm sessile polyp as  shown in picture number A1. The polyp was removed by snare polypectomy,  shown in picture number A6. In the distal ascending colon, two polyps  around 8 mm to 1 cm sessile polyps as shown in picture number A8,  removed by snare polypectomy. In the ascending colon, another small  polyp removed by cold biopsy as shown in picture number A10 and A11. Transverse colon looks normal as shown in picture number A13 and A14. Descending colon looks normal as shown in picture number A15 and A16. Sigmoid colon, colocolonic end-to-end anastomotic site looks clean as  shown in picture number A17. On retroflex in the rectum, some small to  moderate internal hemorrhoids noted as shown in picture number A18. Air  was withdrawn as the scope was removed. The patient tolerated the  procedure well without any immediate complications. Vitals including  blood pressure, heart rate and pulse ox were stable during the procedure  and after the procedure. The patient transferred to the recovery room  in stable condition. IMPRESSION:  Colonoscopy to distal ileum. Total of four polyps, size  varies from 4 mm to 1 cm sessile polyps in the right colon removed by  snare polypectomy. End-to-end colocolic anastomosis in the sigmoid  colon looks clean. RECOMMENDATIONS:  Advance diet as tolerated. High-fiber diet. Fiber  supplement. Restart to Xarelto on 02/21/2022 because of multiple polyps  removed. Followup colonoscopy in 2 years because of personal history of  colon cancer and recurrent large polyps and fair prep. Thank you, Rachel Arcos, for letting me to do procedure on the patient. Do not  hesitate to call me if you have any questions. My recommendations are  above.           Alba Read M.D.    D: 02/17/2022 8:28:54       T: 02/17/2022 8:31:31     [email or web address]  Job#: [id number]     Doc#: [id number]    CC:  Rachel Arcos Danial Aguilar M.D.

## 2022-02-17 NOTE — ANESTHESIA POSTPROCEDURE EVALUATION
Department of Anesthesiology  Postprocedure Note    Patient: Jason Sommers  MRN: [id number]  YOB: 1965  Date of evaluation: 2/17/2022  Time:  8:19 AM     Procedure Summary     Date: 02/17/22 Room / Location: [address] [place] [address] / [address] [place] [address]    Anesthesia Start: 0744 Anesthesia Stop: 7689    Procedure: COLONOSCOPY POLYPECTOMY SNARE/COLD BIOPSY (N/A ) Diagnosis: (PERSONAL HX OF COLON CA)    Surgeons: Elijah Ortiz MD Responsible Provider: [employer],     Anesthesia Type: MAC ASA Status: 4          Anesthesia Type: MAC    Alex Phase I: Alex Score: 10    Alex Phase II:      Last vitals: Reviewed and per EMR flowsheets.        Anesthesia Post Evaluation    Patient location during evaluation: bedside  Patient participation: complete - patient participated  Level of consciousness: awake and alert  Airway patency: patent  Nausea & Vomiting: no nausea and no vomiting  Complications: no  Cardiovascular status: hemodynamically stable  Respiratory status: room air, spontaneous ventilation and acceptable  Hydration status: euvolemic

## 2022-02-17 NOTE — BRIEF OP NOTE
Brief Postoperative Note      Patient: Darian Gunderson  YOB: 1965  MRN: [id number]    Date of Procedure: 2/17/2022    Pre-Op Diagnosis: PERSONAL HX OF COLON CA    Post-Op Diagnosis: 4 polyps removed by snare . Procedure(s):  COLONOSCOPY POLYPECTOMY SNARE/COLD BIOPSY    Surgeon(s):  Tereza Light MD    Assistant:  * No surgical staff found *    Anesthesia: Monitor Anesthesia Care    Estimated Blood Loss (mL): Minimal    Complications: None    Specimens:   ID Type Source Tests Collected by Time Destination   A : ascending colon polyps Tissue Colon SURGICAL PATHOLOGY Tereza Light MD 2/17/2022 7867        Implants:  * No implants in log *      Drains: * No LDAs found *    Findings: 4 polyps removed by snare .     Electronically signed by Tereza Light MD on 2/17/2022 at 8:18 AM

## 2022-02-17 NOTE — PROGRESS NOTES
Recovery mode. Patient denies discomfort. Passing gas, taking fluids.  discussed findings with patient and . Discharge instructions provided and understanding verbalized.

## 2022-02-17 NOTE — ANESTHESIA PRE PROCEDURE
Department of Anesthesiology  Preprocedure Note       Name:  Winter Wu   Age:  56 y.o.  :  1965                                          MRN:  [id number]         Date:  2022      Surgeon: Shirley Irby):  Jamie Haynes MD    Procedure: Procedure(s):  COLONOSCOPY    Medications prior to admission:   Prior to Admission medications    Medication Sig Start Date End Date Taking?  Authorizing Provider   amLODIPine (NORVASC) 5 MG tablet TAKE 1 TABLET BY MOUTH DAILY 21 Yes LARRY Chu CNP   isosorbide mononitrate (IMDUR) 60 MG extended release tablet TAKE 2 TABLETS BY MOUTH TWICE DAILY 21  Yes LARRY Chu CNP   metoprolol succinate (TOPROL XL) 25 MG extended release tablet TAKE 1 TABLET BY MOUTH DAILY 9/3/21  Yes Afshan Duffy MD   pantoprazole (PROTONIX) 40 MG tablet Take 1 tablet by mouth daily 18  Yes Keira Perez MD   torsemide (DEMADEX) 20 MG tablet Take 2 tablets by mouth daily 3/23/18  Yes Bailey De Leon MD   calcium acetate (PHOSLO) 667 MG capsule Take 1 capsule by mouth 3 times daily (with meals) 3/22/18  Yes Bailey De Leon MD   cloNIDine (CATAPRES) 0.1 MG tablet Take 2 tablets by mouth 2 times daily 3/22/18  Yes Bailey De Leon MD   doxazosin (CARDURA) 8 MG tablet Take 1 tablet by mouth 2 times daily 2/3/18  Yes Taylor Bowers MD   hydrALAZINE (APRESOLINE) 100 MG tablet Take 1 tablet by mouth 3 times daily 17  Yes Taylor Bowers MD   clopidogrel (PLAVIX) 75 MG tablet Take 1 tablet by mouth daily 17  Yes Taylor Bowers MD   atorvastatin (LIPITOR) 40 MG tablet TAKE 2 TABLETS BY MOUTH DAILY 2/3/22   Afshan Duffy MD   fenofibrate (TRICOR) 145 MG tablet TAKE 1 TABLET BY MOUTH DAILY 21   Afshan Duffy MD   XARELTO 2.5 MG TABS tablet TAKE 1 TABLET BY MOUTH TWICE DAILY 21   Afshan Duffy MD   rivaroxaban Savi Hebert) 2.5 MG TABS tablet Take 1 tablet by mouth 2 times daily 20   Afshan Duffy MD Omega-3 Fatty Acids (FISH OIL) 1000 MG CAPS Take 2 capsules by mouth 2 times daily 12/3/20   Lit Garcia MD   nitroGLYCERIN (NITROSTAT) 0.4 MG SL tablet up to max of 3 total doses. If no relief after 1 dose, call 911. Patient taking differently: Place 0.4 mg under the tongue as needed up to max of 3 total doses.  If no relief after 1 dose, call 911. 9/27/18   LARRY Novak - CNP   insulin glargine (TOUJEO SOLOSTAR) 300 UNIT/ML injection pen Inject 10 Units into the skin daily    Historical Provider, MD   Blood Pressure Monitor KIT CHECK BP TWICE DAILY IF SBP >140 CALL PCP 12/6/15   Jonathan Le MD       Current medications:    Current Facility-Administered Medications   Medication Dose Route Frequency Provider Last Rate Last Admin    0.9 % sodium chloride infusion   IntraVENous Continuous Noelle Mclean MD           Allergies:  No Known Allergies    Problem List:    Patient Active Problem List   Diagnosis Code    Accelerated hypertension I10    NSTEMI (non-ST elevated myocardial infarction) ([facility] I21.4    Coronary artery disease involving native coronary artery of native heart I25.10    Metastatic colon cancer to liver (HCC) C18.9, C78.7    History of non-ST elevation myocardial infarction (NSTEMI) I25.2    Physical deconditioning R53.81    ESRD (end stage renal disease) ([facility] N18.6    Anemia D64.9    Leucopenia D72.819    Pancytopenia (formerly [facility]) D61.818    AV junctional bradycardia R00.1    Non-compliance with renal dialysis ([facility] Z91.15    Anemia due to chronic kidney disease N18.9, D63.1       Past Medical History:        Diagnosis Date    ASCVD (arteriosclerotic cardiovascular disease)     Cancer ([facility] 11/2017    Dr. Richard Thapa    CKD (chronic kidney disease) stage 3, GFR 30-59 ml/min (formerly [facility])     desi's Rose Myrick COPD (chronic obstructive pulmonary disease) ([facility]     DM2 (diabetes mellitus, type 2) ([facility]     Dyslipidemia     Hemodialysis patient ([facility]     History of blood transfusion     Hyperlipidemia     Hypertension     Thyroid disease        Past Surgical History:        Procedure Laterality Date    COLONOSCOPY Left 12/18/2018    COLONOSCOPY performed by Erica Blanc MD [facility]    ENDOSCOPY, COLON, DIAGNOSTIC      EYE SURGERY Left 2013    laser surgery    INSERTION / REMOVAL / REPLACEMENT VENOUS ACCESS CATHETER Right 12/4/2017    MEDIPORT INSERTION performed by Kali Mckeon MD at [address] FLX DX W/COLLJ [address] PFRMD Left 9/16/2018    COLONOSCOPY DIAGNOSTIC OR SCREENING performed by Erica Blanc MD at [facility] COLONOSCOPY W/BIOPSY SINGLE/MULTIPLE Left 11/24/2017    COLONOSCOPY WITH BIOPSY performed by Erica Blanc MD at [facility]    MS EGD TRANSORAL BIOPSY SINGLE/MULTIPLE N/A 11/23/2017    EGD BIOPSY performed by Erica Blanc MD at [facility] on nose removed    SMALL INTESTINE SURGERY N/A 11/27/2017    SIGMOID COLON RESECTION, APPENDECTOMY, LIVER BIOPSY performed by Kali Mckeon MD at [address] Left 9/16/2018    EGD BIOPSY performed by Erica Blanc MD at [facility]       Social History:    Social History     Tobacco Use    Smoking status: Never Smoker    Smokeless tobacco: Never Used   Substance Use Topics    Alcohol use: No     Alcohol/week: 0.0 standard drinks                                Counseling given: Not Answered      Vital Signs (Current):   Vitals:    02/17/22 0649   BP: (!) 146/73   Pulse: 83   Resp: 18   Temp: 36.3 °C (97.3 °F)   TempSrc: Tympanic   SpO2: 94%   Weight: 244 lb 6.4 oz (110.9 kg)   Height: 5' 9\" (1.753 m)                                              BP Readings from Last 3 Encounters:   02/17/22 (!) 146/73   08/12/21 126/70   12/03/20 130/74       NPO Status: Time of last liquid consumption: 0400                        Time of last solid consumption: 2000                        Date of last liquid consumption: 02/17/22                        Date of last solid food consumption: 02/15/22    BMI:   Wt Readings from Last 3 Encounters:   02/17/22 244 lb 6.4 oz (110.9 kg)   08/12/21 250 lb 3.2 oz (113.5 kg)   12/03/20 238 lb 12.8 oz (108.3 kg)     Body mass index is 36.09 kg/m². CBC:   Lab Results   Component Value Date    WBC 5.6 09/27/2018    RBC 3.08 09/27/2018    HGB 8.8 09/27/2018    HCT 27.9 09/27/2018    MCV 90.6 09/27/2018    RDW 18.5 03/22/2018     09/27/2018       CMP:   Lab Results   Component Value Date     09/17/2018    K 5.3 09/17/2018    K 5.0 02/02/2018     09/17/2018    CO2 24 09/17/2018    BUN 36 09/17/2018    CREATININE 3.5 09/17/2018    LABGLOM 19 09/17/2018    GLUCOSE 109 09/17/2018    PROT 6.0 09/16/2018    CALCIUM 9.1 09/17/2018    BILITOT 0.4 09/16/2018    ALKPHOS 84 09/16/2018    AST 11 09/16/2018    ALT 19 09/16/2018       POC Tests: No results for input(s): POCGLU, POCNA, POCK, POCCL, POCBUN, POCHEMO, POCHCT in the last 72 hours.     Coags:   Lab Results   Component Value Date    INR 1.20 09/14/2018    APTT 27.2 03/21/2018       HCG (If Applicable): No results found for: PREGTESTUR, PREGSERUM, HCG, HCGQUANT     ABGs: No results found for: PHART, PO2ART, GJK0FRG, VAB5RFF, BEART, I4QKAKQP     Type & Screen (If Applicable):  Lab Results   Component Value Date    LABRH POS 09/14/2018       Drug/Infectious Status (If Applicable):  Lab Results   Component Value Date    HEPCAB Negative 11/30/2017       COVID-19 Screening (If Applicable): No results found for: COVID19        Anesthesia Evaluation  Patient summary reviewed and Nursing notes reviewed  Airway: Mallampati: IV  TM distance: >3 FB   Neck ROM: full  Mouth opening: > = 3 FB Dental:      Comment: Denies any loose/removable    Pulmonary: breath sounds clear to auscultation  (+) COPD:                             Cardiovascular:  Exercise tolerance: good (>4 METS),   (+) hypertension:, past MI:, CAD:,         Rhythm: regular  Rate: normal                    Neuro/Psych:   Negative Neuro/Psych ROS              GI/Hepatic/Renal:   (+) liver disease:, renal disease: dialysis,          ROS comment: Last dialysis 2/16/22. Endo/Other:    (+) Diabetes, . Pt had no PAT visit       Abdominal:             Vascular: negative vascular ROS. Other Findings:             Anesthesia Plan      MAC     ASA 4       Induction: intravenous. Anesthetic plan and risks discussed with patient. Plan discussed with attending.                   LARRY Houston - CRNA   2/17/2022

## 2022-02-17 NOTE — H&P
[address] [place] [address]                       PREOPERATIVE HISTORY AND PHYSICAL    PATIENT NAME: Logan Frey                  :        1965  MED REC NO:   [id number]                           ROOM:  ACCOUNT NO:   [de-identified]                           ADMIT DATE: 2022  PROVIDER:     CALIXTO Ortiz OF PROCEDURE:  2022    PROCEDURE:  Colonoscopy. INDICATION:  The patient is a 57-year-old pleasant male who had a  sigmoid colon cancer diagnosed in 2017. At that time, the patient had  sigmoid colectomy. He is undergoing followup colonoscopy. Denied any  abdominal pain, nausea, vomiting. PAST MEDICAL HISTORY:  Colon cancer, sclerotic cardiovascular disease,  chronic kidney disease stage III, chronic obstructive pulmonary disease,  diabetes mellitus, dyslipidemia, hemodialysis, hyperlipidemia,  hypertension, hypothyroidism. PAST SURGICAL HISTORY:  Sigmoid colon resection, MediPort placement and  removal, colonoscopy, EGD. MEDICATIONS:  Reviewed. PHYSICAL EXAMINATION:  VITAL SIGNS:  Temperature 97.3, pulse 83, respiratory rate 18, blood  pressure 146/73. HEART:  Regular. Normal S1 and S2. No S3.  LUNGS:  Equal to expansion on inspection. [address] air entry bilaterally. ABDOMEN:  Soft. Normoactive bowel sounds. Nontender. Not distended. IMPRESSION:  A 57-year-old pleasant male who had history of sigmoid  colon, sigmoid colon resection in 2017. He is undergoing followup  surveillance colonoscopy. Denied any abdominal pain, nausea, vomiting. RECOMMENDATIONS:  Keep the patient nothing by mouth, proceed with  colonoscopy as planned. The risks including but not limited to  perforation, bleeding, infection, adverse reaction to medicine, very  slight chance of missing significant lesions. The patient expressed his  understanding.   Further plans pending the above test results.         Colleen Aparicio M.D.    D: 02/17/2022 7:50:15       T: 02/17/2022 7:52:34     [email or web address]  Job#: [id number]     Doc#: [id number]    CC:

## 2022-02-17 NOTE — PROGRESS NOTES
Colonoscopy completed, tolerated well. Polyps removed with hot snare, 1 jar labeled and sent to lab. Photos taken. Scope  used.

## 2022-03-23 RX ORDER — ATORVASTATIN CALCIUM 80 MG/1
80 TABLET, FILM COATED ORAL DAILY
Qty: 90 TABLET | Refills: 2 | Status: SHIPPED | OUTPATIENT
Start: 2022-03-23

## 2022-03-23 RX ORDER — ATORVASTATIN CALCIUM 80 MG/1
80 TABLET, FILM COATED ORAL DAILY
COMMUNITY
End: 2022-03-23 | Stop reason: SDUPTHER

## 2022-07-10 ENCOUNTER — HOSPITAL ENCOUNTER (EMERGENCY)
Age: 57
Discharge: HOME | End: 2022-07-10
Payer: MEDICARE

## 2022-07-10 VITALS
TEMPERATURE: 97.7 F | SYSTOLIC BLOOD PRESSURE: 153 MMHG | RESPIRATION RATE: 16 BRPM | BODY MASS INDEX: 35.59 KG/M2 | WEIGHT: 241 LBS | HEART RATE: 103 BPM | DIASTOLIC BLOOD PRESSURE: 73 MMHG | OXYGEN SATURATION: 96 %

## 2022-07-10 DIAGNOSIS — U07.1 COVID-19 VIRUS INFECTION: Primary | ICD-10-CM

## 2022-07-10 PROCEDURE — 99202 OFFICE O/P NEW SF 15 MIN: CPT | Performed by: NURSE PRACTITIONER

## 2022-07-10 PROCEDURE — 99213 OFFICE O/P EST LOW 20 MIN: CPT

## 2022-07-10 ASSESSMENT — ENCOUNTER SYMPTOMS
SORE THROAT: 1
SINUS PRESSURE: 0
SINUS PAIN: 0
TROUBLE SWALLOWING: 0
DIARRHEA: 0
RHINORRHEA: 1
EYE DISCHARGE: 0
WHEEZING: 0
SHORTNESS OF BREATH: 0
EYE REDNESS: 0
VOMITING: 0
NAUSEA: 0
COUGH: 1
CHEST TIGHTNESS: 0

## 2022-07-10 ASSESSMENT — PAIN - FUNCTIONAL ASSESSMENT
PAIN_FUNCTIONAL_ASSESSMENT: 0-10
PAIN_FUNCTIONAL_ASSESSMENT: ACTIVITIES ARE NOT PREVENTED

## 2022-07-10 ASSESSMENT — PAIN DESCRIPTION - PAIN TYPE: TYPE: ACUTE PAIN

## 2022-07-10 ASSESSMENT — PAIN SCALES - GENERAL: PAINLEVEL_OUTOF10: 8

## 2022-07-10 ASSESSMENT — PAIN DESCRIPTION - LOCATION: LOCATION: THROAT

## 2022-07-10 ASSESSMENT — PAIN DESCRIPTION - FREQUENCY: FREQUENCY: CONTINUOUS

## 2022-07-10 ASSESSMENT — PAIN DESCRIPTION - DESCRIPTORS: DESCRIPTORS: ACHING

## 2022-07-10 NOTE — ED TRIAGE NOTES
Pt to room 8 , then reported that he tested positive for Covid on Friday at [facility]. He was then moved to room 1. He reports that he has a sore throat that won't go away. He is on dialysis and is a diabetic.

## 2022-07-10 NOTE — ED PROVIDER NOTES
[phone]       Chief Complaint   Patient presents with    Pharyngitis    Cough    Positive For Covid-19     [facility] on Friday       Nurses Notes reviewed and I agree except as noted in the HPI. HISTORY OF PRESENT ILLNESS   Sunni Gan is a 56 y.o. male who presents for evaluation of cough and sore throat. Onset of symptoms around 7/6/2022, unchanged. Cough is intermittent, dry. Associated sore throat, occurs with coughing/swallowing. No fever, wheeze, shortness of breath. Patient recently diagnosed with COVID-19. No current treatment. REVIEW OF SYSTEMS     Review of Systems   Constitutional: Negative for chills, diaphoresis, fatigue and fever. HENT: Positive for congestion, postnasal drip, rhinorrhea and sore throat. Negative for ear pain, sinus pressure, sinus pain and trouble swallowing. Eyes: Negative for discharge and redness. Respiratory: Positive for cough. Negative for chest tightness, shortness of breath and wheezing. Cardiovascular: Negative for chest pain. Gastrointestinal: Negative for diarrhea, nausea and vomiting. Genitourinary: Negative for decreased urine volume. Musculoskeletal: Negative for neck pain and neck stiffness. Skin: Negative for rash. Neurological: Negative for headaches. Hematological: Negative for adenopathy. Psychiatric/Behavioral: Negative for sleep disturbance.        PAST MEDICAL HISTORY         Diagnosis Date    ASCVD (arteriosclerotic cardiovascular disease)     Cancer [facility] 11/2017    Dr. Issa Sandra    CKD (chronic kidney disease) stage 3, GFR 30-59 ml/min ([facility])     see's Micki Cage COPD (chronic obstructive pulmonary disease) ([facility]     DM2 (diabetes mellitus, type 2) ([facility]     Dyslipidemia     Hemodialysis patient ([facility]     History of blood transfusion     Hyperlipidemia     Hypertension     Thyroid disease        SURGICAL HISTORY     Patient  has a past surgical history that includes skin biopsy; Endoscopy, colon, diagnostic; eye surgery (Left, 2013); pr egd transoral biopsy single/multiple (N/A, 11/23/2017); pr colonoscopy w/biopsy single/multiple (Left, 11/24/2017); Small intestine surgery (N/A, 11/27/2017); INSERTION / REMOVAL / REPLACEMENT VENOUS ACCESS CATHETER (Right, 12/4/2017); pr colonoscopy flx dx w/collj spec when pfrmd (Left, 9/16/2018); Upper gastrointestinal endoscopy (Left, 9/16/2018); Colonoscopy (Left, 12/18/2018); and Colonoscopy (N/A, 2/17/2022). CURRENT MEDICATIONS       Discharge Medication List as of 7/10/2022  5:35 PM      CONTINUE these medications which have NOT CHANGED    Details   atorvastatin (LIPITOR) 80 MG tablet Take 1 tablet by mouth daily, Disp-90 tablet, R-2Normal      fenofibrate (TRICOR) 145 MG tablet TAKE 1 TABLET BY MOUTH DAILY, Disp-30 tablet, R-9Normal      amLODIPine (NORVASC) 5 MG tablet TAKE 1 TABLET BY MOUTH DAILY, Disp-90 tablet, R-3Normal      !! XARELTO 2.5 MG TABS tablet TAKE 1 TABLET BY MOUTH TWICE DAILY, Disp-60 tablet, R-11Normal      isosorbide mononitrate (IMDUR) 60 MG extended release tablet TAKE 2 TABLETS BY MOUTH TWICE DAILY, Disp-360 tablet, R-3Normal      metoprolol succinate (TOPROL XL) 25 MG extended release tablet TAKE 1 TABLET BY MOUTH DAILY, Disp-90 tablet, R-3Normal      !! rivaroxaban (XARELTO) 2.5 MG TABS tablet Take 1 tablet by mouth 2 times daily, Disp-56 tablet,R-0Lot#[id number], EXP 5/2022Sample      Omega-3 Fatty Acids (FISH OIL) 1000 MG CAPS Take 2 capsules by mouth 2 times daily, Disp-90 capsule,R-3Normal      nitroGLYCERIN (NITROSTAT) 0.4 MG SL tablet up to max of 3 total doses.  If no relief after 1 dose, call 911., Disp-25 tablet, R-3Normal      pantoprazole (PROTONIX) 40 MG tablet Take 1 tablet by mouth daily, Disp-30 tablet, R-3Normal      torsemide (DEMADEX) 20 MG tablet Take 2 tablets by mouth daily, Disp-30 tablet, R-1Print      calcium acetate (PHOSLO) 667 MG capsule Take 1 capsule by mouth 3 times daily (with meals), Disp-90 capsule, R-1Print      cloNIDine (CATAPRES) 0.1 MG tablet Take 2 tablets by mouth 2 times daily, Disp-60 tablet, R-3Print      doxazosin (CARDURA) 8 MG tablet Take 1 tablet by mouth 2 times daily, Disp-60 tablet, R-3Normal      hydrALAZINE (APRESOLINE) 100 MG tablet Take 1 tablet by mouth 3 times daily, Disp-90 tablet, R-3Normal      clopidogrel (PLAVIX) 75 MG tablet Take 1 tablet by mouth daily, Disp-30 tablet, R-3Normal      insulin glargine (TOUJEO SOLOSTAR) 300 UNIT/ML injection pen Inject 10 Units into the skin dailyHistorical Med      Blood Pressure Monitor KIT Disp-1 kit, R-0, NormalCHECK BP TWICE DAILY IF SBP >140 CALL PCP       ! ! - Potential duplicate medications found. Please discuss with provider. ALLERGIES     Patient is has No Known Allergies. FAMILY HISTORY     Patient'sfamily history includes Cancer in his father; Heart Disease in his father; High Blood Pressure in his mother. SOCIAL HISTORY     Patient  reports that he has never smoked. He has never used smokeless tobacco. He reports that he does not drink alcohol and does not use drugs. PHYSICAL EXAM     ED TRIAGE VITALS  BP: (!) 153/73, Temp: 97.7 °F (36.5 °C), Heart Rate: (!) 103, Resp: 16, SpO2: 96 %  Physical Exam    DIAGNOSTIC RESULTS   Labs: No results found for this visit on 07/10/22. IMAGING:  No orders to display     URGENT CARE COURSE:     Vitals:    07/10/22 1723   BP: (!) 153/73   Pulse: (!) 103   Resp: 16   Temp: 97.7 °F (36.5 °C)   TempSrc: Temporal   SpO2: 96%   Weight: 241 lb (109.3 kg)       Medications - No data to display  PROCEDURES:  None  FINALIMPRESSION      1. COVID-19 virus infection        DISPOSITION/PLAN   DISPOSITION Decision To Discharge 07/10/2022 05:35:26 PM  Nontoxic, no distress. Oropharynx clear moist.  Recommended Mucinex DM over-the-counter. Increase fluids, rest.  If any distress go to ER.   PATIENT REFERRED TO:  Jak Kendrick, LARRY - CNP  [address] [address]  Vignesh Zenia   [phone]      Follow-up as needed. Mucinex DM over-the-counter. Increase fluids. If symptoms worsen go to ER.     DISCHARGE MEDICATIONS:  Discharge Medication List as of 7/10/2022  5:35 PM        Discharge Medication List as of 7/10/2022  5:35 PM          [address] Rachel Tabor, APRN - CNP  07/10/22 [id number]

## 2022-07-11 ENCOUNTER — CARE COORDINATION (OUTPATIENT)
Dept: CARE COORDINATION | Age: 57
End: 2022-07-11

## 2022-07-11 NOTE — CARE COORDINATION
Patient contacted regarding COVID-19 diagnosis. Discussed COVID-19 related testing which was available at this time. Test results were positive. Patient informed of results, if available? n/a. Ambulatory Care Manager contacted the patient by telephone to perform post discharge assessment. Call within 2 business days of discharge: Yes. Verified name and  with patient as identifiers. Provided introduction to self, and explanation of the CTN/ACM role, and reason for call due to risk factors for infection and/or exposure to COVID-19. Symptoms reviewed with patient who verbalized the following symptoms: cough and sore throat      Due to no new or worsening symptoms encounter was not routed to provider for escalation. Discussed follow-up appointments. If no appointment was previously scheduled, appointment scheduling offered: n/a. [facility] follow up appointment(s):   Future Appointments   Date Time Provider Sugar Hayden   2022 12:00 PM Trae Diana MD N [facility] [address] [facility] follow up appointment(s): pt will be contacting PCP office today. Non-face-to-face services provided:  Obtained and reviewed discharge summary and/or continuity of care documents     Advance Care Planning:   Does patient have an Advance Directive:  not on file. Educated patient about risk for severe COVID-19 due to risk factors according to CDC guidelines. ACM reviewed discharge instructions, medical action plan and red flag symptoms with the patient who verbalized understanding. Discussed COVID vaccination status: No. Education provided on COVID-19 vaccination as appropriate. Discussed exposure protocols and quarantine with CDC Guidelines. Patient was given an opportunity to verbalize any questions and concerns and agrees to contact ACM or health care provider for questions related to their healthcare.     Reviewed and educated patient on any new and changed medications related to discharge diagnosis     Was patient discharged with a pulse oximeter? no      ACM provided contact information. No further follow-up call identified based on severity of symptoms and risk factors.

## 2022-08-25 RX ORDER — METOPROLOL SUCCINATE 25 MG/1
25 TABLET, EXTENDED RELEASE ORAL DAILY
Qty: 90 TABLET | Refills: 3 | Status: SHIPPED | OUTPATIENT
Start: 2022-08-25

## 2022-09-18 RX ORDER — ISOSORBIDE MONONITRATE 60 MG/1
TABLET, EXTENDED RELEASE ORAL
Qty: 120 TABLET | Refills: 0 | Status: SHIPPED | OUTPATIENT
Start: 2022-09-18 | End: 2022-09-19

## 2022-09-19 RX ORDER — ISOSORBIDE MONONITRATE 60 MG/1
TABLET, EXTENDED RELEASE ORAL
Qty: 360 TABLET | Refills: 0 | Status: SHIPPED | OUTPATIENT
Start: 2022-09-19

## 2022-09-19 RX ORDER — FENOFIBRATE 145 MG/1
145 TABLET, COATED ORAL DAILY
Qty: 90 TABLET | Refills: 0 | Status: SHIPPED | OUTPATIENT
Start: 2022-09-19

## 2022-09-22 ENCOUNTER — OFFICE VISIT (OUTPATIENT)
Dept: CARDIOLOGY CLINIC | Age: 57
End: 2022-09-22
Payer: MEDICARE

## 2022-09-22 VITALS
HEIGHT: 69 IN | DIASTOLIC BLOOD PRESSURE: 62 MMHG | BODY MASS INDEX: 36.29 KG/M2 | HEART RATE: 66 BPM | WEIGHT: 245 LBS | SYSTOLIC BLOOD PRESSURE: 126 MMHG

## 2022-09-22 DIAGNOSIS — I25.10 CORONARY ARTERY DISEASE INVOLVING NATIVE CORONARY ARTERY OF NATIVE HEART WITHOUT ANGINA PECTORIS: Primary | ICD-10-CM

## 2022-09-22 DIAGNOSIS — I73.9 PAD (PERIPHERAL ARTERY DISEASE) (HCC): ICD-10-CM

## 2022-09-22 DIAGNOSIS — E78.5 HYPERLIPIDEMIA, UNSPECIFIED HYPERLIPIDEMIA TYPE: ICD-10-CM

## 2022-09-22 PROCEDURE — 3017F COLORECTAL CA SCREEN DOC REV: CPT | Performed by: INTERNAL MEDICINE

## 2022-09-22 PROCEDURE — G8427 DOCREV CUR MEDS BY ELIG CLIN: HCPCS | Performed by: INTERNAL MEDICINE

## 2022-09-22 PROCEDURE — 1036F TOBACCO NON-USER: CPT | Performed by: INTERNAL MEDICINE

## 2022-09-22 PROCEDURE — G8417 CALC BMI ABV UP PARAM F/U: HCPCS | Performed by: INTERNAL MEDICINE

## 2022-09-22 PROCEDURE — 99213 OFFICE O/P EST LOW 20 MIN: CPT | Performed by: INTERNAL MEDICINE

## 2022-09-22 RX ORDER — DOXYCYCLINE HYCLATE 100 MG/1
100 CAPSULE ORAL 2 TIMES DAILY
COMMUNITY

## 2022-09-22 NOTE — PROGRESS NOTES
Follow-up. He had a toe on the left foot amputated at [facility] by Dr. Suma Howard, wound vac in place and on Doxycycline BID. He doesn't have a med list but states there are no changes. No concerns.

## 2022-09-22 NOTE — PROGRESS NOTES
[address] [facility] [address] [facility] [address]  Dept: [phone]  Dept Fax: [phone]  Loc: [phone]    Visit Date: 9/22/2022    Mr. Rodrigo Silverio is a 56 y.o. male  who presented for:  Chief Complaint   Patient presents with    1 Year Follow Up    Coronary Artery Disease       HPI:   HPI     56 y.o. male of multivessel CAD/NSTEMI 2017, ESRD, and history of colon cancer presents for follow-up. Recent drug-coated balloon angioplasty and right superficial femoral artery at [facility] on 8/27/2020. Left foot with great toe amputation. He has a wound vac. Trying to heal the surgery. No f/c/sn. He is on Plavix/Xarelto. No bleeding. BP stable. No chest pain, angina, RANDLE, orthopnea, PND, sob at rest, palpitations, LE edema, or syncope. Current Outpatient Medications:     doxycycline hyclate (VIBRAMYCIN) 100 MG capsule, Take 100 mg by mouth 2 times daily, Disp: , Rfl:     isosorbide mononitrate (IMDUR) 60 MG extended release tablet, TAKE 2 TABLETS BY MOUTH TWICE DAILY, Disp: 360 tablet, Rfl: 0    fenofibrate (TRICOR) 145 MG tablet, TAKE 1 TABLET BY MOUTH DAILY, Disp: 90 tablet, Rfl: 0    metoprolol succinate (TOPROL XL) 25 MG extended release tablet, Take 1 tablet by mouth daily, Disp: 90 tablet, Rfl: 3    atorvastatin (LIPITOR) 80 MG tablet, Take 1 tablet by mouth daily, Disp: 90 tablet, Rfl: 2    amLODIPine (NORVASC) 5 MG tablet, TAKE 1 TABLET BY MOUTH DAILY, Disp: 90 tablet, Rfl: 3    XARELTO 2.5 MG TABS tablet, TAKE 1 TABLET BY MOUTH TWICE DAILY, Disp: 60 tablet, Rfl: 11    rivaroxaban (XARELTO) 2.5 MG TABS tablet, Take 1 tablet by mouth 2 times daily, Disp: 56 tablet, Rfl: 0    Omega-3 Fatty Acids (FISH OIL) 1000 MG CAPS, Take 2 capsules by mouth 2 times daily, Disp: 90 capsule, Rfl: 3    nitroGLYCERIN (NITROSTAT) 0.4 MG SL tablet, up to max of 3 total doses. If no relief after 1 dose, call 911.  (Patient taking differently: Place 0.4 mg under the tongue as needed up to max of 3 total doses. If no relief after 1 dose, call 911.), Disp: 25 tablet, Rfl: 3    pantoprazole (PROTONIX) 40 MG tablet, Take 1 tablet by mouth daily, Disp: 30 tablet, Rfl: 3    torsemide (DEMADEX) 20 MG tablet, Take 2 tablets by mouth daily, Disp: 30 tablet, Rfl: 1    calcium acetate (PHOSLO) 667 MG capsule, Take 1 capsule by mouth 3 times daily (with meals), Disp: 90 capsule, Rfl: 1    cloNIDine (CATAPRES) 0.1 MG tablet, Take 2 tablets by mouth 2 times daily, Disp: 60 tablet, Rfl: 3    doxazosin (CARDURA) 8 MG tablet, Take 1 tablet by mouth 2 times daily, Disp: 60 tablet, Rfl: 3    hydrALAZINE (APRESOLINE) 100 MG tablet, Take 1 tablet by mouth 3 times daily, Disp: 90 tablet, Rfl: 3    clopidogrel (PLAVIX) 75 MG tablet, Take 1 tablet by mouth daily, Disp: 30 tablet, Rfl: 3    insulin glargine (TOUJEO SOLOSTAR) 300 UNIT/ML injection pen, Inject 10 Units into the skin daily, Disp: , Rfl:     Blood Pressure Monitor KIT, CHECK BP TWICE DAILY IF SBP >140 CALL PCP, Disp: 1 kit, Rfl: 0    Past Medical History  Keaton López  has a past medical history of ASCVD (arteriosclerotic cardiovascular disease), Cancer ([facility] CKD (chronic kidney disease) stage 3, GFR 30-59 ml/min ([facility]), COPD (chronic obstructive pulmonary disease) ([facility] DM2 (diabetes mellitus, type 2) [facility] Dyslipidemia, [facility] History of blood transfusion, Hyperlipidemia, Hypertension, and Thyroid disease. Social History  Keaton López  reports that he has never smoked. He has never used smokeless tobacco. He reports that he does not drink alcohol and does not use drugs. Family History  Keaton López family history includes Cancer in his father; Heart Disease in his father; High Blood Pressure in his mother. There is no family history of bicuspid aortic valve, aneurysms, heart transplant, pacemakers, defibrillators, or sudden cardiac death.       Past Surgical History   Past Surgical History: Procedure Laterality Date    COLONOSCOPY Left 12/18/2018    COLONOSCOPY performed by Cele Ladd MD at [address] [facility]    COLONOSCOPY N/A 02/17/2022    COLONOSCOPY POLYPECTOMY SNARE/COLD BIOPSY performed by Cele Ladd MD at [address] [facility]    ENDOSCOPY, COLON, DIAGNOSTIC      EYE SURGERY Left 2013    laser surgery    INSERTION / REMOVAL / REPLACEMENT VENOUS ACCESS CATHETER Right 12/04/2017    MEDIP[facility] [address] FLX DX W/COLLJ [address] PFRMD Left 09/16/2018    COLONOSCOPY DIAGNOSTIC OR SCREENING performed by Cele Ladd MD at [address] [facility]    HI COLONOSCOPY W/BIOPSY SINGLE/MULTIPLE Left 11/24/2017    COLONOSCOPY WITH BIOPSY performed by Cele Ladd MD at [address] End[facility]    HI EGD TRANSORAL BIOPSY SINGLE/MULTIPLE N/A 11/23/2017    EGD BIOPSY performed by Cele Ladd MD at [place] [address]      mole on nose removed    SMALL INTESTINE SURGERY N/A 11/27/2017    SIGMOID COLON RESECTION, APPENDECTOMY, LIVER BIOPSY performed by Fabricio Benitez MD at [address] [facility] Left 09/16/2018    EGD BIOPSY performed by Cele Ladd MD at [address] [facility]       Review of Systems   Constitutional: Negative for chills and fever  HENT: Negative for congestion, sinus pressure, sneezing and sore throat. Eyes: Negative for pain, discharge, redness and itching. Respiratory: Negative for apnea, cough  Gastrointestinal: Negative for blood in stool, constipation, diarrhea   Endocrine: Negative for cold intolerance, heat intolerance, polydipsia. Genitourinary: Negative for dysuria, enuresis, flank pain and hematuria. Musculoskeletal: Negative for arthralgias, joint swelling and neck pain. Neurological: Negative for numbness and headaches. Psychiatric/Behavioral: Negative for agitation, confusion, decreased concentration and dysphoric mood.      Objective:     /62   Pulse 66 Ht 5' 9\" (1.753 m)   Wt 245 lb (111.1 kg)   BMI 36.18 kg/m²     Wt Readings from Last 3 Encounters:   09/22/22 245 lb (111.1 kg)   07/10/22 241 lb (109.3 kg)   02/17/22 244 lb 6.4 oz (110.9 kg)     BP Readings from Last 3 Encounters:   09/22/22 126/62   07/10/22 (!) 153/73   02/17/22 124/68       Nursing note and vitals reviewed. Physical Exam   Constitutional: Oriented to person, place, and time. Appears well-developed and well-nourished. HENT:   Head: Normocephalic and atraumatic. Eyes: EOM are normal. Pupils are equal, round, and reactive to light. Neck: Normal range of motion. Neck supple. No JVD present. Cardiovascular: Normal rate, regular rhythm, normal heart sounds and intact distal pulses. No murmur heard. Pulmonary/Chest: Effort normal and breath sounds normal. No respiratory distress. No wheezes. No rales. Abdominal: Soft. Bowel sounds are normal. No distension. There is no tenderness. Musculoskeletal: Normal range of motion. No edema. Left great toe amputation. Neurological: Alert and oriented to person, place, and time. No cranial nerve deficit. Coordination normal.   Skin: Skin is warm and dry. Psychiatric: Normal mood and affect.        No results found for: CKTOTAL, CKMB, CKMBINDEX    Lab Results   Component Value Date/Time    WBC 5.6 09/27/2018 09:19 AM    RBC 3.08 09/27/2018 09:19 AM    HGB 8.8 09/27/2018 09:19 AM    HCT 27.9 09/27/2018 09:19 AM    MCV 90.6 09/27/2018 09:19 AM    MCH 28.6 09/27/2018 09:19 AM    MCHC 31.5 09/27/2018 09:19 AM    RDW 18.5 03/22/2018 05:51 AM     09/27/2018 09:19 AM    MPV 11.2 09/27/2018 09:19 AM       Lab Results   Component Value Date/Time     09/17/2018 09:00 AM    K 5.4 09/16/2022 12:21 PM    K 5.0 02/02/2018 10:15 AM     09/17/2018 09:00 AM    CO2 24 09/17/2018 09:00 AM    BUN 36 09/17/2018 09:00 AM    LABALBU 3.5 09/17/2018 09:00 AM    CREATININE 3.5 09/17/2018 09:00 AM    CALCIUM 9.1 09/17/2018 09:00 AM    LABGLOM 19 09/17/2018 09:00 AM    GLUCOSE 109 09/17/2018 09:00 AM       Lab Results   Component Value Date/Time    ALKPHOS 84 09/16/2018 05:52 AM    ALT 19 09/16/2018 05:52 AM    AST 11 09/16/2018 05:52 AM    PROT 6.0 09/16/2018 05:52 AM    BILITOT 0.4 09/16/2018 05:52 AM    BILIDIR <0.2 09/14/2018 08:30 PM    LABALBU 3.5 09/17/2018 09:00 AM       Lab Results   Component Value Date/Time    MG 1.8 09/17/2018 09:00 AM       Lab Results   Component Value Date    INR 1.20 (H) 09/14/2018    INR 1.12 03/21/2018    INR 1.19 (H) 03/19/2018         Lab Results   Component Value Date/Time    LABA1C 5.8 11/22/2017 03:54 PM       Lab Results   Component Value Date/Time    TRIG 71 11/22/2017 04:53 AM    HDL 33 03/02/2021 12:17 PM    LDLCALC 106 03/02/2021 12:17 PM       Lab Results   Component Value Date/Time    TSH 3.600 09/14/2018 08:30 PM         Testing Reviewed:      I have individually reviewed the cardiac test below:    ECHO: Results for orders placed during the hospital encounter of 11/21/17    Echocardiogram 2D/ M-Mode/ Colorflow/ Do    Narrative  Transthoracic Echocardiography Report (TTE)    Demographics    Patient Name  Murl Riddles      Gender             Male  MODESTO CHAUDHARI    MR #          [id number]      Race                   Ethnicity    Account #     [de-identified]      Room Number        8806    Accession     [id number]      Date of Study      11/22/2017  Number    Date of Birth 1965     Referring          Rhina Cole MD  Physician          Lucinda Banuelos    Age           50 year(s)     Sonographer        ROSA ELENA Milan, RDAMRIK,  RDMS, RVT    Interpreting       Sigrid Acevedo DO  Physician    Procedure    Type of Study    TTE procedure:ECHOCARDIOGRAM COMPLETE 2D W DOPPLER W COLOR. Procedure Date  Date: 11/22/2017 Start: 08:02 AM    Study Location: Bedside  Technical Quality: Adequate visualization    Indications:NSTEMI.     Additional Medical History:NSTEMI, accelerated HTN, thyroid disease,  hypertension, hyperlipidemia, diabetes, chronic kidney disease    Patient Status: Routine    Height: 69 inches Weight: 230.01 pounds BSA: 2.19 m^2 BMI: 33.97 kg/m^2    BP: 167/75 mmHg    Conclusions    Summary  Mild concentric left ventricular hypertrophy. Systolic function was low normal.  Ejection fraction is visually estimated at 50%. Normal right ventricular size and function. Right ventricular systolic pressure of 42 mm Hg consistent with mild  pulmonary hypertension. Leaflets exhibited mild to moderately increased thickness and mildly  reduced cuspal separation of the aortic valve. Mild to moderate aortic regurgitation is noted. Aortic valve leaflets are Mildly calcified. Small to moderate circumferential pericardial effusion without tamponade  physiology is noted . and in subcostal view of 1.22 cm in upper region and 1.08 cm in lower  region. May consider serial monitoring if clinically indicated  Mildly dilated left atrium. Small to moderate circumferential pericardial effusion without tamponade  physiology is noted . and in subcostal view of 1.22 cm in upper region and 1.08 cm in lower  region. May consider serial monitoring if clinically indicated    Signature    ----------------------------------------------------------------  Electronically signed by Gen Caldwell DO (Interpreting  physician) on 11/22/2017 at 07:18 PM  ----------------------------------------------------------------    Findings    Mitral Valve  Structurally normal mitral valve. Trace mitral regurgitation is present. Aortic Valve  Leaflets exhibited mild to moderately increased thickness and mildly  reduced cuspal separation of the aortic valve. Mild to moderate aortic regurgitation is noted. Aortic valve leaflets are Mildly calcified. Tricuspid Valve  Tricuspid valve is structurally normal.  Trivial tricuspid regurgitation visualized.     Pulmonic Valve  Pulmonic valve is structurally normal.  Trivial pulmonic regurgitation visualized. Left Atrium  Mildly dilated left atrium. Left Ventricle  Mild concentric left ventricular hypertrophy. Systolic function was low normal.  Ejection fraction is visually estimated at 50%. Right Atrium  The right atrium is of normal size. Right Ventricle  Normal right ventricular size and function. Right ventricular systolic pressure of 42 mm Hg consistent with mild  pulmonary hypertension. Pericardial Effusion  Small to moderate circumferential pericardial effusion without tamponade  physiology is noted . and in subcostal view of 1.22 cm in upper region and 1.08 cm in lower  region. May consider serial monitoring if clinically indicated    Aorta / Great Vessels  Aortic root dimension within normal limits.     M-Mode/2D Measurements & Calculations    LV Diastolic    LV Systolic Dimension: 4.1  AV Cusp Separation: 2.2 cmLA  Dimension: 5.5  cm                          Dimension: 4.7 cmAO Root  cm              LV Volume Diastolic: 480 ml Dimension: 3.6 cm  LV FS:25.5 %    LV Volume Systolic: 99.2 ml  LV PW           LV EDV/LV EDV Index: 918  Diastolic: 1.3  VX/32 K^2KB ESV/LV ESV  cm              Index: 74.2 ml/34 m^2       RV Diastolic Dimension: 2.9 cm  Septum          EF Calculated: 63.4 %  Diastolic: 1.5                              LA/Aorta: 1.31  cm                                          Ascending Aorta: 3.2 cm    Doppler Measurements & Calculations    MV Peak E-Wave: 111 cm/s   AV Peak Velocity: 160  LVOT Peak Velocity: 115  MV Peak A-Wave: 64.7 cm/s  cm/s                   cm/s  MV E/A Ratio: 1.72         AV Peak Gradient:      LVOT Peak Gradient: 5  MV Peak Gradient: 4.93     10.24 mmHg             mmHg  mmHg  TV Peak E-Wave: 60.2  MV Deceleration Time: 165                         cm/s  msec                                              TV Peak A-Wave: 67.1  AV P1/2t: 540 msec     cm/s    MV E' Septal Velocity:                            TV Peak Gradient: 1.45  3.41 cm/s mmHg  MV A' Septal Velocity:     AV DVI (Vmax):0.72     TR Velocity:282 cm/s  5.07 cm/s                                         TR Gradient:31.81 mmHg  MV E' Lateral Velocity:                           PV Peak Velocity: 81.4  8.97 cm/s                                         cm/s  MV A' Lateral Velocity:                           PV Peak Gradient: 2.65  9.36 cm/s                                         mmHg  E/E' septal: 32.55  E/E' lateral: 12.37  MR Velocity: 476 cm/s                             ND ED Velocity: 192 cm/s    [email or web address] DocKey=[id number]  [id number]%[id number]%[id number]       Assessment/Plan   Multivessel CAD/NSTEMI 2017 - no PCI done, medically treated  Left great toe amputation with wound vac  PAD -bilateral superficial femoral artery disease with chronic occlusion of patient's right superficial femoral artery, treated with drug-coated balloon angioplasty on 8/27/2020 at [facility]  ESRD  HLD   Hyper   History of colon cancer  Hypertension  Preserved EF: EF 50 to 55%  No active cardiac symptoms. BP and HR well controlled. He is doing MWF dialysis without issues. Re-check baseline labs and titrate FLP. He is compliant with medications. Discussed diet/exercise/BP/weight loss/health lifestyle choices/lipids; the patient understands the goals and will try to comply.     Disposition:  1 year           Electronically signed by Antoine Cruz MD   9/22/2022 at 12:56 PM EDT

## 2022-10-12 RX ORDER — RIVAROXABAN 2.5 MG/1
TABLET, FILM COATED ORAL
Qty: 60 TABLET | Refills: 11 | Status: SHIPPED | OUTPATIENT
Start: 2022-10-12

## 2022-12-14 RX ORDER — ATORVASTATIN CALCIUM 80 MG/1
80 TABLET, FILM COATED ORAL DAILY
Qty: 90 TABLET | Refills: 3 | Status: SHIPPED | OUTPATIENT
Start: 2022-12-14

## 2022-12-19 RX ORDER — ISOSORBIDE MONONITRATE 60 MG/1
TABLET, EXTENDED RELEASE ORAL
Qty: 360 TABLET | Refills: 2 | Status: SHIPPED | OUTPATIENT
Start: 2022-12-19

## 2022-12-19 RX ORDER — FENOFIBRATE 145 MG/1
145 TABLET, COATED ORAL DAILY
Qty: 90 TABLET | Refills: 2 | Status: SHIPPED | OUTPATIENT
Start: 2022-12-19

## 2023-01-24 ENCOUNTER — TELEPHONE (OUTPATIENT)
Dept: CARDIOLOGY CLINIC | Age: 58
End: 2023-01-24

## 2023-01-24 NOTE — TELEPHONE ENCOUNTER
Pre op Risk Assessment    Procedure EDG  Physician Dr. Omid Barkley  Date of surgery/procedure 02/07/2023    Last OV 09/22/2022  Last Stress 11/24/2017  Last Echo 03/08/2018  Last Cath 09/14/2018  Last Stent none in epic  Is patient on blood thinners Xarelto and ASA  Hold Meds/how many days 3 days    Fax: [phone]

## 2023-02-09 ENCOUNTER — HOSPITAL ENCOUNTER (OUTPATIENT)
Dept: WOUND CARE | Age: 58
Discharge: HOME | End: 2023-02-09
Payer: MEDICARE

## 2023-02-09 VITALS
DIASTOLIC BLOOD PRESSURE: 83 MMHG | SYSTOLIC BLOOD PRESSURE: 176 MMHG | OXYGEN SATURATION: 98 % | TEMPERATURE: 98.5 F | HEART RATE: 75 BPM | RESPIRATION RATE: 16 BRPM

## 2023-02-09 DIAGNOSIS — A49.01 MSSA (METHICILLIN SUSCEPTIBLE STAPHYLOCOCCUS AUREUS) INFECTION: ICD-10-CM

## 2023-02-09 DIAGNOSIS — M86.272 SUBACUTE OSTEOMYELITIS OF LEFT FOOT (HCC): Primary | ICD-10-CM

## 2023-02-09 PROBLEM — T81.31XA DEHISCENCE OF OPERATIVE WOUND: Status: ACTIVE | Noted: 2023-02-09

## 2023-02-09 PROBLEM — E11.9 DIABETES MELLITUS (HCC): Status: ACTIVE | Noted: 2023-02-09

## 2023-02-09 PROBLEM — Z99.2 END-STAGE RENAL DISEASE ON HEMODIALYSIS (HCC): Status: ACTIVE | Noted: 2018-03-23

## 2023-02-09 PROBLEM — E78.5 HYPERLIPIDEMIA: Status: ACTIVE | Noted: 2023-02-09

## 2023-02-09 PROBLEM — N18.6 END-STAGE RENAL DISEASE ON HEMODIALYSIS (HCC): Status: ACTIVE | Noted: 2018-03-23

## 2023-02-09 PROBLEM — M86.9 OSTEOMYELITIS OF LEFT FOOT (HCC): Status: ACTIVE | Noted: 2023-02-09

## 2023-02-09 PROCEDURE — 99203 OFFICE O/P NEW LOW 30 MIN: CPT

## 2023-02-09 PROCEDURE — 99203 OFFICE O/P NEW LOW 30 MIN: CPT | Performed by: NURSE PRACTITIONER

## 2023-02-09 NOTE — DISCHARGE INSTRUCTIONS
Visit Discharge/Physician Orders: Continue oral Doxycycline prescription sent to pharmacy  - CRP,ESR, CBC,BMP  ordered today see if they can draw at dialysis  - x-ray ordered today have done within the next week    [facility] Care: [facility]    Wound Location: left foot    Dressing orders: per Dr. Aye Manriquez    Follow up visit:   3 Weeks 3/2/23 at 2:30pm    Keep next scheduled appointment. Please give 24 hour notice if unable to keep appointment. [phone]    If you experience any of the following, please call the Wound Care Service during business hours: Monday through Friday 8:00 am - 4:30 pm  ([phone]). *Increase in pain   *Temperature over 101   *Increase in drainage from your wound or a foul odor   *Uncontrolled swelling   *Need for compression bandage changes due to slippage, breakthrough drainage    If you need medical attention outside of business hours, please contact your Primary Care Doctor or go to the nearest emergency room.

## 2023-02-09 NOTE — PROGRESS NOTES
[facility]  Consult and Procedure Note       [address]  RECORD NUMBER:  [id number]  AGE: 66 y.o. GENDER: male  : 1965  EPISODE DATE:  2023    SUBJECTIVE:     Chief Complaint   Patient presents with    Wound Check     Left foot       HISTORY OF PRESENT ILLNESS      Efren Bonilla is a 66 y.o. male who presents today for evaluation of osteomyelitis of left foot. Patient referred to clinic by Dr. Tamica Garcia who has been following patient for nonhealing wound to left foot, is s/p excision of left first metatarsal head 23. Bone biopsy was sent as clearance fragment at time of last surgery, showed presence of MSSA. He was placed on Doxycycline 23 per Dr. Tamica Garcia. Patient denies any adverse effects related to antibiotic use. Patient has two wounds noted to left lateral foot today, he states that he thinks these started with use of negative pressure wound therapy. Patient has history of DM,previously uncontrolled. States his hemoglobin A1C was over 11, has been working with DM clinic and has decreased to around 7. Other pertinent history includes CKD, is a current hemodialysis patient, has fistula to right forearm. He denies any fever, chills, fatigue, malaise. Denies any further needs or concerns.      PAST MEDICAL HISTORY             Diagnosis Date    ASCVD (arteriosclerotic cardiovascular disease)     Cancer ([facility] [address] 2017    Dr. Henri Jung    CKD (chronic kidney disease) stage 3, GFR 30-59 ml/min ([facility])     see's Alexey Ibarra    COPD (chronic obstructive pulmonary disease) ([facility])     DM2 (diabetes mellitus, type 2) ([facility] [address]     Dyslipidemia     Hemodialysis patient ([facility] [address]     History of blood transfusion     Hyperlipidemia     Hypertension     Thyroid disease        PAST SURGICAL HISTORY     Past Surgical History:   Procedure Laterality Date    COLONOSCOPY Left 2018    COLONOSCOPY performed by Tristan Coats MD at [address] [facility]    COLONOSCOPY N/A 2022 COLONOSCOPY POLYPECTOMY SNARE/COLD BIOPSY performed by Zahra Shane MD at [facility]    ENDOSCOPY, COLON, DIAGNOSTIC      EYE SURGERY Left 2013    laser surgery    INSERTION / REMOVAL / REPLACEMENT VENOUS ACCESS CATHETER Right 12/04/2017    MEDIPORT INSERTION performed by Ladi Carter MD at [address] FLX DX W/COLLJ [address] PFRMD Left 09/16/2018    COLONOSCOPY DIAGNOSTIC OR SCREENING performed by Zahra Shane MD at [facility]    WA COLONOSCOPY W/BIOPSY SINGLE/MULTIPLE Left 11/24/2017    COLONOSCOPY WITH BIOPSY performed by Zahra Shane MD at [facility]    WA EGD TRANSORAL BIOPSY SINGLE/MULTIPLE N/A 11/23/2017    EGD BIOPSY performed by Zahra Shane MD at [place] [address]      mole on nose removed    SMALL INTESTINE SURGERY N/A 11/27/2017    SIGMOID COLON RESECTION, APPENDECTOMY, LIVER BIOPSY performed by Ladi Carter MD at [address]     UPPER GASTROINTESTINAL ENDOSCOPY Left 09/16/2018    EGD BIOPSY performed by Zahra Shane MD at [facility]     FAMILY HISTORY     Family History   Problem Relation Age of Onset    Cancer Father     Heart Disease Father     High Blood Pressure Mother      SOCIAL HISTORY     Social History     Tobacco Use    Smoking status: Never     Passive exposure: Never    Smokeless tobacco: Never   Vaping Use    Vaping Use: Never used   Substance Use Topics    Alcohol use: No     Alcohol/week: 0.0 standard drinks    Drug use: No       ALLERGIES     No Known Allergies    MEDICATIONS     Current Outpatient Medications on File Prior to Encounter   Medication Sig Dispense Refill    isosorbide mononitrate (IMDUR) 60 MG extended release tablet TAKE 2 TABLETS BY MOUTH TWICE DAILY 360 tablet 2    fenofibrate (TRICOR) 145 MG tablet TAKE 1 TABLET BY MOUTH DAILY 90 tablet 2    atorvastatin (LIPITOR) 80 MG tablet Take 1 tablet by mouth daily 90 tablet 3    XARELTO 2.5 MG TABS tablet TAKE 1 TABLET BY MOUTH TWICE DAILY 60 tablet 11    doxycycline hyclate (VIBRAMYCIN) 100 MG capsule Take 100 mg by mouth 2 times daily      metoprolol succinate (TOPROL XL) 25 MG extended release tablet Take 1 tablet by mouth daily 90 tablet 3    amLODIPine (NORVASC) 5 MG tablet TAKE 1 TABLET BY MOUTH DAILY 90 tablet 3    rivaroxaban (XARELTO) 2.5 MG TABS tablet Take 1 tablet by mouth 2 times daily 56 tablet 0    Omega-3 Fatty Acids (FISH OIL) 1000 MG CAPS Take 2 capsules by mouth 2 times daily 90 capsule 3    nitroGLYCERIN (NITROSTAT) 0.4 MG SL tablet up to max of 3 total doses. If no relief after 1 dose, call 911. (Patient not taking: Reported on 2/9/2023) 25 tablet 3    pantoprazole (PROTONIX) 40 MG tablet Take 1 tablet by mouth daily 30 tablet 3    torsemide (DEMADEX) 20 MG tablet Take 2 tablets by mouth daily 30 tablet 1    calcium acetate (PHOSLO) 667 MG capsule Take 1 capsule by mouth 3 times daily (with meals) 90 capsule 1    cloNIDine (CATAPRES) 0.1 MG tablet Take 2 tablets by mouth 2 times daily 60 tablet 3    doxazosin (CARDURA) 8 MG tablet Take 1 tablet by mouth 2 times daily 60 tablet 3    hydrALAZINE (APRESOLINE) 100 MG tablet Take 1 tablet by mouth 3 times daily 90 tablet 3    clopidogrel (PLAVIX) 75 MG tablet Take 1 tablet by mouth daily 30 tablet 3    insulin glargine (TOUJEO SOLOSTAR) 300 UNIT/ML injection pen Inject 10 Units into the skin daily      Blood Pressure Monitor KIT CHECK BP TWICE DAILY IF SBP >140 CALL PCP 1 kit 0     No current facility-administered medications on file prior to encounter.        REVIEW OF SYSTEMS:     Constitutional: Denies fever, chills, night sweats, fatigue, weight loss/gain, loss of appetite   Head: Denies headache,  dizziness, loss of consciousness  Respiratory: Denies shortness of breath, cough, wheezing, dyspnea with exertion  Cardiovascular:Denies chest pain, palpitations, edema  Gastrointestinal: Denies nausea, vomiting, constipation, diarrhea, abdominal pain   LIYA: Denies dysuria, frequency, urgency, hematuria  Musculoskeletal: Denies joint pain, swelling , stiffness,  Endocrine: Denies polyuria, polydipsia, cold or heat intolerance  Hematology: Denies easy brusing or bleeding, hx of clotting disorder  Dermatology: +wound Denies skin rash, eczema, pruritis    PHYSICAL EXAM:     BP (!) 176/83   Pulse 75   Temp 98.5 °F (36.9 °C) (Infrared)   Resp 16   SpO2 98%   Wt Readings from Last 3 Encounters:   09/22/22 245 lb (111.1 kg)   07/10/22 241 lb (109.3 kg)   02/17/22 244 lb 6.4 oz (110.9 kg)       General:  Awake, alert, no apparent distress. Appears stated age  [de-identified]: conjuctivae are clear without exudate or hemorrhage, anicteric sclera, moist oral mucosa. Chest:  Respirations regular, non-labored. Chest rise and fall equal bilaterally. Abdomen:  Soft, non tender to palpation. Neurological: Awake, alert, oriented x4  Psychiatric:  Appropriate mood and affect  Extremities: non-traumatic in appearance. Pedal and posterior tibial pulses +1 to left foot. Skin:  Warm and dry  Wound:    Left lateral foot, proximal and distal wounds shows dry eschar. Periwound shows blanchable erythema    Left great toe amputation incision site shows intact sutures. Edges do not appear to be approximated to distal portion of incision. Periwound macerated with blanchable erythema. No warmth to the touch or fluctuance noted. Wound 02/09/23 Foot Left;Proximal;Lateral #1 (Active)   Wound Image   02/09/23 1341   Wound Etiology Diabetic 02/09/23 1341   Dressing Status Intact; Old drainage noted 02/09/23 1341   Wound Cleansed Cleansed with saline 02/09/23 1341   Wound Length (cm) 1.5 cm 02/09/23 1341   Wound Width (cm) 1 cm 02/09/23 1341   Wound Depth (cm) 0 cm 02/09/23 1341   Wound Surface Area (cm^2) 1.5 cm^2 02/09/23 1341   Wound Volume (cm^3) 0 cm^3 02/09/23 1341   Wound Assessment Eschar dry 02/09/23 1341   Drainage Amount None 02/09/23 1341   Odor None 02/09/23 1341   Latrice-wound Assessment Dry/flaky; Blanchable erythema; Intact 02/09/23 1341   Margins Attached edges 02/09/23 1341   Wound Thickness Description not for Pressure Injury Full thickness 02/09/23 1341   Number of days: 0       Wound 02/09/23 Foot Left;Distal;Lateral #2 (Active)   Wound Image   02/09/23 1341   Wound Etiology Diabetic 02/09/23 1341   Dressing Status Intact; Old drainage noted 02/09/23 1341   Wound Cleansed Cleansed with saline 02/09/23 1341   Wound Length (cm) 3 cm 02/09/23 1341   Wound Width (cm) 2 cm 02/09/23 1341   Wound Depth (cm) 0 cm 02/09/23 1341   Wound Surface Area (cm^2) 6 cm^2 02/09/23 1341   Wound Volume (cm^3) 0 cm^3 02/09/23 1341   Wound Assessment Eschar dry 02/09/23 1341   Drainage Amount None 02/09/23 1341   Odor None 02/09/23 1341   Latrice-wound Assessment Intact;Dry/flaky; Blanchable erythema 02/09/23 1341   Margins Attached edges 02/09/23 1341   Wound Thickness Description not for Pressure Injury Full thickness 02/09/23 1341   Number of days: 0       Wound 02/09/23 Foot Left great toe amp site #3 (Active)   Wound Image   02/09/23 1341   Wound Etiology Diabetic 02/09/23 1341   Dressing Status Intact; Old drainage noted 02/09/23 1341   Wound Cleansed Cleansed with saline 02/09/23 1341   Wound Length (cm) 6 cm 02/09/23 1341   Wound Width (cm) 0.2 cm 02/09/23 1341   Wound Depth (cm) 0.8 cm 02/09/23 1341   Wound Surface Area (cm^2) 1.2 cm^2 02/09/23 1341   Wound Volume (cm^3) 0.96 cm^3 02/09/23 1341   Wound Assessment Pale granulation tissue 02/09/23 1341   Drainage Amount Moderate 02/09/23 1341   Drainage Description Serosanguinous; Yellow 02/09/23 1341   Odor None 02/09/23 1341   Latrice-wound Assessment Dry/flaky; Intact; Maceration;Blanchable erythema 02/09/23 1341   Margins Attached edges 02/09/23 1341   Wound Thickness Description not for Pressure Injury Full thickness 02/09/23 1341   Number of days: 0         LABS/IMAGING             Latest Reference Range & Units 1/6/23 12:05   Sodium 135 - 145 mEq/L 135   Potassium 3.6 - 5.0 mEq/L 5.8 (H)   CO2 21 - 32 mEq/L 25   BUN,BUNPL 7 - 20 mg/dL 92 (H)   Creatinine 0.60 - 1.30 mg/dL 5.95 (H)   Creatinine Clearance  10 (L)   Magnesium 1.8 - 2.5 mg/dL 1.5 (L)   Glucose, Random 70 - 110 mg/dL 159 (H)   CALCIUM, SERUM, 720731 8.8 - 10.5 mg/dL 9.50   Phosphorus 2.4 - 4.7 mg/dL 4.6   CHOLESTEROL/HDL RELATIVE RISK 4.0 - 5.0  3.3 (L)   Direct-LDL / HDL Risk <3.1  1.7   Cholesterol <200 mg/dL 105   HDL Cholesterol mg/dL 31 (L)   LDL DIRECT,LDL mg/dL 55   Triglycerides <150 mg/dL 107   VLDL <39 mg/dL 21   Albumin 3.5 - 5.0 g/dL 3.3 (L)   Alk Phos 41 - 137 IU/L 49   ALT 10 - 40 IU/L 34   AST 15 - 41 IU/L 26   Hemoglobin A1C 4.4 - 6.4 % 7.0 (H)   eAG (mg/dL) mg/dL 154   PTH 12.0 - 88.0 U/mL 163.4 (H)   Vit D, 25-Hydroxy 30.00 - 100 ng/mL 41.10   WBC 4.4 - 10.5 th/cmm 8.3   RBC 4.50 - 6.00 mil/cmm 3.54 (L)   Hemoglobin Quant 13.5 - 16.5 gm/dL 11.2 (L)   Hematocrit 40.0 - 49.0 % 33.3 (L)   MCV 80 - 97 CU GAIL 94.3   MCH 27.5 - 33.0 PG 31.6   MCHC 33.0 - 36.0 gm/dL 33.5   RDW 12.0 - 16.0 % 15.2   Platelet Count 762 - 400 th/cmm 121 (L)   Neutrophils % 40 - 70 % 76.3 (H)   Lymphocyte % 15 - 45 % 12.1 (L)   Absolute Mono # 0 - 800 /cmm 600   Monocytes % 2 - 10 % 7.8   Eosinophils % 0 - 6 % 2.7   Basophils % 0 - 2 % 1.1   Basophils Absolute 0 - 200 /cmm 100   Absolute Neut # 1800 - 7700 /cmm 6300   Absolute Lymph # 1000 - 4800 /cmm 1000   Absolute Eos # 0 - 500 /cmm 200   Nucleated Red Blood Cells <1 /100 WBC 0.0   Ferritin 20 - 250 ng/mL 890 (H)   Iron, Serum 45 - 182 mcg/dL 91   Iron Saturation 20 - 50 % 27   Transferrin 180 - 329 mg/dL 234   Retic Ct Abs 0.90 - 2.60 % 1.75   (H): Data is abnormally high  (L): Data is abnormally low    ASSESSMENT     -Osteomyelitis, left foot  -Nonhealing surgical wound     Patient Active Problem List   Diagnosis Code    Accelerated hypertension I10    NSTEMI (non-ST elevated myocardial infarction) ([facility] [address] I21.4    Coronary artery disease involving native coronary artery of native heart I25.10    Metastatic colon cancer to liver (HCC) C18.9, C78.7    History of non-ST elevation myocardial infarction (NSTEMI) I25.2    Physical deconditioning R53.81    ESRD (end stage renal disease) (HCC) N18.6    Anemia D64.9    Leucopenia D72.819    Pancytopenia (HCC) D61.818    AV junctional bradycardia R00.1    Non-compliance with renal dialysis ([facility] [address] Z91.15    Anemia due to chronic kidney disease N18.9, D63.1       PLAN     Patient examined and evaluated. All available lab work, radiology studies, and progress notes pertaining to Donn Human reviewed prior to or during patient visit today.    -Osteomyelitis, left foot, Nonhealing surgical wound- Patient currently taking Doxycycline for this problem, no adverse effects reported. Will continue Doxycycline, plan for 6 weeks treatment. Patient is ESRD patient receiving dialysis three times weekly. Common side effects of antibiotic reviewed. Advised patient to drink plenty of water (within limits set by nephrology), do not lay flat for 1 hour after taking medication, avoid prolonged exposure to the sun while on antibiotic. Recommend yogurt or probiotic daily while on antibiotics for GI health. Will avoid IV antibiotics if able in an effort to preserve vascular access. CMP, CBC, ESR, CRP ordered for safety monitoring. Patient is scheduled to receive dialysis tomorrow, advised him to have completed at that time if able. Xray of foot ordered today as patient states it has been quite a while since this was completed to evaluate for extent of bony involvement. Advised patient that antibiotics are not a guaranteed fix for osteomyelitis. While our goal is resolution of the problem, surgical debridement may still be necessary for complete resolution. He verbalized understanding. -DM- Education provided on the importance of good control of DM in wound healing and treatment of infection. Recommend well balanced diet.   Encouraged him to follow up with DM clinic as scheduled for ongoing monitoring.     -Nonhealing surgical wound- Incision line does appear to be opening, edges are not well approximated today. Reinforced importance of keeping wound clean and covered. Encouraged patient to follow up with Dr. Tamica Garcia as scheduled next week for ongoing wound care needs.     -Education provided on signs and symptoms of worsening infection. Call clinic or seek emergency care should these occur. Follow up 2 weeks. Call clinic with any needs or concerns prior to scheduled visit. All questions and concerns addressed prior to discharge from today's visit. Please see attached Discharge Instructions    Written patient dismissal instructions given to patient and signed by patient or POA. Treatment:   Orders Placed This Encounter   Procedures    XR FOOT LEFT (MIN 3 VIEWS)     Standing Status:   Future     Standing Expiration Date:   2/9/2024    CBC     Standing Status:   Future     Standing Expiration Date:   2/9/2024    C-Reactive Protein     Standing Status:   Future     Standing Expiration Date:   2/9/2024    Sedimentation Rate     Standing Status:   Future     Standing Expiration Date:   2/9/2024    Comprehensive Metabolic Panel     Standing Status:   Future     Standing Expiration Date:   2/9/2024     I spent a total of 40 minutes with Efren Sheets  on 2/9/2023. Time spent includes review of previous progress notes, reviewing and discussing test results, education on plan of care for presenting diagnosis, answering questions, providing instructions on treatment and/or medications ordered, reviewing importance of compliance with outline treatment plan and any identifiable barriers to compliance and coordination of care based on plan and assessment as noted.      Discharge Instructions     Discharge Instructions         Visit Discharge/Physician Orders: Continue oral Doxycycline prescription sent to pharmacy  - CRP,ESR, CBC,BMP  ordered today see if they can draw at dialysis  - x-ray ordered today have done within the next week    [facility] Care: [facility]    Wound Location: left foot    Dressing orders: per Dr. Shelley Holden    Follow up visit:   3 Weeks 3/2/23 at 2:30pm    Keep next scheduled appointment. Please give 24 hour notice if unable to keep appointment. [phone]    If you experience any of the following, please call the Wound Care Service during business hours: Monday through Friday 8:00 am - 4:30 pm  ([phone]). *Increase in pain   *Temperature over 101   *Increase in drainage from your wound or a foul odor   *Uncontrolled swelling   *Need for compression bandage changes due to slippage, breakthrough drainage    If you need medical attention outside of business hours, please contact your Primary Care Doctor or go to the nearest emergency room.                 Electronically signed by LARRY Ramirez CNP on 2/9/2023 at 2:47 PM

## 2023-02-09 NOTE — PLAN OF CARE
Problem: Wound:  Goal: Will show signs of wound healing; wound closure and no evidence of infection  Description: Will show signs of wound healing; wound closure and no evidence of infection  Outcome: Progressing   Pt. Seen today for ID consult see AVS for new orders. Follow up in 3 weeks. Care plan reviewed with patient. Patient verbalize understanding of the plan of care and contribute to goal setting.

## 2023-03-02 ENCOUNTER — HOSPITAL ENCOUNTER (OUTPATIENT)
Dept: WOUND CARE | Age: 58
Discharge: HOME | End: 2023-03-02
Payer: MEDICARE

## 2023-03-02 VITALS
TEMPERATURE: 98.2 F | HEART RATE: 89 BPM | OXYGEN SATURATION: 96 % | RESPIRATION RATE: 18 BRPM | SYSTOLIC BLOOD PRESSURE: 128 MMHG | DIASTOLIC BLOOD PRESSURE: 60 MMHG

## 2023-03-02 DIAGNOSIS — M86.272 SUBACUTE OSTEOMYELITIS OF LEFT FOOT (HCC): Primary | ICD-10-CM

## 2023-03-02 PROCEDURE — 99213 OFFICE O/P EST LOW 20 MIN: CPT

## 2023-03-02 PROCEDURE — 99214 OFFICE O/P EST MOD 30 MIN: CPT | Performed by: NURSE PRACTITIONER

## 2023-03-02 RX ORDER — DOXYCYCLINE HYCLATE 100 MG
100 TABLET ORAL 2 TIMES DAILY
Qty: 28 TABLET | Refills: 0 | Status: SHIPPED | OUTPATIENT
Start: 2023-03-02 | End: 2023-03-16

## 2023-03-02 NOTE — PLAN OF CARE
Problem: Wound:  Goal: Will show signs of wound healing; wound closure and no evidence of infection  Description: Will show signs of wound healing; wound closure and no evidence of infection  Outcome: Progressing  Note: Patient seen for left foot wounds. Wound shows signs of proper closure and healing. No s/s of infection noted. Follow up in 2 weeks. Lab orders to be drawn in dialysis tomorrow and in 2 weeks. See AVS for order changes. Care plan reviewed with patient. Patient verbalize understanding of the plan of care and contribute to goal setting.

## 2023-03-02 NOTE — PROGRESS NOTES
[address] [facility]  Consult and Procedure Note       [address]  RECORD NUMBER:  [id number]  AGE: 66 y.o. GENDER: male  : 1965  EPISODE DATE:  3/2/2023    SUBJECTIVE:     Chief Complaint   Patient presents with    Wound Check         HISTORY OF PRESENT ILLNESS      Pete Forde is a 66 y.o. male who presents today for evaluation of osteomyelitis of left foot. Patient referred to clinic by Dr. Orlin Thompson who has been following patient for nonhealing wound to left foot, is s/p excision of left first metatarsal head 23. Bone biopsy was sent as clearance fragment at time of last surgery, showed presence of MSSA. He was placed on Doxycycline 23 per Dr. Orlin Thompson. At last visit these were continued with plan for 6 weeks total treatment. Patient states since last visit he stopped taking antibiotic, unclear when. He states that he was recently evaluated by Dr. Orlin Thompson. States that he had no concerns regarding wounds. Xray as ordered at last visit were completed at Dr. Shahzad Nathan office. Patient states that he was told by Dr. Orlin Thompson that they looked good, no report available at time of visit today. Patient has history of DM,previously uncontrolled. States his hemoglobin A1C was over 11, has been working with DM clinic and has decreased to around 7. Other pertinent history includes CKD, is a current hemodialysis patient, has fistula to right forearm. He denies any fever, chills, fatigue, malaise. Denies any further needs or concerns.      PAST MEDICAL HISTORY             Diagnosis Date    ASCVD (arteriosclerotic cardiovascular disease)     Cancer ([facility] 2017    Dr. Eddie Mcdaniel    CKD (chronic kidney disease) stage 3, GFR 30-59 ml/min ([facility])     see's To Pérez    COPD (chronic obstructive pulmonary disease) ([facility])     DM2 (diabetes mellitus, type 2) ([facility]     Dyslipidemia     Hemodialysis patient ([facility]     History of blood transfusion     Hyperlipidemia     Hypertension Osteomyelitis of left foot ([facility] 2/9/2023    Thyroid disease        PAST SURGICAL HISTORY     Past Surgical History:   Procedure Laterality Date    COLONOSCOPY Left 12/18/2018    COLONOSCOPY performed by Kobi Muir MD at [facility]    COLONOSCOPY N/A 02/17/2022    COLONOSCOPY POLYPECTOMY SNARE/COLD BIOPSY performed by Kobi Muir MD at [facility]    ENDOSCOPY, COLON, DIAGNOSTIC      EYE SURGERY Left 2013    laser surgery    INSERTION / REMOVAL / REPLACEMENT VENOUS ACCESS CATHETER Right 12/04/2017    MEDIPORT INSERTION performed by Adam Dimas MD at [address] [facility] FLX DX W/COLLJ [address] [facility] Left 09/16/2018    COLONOSCOPY DIAGNOSTIC OR SCREENING [facility]    WI COLONOSCOPY W/BIOPSY SINGLE/MULTIPLE Left 11/24/2017    COLONOSCOPY WITH BIOPSY performed by Kobi Muir MD at [facility]    WI EGD TRANSORAL BIOPSY SINGLE/MULTIPLE N/A 11/23/2017    EGD BIOPSY performed by Kobi Muir MD at [place] [address]      mole on nose removed    SMALL INTESTINE SURGERY N/A 11/27/2017    SIGMOID COLON RESECTION, APPENDECTOMY, LIVER BIOPSY performed [facility]     UPPER GASTROINTESTINAL ENDOSCOPY Left 09/16/2018    EGD BIOPSY performed by Kobi Muir MD at [facility]     FAMILY HISTORY     Family History   Problem Relation Age of Onset    Cancer Father     Heart Disease Father     High Blood Pressure Mother      SOCIAL HISTORY     Social History     Tobacco Use    Smoking status: Never     Passive exposure: Never    Smokeless tobacco: Never   Vaping Use    Vaping Use: Never used   Substance Use Topics    Alcohol use: No     Alcohol/week: 0.0 standard drinks    Drug use: No       ALLERGIES     No Known Allergies    MEDICATIONS     Current Outpatient Medications on File Prior to Encounter   Medication Sig Dispense Refill    isosorbide mononitrate (IMDUR) 60 MG extended release tablet TAKE 2 TABLETS BY MOUTH TWICE DAILY 360 tablet 2    fenofibrate (TRICOR) 145 MG tablet TAKE 1 TABLET BY MOUTH DAILY 90 tablet 2    atorvastatin (LIPITOR) 80 MG tablet Take 1 tablet by mouth daily 90 tablet 3    XARELTO 2.5 MG TABS tablet TAKE 1 TABLET BY MOUTH TWICE DAILY 60 tablet 11    doxycycline hyclate (VIBRAMYCIN) 100 MG capsule Take 100 mg by mouth 2 times daily      metoprolol succinate (TOPROL XL) 25 MG extended release tablet Take 1 tablet by mouth daily 90 tablet 3    amLODIPine (NORVASC) 5 MG tablet TAKE 1 TABLET BY MOUTH DAILY 90 tablet 3    rivaroxaban (XARELTO) 2.5 MG TABS tablet Take 1 tablet by mouth 2 times daily 56 tablet 0    Omega-3 Fatty Acids (FISH OIL) 1000 MG CAPS Take 2 capsules by mouth 2 times daily 90 capsule 3    nitroGLYCERIN (NITROSTAT) 0.4 MG SL tablet up to max of 3 total doses. If no relief after 1 dose, call 911. (Patient not taking: No sig reported) 25 tablet 3    pantoprazole (PROTONIX) 40 MG tablet Take 1 tablet by mouth daily 30 tablet 3    torsemide (DEMADEX) 20 MG tablet Take 2 tablets by mouth daily 30 tablet 1    calcium acetate (PHOSLO) 667 MG capsule Take 1 capsule by mouth 3 times daily (with meals) 90 capsule 1    cloNIDine (CATAPRES) 0.1 MG tablet Take 2 tablets by mouth 2 times daily 60 tablet 3    doxazosin (CARDURA) 8 MG tablet Take 1 tablet by mouth 2 times daily 60 tablet 3    hydrALAZINE (APRESOLINE) 100 MG tablet Take 1 tablet by mouth 3 times daily 90 tablet 3    clopidogrel (PLAVIX) 75 MG tablet Take 1 tablet by mouth daily 30 tablet 3    insulin glargine (TOUJEO SOLOSTAR) 300 UNIT/ML injection pen Inject 10 Units into the skin daily      Blood Pressure Monitor KIT CHECK BP TWICE DAILY IF SBP >140 CALL PCP 1 kit 0     No current facility-administered medications on file prior to encounter.        REVIEW OF SYSTEMS:     Constitutional: Denies fever, chills, night sweats, fatigue, weight loss/gain, loss of appetite   Head: Denies headache,  dizziness, loss of consciousness  Respiratory: Denies shortness of breath, cough, wheezing, dyspnea with exertion  Cardiovascular:Denies chest pain, palpitations, edema  Gastrointestinal: Denies nausea, vomiting, constipation, diarrhea, abdominal pain   LIYA: Denies dysuria, frequency, urgency, hematuria  Musculoskeletal: Denies joint pain, swelling , stiffness,  Endocrine: Denies polyuria, polydipsia, cold or heat intolerance  Hematology: Denies easy brusing or bleeding, hx of clotting disorder  Dermatology: +wound Denies skin rash, eczema, pruritis    PHYSICAL EXAM:     /60   Pulse 89   Temp 98.2 °F (36.8 °C) (Infrared)   Resp 18   SpO2 96%   Wt Readings from Last 3 Encounters:   09/22/22 245 lb (111.1 kg)   07/10/22 241 lb (109.3 kg)   02/17/22 244 lb 6.4 oz (110.9 kg)       General:  Awake, alert, no apparent distress. Appears stated age  [de-identified]: conjuctivae are clear without exudate or hemorrhage, anicteric sclera, moist oral mucosa. Chest:  Respirations regular, non-labored. Chest rise and fall equal bilaterally. Abdomen:  Soft, non tender to palpation. Neurological: Awake, alert, oriented x4  Psychiatric:  Appropriate mood and affect  Extremities: non-traumatic in appearance. Pedal and posterior tibial pulses +1 to left foot. Skin:  Warm and dry  Wound:    Left lateral foot, proximal and distal wound bed slough present. Periwound shows blanchable erythema    Left great toe amputation incision site shows intact sutures, open area noted to distal incision, wound bed dry with slough. Periwound hyperkeratosis. No warmth to the touch or fluctuance noted. Wound 02/09/23 Foot Left;Proximal;Lateral #1 (Active)   Wound Image   03/02/23 1437   Wound Etiology Diabetic 03/02/23 1437   Dressing Status Intact; Old drainage noted 03/02/23 1437   Wound Cleansed Cleansed with saline 03/02/23 1437   Dressing/Treatment ABD; Ace wrap;Betadine swabs/povidone iodine; Roll gauze 03/02/23 1437   Offloading for Diabetic Foot Ulcers Offloading ordered;Post op shoe 03/02/23 1437   Wound Length (cm) 0.8 cm 03/02/23 1437   Wound Width (cm) 0.4 cm 03/02/23 1437   Wound Depth (cm) 0.2 cm 03/02/23 1437   Wound Surface Area (cm^2) 0.32 cm^2 03/02/23 1437   Change in Wound Size % (l*w) 78.67 03/02/23 1437   Wound Volume (cm^3) 0.064 cm^3 03/02/23 1437   Wound Assessment Neeraj Mcclelland 03/02/23 1437   Drainage Amount Scant 03/02/23 1437   Drainage Description Yellow 03/02/23 1437   Odor None 03/02/23 1437   Latrice-wound Assessment Dry/flaky; Blanchable erythema; Intact 03/02/23 1437   Margins Attached edges 03/02/23 1437   Wound Thickness Description not for Pressure Injury Full thickness 03/02/23 1437   Number of days: 21       Wound 02/09/23 Foot Left;Distal;Lateral #2 (Active)   Wound Image   03/02/23 1437   Wound Etiology Diabetic 03/02/23 1437   Dressing Status Intact; Old drainage noted 03/02/23 1437   Wound Cleansed Cleansed with saline 03/02/23 1437   Dressing/Treatment ABD; Ace wrap;Betadine swabs/povidone iodine; Roll gauze 03/02/23 1437   Offloading for Diabetic Foot Ulcers Offloading ordered;Post op shoe 03/02/23 1437   Wound Length (cm) 2.6 cm 03/02/23 1437   Wound Width (cm) 1.1 cm 03/02/23 1437   Wound Depth (cm) 0.1 cm 03/02/23 1437   Wound Surface Area (cm^2) 2.86 cm^2 03/02/23 1437   Change in Wound Size % (l*w) 52.33 03/02/23 1437   Wound Volume (cm^3) 0.286 cm^3 03/02/23 1437   Wound Assessment Slough;Granulation tissue; Devitalized tissue 03/02/23 1437   Drainage Amount Small 03/02/23 1437   Drainage Description Serosanguinous; Yellow 03/02/23 1437   Odor None 03/02/23 1437   Latrice-wound Assessment Dry/flaky; Intact 03/02/23 1437   Margins Attached edges 03/02/23 1437   Wound Thickness Description not for Pressure Injury Full thickness 03/02/23 1437   Number of days: 21       Wound 02/09/23 Foot Left great toe amp site #3 (Active)   Wound Image   03/02/23 1437   Wound Etiology Diabetic 03/02/23 1437   Dressing Status Intact; Old drainage noted 03/02/23 1437   Wound Cleansed Cleansed with saline 03/02/23 1437   Dressing/Treatment ABD; Ace wrap;Betadine swabs/povidone iodine; Roll gauze 03/02/23 1437   Offloading for Diabetic Foot Ulcers Post op shoe 03/02/23 1437   Wound Length (cm) 2.4 cm 03/02/23 1437   Wound Width (cm) 0.3 cm 03/02/23 1437   Wound Depth (cm) 0.8 cm 03/02/23 1437   Wound Surface Area (cm^2) 0.72 cm^2 03/02/23 1437   Change in Wound Size % (l*w) 40 03/02/23 1437   Wound Volume (cm^3) 0.576 cm^3 03/02/23 1437   Wound Healing % 40 03/02/23 1437   Wound Assessment Slough;Dry 03/02/23 1437   Drainage Amount Small 03/02/23 1437   Drainage Description Serosanguinous 03/02/23 1437   Odor None 03/02/23 1437   Latrice-wound Assessment Hyperkeratosis (callous); Dry/flaky 03/02/23 1437   Margins Attached edges 03/02/23 1437   Wound Thickness Description not for Pressure Injury Full thickness 03/02/23 1437   Number of days: 21         LABS/IMAGING                     Latest Reference Range & Units 1/6/23 12:05   Sodium 135 - 145 mEq/L 135   Potassium 3.6 - 5.0 mEq/L 5.8 (H)   CO2 21 - 32 mEq/L 25   BUN,BUNPL 7 - 20 mg/dL 92 (H)   Creatinine 0.60 - 1.30 mg/dL 5.95 (H)   Creatinine Clearance  10 (L)   Magnesium 1.8 - 2.5 mg/dL 1.5 (L)   Glucose, Random 70 - 110 mg/dL 159 (H)   CALCIUM, SERUM, 074839 8.8 - 10.5 mg/dL 9.50   Phosphorus 2.4 - 4.7 mg/dL 4.6   CHOLESTEROL/HDL RELATIVE RISK 4.0 - 5.0  3.3 (L)   Direct-LDL / HDL Risk <3.1  1.7   Cholesterol <200 mg/dL 105   HDL Cholesterol mg/dL 31 (L)   LDL DIRECT,LDL mg/dL 55   Triglycerides <150 mg/dL 107   VLDL <39 mg/dL 21   Albumin 3.5 - 5.0 g/dL 3.3 (L)   Alk Phos 41 - 137 IU/L 49   ALT 10 - 40 IU/L 34   AST 15 - 41 IU/L 26   Hemoglobin A1C 4.4 - 6.4 % 7.0 (H)   eAG (mg/dL) mg/dL 154   PTH 12.0 - 88.0 U/mL 163.4 (H)   Vit D, 25-Hydroxy 30.00 - 100 ng/mL 41.10   WBC 4.4 - 10.5 th/cmm 8.3   RBC 4.50 - 6.00 mil/cmm 3.54 (L)   Hemoglobin Quant 13.5 - 16.5 gm/dL 11.2 (L)   Hematocrit 40.0 - 49.0 % 33.3 (L)   MCV 80 - 97 CU GAIL 94.3   MCH 27.5 - 33.0 PG 31.6   MCHC 33.0 - 36.0 gm/dL 33.5   RDW 12.0 - 16.0 % 15.2   Platelet Count 150 - 400 th/cmm 121 (L)   Neutrophils % 40 - 70 % 76.3 (H)   Lymphocyte % 15 - 45 % 12.1 (L)   Absolute Mono # 0 - 800 /cmm 600   Monocytes % 2 - 10 % 7.8   Eosinophils % 0 - 6 % 2.7   Basophils % 0 - 2 % 1.1   Basophils Absolute 0 - 200 /cmm 100   Absolute Neut # 1800 - 7700 /cmm 6300   Absolute Lymph # 1000 - 4800 /cmm 1000   Absolute Eos # 0 - 500 /cmm 200   Nucleated Red Blood Cells <1 /100 WBC 0.0   Ferritin 20 - 250 ng/mL 890 (H)   Iron, Serum 45 - 182 mcg/dL 91   Iron Saturation 20 - 50 % 27   Transferrin 180 - 329 mg/dL 234   Retic Ct Abs 0.90 - 2.60 % 1.75   (H): Data is abnormally high  (L): Data is abnormally low    ASSESSMENT     -Osteomyelitis, left foot  -Nonhealing surgical wound     Patient Active Problem List   Diagnosis Code    Accelerated hypertension I10    NSTEMI (non-ST elevated myocardial infarction) ([facility]) I21.4    Coronary artery disease involving native coronary artery of native heart I25.10    Metastatic colon cancer to liver ([facility]) C18.9, C78.7    History of non-ST elevation myocardial infarction (NSTEMI) I25.2    Physical deconditioning R53.81    ESRD (end stage renal disease) ([facility]) N18.6    Anemia D64.9    Leucopenia D72.819    Pancytopenia ([facility]) D61.818    AV junctional bradycardia R00.1    Non-compliance with renal dialysis ([facility]) Z91.15    Anemia due to chronic kidney disease N18.9, D63.1    Dehiscence of operative wound T81.31XA    Diabetes mellitus ([facility]) E11.9    End-stage renal disease on hemodialysis ([facility]) N18.6, Z99.2    Hyperlipidemia E78.5    Osteomyelitis of left foot ([facility]) M86.9       PLAN     Patient examined and evaluated.      All available lab work, radiology studies, and progress notes pertaining to Zachary Hoffman reviewed prior to or during patient visit today.    -Osteomyelitis, left foot, Nonhealing surgical wound- Markers of inflammation elevated on last  draw.  Recommend patient restart Doxycycline, prescription sent for 2 week supply today. Common side effects of antibiotic reviewed. Advised patient to drink plenty of water (within limits set by nephrology), do not lay flat for 1 hour after taking medication, avoid prolonged exposure to the sun while on antibiotic. Recommend yogurt or probiotic daily while on antibiotics for GI health. Repeat CBC, ESR, CRP ordered for monitoring of infectious process. Have drawn at dialysis tomorrow. Repeat in 2 weeks. Advised patient that antibiotics are not a guaranteed fix for osteomyelitis. While our goal is resolution of the problem, surgical debridement may still be necessary for complete resolution. He verbalized understanding. -DM- Education provided on the importance of good control of DM in wound healing and treatment of infection. Recommend well balanced diet. Encouraged him to follow up with DM clinic as scheduled for ongoing monitoring.     -Nonhealing surgical wound- Appears improved as compared to last visit. Encouraged patient to follow up with Dr. Rosalina Bauer as scheduled for ongoing wound care needs.     -Education provided on signs and symptoms of worsening infection. Call clinic or seek emergency care should these occur. Follow up 2 weeks. Call clinic with any needs or concerns prior to scheduled visit. All questions and concerns addressed prior to discharge from today's visit. Please see attached Discharge Instructions    Written patient dismissal instructions given to patient and signed by patient or POA.            Treatment:   Orders Placed This Encounter   Procedures    CBC with Auto Differential     Standing Status:   Future     Standing Expiration Date:   3/2/2024    C-Reactive Protein     Standing Status:   Future     Standing Expiration Date:   3/2/2024    Sedimentation Rate     Standing Status:   Future     Standing Expiration Date:   3/2/2024    CBC with Auto Differential     Standing Status:   Future     Standing Expiration Date:   3/2/2024    C-Reactive Protein     Standing Status:   Future     Standing Expiration Date:   3/2/2024    Sedimentation Rate     Standing Status:   Future     Standing Expiration Date:   3/2/2024       I spent a total of 35 minutes with Srikanth Chilel  on 3/2/2023. Time spent includes review of previous progress notes, reviewing and discussing test results, education on plan of care for presenting diagnosis, answering questions, providing instructions on treatment and/or medications ordered, reviewing importance of compliance with outline treatment plan and any identifiable barriers to compliance and coordination of care based on plan and assessment as noted. Discharge Instructions     Discharge Instructions         Visit Discharge/Physician Orders:   - please have labs drawn in dialysis Friday 3/3/23 CBC, ESR, CRP  - labs also to be drawn in 2 weeks  - 2 weeks of doxycycline ordered. Prescription sent to [facility]. - drink plenty of water  - limit time in sun while on antibiotic          Home Care: [facility]     Wound Location: left foot     Dressing orders: per Dr. Abhishek Milner     Follow up visit:   2 Weeks Thursday March [address]th [address] next scheduled appointment. Please give 24 hour notice if unable to keep appointment. [phone]     If you experience any of the following, please call the Wound Care Service during business hours: Monday through Friday 8:00 am - 4:30 pm  ([phone]). *Increase in pain              *Temperature over 101              *Increase in drainage from your wound or a foul odor              *Uncontrolled swelling              *Need for compression bandage changes due to slippage, breakthrough drainage     If you need medical attention outside of business hours, please contact your Primary Care Doctor or go to the nearest emergency room.                    Electronically signed by LARRY Flood CNP on 3/2/2023 at 3:32 PM

## 2023-03-02 NOTE — DISCHARGE INSTRUCTIONS
Visit Discharge/Physician Orders:   - please have labs drawn in dialysis Friday 3/3/23 CBC, ESR, CRP  - labs also to be drawn in 2 weeks  - 2 weeks of doxycycline ordered. Prescription sent to [facility]. - drink plenty of water  - limit time in sun while on antibiotic          Home Care: [facility]     Wound Location: left foot     Dressing orders: per Dr. Abhishek Milner     Follow up visit:   2 Weeks Thursday March 16th at [address] next scheduled appointment. Please give 24 hour notice if unable to keep appointment. [phone]     If you experience any of the following, please call the Wound Care Service during business hours: Monday through Friday 8:00 am - 4:30 pm  ([phone]). *Increase in pain              *Temperature over 101              *Increase in drainage from your wound or a foul odor              *Uncontrolled swelling              *Need for compression bandage changes due to slippage, breakthrough drainage     If you need medical attention outside of business hours, please contact your Primary Care Doctor or go to the nearest emergency room.

## 2023-03-16 ENCOUNTER — TELEPHONE (OUTPATIENT)
Dept: WOUND CARE | Age: 58
End: 2023-03-16

## 2023-03-16 ENCOUNTER — HOSPITAL ENCOUNTER (OUTPATIENT)
Dept: WOUND CARE | Age: 58
Discharge: HOME | End: 2023-03-16
Payer: MEDICARE

## 2023-03-16 VITALS
RESPIRATION RATE: 18 BRPM | TEMPERATURE: 97 F | HEART RATE: 78 BPM | DIASTOLIC BLOOD PRESSURE: 65 MMHG | OXYGEN SATURATION: 94 % | SYSTOLIC BLOOD PRESSURE: 123 MMHG

## 2023-03-16 DIAGNOSIS — T81.31XD DEHISCENCE OF OPERATIVE WOUND, SUBSEQUENT ENCOUNTER: ICD-10-CM

## 2023-03-16 DIAGNOSIS — M86.172 OTHER ACUTE OSTEOMYELITIS OF LEFT FOOT (HCC): Primary | ICD-10-CM

## 2023-03-16 PROCEDURE — 99214 OFFICE O/P EST MOD 30 MIN: CPT | Performed by: NURSE PRACTITIONER

## 2023-03-16 PROCEDURE — 99213 OFFICE O/P EST LOW 20 MIN: CPT

## 2023-03-16 RX ORDER — LIDOCAINE 40 MG/G
CREAM TOPICAL ONCE
OUTPATIENT
Start: 2023-03-16 | End: 2023-03-16

## 2023-03-16 RX ORDER — BACITRACIN ZINC AND POLYMYXIN B SULFATE 500; 1000 [USP'U]/G; [USP'U]/G
OINTMENT TOPICAL ONCE
Status: CANCELLED | OUTPATIENT
Start: 2023-03-16 | End: 2023-03-16

## 2023-03-16 RX ORDER — GENTAMICIN SULFATE 1 MG/G
OINTMENT TOPICAL ONCE
OUTPATIENT
Start: 2023-03-16 | End: 2023-03-16

## 2023-03-16 RX ORDER — LIDOCAINE HYDROCHLORIDE 20 MG/ML
JELLY TOPICAL ONCE
OUTPATIENT
Start: 2023-03-16 | End: 2023-03-16

## 2023-03-16 RX ORDER — BACITRACIN, NEOMYCIN, POLYMYXIN B 400; 3.5; 5 [USP'U]/G; MG/G; [USP'U]/G
OINTMENT TOPICAL ONCE
Status: CANCELLED | OUTPATIENT
Start: 2023-03-16 | End: 2023-03-16

## 2023-03-16 RX ORDER — LIDOCAINE 50 MG/G
OINTMENT TOPICAL ONCE
Status: CANCELLED | OUTPATIENT
Start: 2023-03-16 | End: 2023-03-16

## 2023-03-16 RX ORDER — LIDOCAINE HYDROCHLORIDE 40 MG/ML
SOLUTION TOPICAL ONCE
Status: CANCELLED | OUTPATIENT
Start: 2023-03-16 | End: 2023-03-16

## 2023-03-16 RX ORDER — LIDOCAINE 40 MG/G
CREAM TOPICAL ONCE
Status: CANCELLED | OUTPATIENT
Start: 2023-03-16 | End: 2023-03-16

## 2023-03-16 RX ORDER — GINSENG 100 MG
CAPSULE ORAL ONCE
OUTPATIENT
Start: 2023-03-16 | End: 2023-03-16

## 2023-03-16 RX ORDER — LIDOCAINE HYDROCHLORIDE 40 MG/ML
SOLUTION TOPICAL ONCE
OUTPATIENT
Start: 2023-03-16 | End: 2023-03-16

## 2023-03-16 RX ORDER — LIDOCAINE HYDROCHLORIDE 20 MG/ML
JELLY TOPICAL ONCE
Status: CANCELLED | OUTPATIENT
Start: 2023-03-16 | End: 2023-03-16

## 2023-03-16 RX ORDER — CLOBETASOL PROPIONATE 0.5 MG/G
OINTMENT TOPICAL ONCE
Status: CANCELLED | OUTPATIENT
Start: 2023-03-16 | End: 2023-03-16

## 2023-03-16 RX ORDER — DOXYCYCLINE HYCLATE 100 MG
100 TABLET ORAL 2 TIMES DAILY
Qty: 28 TABLET | Refills: 0 | Status: SHIPPED | OUTPATIENT
Start: 2023-03-16 | End: 2023-03-30

## 2023-03-16 RX ORDER — BETAMETHASONE DIPROPIONATE 0.05 %
OINTMENT (GRAM) TOPICAL ONCE
OUTPATIENT
Start: 2023-03-16 | End: 2023-03-16

## 2023-03-16 RX ORDER — BACITRACIN, NEOMYCIN, POLYMYXIN B 400; 3.5; 5 [USP'U]/G; MG/G; [USP'U]/G
OINTMENT TOPICAL ONCE
OUTPATIENT
Start: 2023-03-16 | End: 2023-03-16

## 2023-03-16 RX ORDER — GINSENG 100 MG
CAPSULE ORAL ONCE
Status: CANCELLED | OUTPATIENT
Start: 2023-03-16 | End: 2023-03-16

## 2023-03-16 RX ORDER — GENTAMICIN SULFATE 1 MG/G
OINTMENT TOPICAL ONCE
Status: CANCELLED | OUTPATIENT
Start: 2023-03-16 | End: 2023-03-16

## 2023-03-16 RX ORDER — BACITRACIN ZINC AND POLYMYXIN B SULFATE 500; 1000 [USP'U]/G; [USP'U]/G
OINTMENT TOPICAL ONCE
OUTPATIENT
Start: 2023-03-16 | End: 2023-03-16

## 2023-03-16 RX ORDER — CLOBETASOL PROPIONATE 0.5 MG/G
OINTMENT TOPICAL ONCE
OUTPATIENT
Start: 2023-03-16 | End: 2023-03-16

## 2023-03-16 RX ORDER — LIDOCAINE 50 MG/G
OINTMENT TOPICAL ONCE
OUTPATIENT
Start: 2023-03-16 | End: 2023-03-16

## 2023-03-16 RX ORDER — BETAMETHASONE DIPROPIONATE 0.05 %
OINTMENT (GRAM) TOPICAL ONCE
Status: CANCELLED | OUTPATIENT
Start: 2023-03-16 | End: 2023-03-16

## 2023-03-16 NOTE — PLAN OF CARE
Problem: Wound:  Goal: Will show signs of wound healing; wound closure and no evidence of infection  Description: Will show signs of wound healing; wound closure and no evidence of infection  Outcome: Progressing     Patient presents to wound clinic for foot infection. See avs for discharge instructions. Follow up visit: 2 weeks on Thursday March 30th at 11:00 am.     Care plan reviewed with patient. Patient verbalize understanding of the plan of care and contribute to goal setting.

## 2023-03-16 NOTE — DISCHARGE INSTRUCTIONS
Visit Discharge/Physician Orders:   - Finish antibiotic as prescribed. - drink plenty of water  - limit time in sun while on antibiotic   - Will review labs once we receive and let you know whether we need to send in additional antibiotics. - Lab work to be drawn by home health a few days prior to next follow up on 3/30. CRP & Sed rate. Orders faxed. Home Care: [address]      Wound Location: left foot     Dressing orders: per Dr. Debbie Huitron     Follow up visit: 2 weeks on Thursday March 30th at 11:00 am      Keep next scheduled appointment. Please give 24 hour notice if unable to keep appointment. [phone]     If you experience any of the following, please call the Wound Care Service during business hours: Monday through Friday 8:00 am - 4:30 pm  ([phone]).               *Increase in pain              *Temperature over 101              *Increase in drainage from your wound or a foul odor              *Uncontrolled swelling              *Need for compression bandage changes due to slippage, breakthrough drainage     If you need medical attention outside of business hours, please contact your Primary Care Doctor or go to the nearest emergency room

## 2023-03-16 NOTE — TELEPHONE ENCOUNTER
Patient updated on new script for antibiotic sent to pharmacy. Lab work to be completed prior to next visit by home health. All orders and lab vouchers faxed to [facility].

## 2023-03-16 NOTE — PROGRESS NOTES
[facility]  Consult and Procedure Note       [address]  RECORD NUMBER:  [id number]  AGE: 66 y.o. GENDER: male  : 1965  EPISODE DATE:  3/16/2023    SUBJECTIVE:     Chief Complaint   Patient presents with    Wound Check     LEFT FOOT      HISTORY OF PRESENT ILLNESS      Dalton Moran is a 66 y.o. male who presents today for evaluation of osteomyelitis of left foot. Patient referred to clinic by Dr. Bevin Ahumada who has been following patient for nonhealing wound to left foot, is s/p excision of left first metatarsal head 23. Bone biopsy was sent as clearance fragment at time of last surgery, showed presence of MSSA. He was placed on Doxycycline 23 per Dr. Bevin Ahumada. At antibiotic was continued with plan for 6 weeks total treatment. Patient stopped taking antibiotic for some time between that visit and last visit, specifics very unclear, patient poor historian. It appears as though he may have had a 2 week gap with no antibiotics. At last visit doxycycline was restarted, patient reports compliance with medication. Denies any adverse effects related to use. States he has follow up scheduled with Dr. Bevin Ahumada today for re-evaluation of wounds. Patient has history of DM,previously uncontrolled. States his hemoglobin A1C was over 11, has been working with DM clinic and has decreased to around 7. Other pertinent history includes CKD, is a current hemodialysis patient, has fistula to right forearm. He denies any fever, chills, fatigue, malaise. Denies any further needs or concerns.      PAST MEDICAL HISTORY             Diagnosis Date    ASCVD (arteriosclerotic cardiovascular disease)     Cancer ([facility] 2017    Dr. Benny Roberts    CKD (chronic kidney disease) stage 3, GFR 30-59 ml/min ([facility])     see's Gordo Warren    COPD (chronic obstructive pulmonary disease) ([facility])     DM2 (diabetes mellitus, type 2) ([facility]     Dyslipidemia     Hemodialysis patient ([facility]     History of blood transfusion     Hyperlipidemia     Hypertension     Osteomyelitis of left foot ([address] 2/9/2023    Thyroid disease        PAST SURGICAL HISTORY     Past Surgical History:   Procedure Laterality Date    COLONOSCOPY Left 12/18/2018    [facility] [place] [facility]    COLONOSCOPY N/A 02/17/2022    COLONOSCOPY POLYPECTOMY SNARE/COLD BIOPSY performed by Emilia Sharma MD at [facility] [place] [facility]    ENDOSCOPY, COLON, DIAGNOSTIC      EYE SURGERY Left 2013    laser surgery    INSERTION / REMOVAL / REPLACEMENT VENOUS ACCESS CATHETER Right 12/04/2017    MEDIPORT INSERTION performed by Steffi Lr MD at [address] [facility] FLX DX W/COLLJ [address] P[facility]MD Left 09/16/2018    COLONOSCOPY DIAGNOSTIC OR SCREENING [facility] [place] [facility]    [place] COLONOSCOPY W/BIOPSY SINGLE/MULTIPLE Left 11/24/2017    COLONOSCOPY WITH BIOPSY [facility] [place] [facility]    [place] EGD TRANSORAL BIOPSY SINGLE/MULTIPLE N/A 11/23/2017    EGD BIOPSY performed by Emilia Sharma MD at [place] [address]      mole on nose removed    SMALL INTESTINE SURGERY N/A 11/27/2017    SIGMOID COLON RESECTION, APPENDECTOMY, LIVER BIOPSY performed [facility] L[facility]     UPPER GASTROINTESTINAL ENDOSCOPY Left 09/16/2018    EGD BIOPSY [facility] [place] [facility]     FAMILY HISTORY     Family History   Problem Relation Age of Onset    Cancer Father     Heart Disease Father     High Blood Pressure Mother      SOCIAL HISTORY     Social History     Tobacco Use    Smoking status: Never     Passive exposure: Never    Smokeless tobacco: Never   Vaping Use    Vaping Use: Never used   Substance Use Topics    Alcohol use: No     Alcohol/week: 0.0 standard drinks    Drug use: No       ALLERGIES     No Known Allergies    MEDICATIONS     Current Outpatient Medications on File Prior to Encounter   Medication Sig Dispense Refill doxycycline hyclate (VIBRA-TABS) 100 MG tablet Take 1 tablet by mouth 2 times daily for 14 days 28 tablet 0    isosorbide mononitrate (IMDUR) 60 MG extended release tablet TAKE 2 TABLETS BY MOUTH TWICE DAILY 360 tablet 2    fenofibrate (TRICOR) 145 MG tablet TAKE 1 TABLET BY MOUTH DAILY 90 tablet 2    atorvastatin (LIPITOR) 80 MG tablet Take 1 tablet by mouth daily 90 tablet 3    XARELTO 2.5 MG TABS tablet TAKE 1 TABLET BY MOUTH TWICE DAILY 60 tablet 11    metoprolol succinate (TOPROL XL) 25 MG extended release tablet Take 1 tablet by mouth daily 90 tablet 3    amLODIPine (NORVASC) 5 MG tablet TAKE 1 TABLET BY MOUTH DAILY 90 tablet 3    rivaroxaban (XARELTO) 2.5 MG TABS tablet Take 1 tablet by mouth 2 times daily 56 tablet 0    Omega-3 Fatty Acids (FISH OIL) 1000 MG CAPS Take 2 capsules by mouth 2 times daily 90 capsule 3    nitroGLYCERIN (NITROSTAT) 0.4 MG SL tablet up to max of 3 total doses. If no relief after 1 dose, call 911. (Patient not taking: No sig reported) 25 tablet 3    pantoprazole (PROTONIX) 40 MG tablet Take 1 tablet by mouth daily 30 tablet 3    torsemide (DEMADEX) 20 MG tablet Take 2 tablets by mouth daily 30 tablet 1    calcium acetate (PHOSLO) 667 MG capsule Take 1 capsule by mouth 3 times daily (with meals) 90 capsule 1    cloNIDine (CATAPRES) 0.1 MG tablet Take 2 tablets by mouth 2 times daily 60 tablet 3    doxazosin (CARDURA) 8 MG tablet Take 1 tablet by mouth 2 times daily 60 tablet 3    hydrALAZINE (APRESOLINE) 100 MG tablet Take 1 tablet by mouth 3 times daily 90 tablet 3    clopidogrel (PLAVIX) 75 MG tablet Take 1 tablet by mouth daily 30 tablet 3    insulin glargine (TOUJEO SOLOSTAR) 300 UNIT/ML injection pen Inject 10 Units into the skin daily      Blood Pressure Monitor KIT CHECK BP TWICE DAILY IF SBP >140 CALL PCP 1 kit 0     No current facility-administered medications on file prior to encounter.        REVIEW OF SYSTEMS:     Constitutional: Denies fever, chills, night sweats, fatigue, weight loss/gain, loss of appetite   Head: Denies headache,  dizziness, loss of consciousness  Respiratory: Denies shortness of breath, cough, wheezing, dyspnea with exertion  Cardiovascular:Denies chest pain, palpitations, edema  Gastrointestinal: Denies nausea, vomiting, constipation, diarrhea, abdominal pain   LIYA: Denies dysuria, frequency, urgency, hematuria  Musculoskeletal: Denies joint pain, swelling , stiffness,  Endocrine: Denies polyuria, polydipsia, cold or heat intolerance  Hematology: Denies easy brusing or bleeding, hx of clotting disorder  Dermatology: +wound Denies skin rash, eczema, pruritis    PHYSICAL EXAM:     /65   Pulse 78   Temp 97 °F (36.1 °C) (Infrared)   Resp 18   SpO2 94%   Wt Readings from Last 3 Encounters:   09/22/22 245 lb (111.1 kg)   07/10/22 241 lb (109.3 kg)   02/17/22 244 lb 6.4 oz (110.9 kg)       General:  Awake, alert, no apparent distress. Appears stated age  [de-identified]: conjuctivae are clear without exudate or hemorrhage, anicteric sclera, moist oral mucosa. Chest:  Respirations regular, non-labored. Chest rise and fall equal bilaterally. Abdomen:  Soft, non tender to palpation. Neurological: Awake, alert, oriented x4  Psychiatric:  Appropriate mood and affect  Extremities: non-traumatic in appearance. Pedal and posterior tibial pulses +1 to left foot. Skin:  Warm and dry  Wound:    Left lateral foot, proximal and distal wound bed slough present. Periwound shows blanchable erythema    Left great toe amputation incision site shows intact sutures, open area noted to distal incision, wound bed dry. Periwound hyperkeratosis. No warmth to the touch or fluctuance noted. Wound 02/09/23 Foot Left;Proximal;Lateral #1 (Active)   Wound Image   03/16/23 1101   Wound Etiology Diabetic 03/16/23 1101   Dressing Status Intact; Old drainage noted;New dressing applied 03/16/23 1101   Wound Cleansed Cleansed with saline 03/16/23 1101   Dressing/Treatment ABD; Ace wrap;Betadine swabs/povidone iodine; Roll gauze 03/16/23 1101   Offloading for Diabetic Foot Ulcers Offloading ordered;Post op shoe 03/16/23 1101   Wound Length (cm) 0.6 cm 03/16/23 1101   Wound Width (cm) 0.6 cm 03/16/23 1101   Wound Depth (cm) 0 cm 03/16/23 1101   Wound Surface Area (cm^2) 0.36 cm^2 03/16/23 1101   Change in Wound Size % (l*w) 76 03/16/23 1101   Wound Volume (cm^3) 0 cm^3 03/16/23 1101   Wound Assessment Eschar dry 03/16/23 1101   Drainage Amount None 03/16/23 1101   Drainage Description Yellow 03/02/23 1437   Odor None 03/16/23 1101   Latrice-wound Assessment Dry/flaky; Hyperkeratosis (callous) 03/16/23 1101   Margins Attached edges 03/16/23 1101   Wound Thickness Description not for Pressure Injury Full thickness 03/16/23 1101   Number of days: 35       Wound 02/09/23 Foot Left;Distal;Lateral #2 (Active)   Wound Image   03/16/23 1101   Wound Etiology Diabetic 03/16/23 1101   Dressing Status Intact; Old drainage noted;New dressing applied 03/16/23 1101   Wound Cleansed Cleansed with saline 03/16/23 1101   Dressing/Treatment ABD; Ace wrap;Betadine swabs/povidone iodine; Roll gauze 03/16/23 1101   Offloading for Diabetic Foot Ulcers Offloading ordered;Post op shoe 03/16/23 1101   Wound Length (cm) 2.4 cm 03/16/23 1101   Wound Width (cm) 1.3 cm 03/16/23 1101   Wound Depth (cm) 0.1 cm 03/16/23 1101   Wound Surface Area (cm^2) 3.12 cm^2 03/16/23 1101   Change in Wound Size % (l*w) 48 03/16/23 1101   Wound Volume (cm^3) 0.312 cm^3 03/16/23 1101   Wound Assessment Granulation tissue;Eschar dry 03/16/23 1101   Drainage Amount Scant 03/16/23 1101   Drainage Description Serosanguinous 03/16/23 1101   Odor None 03/16/23 1101   Latrice-wound Assessment Dry/flaky; Hyperkeratosis (callous) 03/16/23 1101   Margins Attached edges 03/16/23 1101   Wound Thickness Description not for Pressure Injury Full thickness 03/16/23 1101   Number of days: 35       Wound 02/09/23 Foot Left great toe amp site #3 (Active)   Wound Image 03/16/23 1101   Wound Etiology Diabetic 03/16/23 1101   Dressing Status Intact; Old drainage noted;New dressing applied 03/16/23 1101   Wound Cleansed Cleansed with saline 03/16/23 1101   Dressing/Treatment ABD; Ace wrap;Betadine swabs/povidone iodine; Roll gauze 03/16/23 1101   Offloading for Diabetic Foot Ulcers Offloading ordered;Post op shoe 03/16/23 1101   Wound Length (cm) 2.5 cm 03/16/23 1101   Wound Width (cm) 0.2 cm 03/16/23 1101   Wound Depth (cm) 1.1 cm 03/16/23 1101   Wound Surface Area (cm^2) 0.5 cm^2 03/16/23 1101   Change in Wound Size % (l*w) 58.33 03/16/23 1101   Wound Volume (cm^3) 0.55 cm^3 03/16/23 1101   Wound Healing % 43 03/16/23 1101   Wound Assessment Slough;Granulation tissue 03/16/23 1101   Drainage Amount Moderate 03/16/23 1101   Drainage Description Thick; Serosanguinous; Yellow 03/16/23 1101   Odor None 03/16/23 1101   Latrice-wound Assessment Dry/flaky; Maceration 03/16/23 1101   Margins Attached edges 03/16/23 1101   Wound Thickness Description not for Pressure Injury Full thickness 03/16/23 1101   Number of days: 35         LABS/IMAGING                      ASSESSMENT     -Osteomyelitis, left foot  -Nonhealing surgical wound     Patient Active Problem List   Diagnosis Code    Accelerated hypertension I10    NSTEMI (non-ST elevated myocardial infarction) ([facility] I21.4    Coronary artery disease involving native coronary artery of native heart I25.10    Metastatic colon cancer to liver (HCC) C18.9, C78.7    History of non-ST elevation myocardial infarction (NSTEMI) I25.2    Physical deconditioning R53.81    ESRD (end stage renal disease) ([facility] N18.6    Anemia D64.9    Leucopenia D72.819    Pancytopenia (HCC) D61.818    AV junctional bradycardia R00.1    Non-compliance with renal dialysis ([facility] Z91.15    Anemia due to chronic kidney disease N18.9, D63.1    Dehiscence of operative wound T81. 31XA    Diabetes mellitus ([facility] E11.9    End-stage renal disease on hemodialysis (HCC) N18.6, Z99.2 Hyperlipidemia E78.5    Osteomyelitis of left foot ([facility] M86.9       PLAN     Patient examined and evaluated. All available lab work, radiology studies, and progress notes pertaining to Makayla Perez reviewed prior to or during patient visit today.    -Osteomyelitis, left foot, Nonhealing surgical wound- Overall appearance of wound improved today. Blood work reviewed, markers of inflammation showing improvement but have not yet returned to normal.  Patient has completed 4 weeks of Doxycycline at this time. Will continue for 2 weeks, follow up to be scheduled prior to completion of antibiotics. Repeat labs prior to this visit. Common side effects of antibiotic reviewed. Advised patient to drink plenty of water (within limits set by nephrology), do not lay flat for 1 hour after taking medication, avoid prolonged exposure to the sun while on antibiotic. Recommend yogurt or probiotic daily while on antibiotics for GI health. Advised patient that antibiotics are not a guaranteed fix for osteomyelitis. While our goal is resolution of the problem, surgical debridement may still be necessary for complete resolution. He verbalized understanding. -DM- Education provided on the importance of good control of DM in wound healing and treatment of infection. Recommend well balanced diet. Encouraged him to follow up with DM clinic as scheduled for ongoing monitoring.     -Nonhealing surgical wound- Appears improved as compared to last visit. Encouraged patient to follow up with Dr. Jessenia Kovacs as scheduled for ongoing wound care needs.     -Education provided on signs and symptoms of worsening infection. Call clinic or seek emergency care should these occur. Follow up 2 weeks. Call clinic with any needs or concerns prior to scheduled visit. All questions and concerns addressed prior to discharge from today's visit.     Please see attached Discharge Instructions    Written patient dismissal instructions given to patient and signed by patient or POA.            Treatment:   Orders Placed This Encounter   Procedures    Initiate Outpatient Wound Care Protocol     Cleanse wound with saline    If wound contains bioburden or contamination cleanse with wound cleanser or antimicrobial solution     For normal periwound tissue without irritation nor maceration, apply topical skin protectant    For periwound tissue with irritation and/or maceration, apply zinc based product, topical steroid cream/ointment, or equivalent     For wounds with dry firm black eschar and/or without exudate, apply betadine and leave open to air      For wounds with scant/small to no exudate or drainage, apply wound gel, hydrocolloid, polymer, or equivalent and cover with secondary dressing/foam      For wounds with moderate/large exudate or drainage, apply alginate, hydrofiber, polymer, or equivalent and cover with secondary dressing/foam    For wounds with nonviable tissue requiring removal, apply chemical or mechanical debrider and cover with secondary dressing/foam    For wounds with tunneling, dead space, or cavity, fill or pack with strip/gauze/kerlex to fit and cover with secondary dressing/foam    For wounds with adequate granulation or epithelization, apply wound gel, hydrocolloid, polymer, collagen, or transparent film, and cover secondary dry dressing/foam    For wounds that need additional secondary dressing to help pad or control additional drainage/exudates, add foam, absorbent pad or hydrocolloid    For wounds with suspected or known infection, apply antimicrobial mesh and/or antimicrobial alginate/hydrofiber, or antimicrobial solution moistened gauze/kerlex, or equivalent and cover with secondary dressing/foam    Compression Management needed for edema control, apply multilayer compression or tubular garment or equivalent    Offloading Management needed for pressure relief, apply offloading shoe/boot or equivalent     Standing Status: Standing     Number of Occurrences:   1    C-Reactive Protein     Standing Status:   Future     Standing Expiration Date:   3/16/2024    Sedimentation Rate     Standing Status:   Future     Standing Expiration Date:   3/16/2024    [facility] nursing order (specify)     Visit Discharge/Physician Orders:   - Finish antibiotic as prescribed. - drink plenty of water  - limit time in sun while on antibiotic   - Will review labs once we receive and let you know whether we need to send in additional antibiotics. - Lab work to be drawn by home health a few days prior to next follow up on 3/30. CRP & Sed rate. Orders faxed. Home Care: [address]      Wound Location: left foot     Dressing orders: per Dr. Suzy Lyon     Follow up visit: 2 weeks on Thursday March 30th at 11:00 am      Keep next scheduled appointment. Please give 24 hour notice if unable to keep appointment. [phone]     If you experience any of the following, please call the Wound Care Service during business hours: Monday through Friday 8:00 am - 4:30 pm  ([phone]). *Increase in pain              *Temperature over 101              *Increase in drainage from your wound or a foul odor              *Uncontrolled swelling              *Need for compression bandage changes due to slippage, breakthrough drainage     If you need medical attention outside of business hours, please contact your Primary Care Doctor or go to the nearest emergency room     I spent a total of 35 minutes with Camilo Harrison  on 3/16/2023. Time spent includes review of previous progress notes, reviewing and discussing test results, education on plan of care for presenting diagnosis, answering questions, providing instructions on treatment and/or medications ordered, reviewing importance of compliance with outline treatment plan and any identifiable barriers to compliance and coordination of care based on plan and assessment as noted. Discharge Instructions     Discharge Instructions         Visit Discharge/Physician Orders:   - Finish antibiotic as prescribed. - drink plenty of water  - limit time in sun while on antibiotic   - Will review labs once we receive and let you know whether we need to send in additional antibiotics. - Lab work to be drawn by home health a few days prior to next follow up on 3/30. CRP & Sed rate. Orders faxed. Home Care: [address]      Wound Location: left foot     Dressing orders: per Dr. Mellissa Vyas     Follow up visit: 2 weeks on Thursday March 30th at 11:00 am      Keep next scheduled appointment. Please give 24 hour notice if unable to keep appointment. [phone]     If you experience any of the following, please call the Wound Care Service during business hours: Monday through Friday 8:00 am - 4:30 pm  ([phone]).               *Increase in pain              *Temperature over 101              *Increase in drainage from your wound or a foul odor              *Uncontrolled swelling              *Need for compression bandage changes due to slippage, breakthrough drainage     If you need medical attention outside of business hours, please contact your Primary Care Doctor or go to the nearest emergency room      Electronically signed by LARRY Durbin CNP on 3/16/2023 at 2:45 PM

## 2023-03-30 ENCOUNTER — HOSPITAL ENCOUNTER (OUTPATIENT)
Dept: WOUND CARE | Age: 58
Discharge: HOME | End: 2023-03-30
Payer: MEDICARE

## 2023-03-30 VITALS
OXYGEN SATURATION: 98 % | DIASTOLIC BLOOD PRESSURE: 71 MMHG | RESPIRATION RATE: 18 BRPM | SYSTOLIC BLOOD PRESSURE: 146 MMHG | HEART RATE: 82 BPM | TEMPERATURE: 97.1 F

## 2023-03-30 DIAGNOSIS — M86.272 SUBACUTE OSTEOMYELITIS OF LEFT FOOT (HCC): Primary | ICD-10-CM

## 2023-03-30 PROCEDURE — 99213 OFFICE O/P EST LOW 20 MIN: CPT

## 2023-03-30 PROCEDURE — 99213 OFFICE O/P EST LOW 20 MIN: CPT | Performed by: NURSE PRACTITIONER

## 2023-03-30 RX ORDER — DOXYCYCLINE HYCLATE 100 MG
100 TABLET ORAL 2 TIMES DAILY
Qty: 14 TABLET | Refills: 0 | Status: SHIPPED | OUTPATIENT
Start: 2023-03-30 | End: 2023-04-06

## 2023-03-30 NOTE — PLAN OF CARE
Problem: Wound:  Goal: Will show signs of wound healing; wound closure and no evidence of infection  Description: Will show signs of wound healing; wound closure and no evidence of infection  Outcome: Progressing  Note: Patient seen for antibiotic therapy for foot infection. Lateral foot wounds have some drainage. Labs need drawn. Continue antibiotics for another week. Follow up in 2 weeks. See AVS for order changes. Care plan reviewed with patient. Patient verbalize understanding of the plan of care and contribute to goal setting.

## 2023-03-30 NOTE — PROGRESS NOTES
Hyperlipidemia     Hypertension     Osteomyelitis of left foot ([address] 2/9/2023    Thyroid disease        PAST SURGICAL HISTORY     Past Surgical History:   Procedure Laterality Date    COLONOSCOPY Left 12/18/2018    COLONOS[facility]elsi [facility] [place] [facility]    COLONOSCOPY N/A 02/17/2022    COLONOSCOPY POLYPECTOMY SNARE/COLD BIOPSY performed by Kelsi Casey MD at [facility] [place] [facility]    ENDOSCOPY, COLON, DIAGNOSTIC      EYE SURGERY Left 2013    laser surgery    INSERTION / REMOVAL / REPLACEMENT VENOUS ACCESS CATHETER Right 12/04/2017    MEDIPORT INSERTION performed by Bhavna Mac MD at [address] FLX DX W/COLLJ [address] P[facility]MD Left 09/16/2018    COLONOSCOPY DIAGNOSTIC OR SCREENING performed by Kelsi Casey MD at [facility] [place] [facility]    [place] COLONOSCOPY W/BIOPSY SINGLE/MULTIPLE Left 11/24/2017    COLONOSCOPY WITH BIOPSY performed by Kelsi Casey MD at [facility] [place] [facility]    [place] EGD TRANSORAL BIOPSY SINGLE/MULTIPLE N/A 11/23/2017    EGD BIOPSY performed by Kelsi Casey MD at [place] [address]      mole on nose removed    SMALL INTESTINE SURGERY N/A 11/27/2017    SIGMOID COLON RESECTION, APPENDECTOMY, LIVER BIOPSY [facility]     UPPER GASTROINTESTINAL ENDOSCOPY Left 09/16/2018    EGD BIOPSY performed by Kelsi Casey MD at [facility] [place] [facility]     FAMILY HISTORY     Family History   Problem Relation Age of Onset    Cancer Father     Heart Disease Father     High Blood Pressure Mother      SOCIAL HISTORY     Social History     Tobacco Use    Smoking status: Never     Passive exposure: Never    Smokeless tobacco: Never   Vaping Use    Vaping Use: Never used   Substance Use Topics    Alcohol use: No     Alcohol/week: 0.0 standard drinks    Drug use: No       ALLERGIES     No Known Allergies    MEDICATIONS     Current Outpatient Medications on File Prior to Encounter   Medication Sig Dispense Refill    doxycycline hyclate

## 2023-03-30 NOTE — DISCHARGE INSTRUCTIONS
Visit Discharge/Physician Orders:   - Finish antibiotic as prescribed. - drink plenty of water  - limit time in sun while on antibiotic   - Will review labs once we receive and let you know whether we need to send in additional antibiotics. - have lab results faxed to [facility]  [address]: [address]      Wound Location: left foot     Dressing orders: per Dr. Lindsey Asp     Follow up visit: we will call you to set up an appointment once we receive and review labs      Keep next scheduled appointment. Please give 24 hour notice if unable to keep appointment. [phone]     If you experience any of the following, please call the Wound Care Service during business hours: Monday through Friday 8:00 am - 4:30 pm  ([phone]).               *Increase in pain              *Temperature over 101              *Increase in drainage from your wound or a foul odor              *Uncontrolled swelling              *Need for compression bandage changes due to slippage, breakthrough drainage     If you need medical attention outside of business hours, please contact your Primary Care Doctor or go to the nearest emergency room

## 2023-04-06 ENCOUNTER — TELEPHONE (OUTPATIENT)
Dept: INFECTIOUS DISEASES | Age: 58
End: 2023-04-06

## 2023-04-06 ENCOUNTER — TELEPHONE (OUTPATIENT)
Dept: WOUND CARE | Age: 58
End: 2023-04-06

## 2023-04-06 DIAGNOSIS — M86.272 SUBACUTE OSTEOMYELITIS OF LEFT FOOT (HCC): Primary | ICD-10-CM

## 2023-04-06 RX ORDER — DOXYCYCLINE HYCLATE 100 MG
100 TABLET ORAL 2 TIMES DAILY
Qty: 28 TABLET | Refills: 0 | Status: SHIPPED | OUTPATIENT
Start: 2023-04-06 | End: 2023-04-20

## 2023-04-06 NOTE — TELEPHONE ENCOUNTER
Reviewed lab results with Dr. Micki Hodgson. Recommend continuing oral antibiotics due to ongoing elevation of CRP, ESR and nonhealing surgical incision. Refill sent to pharmacy today. Repeat labs in 2 weeks. Follow up scheduled in 2 weeks.

## 2023-04-06 NOTE — TELEPHONE ENCOUNTER
----- Message from [address] [place] [address], LARRY - CNP sent at 4/6/2023  1:02 PM EDT -----  Regarding: follow up  Please add patient to 4/20/23 at 1:30. Thanks!     Niurka Torre

## 2023-04-06 NOTE — TELEPHONE ENCOUNTER
----- Message from [address] [place] [address], LARRY - CNP sent at 4/6/2023  1:02 PM EDT -----  Regarding: follow up  Please add patient to 4/20/23 at 1:30. Thanks!     Lizz Daley

## 2023-04-20 ENCOUNTER — HOSPITAL ENCOUNTER (OUTPATIENT)
Dept: WOUND CARE | Age: 58
Discharge: HOME | End: 2023-04-20
Payer: MEDICARE

## 2023-04-20 VITALS
RESPIRATION RATE: 16 BRPM | OXYGEN SATURATION: 97 % | SYSTOLIC BLOOD PRESSURE: 179 MMHG | DIASTOLIC BLOOD PRESSURE: 75 MMHG | TEMPERATURE: 98.3 F | HEART RATE: 76 BPM

## 2023-04-20 DIAGNOSIS — T81.31XD DEHISCENCE OF OPERATIVE WOUND, SUBSEQUENT ENCOUNTER: ICD-10-CM

## 2023-04-20 DIAGNOSIS — Z79.2 LONG TERM CURRENT USE OF ANTIBIOTICS: ICD-10-CM

## 2023-04-20 DIAGNOSIS — M86.272 SUBACUTE OSTEOMYELITIS OF LEFT FOOT (HCC): Primary | ICD-10-CM

## 2023-04-20 PROCEDURE — 99213 OFFICE O/P EST LOW 20 MIN: CPT

## 2023-04-20 PROCEDURE — 99213 OFFICE O/P EST LOW 20 MIN: CPT | Performed by: NURSE PRACTITIONER

## 2023-04-20 RX ORDER — LIDOCAINE HYDROCHLORIDE 20 MG/ML
JELLY TOPICAL ONCE
OUTPATIENT
Start: 2023-04-20 | End: 2023-04-20

## 2023-04-20 RX ORDER — BETAMETHASONE DIPROPIONATE 0.05 %
OINTMENT (GRAM) TOPICAL ONCE
OUTPATIENT
Start: 2023-04-20 | End: 2023-04-20

## 2023-04-20 RX ORDER — LIDOCAINE HYDROCHLORIDE 40 MG/ML
SOLUTION TOPICAL ONCE
OUTPATIENT
Start: 2023-04-20 | End: 2023-04-20

## 2023-04-20 RX ORDER — GENTAMICIN SULFATE 1 MG/G
OINTMENT TOPICAL ONCE
OUTPATIENT
Start: 2023-04-20 | End: 2023-04-20

## 2023-04-20 RX ORDER — BACITRACIN, NEOMYCIN, POLYMYXIN B 400; 3.5; 5 [USP'U]/G; MG/G; [USP'U]/G
OINTMENT TOPICAL ONCE
OUTPATIENT
Start: 2023-04-20 | End: 2023-04-20

## 2023-04-20 RX ORDER — GINSENG 100 MG
CAPSULE ORAL ONCE
OUTPATIENT
Start: 2023-04-20 | End: 2023-04-20

## 2023-04-20 RX ORDER — LIDOCAINE 50 MG/G
OINTMENT TOPICAL ONCE
OUTPATIENT
Start: 2023-04-20 | End: 2023-04-20

## 2023-04-20 RX ORDER — LIDOCAINE 40 MG/G
CREAM TOPICAL ONCE
OUTPATIENT
Start: 2023-04-20 | End: 2023-04-20

## 2023-04-20 RX ORDER — DOXYCYCLINE HYCLATE 100 MG
100 TABLET ORAL 2 TIMES DAILY
Qty: 56 TABLET | Refills: 0 | Status: SHIPPED | OUTPATIENT
Start: 2023-04-20 | End: 2023-05-18

## 2023-04-20 RX ORDER — CLOBETASOL PROPIONATE 0.5 MG/G
OINTMENT TOPICAL ONCE
OUTPATIENT
Start: 2023-04-20 | End: 2023-04-20

## 2023-04-20 RX ORDER — BACITRACIN ZINC AND POLYMYXIN B SULFATE 500; 1000 [USP'U]/G; [USP'U]/G
OINTMENT TOPICAL ONCE
OUTPATIENT
Start: 2023-04-20 | End: 2023-04-20

## 2023-04-20 NOTE — PROGRESS NOTES
[facility]IGH [facility]   [facility].     Zi Acuna RN  4/20/2023
for Diabetic Foot Ulcers Offloading ordered;Post op shoe 04/20/23 1335   Wound Length (cm) 0.4 cm 04/20/23 1335   Wound Width (cm) 0.3 cm 04/20/23 1335   Wound Depth (cm) 0.3 cm 04/20/23 1335   Wound Surface Area (cm^2) 0.12 cm^2 04/20/23 1335   Change in Wound Size % (l*w) 92 04/20/23 1335   Wound Volume (cm^3) 0.036 cm^3 04/20/23 1335   Undermining Starts ___ O'Clock 12 04/20/23 1335   Undermining Ends___ O'Clock 12 04/20/23 1335   Undermining Maxium Distance (cm) 0.5 04/20/23 1335   Wound Assessment Slough 04/20/23 1335   Drainage Amount Scant 04/20/23 1335   Drainage Description Serosanguinous; Yellow 04/20/23 1335   Odor None 04/20/23 1335   Latrice-wound Assessment Blanchable erythema;Dry/flaky 04/20/23 1335   Margins Unattached edges 04/20/23 1335   Wound Thickness Description not for Pressure Injury Full thickness 04/20/23 1335   Number of days: 70       Wound 02/09/23 Foot Left;Distal;Lateral #2 (Active)   Wound Image   04/20/23 1335   Wound Etiology Diabetic 04/20/23 1335   Dressing Status Intact; Old drainage noted;New dressing applied 04/20/23 1335   Wound Cleansed Cleansed with saline 04/20/23 1335   Dressing/Treatment ABD; Ace wrap;Betadine swabs/povidone iodine; Roll gauze 04/20/23 1335   Offloading for Diabetic Foot Ulcers Offloading ordered;Post op shoe 04/20/23 1335   Wound Length (cm) 2.2 cm 04/20/23 1335   Wound Width (cm) 1.6 cm 04/20/23 1335   Wound Depth (cm) 0.3 cm 04/20/23 1335   Wound Surface Area (cm^2) 3.52 cm^2 04/20/23 1335   Change in Wound Size % (l*w) 41.33 04/20/23 1335   Wound Volume (cm^3) 1. 056 cm^3 04/20/23 1335   Wound Assessment Pale granulation tissue;Slough; Devitalized tissue 04/20/23 1335   Drainage Amount Moderate 04/20/23 1335   Drainage Description Serosanguinous 04/20/23 1335   Odor None 04/20/23 1335   Latrice-wound Assessment Blanchable erythema;Dry/flaky 04/20/23 1335   Margins Attached edges 04/20/23 1335   Wound Thickness Description not for Pressure Injury Full thickness

## 2023-04-20 NOTE — PLAN OF CARE
Problem: Wound:  Goal: Will show signs of wound healing; wound closure and no evidence of infection  Description: Will show signs of wound healing; wound closure and no evidence of infection  Outcome: Progressing   Patient seen for left foot wounds. Wound shows signs of proper closure and healing. No s/s of infection noted. Follow up in 3 weeks. See AVS for order changes. Care plan reviewed with patient. Patient verbalize understanding of the plan of care and contribute to goal setting.

## 2023-04-20 NOTE — DISCHARGE INSTRUCTIONS
Visit Discharge/Physician Orders:   - Continue antibiotic as prescribed we send in a refill to your pharmacy. Take right after dialysis and then in the evening with dinner  - Continue to drink plenty of water  - Limit time in sun while on antibiotic   - Repeat labs Saturday (04/22/2023)  then again in 3 weeks before next appointment. Have lab results faxed to Orlando Medina NP [address] [place] [address]      Wound Location: left foot     Dressing orders: per Dr. Salinas Cueva     Follow up visit: 3 weeks 05/11/2023 at 1:00     Keep next scheduled appointment. Please give 24 hour notice if unable to keep appointment. [phone]     If you experience any of the following, please call the Wound Care Service during business hours: Monday through Friday 8:00 am - 4:30 pm  ([phone]).               *Increase in pain              *Temperature over 101              *Increase in drainage from your wound or a foul odor              *Uncontrolled swelling              *Need for compression bandage changes due to slippage, breakthrough drainage     If you need medical attention outside of business hours, please contact your Primary Care Doctor or go to the nearest emergency room

## 2023-05-18 ENCOUNTER — HOSPITAL ENCOUNTER (OUTPATIENT)
Dept: WOUND CARE | Age: 58
Discharge: HOME | End: 2023-05-18
Payer: MEDICARE

## 2023-05-18 VITALS
HEART RATE: 77 BPM | DIASTOLIC BLOOD PRESSURE: 74 MMHG | OXYGEN SATURATION: 96 % | RESPIRATION RATE: 18 BRPM | SYSTOLIC BLOOD PRESSURE: 169 MMHG | TEMPERATURE: 98.7 F

## 2023-05-18 DIAGNOSIS — M86.672 OTHER CHRONIC OSTEOMYELITIS OF LEFT FOOT (HCC): ICD-10-CM

## 2023-05-18 DIAGNOSIS — Z79.2 LONG TERM CURRENT USE OF ANTIBIOTICS: Primary | ICD-10-CM

## 2023-05-18 DIAGNOSIS — M86.272 SUBACUTE OSTEOMYELITIS OF LEFT FOOT (HCC): ICD-10-CM

## 2023-05-18 DIAGNOSIS — T81.31XD DEHISCENCE OF OPERATIVE WOUND, SUBSEQUENT ENCOUNTER: ICD-10-CM

## 2023-05-18 PROCEDURE — 87205 SMEAR GRAM STAIN: CPT

## 2023-05-18 PROCEDURE — 99213 OFFICE O/P EST LOW 20 MIN: CPT

## 2023-05-18 PROCEDURE — 87070 CULTURE OTHR SPECIMN AEROBIC: CPT

## 2023-05-18 PROCEDURE — 99214 OFFICE O/P EST MOD 30 MIN: CPT | Performed by: NURSE PRACTITIONER

## 2023-05-18 RX ORDER — LIDOCAINE HYDROCHLORIDE 20 MG/ML
JELLY TOPICAL ONCE
OUTPATIENT
Start: 2023-05-18 | End: 2023-05-18

## 2023-05-18 RX ORDER — BETAMETHASONE DIPROPIONATE 0.05 %
OINTMENT (GRAM) TOPICAL ONCE
OUTPATIENT
Start: 2023-05-18 | End: 2023-05-18

## 2023-05-18 RX ORDER — LIDOCAINE 40 MG/G
CREAM TOPICAL ONCE
OUTPATIENT
Start: 2023-05-18 | End: 2023-05-18

## 2023-05-18 RX ORDER — LIDOCAINE HYDROCHLORIDE 40 MG/ML
SOLUTION TOPICAL ONCE
OUTPATIENT
Start: 2023-05-18 | End: 2023-05-18

## 2023-05-18 RX ORDER — GINSENG 100 MG
CAPSULE ORAL ONCE
OUTPATIENT
Start: 2023-05-18 | End: 2023-05-18

## 2023-05-18 RX ORDER — GENTAMICIN SULFATE 1 MG/G
OINTMENT TOPICAL ONCE
OUTPATIENT
Start: 2023-05-18 | End: 2023-05-18

## 2023-05-18 RX ORDER — DOXYCYCLINE HYCLATE 100 MG
100 TABLET ORAL 2 TIMES DAILY
Qty: 60 TABLET | Refills: 0 | Status: SHIPPED | OUTPATIENT
Start: 2023-05-18 | End: 2023-06-17

## 2023-05-18 RX ORDER — BACITRACIN, NEOMYCIN, POLYMYXIN B 400; 3.5; 5 [USP'U]/G; MG/G; [USP'U]/G
OINTMENT TOPICAL ONCE
OUTPATIENT
Start: 2023-05-18 | End: 2023-05-18

## 2023-05-18 RX ORDER — CLOBETASOL PROPIONATE 0.5 MG/G
OINTMENT TOPICAL ONCE
OUTPATIENT
Start: 2023-05-18 | End: 2023-05-18

## 2023-05-18 RX ORDER — LIDOCAINE 50 MG/G
OINTMENT TOPICAL ONCE
OUTPATIENT
Start: 2023-05-18 | End: 2023-05-18

## 2023-05-18 RX ORDER — BACITRACIN ZINC AND POLYMYXIN B SULFATE 500; 1000 [USP'U]/G; [USP'U]/G
OINTMENT TOPICAL ONCE
OUTPATIENT
Start: 2023-05-18 | End: 2023-05-18

## 2023-05-18 NOTE — PROGRESS NOTES
chills, night sweats, fatigue, weight loss/gain, loss of appetite   Head: Denies headache,  dizziness, loss of consciousness  Respiratory: Denies shortness of breath, cough, wheezing, dyspnea with exertion  Cardiovascular:Denies chest pain, palpitations, edema  Gastrointestinal: Denies nausea, vomiting, constipation, diarrhea, abdominal pain   LIYA: Denies dysuria, frequency, urgency, hematuria  Musculoskeletal: Denies joint pain, swelling , stiffness,  Endocrine: Denies polyuria, polydipsia, cold or heat intolerance  Hematology: Denies easy brusing or bleeding, hx of clotting disorder  Dermatology: +wound Denies skin rash, eczema, pruritis    PHYSICAL EXAM:     BP (!) 169/74   Pulse 77   Temp 98.7 °F (37.1 °C) (Infrared)   Resp 18   SpO2 96%   Wt Readings from Last 3 Encounters:   09/22/22 245 lb (111.1 kg)   07/10/22 241 lb (109.3 kg)   02/17/22 244 lb 6.4 oz (110.9 kg)       General:  Awake, alert, no apparent distress. Appears stated age  [de-identified]: conjuctivae are clear without exudate or hemorrhage, anicteric sclera, moist oral mucosa. Chest:  Respirations regular, non-labored. Chest rise and fall equal bilaterally. Abdomen:  Soft, non tender to palpation. Neurological: Awake, alert, oriented x4  Psychiatric:  Appropriate mood and affect  Extremities: non-traumatic in appearance. Pedal and posterior tibial pulses +1 to left foot. Skin:  Warm and dry  Wound:    Left lateral foot, proximal wound has healed. Left lateral foot,  distal wound bed bleeding, granular with slough and devitalized tissue. Periwound shows blanchable erythema. Moderate amount of thick, yellow drainage noted. Left great toe amputation incision site has healed.     Wound 02/09/23 Foot Left;Proximal;Lateral #1 (Active)   Wound Image   05/18/23 1324   Wound Etiology Diabetic 05/18/23 1324   Dressing Status Intact;Dry 05/18/23 1324   Wound Cleansed Cleansed with saline 05/18/23 1324   Dressing/Treatment Ace wrap;ABD;Betadine

## 2023-05-18 NOTE — PLAN OF CARE
Problem: Wound:  Goal: Will show signs of wound healing; wound closure and no evidence of infection  Description: Will show signs of wound healing; wound closure and no evidence of infection  Outcome: Progressing  Note: Patient seen for left foot wound for antibiotics . Wound shows signs of proper closure and healing. No s/s of infection noted. Follow up in 3 weeks. Lab requisitions given. See AVS for order changes. Care plan reviewed with patient. Patient verbalize understanding of the plan of care and contribute to goal setting.

## 2023-05-18 NOTE — DISCHARGE INSTRUCTIONS
Visit Discharge/Physician Orders:   - Continue antibiotic as prescribed we send in a refill to your pharmacy. Take right after dialysis and then in the evening with dinner  - Continue to drink plenty of water  - Limit time in sun while on antibiotic   - Repeat labs in about  3 weeks before next appointment. Have lab results faxed to Daphney Snyder [phone]   - culture sent today. We will call you if you need additional antibiotics      Home Care: [address]      Wound Location: left foot     Dressing orders: per Dr. Jessica Jose     Follow up visit: 3 weeks Thursday June 8th at 1pm     Keep next scheduled appointment. Please give 24 hour notice if unable to keep appointment. [phone]     If you experience any of the following, please call the Wound Care Service during business hours: Monday through Friday 8:00 am - 4:30 pm  ([phone]).               *Increase in pain              *Temperature over 101              *Increase in drainage from your wound or a foul odor              *Uncontrolled swelling              *Need for compression bandage changes due to slippage, breakthrough drainage     If you need medical attention outside of business hours, please contact your Primary Care Doctor or go to the nearest emergency room

## 2023-05-19 LAB
BACTERIA SPEC AEROBE CULT: NORMAL
GRAM STN SPEC: NORMAL

## 2023-06-08 ENCOUNTER — HOSPITAL ENCOUNTER (OUTPATIENT)
Dept: WOUND CARE | Age: 58
Discharge: HOME | End: 2023-06-08
Payer: MEDICARE

## 2023-06-08 ENCOUNTER — TELEPHONE (OUTPATIENT)
Dept: WOUND CARE | Age: 58
End: 2023-06-08

## 2023-06-08 VITALS
SYSTOLIC BLOOD PRESSURE: 165 MMHG | DIASTOLIC BLOOD PRESSURE: 74 MMHG | RESPIRATION RATE: 18 BRPM | OXYGEN SATURATION: 96 % | HEART RATE: 87 BPM | TEMPERATURE: 97.8 F

## 2023-06-08 DIAGNOSIS — T81.31XD DEHISCENCE OF OPERATIVE WOUND, SUBSEQUENT ENCOUNTER: Primary | ICD-10-CM

## 2023-06-08 PROCEDURE — 99213 OFFICE O/P EST LOW 20 MIN: CPT

## 2023-06-08 PROCEDURE — 99214 OFFICE O/P EST MOD 30 MIN: CPT | Performed by: NURSE PRACTITIONER

## 2023-06-08 RX ORDER — CLOBETASOL PROPIONATE 0.5 MG/G
OINTMENT TOPICAL ONCE
OUTPATIENT
Start: 2023-06-08 | End: 2023-06-08

## 2023-06-08 RX ORDER — LIDOCAINE HYDROCHLORIDE 20 MG/ML
JELLY TOPICAL ONCE
OUTPATIENT
Start: 2023-06-08 | End: 2023-06-08

## 2023-06-08 RX ORDER — GENTAMICIN SULFATE 1 MG/G
OINTMENT TOPICAL ONCE
OUTPATIENT
Start: 2023-06-08 | End: 2023-06-08

## 2023-06-08 RX ORDER — LIDOCAINE 40 MG/G
CREAM TOPICAL ONCE
OUTPATIENT
Start: 2023-06-08 | End: 2023-06-08

## 2023-06-08 RX ORDER — LIDOCAINE 50 MG/G
OINTMENT TOPICAL ONCE
OUTPATIENT
Start: 2023-06-08 | End: 2023-06-08

## 2023-06-08 RX ORDER — BETAMETHASONE DIPROPIONATE 0.05 %
OINTMENT (GRAM) TOPICAL ONCE
OUTPATIENT
Start: 2023-06-08 | End: 2023-06-08

## 2023-06-08 RX ORDER — BACITRACIN ZINC AND POLYMYXIN B SULFATE 500; 1000 [USP'U]/G; [USP'U]/G
OINTMENT TOPICAL ONCE
OUTPATIENT
Start: 2023-06-08 | End: 2023-06-08

## 2023-06-08 RX ORDER — LIDOCAINE HYDROCHLORIDE 40 MG/ML
SOLUTION TOPICAL ONCE
OUTPATIENT
Start: 2023-06-08 | End: 2023-06-08

## 2023-06-08 RX ORDER — GINSENG 100 MG
CAPSULE ORAL ONCE
OUTPATIENT
Start: 2023-06-08 | End: 2023-06-08

## 2023-06-08 RX ORDER — IBUPROFEN 200 MG
TABLET ORAL ONCE
OUTPATIENT
Start: 2023-06-08 | End: 2023-06-08

## 2023-06-08 NOTE — PROGRESS NOTES
[address] [facility]  Consult and Procedure Note       [address]  RECORD NUMBER:  [id number]  AGE: 66 y.o. GENDER: male  : 1965  EPISODE DATE:  2023    SUBJECTIVE:     Chief Complaint   Patient presents with    Wound Check     Left foot- antibiotic       HISTORY OF PRESENT ILLNESS      Adrián Posey is a 66 y.o. male who presents today for evaluation of osteomyelitis of left foot. Patient referred to clinic by Dr. Nida Romo who has been following patient for nonhealing wound to medial left foot, is s/p excision of left first metatarsal head 23. Bone biopsy was sent as clearance fragment at time of last surgery, showed presence of MSSA. He was placed on Doxycycline 23 per Dr. Nida Romo has continued on this since that time due to ongoing elevation of markers of inflammation and failure of surgical incision to heal.  Case was reviewed with Dr. Enriqueta Cunha who recommended continuing Doxycycline until surgical incision has healed completely. Patient states he has been taking Doxycycline twice daily as ordered. At last visit incision was healed, antibiotics were continued due to ongoing elevation of markers of inflammation and concern for new wound to lateral foot. Patient states he was evaluated by Dr. Nida Romo this morning who told him that he had infection of the bone in this area and will need surgery. Surgery is on hold, however, until he is evaluated by Dr. Jayme Donovan due to concerns for vascular issues. Culture of wound was taken, did not identify any pathogens. Patient has history of DM,previously uncontrolled. States his hemoglobin A1C was over 11, has been working with DM clinic and has decreased to around 7. Other pertinent history includes CKD, is a current hemodialysis patient, has fistula to right forearm. He denies any fever, chills, fatigue, malaise. Denies any further needs or concerns.      PAST MEDICAL HISTORY             Diagnosis Date    ASCVD

## 2023-06-08 NOTE — PLAN OF CARE
Problem: Wound:  Goal: Will show signs of wound healing; wound closure and no evidence of infection  Description: Will show signs of wound healing; wound closure and no evidence of infection  Outcome: Progressing     Patient presents to wound clinic for left foot wound. See avs for discharge instructions. Follow up visit: 2 weeks on Friday June 23rd at 10:30 am     Care plan reviewed with patient. Patient  verbalize understanding of the plan of care and contribute to goal setting.

## 2023-06-08 NOTE — DISCHARGE INSTRUCTIONS
Visit Discharge/Physician Orders:   - Will call dialysis about IV antibiotics. Dr Lito Odom at [facility]. Plan for 4 weeks. We will send progress note from today to Dr Mychal Dominique office. Plan is for Dr Alyson Alvarenga to sign for IV antibiotics. Their office will send information to Dialysis for infusions. - Will get xray results from Dr Mychal Dominique office.   - Keep follow up with Dr Duke Logan.   - CBC BMP CRP ESR weekly while on IV antibiotics x 4 weeks. Fax to wound care at [phone].   - Continue oral antibiotic as prescribed Take right after dialysis and then in the evening with dinner.   - Do not take oral and IV antibiotics together.  - Once you start the IV antibiotics, STOP your oral antibiotics. - Continue to drink plenty of water  - Limit time in sun while on antibiotic     Home Care: [address]      Wound Location: left foot     Dressing orders: per Dr. Alyson Alvarenga     Follow up visit: 2 weeks on Friday June 23rd at 10:30 am      Keep next scheduled appointment. Please give 24 hour notice if unable to keep appointment. [phone]     If you experience any of the following, please call the Wound Care Service during business hours: Monday through Friday 8:00 am - 4:30 pm  ([phone]).               *Increase in pain              *Temperature over 101              *Increase in drainage from your wound or a foul odor              *Uncontrolled swelling              *Need for compression bandage changes due to slippage, breakthrough drainage     If you need medical attention outside of business hours, please contact your Primary Care Doctor or go to the nearest emergency room

## 2023-06-08 NOTE — TELEPHONE ENCOUNTER
Called [facility] dialysis regarding need for patient to have IV antibiotics during dialysis. They are unable to accept orders from non [facility] providers. Spoke with Dr Kt Cardoso office who are agreeable to sign for IV antibiotic orders per Milla's recommendations. Recommendations faxed to Dr Kt Cardoso office. Also requested most recent foot xray from Dr Niels Shane office as well. They will fax to us. Dr Niels Shane office will then fax orders to [facility] dialysis who will be responsible for administering patients IV antibiotics three times weekly. Weekly lab work also to be drawn CBC BMP CRP ESR. Attempted to call patient with update on plan. No answer at this time. VM left to call back.

## 2023-06-23 ENCOUNTER — HOSPITAL ENCOUNTER (OUTPATIENT)
Dept: WOUND CARE | Age: 58
Discharge: HOME | End: 2023-06-23
Payer: MEDICARE

## 2023-06-23 VITALS
TEMPERATURE: 97.7 F | OXYGEN SATURATION: 96 % | HEART RATE: 75 BPM | RESPIRATION RATE: 16 BRPM | SYSTOLIC BLOOD PRESSURE: 202 MMHG | DIASTOLIC BLOOD PRESSURE: 88 MMHG

## 2023-06-23 DIAGNOSIS — T81.31XD DEHISCENCE OF OPERATIVE WOUND, SUBSEQUENT ENCOUNTER: Primary | ICD-10-CM

## 2023-06-23 PROCEDURE — 99213 OFFICE O/P EST LOW 20 MIN: CPT | Performed by: NURSE PRACTITIONER

## 2023-06-23 PROCEDURE — 99213 OFFICE O/P EST LOW 20 MIN: CPT

## 2023-06-23 RX ORDER — BETAMETHASONE DIPROPIONATE 0.05 %
OINTMENT (GRAM) TOPICAL ONCE
OUTPATIENT
Start: 2023-06-23 | End: 2023-06-23

## 2023-06-23 RX ORDER — GENTAMICIN SULFATE 1 MG/G
OINTMENT TOPICAL ONCE
OUTPATIENT
Start: 2023-06-23 | End: 2023-06-23

## 2023-06-23 RX ORDER — IBUPROFEN 200 MG
TABLET ORAL ONCE
OUTPATIENT
Start: 2023-06-23 | End: 2023-06-23

## 2023-06-23 RX ORDER — GINSENG 100 MG
CAPSULE ORAL ONCE
OUTPATIENT
Start: 2023-06-23 | End: 2023-06-23

## 2023-06-23 RX ORDER — LIDOCAINE HYDROCHLORIDE 20 MG/ML
JELLY TOPICAL ONCE
OUTPATIENT
Start: 2023-06-23 | End: 2023-06-23

## 2023-06-23 RX ORDER — LIDOCAINE 50 MG/G
OINTMENT TOPICAL ONCE
OUTPATIENT
Start: 2023-06-23 | End: 2023-06-23

## 2023-06-23 RX ORDER — CLOBETASOL PROPIONATE 0.5 MG/G
OINTMENT TOPICAL ONCE
OUTPATIENT
Start: 2023-06-23 | End: 2023-06-23

## 2023-06-23 RX ORDER — BACITRACIN ZINC AND POLYMYXIN B SULFATE 500; 1000 [USP'U]/G; [USP'U]/G
OINTMENT TOPICAL ONCE
OUTPATIENT
Start: 2023-06-23 | End: 2023-06-23

## 2023-06-23 RX ORDER — LIDOCAINE HYDROCHLORIDE 40 MG/ML
SOLUTION TOPICAL ONCE
OUTPATIENT
Start: 2023-06-23 | End: 2023-06-23

## 2023-06-23 RX ORDER — LIDOCAINE 40 MG/G
CREAM TOPICAL ONCE
OUTPATIENT
Start: 2023-06-23 | End: 2023-06-23

## 2023-06-23 NOTE — PROGRESS NOTES
Bailey HIGH has reviewed and agrees with Garett MEYER's shift  Assessment.     Leonor Austin, ELIANE  6/23/2023

## 2023-06-23 NOTE — DISCHARGE INSTRUCTIONS
Visit Discharge/Physician Orders:   - Plan for at least 2 more weeks on IV antibiotics, we will call you once we get the lab work if any changes are needed  - Keep follow up with Dr Nicanor Merlin and Dr. Maryann Mahajan  - CBC BMP CRP ESR weekly while on IV antibiotics x 2 weeks. Fax to wound care at [phone]  - Continue to drink plenty of water       Home Care: [address]      Wound Location: left foot     Dressing orders: per Dr. Maryann Mahajan     Follow up visit: 2 weeks on 07/06/2023 at 10:30      Keep next scheduled appointment. Please give 24 hour notice if unable to keep appointment. [phone]     If you experience any of the following, please call the Wound Care Service during business hours: Monday through Friday 8:00 am - 4:30 pm  ([phone]).               *Increase in pain              *Temperature over 101              *Increase in drainage from your wound or a foul odor              *Uncontrolled swelling              *Need for compression bandage changes due to slippage, breakthrough drainage     If you need medical attention outside of business hours, please contact your Primary Care Doctor or go to the nearest emergency room

## 2023-06-23 NOTE — PROGRESS NOTES
[address] [facility]  Consult and Procedure Note       [address]  RECORD NUMBER:  [id number]  AGE: 66 y.o. GENDER: male  : 1965  EPISODE DATE:  2023    SUBJECTIVE:     Chief Complaint   Patient presents with    Wound Check     Left foot      HISTORY OF PRESENT ILLNESS      Henrik Alatorre is a 66 y.o. male who presents today for evaluation of osteomyelitis of left foot. Patient referred to clinic by Dr. Dinora Castillo who has been following patient for nonhealing wound to medial left foot, is s/p excision of left first metatarsal head 23. Bone biopsy was sent as clearance fragment at time of last surgery, showed presence of MSSA. He was placed on Doxycycline 23-23. This wound was healed at 23 visit. Unfortunately, he has new wound to lateral side of foot. Patient states he was evaluated by Dr. Dinora Castillo  who told him that he had infection of the bone in this area and will need surgery. Surgery is on hold, however, until he undergoes procedure by Dr. Roland Carlisle due to concerns for vascular issues. Culture of wound was taken, did not identify any pathogens. He was placed on IV Vancomycin three times weekly during dialysis due to concerns for osteomyelitis, recommendations for 4 weeks of therapy. Patient has history of DM, previously uncontrolled. States his hemoglobin A1C was over 11, has been working with DM clinic and has decreased to around 7. Other pertinent history includes CKD, is a current hemodialysis patient, has fistula to right forearm. He denies any fever, chills, fatigue, malaise. Denies any further needs or concerns.      PAST MEDICAL HISTORY             Diagnosis Date    ASCVD (arteriosclerotic cardiovascular disease)     Cancer ([facility] [address] 2017    Dr. Hany Jolley    CKD (chronic kidney disease) stage 3, GFR 30-59 ml/min ([facility])     see's [place] [facility]    COPD (chronic obstructive pulmonary disease) ([facility])     DM2 (diabetes mellitus, type 2) ([facility])     Dyslipidemia

## 2023-06-23 NOTE — PLAN OF CARE
Problem: Wound:  Goal: Will show signs of wound healing; wound closure and no evidence of infection  Description: Will show signs of wound healing; wound closure and no evidence of infection  Outcome: Progressing   Patient seen for left foot wound and IV Antibiotics. Wound shows signs of proper closure and healing. No s/s of infection noted. Patient to continue IV antibiotics for at least 2 more weeks. Dressings per Dr. Alex Benjamin. Follow up in 2 weeks. See AVS for order changes. Care plan reviewed with patient. Patient verbalize understanding of the plan of care and contribute to goal setting.

## 2023-07-06 ENCOUNTER — HOSPITAL ENCOUNTER (OUTPATIENT)
Dept: WOUND CARE | Age: 58
Discharge: HOME | End: 2023-07-06
Payer: MEDICARE

## 2023-07-06 VITALS
SYSTOLIC BLOOD PRESSURE: 178 MMHG | HEART RATE: 73 BPM | TEMPERATURE: 97.2 F | RESPIRATION RATE: 16 BRPM | OXYGEN SATURATION: 97 % | DIASTOLIC BLOOD PRESSURE: 79 MMHG

## 2023-07-06 DIAGNOSIS — T81.31XD DEHISCENCE OF OPERATIVE WOUND, SUBSEQUENT ENCOUNTER: Primary | ICD-10-CM

## 2023-07-06 PROCEDURE — 99213 OFFICE O/P EST LOW 20 MIN: CPT

## 2023-07-06 PROCEDURE — 99213 OFFICE O/P EST LOW 20 MIN: CPT | Performed by: NURSE PRACTITIONER

## 2023-07-06 RX ORDER — IBUPROFEN 200 MG
TABLET ORAL ONCE
OUTPATIENT
Start: 2023-07-06 | End: 2023-07-06

## 2023-07-06 RX ORDER — CLOBETASOL PROPIONATE 0.5 MG/G
OINTMENT TOPICAL ONCE
OUTPATIENT
Start: 2023-07-06 | End: 2023-07-06

## 2023-07-06 RX ORDER — LIDOCAINE 50 MG/G
OINTMENT TOPICAL ONCE
OUTPATIENT
Start: 2023-07-06 | End: 2023-07-06

## 2023-07-06 RX ORDER — LIDOCAINE HYDROCHLORIDE 40 MG/ML
SOLUTION TOPICAL ONCE
OUTPATIENT
Start: 2023-07-06 | End: 2023-07-06

## 2023-07-06 RX ORDER — LIDOCAINE HYDROCHLORIDE 20 MG/ML
JELLY TOPICAL ONCE
OUTPATIENT
Start: 2023-07-06 | End: 2023-07-06

## 2023-07-06 RX ORDER — GENTAMICIN SULFATE 1 MG/G
OINTMENT TOPICAL ONCE
OUTPATIENT
Start: 2023-07-06 | End: 2023-07-06

## 2023-07-06 RX ORDER — BACITRACIN ZINC AND POLYMYXIN B SULFATE 500; 1000 [USP'U]/G; [USP'U]/G
OINTMENT TOPICAL ONCE
OUTPATIENT
Start: 2023-07-06 | End: 2023-07-06

## 2023-07-06 RX ORDER — BETAMETHASONE DIPROPIONATE 0.05 %
OINTMENT (GRAM) TOPICAL ONCE
OUTPATIENT
Start: 2023-07-06 | End: 2023-07-06

## 2023-07-06 RX ORDER — LIDOCAINE 40 MG/G
CREAM TOPICAL ONCE
OUTPATIENT
Start: 2023-07-06 | End: 2023-07-06

## 2023-07-06 RX ORDER — GINSENG 100 MG
CAPSULE ORAL ONCE
OUTPATIENT
Start: 2023-07-06 | End: 2023-07-06

## 2023-07-06 NOTE — PROGRESS NOTES
[address] [facility]  Consult and Procedure Note      [address] [facility] RECORD NUMBER:  [id number]  AGE: 66 y.o. GENDER: male  : 1965  EPISODE DATE:  2023    SUBJECTIVE:     Chief Complaint   Patient presents with    Wound Check     Antibiotics left foot      HISTORY OF PRESENT ILLNESS      Leslie Starr is a 66 y.o. male who presents today for evaluation of osteomyelitis of left foot. Patient referred to clinic by Dr. Zuleyma Fernandez who has been following patient for nonhealing wound to medial left foot, is s/p excision of left first metatarsal head 23. Bone biopsy was sent as clearance fragment at time of last surgery, showed presence of MSSA. He was placed on Doxycycline 23-23. This wound was healed at 23 visit. Unfortunately, he has new wound to lateral side of foot. Patient states he was evaluated by Dr. Zuleyma Fernandez  who told him that he had infection of the bone in this area and will need surgery. Surgery is on hold, however, until he undergoes procedure by Dr. Deanna Yanez due to concerns for vascular issues, patient  states this is scheduled for next week. Culture of wound was taken, did not identify any pathogens. He was placed on IV Vancomycin three times weekly during dialysis due to concerns for osteomyelitis, recommendations for 4 weeks of therapy. Patient has history of DM, previously uncontrolled. States his hemoglobin A1C was over 11, has been working with DM clinic and has decreased to around 7. Other pertinent history includes CKD, is a current hemodialysis patient, has fistula to right forearm. He denies any fever, chills, fatigue, malaise. Denies any further needs or concerns.      PAST MEDICAL HISTORY             Diagnosis Date    ASCVD (arteriosclerotic cardiovascular disease)     Cancer ([address] ) 2017    Dr. Yusra Simon    CKD (chronic kidney disease) stage 3, GFR 30-59 ml/min ([facility])     see's Demetri Becerra    COPD (chronic obstructive pulmonary disease)

## 2023-07-06 NOTE — PLAN OF CARE
Problem: Wound:  Goal: Will show signs of wound healing; wound closure and no evidence of infection  Description: Will show signs of wound healing; wound closure and no evidence of infection  Outcome: Progressing   Pt. Seen today for antibiotic therapy see AVS for new orders. Follow up in 2 weeks. Care plan reviewed with patient. Patient verbalize understanding of the plan of care and contribute to goal setting.

## 2023-07-06 NOTE — DISCHARGE INSTRUCTIONS
Visit Discharge/Physician Orders:   - Recommend an additional 2 more weeks on IV antibiotics to go beyond the procedure with Dr. Marvel Mendoza  - Keep follow up with Dr Marvel Mendoza and Dr. Jeyson Negrete  - CBC BMP CRP ESR weekly while on IV antibiotics x 2 weeks. Fax to wound care at [phone]  - Continue to drink plenty of water        Home Care: [address]      Wound Location: left foot     Dressing orders: per Dr. Jeyson Negrete     Follow up visit: 2 weeks on 07/20/2023 at 2:00 pm     Keep next scheduled appointment. Please give 24 hour notice if unable to keep appointment. [phone]     If you experience any of the following, please call the Wound Care Service during business hours: Monday through Friday 8:00 am - 4:30 pm  ([phone]).               *Increase in pain              *Temperature over 101              *Increase in drainage from your wound or a foul odor              *Uncontrolled swelling              *Need for compression bandage changes due to slippage, breakthrough drainage     If you need medical attention outside of business hours, please contact your Primary Care Doctor or go to the nearest emergency room

## 2023-07-20 ENCOUNTER — HOSPITAL ENCOUNTER (OUTPATIENT)
Dept: WOUND CARE | Age: 58
Discharge: HOME | End: 2023-07-20
Payer: MEDICARE

## 2023-07-20 VITALS
OXYGEN SATURATION: 94 % | HEART RATE: 87 BPM | SYSTOLIC BLOOD PRESSURE: 146 MMHG | DIASTOLIC BLOOD PRESSURE: 65 MMHG | TEMPERATURE: 97.3 F | RESPIRATION RATE: 18 BRPM

## 2023-07-20 DIAGNOSIS — T81.31XD DEHISCENCE OF OPERATIVE WOUND, SUBSEQUENT ENCOUNTER: ICD-10-CM

## 2023-07-20 DIAGNOSIS — M86.272 SUBACUTE OSTEOMYELITIS OF LEFT FOOT (HCC): ICD-10-CM

## 2023-07-20 DIAGNOSIS — Z79.2 LONG TERM CURRENT USE OF ANTIBIOTICS: Primary | ICD-10-CM

## 2023-07-20 PROCEDURE — 99213 OFFICE O/P EST LOW 20 MIN: CPT

## 2023-07-20 PROCEDURE — 99213 OFFICE O/P EST LOW 20 MIN: CPT | Performed by: NURSE PRACTITIONER

## 2023-07-20 RX ORDER — GINSENG 100 MG
CAPSULE ORAL ONCE
OUTPATIENT
Start: 2023-07-20 | End: 2023-07-20

## 2023-07-20 RX ORDER — LIDOCAINE 40 MG/G
CREAM TOPICAL ONCE
OUTPATIENT
Start: 2023-07-20 | End: 2023-07-20

## 2023-07-20 RX ORDER — CLOBETASOL PROPIONATE 0.5 MG/G
OINTMENT TOPICAL ONCE
OUTPATIENT
Start: 2023-07-20 | End: 2023-07-20

## 2023-07-20 RX ORDER — LIDOCAINE HYDROCHLORIDE 40 MG/ML
SOLUTION TOPICAL ONCE
OUTPATIENT
Start: 2023-07-20 | End: 2023-07-20

## 2023-07-20 RX ORDER — LIDOCAINE HYDROCHLORIDE 20 MG/ML
JELLY TOPICAL ONCE
OUTPATIENT
Start: 2023-07-20 | End: 2023-07-20

## 2023-07-20 RX ORDER — IBUPROFEN 200 MG
TABLET ORAL ONCE
OUTPATIENT
Start: 2023-07-20 | End: 2023-07-20

## 2023-07-20 RX ORDER — GENTAMICIN SULFATE 1 MG/G
OINTMENT TOPICAL ONCE
OUTPATIENT
Start: 2023-07-20 | End: 2023-07-20

## 2023-07-20 RX ORDER — LIDOCAINE 50 MG/G
OINTMENT TOPICAL ONCE
OUTPATIENT
Start: 2023-07-20 | End: 2023-07-20

## 2023-07-20 RX ORDER — BETAMETHASONE DIPROPIONATE 0.05 %
OINTMENT (GRAM) TOPICAL ONCE
OUTPATIENT
Start: 2023-07-20 | End: 2023-07-20

## 2023-07-20 RX ORDER — BACITRACIN ZINC AND POLYMYXIN B SULFATE 500; 1000 [USP'U]/G; [USP'U]/G
OINTMENT TOPICAL ONCE
OUTPATIENT
Start: 2023-07-20 | End: 2023-07-20

## 2023-07-20 NOTE — DISCHARGE INSTRUCTIONS
Visit Discharge/Physician Orders:   - Start doxycycline the day after completion of IV vancomycin  - Keep follow up with Dr. Liborio Meeks  - CBC BMP CRP ESR ordered today please have results Faxed to wound care at [phone]  - Continue to drink plenty of water        Home Care: [facility]      Wound Location: left foot     Dressing orders: per Dr. Liborio Meeks     Follow up visit: 2 weeks on 8/3/23 at 230pm     Keep next scheduled appointment. Please give 24 hour notice if unable to keep appointment. [phone]     If you experience any of the following, please call the Wound Care Service during business hours: Monday through Friday 8:00 am - 4:30 pm  ([phone]).               *Increase in pain              *Temperature over 101              *Increase in drainage from your wound or a foul odor              *Uncontrolled swelling              *Need for compression bandage changes due to slippage, breakthrough drainage     If you need medical attention outside of business hours, please contact your Primary Care Doctor or go to the nearest emergency room

## 2023-07-20 NOTE — PROGRESS NOTES
care should these occur. Follow up 2 weeks. Call clinic with any needs or concerns prior to scheduled visit. All questions and concerns addressed prior to discharge from today's visit. Please see attached Discharge Instructions    Written patient dismissal instructions given to patient and signed by patient or POA. I spent a total of 25 minutes with Shaye Vee  on 7/20/2023. Time spent includes review of previous progress notes, reviewing and discussing test results, education on plan of care for presenting diagnosis, answering questions, providing instructions on treatment and/or medications ordered, reviewing importance of compliance with outline treatment plan and any identifiable barriers to compliance and coordination of care based on plan and assessment as noted. Orders Placed This Encounter   Procedures    CBC     Standing Status:   Future     Standing Expiration Date:   7/20/2024    C-Reactive Protein     Standing Status:   Future     Standing Expiration Date:   7/20/2024    Sedimentation Rate     Standing Status:   Future     Standing Expiration Date:   9/83/4621    Basic Metabolic Panel     Standing Status:   Future     Standing Expiration Date:   7/20/2024     Discharge Instructions     Discharge Instructions         Visit Discharge/Physician Orders:   - Start doxycycline the day after completion of IV vancomycin  - Keep follow up with Dr. Rachel Hernandez  - CBC BMP CRP ESR ordered today please have results Faxed to wound care at [phone]  - Continue to drink plenty of water        [facility] Care: [facility] [address] [facility]      Wound Location: left foot     Dressing orders: per Dr. Rachel Hernandez     Follow up visit: 2 weeks on 8/3/23 at 230pm     Keep next scheduled appointment. Please give 24 hour notice if unable to keep appointment.  [phone]     If you experience any of the following, please call the Wound Care Service during business hours: Monday through Friday 8:00 am -

## 2023-07-20 NOTE — PLAN OF CARE
Problem: Wound:  Goal: Will show signs of wound healing; wound closure and no evidence of infection  Description: Will show signs of wound healing; wound closure and no evidence of infection  Outcome: Progressing     Patient presents to wound clinic for foot wound. See avs for discharge instructions. Follow up visit: 2 weeks on 8/3/23 at 230pm    Care plan reviewed with patient. Patient verbalize understanding of the plan of care and contribute to goal setting.

## 2023-08-01 ENCOUNTER — TELEPHONE (OUTPATIENT)
Dept: WOUND CARE | Age: 58
End: 2023-08-01

## 2023-08-01 NOTE — TELEPHONE ENCOUNTER
Lab called with critical potassium of 6.1. Updated Milla BULLARD. Report to dialysis provider Dr Aleshia Brunson . [address] call center and they will report critical level to provider.

## 2023-08-03 ENCOUNTER — HOSPITAL ENCOUNTER (OUTPATIENT)
Dept: WOUND CARE | Age: 58
Discharge: HOME | End: 2023-08-03
Payer: MEDICARE

## 2023-08-03 VITALS
SYSTOLIC BLOOD PRESSURE: 193 MMHG | RESPIRATION RATE: 16 BRPM | DIASTOLIC BLOOD PRESSURE: 81 MMHG | OXYGEN SATURATION: 97 % | HEART RATE: 77 BPM | TEMPERATURE: 98.1 F

## 2023-08-03 DIAGNOSIS — T81.31XD DEHISCENCE OF OPERATIVE WOUND, SUBSEQUENT ENCOUNTER: ICD-10-CM

## 2023-08-03 DIAGNOSIS — M86.672 OTHER CHRONIC OSTEOMYELITIS OF LEFT FOOT (HCC): ICD-10-CM

## 2023-08-03 DIAGNOSIS — Z79.2 LONG TERM CURRENT USE OF ANTIBIOTICS: Primary | ICD-10-CM

## 2023-08-03 PROCEDURE — 99213 OFFICE O/P EST LOW 20 MIN: CPT

## 2023-08-03 PROCEDURE — 99214 OFFICE O/P EST MOD 30 MIN: CPT | Performed by: NURSE PRACTITIONER

## 2023-08-03 RX ORDER — LIDOCAINE HYDROCHLORIDE 20 MG/ML
JELLY TOPICAL ONCE
OUTPATIENT
Start: 2023-08-03 | End: 2023-08-03

## 2023-08-03 RX ORDER — GINSENG 100 MG
CAPSULE ORAL ONCE
OUTPATIENT
Start: 2023-08-03 | End: 2023-08-03

## 2023-08-03 RX ORDER — LIDOCAINE 50 MG/G
OINTMENT TOPICAL ONCE
OUTPATIENT
Start: 2023-08-03 | End: 2023-08-03

## 2023-08-03 RX ORDER — LIDOCAINE HYDROCHLORIDE 40 MG/ML
SOLUTION TOPICAL ONCE
OUTPATIENT
Start: 2023-08-03 | End: 2023-08-03

## 2023-08-03 RX ORDER — CLOBETASOL PROPIONATE 0.5 MG/G
OINTMENT TOPICAL ONCE
OUTPATIENT
Start: 2023-08-03 | End: 2023-08-03

## 2023-08-03 RX ORDER — BETAMETHASONE DIPROPIONATE 0.05 %
OINTMENT (GRAM) TOPICAL ONCE
OUTPATIENT
Start: 2023-08-03 | End: 2023-08-03

## 2023-08-03 RX ORDER — BACITRACIN ZINC AND POLYMYXIN B SULFATE 500; 1000 [USP'U]/G; [USP'U]/G
OINTMENT TOPICAL ONCE
OUTPATIENT
Start: 2023-08-03 | End: 2023-08-03

## 2023-08-03 RX ORDER — IBUPROFEN 200 MG
TABLET ORAL ONCE
OUTPATIENT
Start: 2023-08-03 | End: 2023-08-03

## 2023-08-03 RX ORDER — LIDOCAINE 40 MG/G
CREAM TOPICAL ONCE
OUTPATIENT
Start: 2023-08-03 | End: 2023-08-03

## 2023-08-03 RX ORDER — GENTAMICIN SULFATE 1 MG/G
OINTMENT TOPICAL ONCE
OUTPATIENT
Start: 2023-08-03 | End: 2023-08-03

## 2023-08-03 NOTE — PLAN OF CARE
Problem: Wound:  Goal: Will show signs of wound healing; wound closure and no evidence of infection  Description: Will show signs of wound healing; wound closure and no evidence of infection  Outcome: Progressing    Patient seen for left foot infection. Wound shows signs of proper closure and healing. No s/s of infection noted. Continue oral antibiotics have labs drawn closer to next follow up. Dr. Karma Lucio for dressing changes. Follow up in 4 weeks. See AVS for order changes.

## 2023-08-03 NOTE — PROGRESS NOTES
[address] [place] [address] Dr care  [address] [place], [place] [address] [address] [facility], [address] [place] [address],[address]  Telephone: [phone]    Home health agencies:  [facility] Phone # [phone], Fax # [phone]        Discharge Instructions          Visit Discharge/Physician Orders:   - Start doxycycline the day after completion of IV vancomycin will send more to pharmacy   - minimize exposure to sun while on doxycycline  - Keep all follow ups with Dr. Jeb Damon  - CBC BMP CRP ESR ordered today please have drawn in about a month and have results Faxed to wound care at [phone]  - Continue to drink plenty of water as much as you are allowed to have   - try lotion to your arms for itching if make sure you make your kidney physician aware of your itching.   - gold bond, eucerin, vaseline lotion, lubriderm no perfumes or dyes         Home Care: [facility]      Wound Location: left foot     Dressing orders: per Dr. Jeb Damon     Follow up visit: 4 weeks on August 31st at [address] [place] [address] next scheduled appointment. Please give 24 hour notice if unable to keep appointment. [phone]     If you experience any of the following, please call the Wound Care Service during business hours: Monday through Friday 8:00 am - 4:30 pm  ([phone]). *Increase in pain              *Temperature over 101              *Increase in drainage from your wound or a foul odor              *Uncontrolled swelling              *Need for compression bandage changes due to slippage, breakthrough drainage     If you need medical attention outside of business hours, please contact your Primary Care Doctor or go to the nearest emergency room           Skilled nurse to evaluate and treat for wound care. Change dressing as ordered. Patient/Family/caregiver may change dressings as needed as instructed when Home Care unavailable.     WOUNDS REQUIRING DRESSING Changes:     Wound 02/09/23 Foot

## 2023-08-03 NOTE — DISCHARGE INSTRUCTIONS
Visit Discharge/Physician Orders:   - Start doxycycline the day after completion of IV vancomycin will send more to pharmacy   - minimize exposure to sun while on doxycycline  - Keep all follow ups with Dr. French Sinha  - CBC BMP CRP ESR ordered today please have drawn in about a month and have results Faxed to wound care at [phone]  - Continue to drink plenty of water as much as you are allowed to have   - try lotion to your arms for itching if make sure you make your kidney physician aware of your itching.   - gold bond, eucerin, vaseline lotion, lubriderm no perfumes or dyes         Home Care: [facility]- [address]      Wound Location: left foot     Dressing orders: per Dr. French Sinha     Follow up visit: 4 weeks on August 31st at [address] [place] [address] next scheduled appointment. Please give 24 hour notice if unable to keep appointment. [phone]     If you experience any of the following, please call the Wound Care Service during business hours: Monday through Friday 8:00 am - 4:30 pm  ([phone]).               *Increase in pain              *Temperature over 101              *Increase in drainage from your wound or a foul odor              *Uncontrolled swelling              *Need for compression bandage changes due to slippage, breakthrough drainage     If you need medical attention outside of business hours, please contact your Primary Care Doctor or go to the nearest emergency room

## 2023-08-03 NOTE — PROGRESS NOTES
[address] [facility]  Consult and Procedure Note      [address] [facility] RECORD NUMBER:  [id number]  AGE: 66 y.o. GENDER: male  : 1965  EPISODE DATE:  8/3/2023    SUBJECTIVE:     Chief Complaint   Patient presents with    Wound Check      HISTORY OF PRESENT ILLNESS      Regan Skinner is a 66 y.o. male who presents today for evaluation of osteomyelitis of left foot. Patient referred to clinic by Dr. Soco Santana who has been following patient for nonhealing wound to medial left foot, is s/p excision of left first metatarsal head 23. Bone biopsy was sent as clearance fragment at time of last surgery, showed presence of MSSA. He was placed on Doxycycline 23-23. This wound was healed at 23 visit. Unfortunately, he developed new wound to lateral side of foot. Patient states he was evaluated by Dr. Soco Santana who told him that he had infection of the bone in this area and will need surgery. Surgery is on hold pending patient response to recent procedures per Dr. Yuliana Diop. Culture of wound was taken, did not identify any pathogens. He was started on IV Vancomycin three times weekly during dialysis due to concerns for osteomyelitis, for 6 weeks of therapy. At completion of treatment he was started on Doxycycline. He denies any adverse effects related to antibiotic use. States he saw Dr. Soco Santana today who advised him wound is healing well. Patient states that he was advised of concerns that infection may return should he stop antibiotics. .  Patient has history of DM, previously uncontrolled. States his hemoglobin A1C was over 11, has been working with DM clinic and has decreased to around 7. Other pertinent history includes CKD, is a current hemodialysis patient, has fistula to right forearm. He denies any fever, chills, fatigue, malaise. Denies any further needs or concerns.      PAST MEDICAL HISTORY             Diagnosis Date    ASCVD (arteriosclerotic cardiovascular disease)

## 2023-08-21 RX ORDER — METOPROLOL SUCCINATE 25 MG/1
25 TABLET, EXTENDED RELEASE ORAL DAILY
Qty: 90 TABLET | Refills: 3 | Status: SHIPPED | OUTPATIENT
Start: 2023-08-21

## 2023-08-31 ENCOUNTER — HOSPITAL ENCOUNTER (OUTPATIENT)
Dept: WOUND CARE | Age: 58
Discharge: HOME | End: 2023-08-31
Payer: MEDICARE

## 2023-08-31 VITALS
HEART RATE: 91 BPM | BODY MASS INDEX: 35.55 KG/M2 | SYSTOLIC BLOOD PRESSURE: 117 MMHG | OXYGEN SATURATION: 93 % | DIASTOLIC BLOOD PRESSURE: 56 MMHG | TEMPERATURE: 98 F | HEIGHT: 69 IN | RESPIRATION RATE: 18 BRPM | WEIGHT: 240 LBS

## 2023-08-31 DIAGNOSIS — Z79.2 LONG TERM CURRENT USE OF ANTIBIOTICS: Primary | ICD-10-CM

## 2023-08-31 DIAGNOSIS — M86.672 OTHER CHRONIC OSTEOMYELITIS OF LEFT FOOT (HCC): ICD-10-CM

## 2023-08-31 PROCEDURE — 99212 OFFICE O/P EST SF 10 MIN: CPT

## 2023-08-31 PROCEDURE — 99214 OFFICE O/P EST MOD 30 MIN: CPT | Performed by: NURSE PRACTITIONER

## 2023-08-31 RX ORDER — DOXYCYCLINE HYCLATE 100 MG
100 TABLET ORAL 2 TIMES DAILY
Qty: 60 TABLET | Refills: 0 | Status: SHIPPED | OUTPATIENT
Start: 2023-08-31 | End: 2023-09-30

## 2023-08-31 NOTE — PROGRESS NOTES
[address] [facility]  Consult and Procedure Note      [address] [facility] RECORD NUMBER:  [id number]  AGE: 66 y.o. GENDER: male  : 1965  EPISODE DATE:  2023    SUBJECTIVE:     Chief Complaint   Patient presents with    Wound Check     Left foot      HISTORY OF PRESENT ILLNESS      Barbara Tesfaye is a 66 y.o. male who presents today for evaluation of osteomyelitis of left foot. Patient referred to clinic by Dr. Whitney Egan who has been following patient for nonhealing wound to medial left foot, is s/p excision of left first metatarsal head 23. Bone biopsy was sent as clearance fragment at time of last surgery, showed presence of MSSA. He was placed on Doxycycline 23-23. This wound was healed at 23 visit. Unfortunately, he developed new wound to lateral side of foot. Patient states he was evaluated by Dr. Whitney Egan who told him that he had infection of the bone in this area and will need surgery. Surgery was placed on hold pending patient response to recent procedures per Dr. Keshav Guillaume. Culture of wound was taken, did not identify any pathogens. He was started on IV Vancomycin three times weekly during dialysis due to concerns for osteomyelitis, for 6 weeks of therapy. At completion of treatment he was restarted on Doxycycline which he continues today. He denies any adverse effects related to antibiotic use. States he saw Dr. Whitney Egan today who advised him wound is healing well, no plan for any surgical interventions. Is scheduled for repeat imaging and follow up with Dr. Keshav Guillaume in November. Patient has history of DM, previously uncontrolled. States his hemoglobin A1C was over 11, has been working with DM clinic and has decreased to around 7. Other pertinent history includes CKD, is a current hemodialysis patient, has fistula to right forearm. He denies any fever, chills, fatigue, malaise. Denies any further needs or concerns.      PAST MEDICAL HISTORY

## 2023-08-31 NOTE — DISCHARGE INSTRUCTIONS
Visit Discharge/Physician Orders:   - continue doxycycline  - minimize exposure to sun while on doxycycline  - Keep all follow ups with Dr. Whitney Egan  - CBC BMP CRP ESR ordered today please have drawn in about a month and have results Faxed to wound care at [phone]  - Continue to drink plenty of water as much as you are allowed to have   - try lotion to your arms for itching if make sure you make your kidney physician aware of your itching.   - gold bond, eucerin, vaseline lotion, lubriderm no perfumes or dyes   -cast cover or saran wrap (press and seal) to cover your leg during shower         Home Care:      Wound Location: left foot     Dressing orders: per Dr. Whitney Egan     Follow up visit: 5 weeks on October 5 at 1:30 pm      Keep next scheduled appointment. Please give 24 hour notice if unable to keep appointment. [phone]     If you experience any of the following, please call the Wound Care Service during business hours: Monday through Friday 8:00 am - 4:30 pm  ([phone]).               *Increase in pain              *Temperature over 101              *Increase in drainage from your wound or a foul odor              *Uncontrolled swelling              *Need for compression bandage changes due to slippage, breakthrough drainage     If you need medical attention outside of business hours, please contact your Primary Care Doctor or go to the nearest emergency room

## 2023-08-31 NOTE — PLAN OF CARE
Problem: Wound:  Goal: Will show signs of wound healing; wound closure and no evidence of infection  Description: Will show signs of wound healing; wound closure and no evidence of infection  Outcome: Progressing   Seen for ATB, see avs for discharge   Care plan reviewed with patient . Patient verbalize understanding of the plan of care and contribute to goal setting.

## 2023-09-19 RX ORDER — ISOSORBIDE MONONITRATE 60 MG/1
TABLET, EXTENDED RELEASE ORAL
Qty: 360 TABLET | Refills: 0 | Status: SHIPPED | OUTPATIENT
Start: 2023-09-19

## 2023-09-28 ENCOUNTER — OFFICE VISIT (OUTPATIENT)
Dept: CARDIOLOGY CLINIC | Age: 58
End: 2023-09-28
Payer: MEDICARE

## 2023-09-28 VITALS
HEIGHT: 69 IN | DIASTOLIC BLOOD PRESSURE: 82 MMHG | BODY MASS INDEX: 37.29 KG/M2 | HEART RATE: 73 BPM | SYSTOLIC BLOOD PRESSURE: 150 MMHG | WEIGHT: 251.8 LBS

## 2023-09-28 DIAGNOSIS — I73.9 PAD (PERIPHERAL ARTERY DISEASE) (HCC): ICD-10-CM

## 2023-09-28 DIAGNOSIS — I25.10 CORONARY ARTERY DISEASE INVOLVING NATIVE CORONARY ARTERY OF NATIVE HEART WITHOUT ANGINA PECTORIS: Primary | ICD-10-CM

## 2023-09-28 PROCEDURE — 3079F DIAST BP 80-89 MM HG: CPT | Performed by: INTERNAL MEDICINE

## 2023-09-28 PROCEDURE — 1036F TOBACCO NON-USER: CPT | Performed by: INTERNAL MEDICINE

## 2023-09-28 PROCEDURE — 3017F COLORECTAL CA SCREEN DOC REV: CPT | Performed by: INTERNAL MEDICINE

## 2023-09-28 PROCEDURE — 99213 OFFICE O/P EST LOW 20 MIN: CPT | Performed by: INTERNAL MEDICINE

## 2023-09-28 PROCEDURE — G8427 DOCREV CUR MEDS BY ELIG CLIN: HCPCS | Performed by: INTERNAL MEDICINE

## 2023-09-28 PROCEDURE — 3077F SYST BP >= 140 MM HG: CPT | Performed by: INTERNAL MEDICINE

## 2023-09-28 PROCEDURE — G8417 CALC BMI ABV UP PARAM F/U: HCPCS | Performed by: INTERNAL MEDICINE

## 2023-09-28 PROCEDURE — 93000 ELECTROCARDIOGRAM COMPLETE: CPT | Performed by: INTERNAL MEDICINE

## 2023-09-28 RX ORDER — NITROGLYCERIN 0.4 MG/1
0.4 TABLET SUBLINGUAL EVERY 5 MIN PRN
Qty: 25 TABLET | Refills: 3 | Status: SHIPPED | OUTPATIENT
Start: 2023-09-28

## 2023-09-28 NOTE — PROGRESS NOTES
Readings from Last 3 Encounters:   09/28/23 (!) 150/82   08/31/23 (!) 117/56   08/03/23 (!) 193/81       Nursing note and vitals reviewed. Physical Exam   Constitutional: Oriented to person, place, and time. Appears well-developed and well-nourished. HENT:   Head: Normocephalic and atraumatic. Eyes: EOM are normal. Pupils are equal, round, and reactive to light. Neck: Normal range of motion. Neck supple. No JVD present. Cardiovascular: Normal rate, regular rhythm, normal heart sounds and intact distal pulses. No murmur heard. Pulmonary/Chest: Effort normal and breath sounds normal. No respiratory distress. No wheezes. No rales. Abdominal: Soft. Bowel sounds are normal. No distension. There is no tenderness. Musculoskeletal: Normal range of motion. No edema. Neurological: Alert and oriented to person, place, and time. No cranial nerve deficit. Coordination normal.   Skin: Skin is warm and dry. Psychiatric: Normal mood and affect.        No results found for: \"CKTOTAL\", \"CKMB\", \"CKMBINDEX\"    Lab Results   Component Value Date/Time    WBC 9.0 09/08/2023 12:05 PM    RBC 3.13 09/08/2023 12:05 PM    HGB 9.9 09/22/2023 12:15 PM    HCT 28.5 09/22/2023 12:15 PM    MCV 91.9 09/08/2023 12:05 PM    MCH 31.2 09/08/2023 12:05 PM    MCHC 34.0 09/08/2023 12:05 PM    RDW 13.1 09/08/2023 12:05 PM     09/08/2023 12:05 PM    MPV 11.2 09/27/2018 09:19 AM       Lab Results   Component Value Date/Time     09/08/2023 12:05 PM    K 5.4 09/08/2023 12:05 PM    K 5.0 02/02/2018 10:15 AM    CL 94 07/28/2023 12:30 PM    CO2 28 09/08/2023 12:05 PM     09/22/2023 12:05 PM    LABALBU 3.5 09/08/2023 12:05 PM    CREATININE 9.81 09/22/2023 12:05 PM    CALCIUM 10.30 09/08/2023 12:05 PM    LABGLOM 19 09/17/2018 09:00 AM    GLUCOSE 171 09/08/2023 12:05 PM       Lab Results   Component Value Date/Time    ALKPHOS 74 09/08/2023 12:05 PM    ALT 22 09/08/2023 12:05 PM    AST 14 07/07/2023 11:44 AM    PROT 6.0

## 2023-10-02 RX ORDER — NITROGLYCERIN 0.4 MG/1
0.4 TABLET SUBLINGUAL EVERY 5 MIN PRN
Qty: 25 TABLET | Refills: 3 | Status: SHIPPED | OUTPATIENT
Start: 2023-10-02

## 2023-10-05 ENCOUNTER — HOSPITAL ENCOUNTER (OUTPATIENT)
Dept: WOUND CARE | Age: 58
Discharge: HOME | End: 2023-10-05
Payer: MEDICARE

## 2023-10-05 VITALS
RESPIRATION RATE: 18 BRPM | OXYGEN SATURATION: 98 % | SYSTOLIC BLOOD PRESSURE: 158 MMHG | HEART RATE: 88 BPM | DIASTOLIC BLOOD PRESSURE: 73 MMHG | TEMPERATURE: 97.2 F

## 2023-10-05 DIAGNOSIS — T81.31XD DEHISCENCE OF OPERATIVE WOUND, SUBSEQUENT ENCOUNTER: ICD-10-CM

## 2023-10-05 DIAGNOSIS — M86.672 OTHER CHRONIC OSTEOMYELITIS OF LEFT FOOT (HCC): ICD-10-CM

## 2023-10-05 DIAGNOSIS — Z79.2 LONG TERM CURRENT USE OF ANTIBIOTICS: ICD-10-CM

## 2023-10-05 PROCEDURE — 99214 OFFICE O/P EST MOD 30 MIN: CPT | Performed by: NURSE PRACTITIONER

## 2023-10-05 PROCEDURE — 99212 OFFICE O/P EST SF 10 MIN: CPT

## 2023-10-05 RX ORDER — DOXYCYCLINE HYCLATE 100 MG
100 TABLET ORAL 2 TIMES DAILY
Qty: 60 TABLET | Refills: 1 | Status: SHIPPED | OUTPATIENT
Start: 2023-10-05

## 2023-10-05 NOTE — PROGRESS NOTES
are allowed to have   - try lotion to your arms for itching if make sure you make your kidney physician aware of your itching.   - gold bond, eucerin, vaseline lotion, lubriderm no perfumes or dyes      Wound Location: left foot     Dressing orders: per Dr. Libby Nguyen     Follow up visit: 4 weeks on November 2nd at 1:30 pm      Keep next scheduled appointment. Please give 24 hour notice if unable to keep appointment. [phone]     If you experience any of the following, please call the Wound Care Service during business hours: Monday through Friday 8:00 am - 4:30 pm  ([phone]).               *Increase in pain              *Temperature over 101              *Increase in drainage from your wound or a foul odor              *Uncontrolled swelling              *Need for compression bandage changes due to slippage, breakthrough drainage     If you need medical attention outside of business hours, please contact your Primary Care Doctor or go to the nearest emergency room         Electronically signed by LARRY Mayorga CNP on 10/5/2023 at 4:06 PM

## 2023-10-05 NOTE — PLAN OF CARE
Problem: Wound:  Goal: Will show signs of wound healing; wound closure and no evidence of infection  Description: Will show signs of wound healing; wound closure and no evidence of infection  Outcome: Progressing   Pt. Seen today for oral antibiotics see AVS for new orders. Follow up in 4 weeks. Care plan reviewed with patient. Patient verbalize understanding of the plan of care and contribute to goal setting.

## 2023-10-05 NOTE — PATIENT INSTRUCTIONS
Visit Discharge/Physician Orders:   - continue doxycycline  - minimize exposure to sun while on doxycycline  - Keep all follow ups with Dr. Rachel Hernandez  - CBC BMP CRP ESR ordered today please have drawn in about a month and have results Faxed to wound care at [phone]  - Continue to drink plenty of water as much as you are allowed to have   - try lotion to your arms for itching if make sure you make your kidney physician aware of your itching.   - gold bond, eucerin, vaseline lotion, lubriderm no perfumes or dyes   -cast cover or saran wrap (press and seal) to cover your leg during shower      Wound Location: left foot     Dressing orders: per Dr. Rachel Hernandez     Follow up visit: 4 weeks on November 2nd at 1:30 pm      Keep next scheduled appointment. Please give 24 hour notice if unable to keep appointment. [phone]     If you experience any of the following, please call the Wound Care Service during business hours: Monday through Friday 8:00 am - 4:30 pm  ([phone]).               *Increase in pain              *Temperature over 101              *Increase in drainage from your wound or a foul odor              *Uncontrolled swelling              *Need for compression bandage changes due to slippage, breakthrough drainage     If you need medical attention outside of business hours, please contact your Primary Care Doctor or go to the nearest emergency room

## 2023-10-09 ENCOUNTER — TELEPHONE (OUTPATIENT)
Dept: WOUND CARE | Age: 58
End: 2023-10-09

## 2023-10-09 NOTE — TELEPHONE ENCOUNTER
----- Message from [place] Record sent at 10/9/2023  9:15 AM EDT -----  [id number] [address] [phone] MODESTO ASKS IF HE CAN MOVE HIS 1:30, AFTERNOON APPT, TO THE MORNING ON THUR 11/2

## 2023-10-16 RX ORDER — RIVAROXABAN 2.5 MG/1
TABLET, FILM COATED ORAL
Qty: 180 TABLET | Refills: 3 | Status: SHIPPED | OUTPATIENT
Start: 2023-10-16

## 2023-11-02 ENCOUNTER — HOSPITAL ENCOUNTER (OUTPATIENT)
Dept: WOUND CARE | Age: 58
Discharge: HOME | End: 2023-11-02
Payer: MEDICARE

## 2023-11-02 VITALS
DIASTOLIC BLOOD PRESSURE: 75 MMHG | TEMPERATURE: 97.5 F | RESPIRATION RATE: 18 BRPM | HEART RATE: 77 BPM | SYSTOLIC BLOOD PRESSURE: 145 MMHG | OXYGEN SATURATION: 98 %

## 2023-11-02 DIAGNOSIS — Z79.2 LONG TERM CURRENT USE OF ANTIBIOTICS: ICD-10-CM

## 2023-11-02 DIAGNOSIS — M86.672 OTHER CHRONIC OSTEOMYELITIS OF LEFT FOOT (HCC): Primary | ICD-10-CM

## 2023-11-02 PROCEDURE — 99214 OFFICE O/P EST MOD 30 MIN: CPT | Performed by: NURSE PRACTITIONER

## 2023-11-02 PROCEDURE — 99212 OFFICE O/P EST SF 10 MIN: CPT

## 2023-11-02 RX ORDER — DOXYCYCLINE HYCLATE 100 MG
100 TABLET ORAL 2 TIMES DAILY
Qty: 60 TABLET | Refills: 0 | Status: SHIPPED | OUTPATIENT
Start: 2023-11-02

## 2023-11-02 NOTE — PATIENT INSTRUCTIONS
Visit Discharge/Physician Orders:   - continue doxycycline  - minimize exposure to sun while on doxycycline  - Keep all follow ups with Dr. Ayanna Dawkins  - Labs ordered today please have drawn in about a month and have results Faxed to wound care at [phone]  - Continue to drink plenty of water as much as you are allowed to have   - try lotion to your arms for itching if make sure you make your kidney physician aware of your itching.   - gold bond, eucerin, vaseline lotion, lubriderm no perfumes or dyes    - watch your potassium level, it was a little elevated     Wound Location: left foot     Dressing orders: per Dr. Ayanna Dawkins     Follow up visit: 4 weeks on Thursday November 30 at 10:30 am      Keep next scheduled appointment. Please give 24 hour notice if unable to keep appointment. [phone]     If you experience any of the following, please call the Wound Care Service during business hours: Monday through Friday 8:00 am - 4:30 pm  ([phone]).               *Increase in pain              *Temperature over 101              *Increase in drainage from your wound or a foul odor              *Uncontrolled swelling              *Need for compression bandage changes due to slippage, breakthrough drainage     If you need medical attention outside of business hours, please contact your Primary Care Doctor or go to the nearest emergency room

## 2023-11-02 NOTE — PLAN OF CARE
Problem: Skin/Tissue Integrity  Goal: Absence of new skin breakdown  Description: 1. Monitor for areas of redness and/or skin breakdown  2. Assess vascular access sites hourly  3. Every 4-6 hours minimum:  Change oxygen saturation probe site  4. Every 4-6 hours:  If on nasal continuous positive airway pressure, respiratory therapy assess nares and determine need for appliance change or resting period. Outcome: Progressing   Seen today for oral atb for left foot. See avs for discharge   Care plan reviewed with patient. Patient verbalize understanding of the plan of care and contribute to goal setting.

## 2023-11-02 NOTE — PROGRESS NOTES
visit. All questions and concerns addressed prior to discharge from today's visit. Please see attached Discharge Instructions    Written patient dismissal instructions given to patient and signed by patient or POA. Orders Placed This Encounter   Procedures    CBC with Auto Differential     Standing Status:   Future     Standing Expiration Date:   11/2/2024    Comprehensive Metabolic Panel     Standing Status:   Future     Standing Expiration Date:   11/2/2024    C-Reactive Protein     Standing Status:   Future     Standing Expiration Date:   11/2/2024     Discharge Instructions       Patient Instructions   Visit Discharge/Physician Orders:   - continue doxycycline  - minimize exposure to sun while on doxycycline  - Keep all follow ups with Dr. Kailey Valderrama ordered today please have drawn in about a month and have results Faxed to wound care at [phone]  - Continue to drink plenty of water as much as you are allowed to have   - try lotion to your arms for itching if make sure you make your kidney physician aware of your itching.   - gold bond, eucerin, vaseline lotion, lubriderm no perfumes or dyes    - watch your potassium level, it was a little elevated     Wound Location: left foot     Dressing orders: per Dr. Liborio Meeks     Follow up visit: 4 weeks on Thursday November 30 at 10:30 am      Keep next scheduled appointment. Please give 24 hour notice if unable to keep appointment. [phone]     If you experience any of the following, please call the Wound Care Service during business hours: Monday through Friday 8:00 am - 4:30 pm  ([phone]).               *Increase in pain              *Temperature over 101              *Increase in drainage from your wound or a foul odor              *Uncontrolled swelling              *Need for compression bandage changes due to slippage, breakthrough drainage     If you need medical attention outside of business hours, please contact your Primary Care

## 2023-11-20 RX ORDER — FENOFIBRATE 145 MG/1
145 TABLET, COATED ORAL DAILY
Qty: 90 TABLET | Refills: 2 | Status: SHIPPED | OUTPATIENT
Start: 2023-11-20

## (undated) DEVICE — 3M™ STERI-STRIP™ COMPOUND BENZOIN TINCTURE 40 BAGS/CARTON 4 CARTONS/CASE C1544: Brand: 3M™ STERI-STRIP™

## (undated) DEVICE — SYRINGE MED 10ML LUERLOCK TIP W/O SFTY DISP

## (undated) DEVICE — MEDI-VAC YANKAUER SUCTION HANDLE W/BULBOUS TIP: Brand: CARDINAL HEALTH

## (undated) DEVICE — FORCEP RAD JAW W/NEEDLE 160CM

## (undated) DEVICE — BLANKET THER PED W24XL30IN FAB COVERING FOR HTP 1500 HEAT

## (undated) DEVICE — CONNECTOR TBNG AUX H2O JET DISP FOR OLY 160/180 SER

## (undated) DEVICE — ROYAL SILK SURGICAL GOWN, XXL: Brand: CONVERTORS

## (undated) DEVICE — SEALANT HEMSTAT 5ML HUM FIBRIN THROM 2 VI APPL DEV EVICEL

## (undated) DEVICE — ENDO KIT: Brand: MEDLINE INDUSTRIES, INC.

## (undated) DEVICE — CHLORAPREP 26ML ORANGE

## (undated) DEVICE — STAPLER INT L75MM CUT LN L73MM STPL LN L77MM BLU B FRM 8

## (undated) DEVICE — BASIC SINGLE BASIN BTC-LF: Brand: MEDLINE INDUSTRIES, INC.

## (undated) DEVICE — IV START KIT: Brand: MEDLINE INDUSTRIES, INC.

## (undated) DEVICE — DRAPE,EXTREMITY,89X128,STERILE: Brand: MEDLINE

## (undated) DEVICE — SYRINGE,PISTON,IRRIGATION,60ML,STERILE: Brand: MEDLINE

## (undated) DEVICE — STRIP,CLOSURE,WOUND,MEDI-STRIP,1/2X4: Brand: MEDLINE

## (undated) DEVICE — STAPLER INT L60MM REG TISS BLU B FRM 8 FIRING 2 ROW AUTO

## (undated) DEVICE — BIOGUARD A/W CLEANING ADAPTER

## (undated) DEVICE — FORCEPS BX L240CM JAW DIA3.2MM L CAP W/ NDL MIC MESH TOOTH

## (undated) DEVICE — CATHETER ETER IV 22GA L1IN POLYUR STR RADPQ INTROCAN SFTY

## (undated) DEVICE — GAUZE,SPONGE,4"X4",12PLY,STERILE,LF,2'S: Brand: MEDLINE

## (undated) DEVICE — Device

## (undated) DEVICE — SOLUTION IV 500ML 0.9% SOD CHL PH 5 INJ USP VIAFLX PLAS

## (undated) DEVICE — SNARE POLYP SM W13MMXL240CM SHTH DIA2.4MM OVL FLX DISP

## (undated) DEVICE — SOLUTION IV 1000ML LAC RINGERS PH 6.5 INJ USP VIAFLX PLAS

## (undated) DEVICE — LINER SUCT CANSTR 1500CC SEMI RIG W/ POR HYDROPHOBIC SHUT

## (undated) DEVICE — PRESSURE MONITORING SET: Brand: TRUWAVE

## (undated) DEVICE — Z DISCONTINUED USE 2660780 STAPLER INT L26CM STPL 33MM OPN INTLUMN CRV CIR ADJ HT

## (undated) DEVICE — GOWN,SIRUS,NON REINFRCD,LARGE,SET IN SL: Brand: MEDLINE

## (undated) DEVICE — COVER ARMBRD W13XL28.5IN IMPERV BLU FOR OP RM

## (undated) DEVICE — SUTURE VCRL SZ 0 L18IN ABSRB VLT SUTUPAK PRECUT W/O NDL J106T

## (undated) DEVICE — APPLIER CLP L L13IN TI MULT RNG HNDL 20 CLP STR LIGACLP

## (undated) DEVICE — Z DISCONTINUED APPLICATOR SURG PREP 0.5OZ 2% CHG 70% ISO ALC WET FOR UNIV

## (undated) DEVICE — SOLUTION IV IRRIG WATER 1000ML POUR BRL 2F7114

## (undated) DEVICE — TRAY PREP DRY W/ PREM GLV 2 APPL 6 SPNG 2 UNDPD 1 OVERWRAP

## (undated) DEVICE — JELLY,LUBE,STERILE,FLIP TOP,TUBE,2-OZ: Brand: MEDLINE

## (undated) DEVICE — SET ADMIN 25ML L117IN PMP MOD CK VLV RLER CLMP 2 SMRTSITE

## (undated) DEVICE — FORCEPS BX L L240CM DIA2.4MM RAD JAW 4 HOT FOR POLYP DISP

## (undated) DEVICE — SOLUTION IV 1000ML 0.45% SOD CHL PH 5 INJ USP VIAFLX PLAS

## (undated) DEVICE — SUTURE PERMA-HAND SZ 3-0 L30IN NONABSORBABLE BLK L60MM KS 622H

## (undated) DEVICE — SPONGE LAP W18XL18IN WHT COT 4 PLY FLD STRUNG RADPQ DISP ST

## (undated) DEVICE — BLADE CLIPPER GEN PURP NS

## (undated) DEVICE — 3M™ TRANSPORE™ WHITE SURGICAL TAPE 1534-1, 1 INCH X 10 YARD (2,5CM X 9,1M), 12 ROLLS/CARTON 10 CARTONS/CASE: Brand: 3M™ TRANSPORE™

## (undated) DEVICE — SHEET, T, LAPAROTOMY, STERILE: Brand: MEDLINE

## (undated) DEVICE — GLOVE ORANGE PI 7 1/2   MSG9075

## (undated) DEVICE — TRAP POLYP ETRAP

## (undated) DEVICE — 2000CC GUARDIAN II: Brand: GUARDIAN

## (undated) DEVICE — GOWN,SIRUS,NONRNF,SETINSLV,XL,20/CS: Brand: MEDLINE

## (undated) DEVICE — DRESSING GRMCDL 6 12FR D1N CNTR HOLE 4MM ANTMCRBL PRTCTVE DI

## (undated) DEVICE — GARMENT COMPR STD FOR 17IN CALF UNIF THER FLOTRN

## (undated) DEVICE — CONMED SCOPE SAVER BITE BLOCK, 20X27 MM: Brand: SCOPE SAVER

## (undated) DEVICE — SUREFIT, DUAL DISPERSIVE ELECTRODE, CONTACT QUALITY MONITOR: Brand: SUREFIT

## (undated) DEVICE — TUBING PRSS 36 M F

## (undated) DEVICE — TRAY CATH 16FR F INCLUDE LUB DRNGE BG STATLOK STBL DEV

## (undated) DEVICE — 4-PORT MANIFOLD: Brand: NEPTUNE 2

## (undated) DEVICE — MEDI-VAC NON-CONDUCTIVE SUCTION TUBING 6MM X 6.1M (20 FT.) L: Brand: CARDINAL HEALTH

## (undated) DEVICE — SET CATH 20GA L1.75IN RAD ART POLYUR RADPQ W/ INTEGR

## (undated) DEVICE — SOLUTION IV 1000ML 0.9% SOD CHL PH 5 INJ USP VIAFLX PLAS

## (undated) DEVICE — TUBING IV STOPCOCK 48 CM 3 W

## (undated) DEVICE — SPONGE GZ W4XL4IN COT 12 PLY TYP VII WVN C FLD DSGN

## (undated) DEVICE — 3M™ TRANSPORE™ WHITE SURGICAL TAPE 1534-2, 2 INCH X 10 YARD (5CM X 9,1M), 6 ROLLS/CARTON 10 CARTONS/CASE: Brand: 3M™ TRANSPORE™

## (undated) DEVICE — 3M™ TEGADERM™ TRANSPARENT FILM DRESSING FRAME STYLE, 1626W, 4 IN X 4-3/4 IN (10 CM X 12 CM), 50/CT 4CT/CASE: Brand: 3M™ TEGADERM™

## (undated) DEVICE — SPLINT ARMBRD W3XL10.5IN POLYFOAM DLX A LN

## (undated) DEVICE — PACK PROCEDURE SURG SET UP SRMC

## (undated) DEVICE — 500ML,PRESSURE INFUSER W/STOPCOCK: Brand: MEDLINE

## (undated) DEVICE — GLOVE ORANGE PI 7   MSG9070

## (undated) DEVICE — Z DISCONTINUED NO SUB IDED CONNECTOR SURG HEMOSTATIC OPN FLD FOR BIOMATERIAL

## (undated) DEVICE — DRAPE C ARM W36XL30IN RECTANG BND BG AND TAPE

## (undated) DEVICE — ULTRACLEAN ACCESSORY ELECTRODE 6" (15.24 CM) COATED BLADE: Brand: ULTRACLEAN

## (undated) DEVICE — PAD,ABDOMINAL,5"X9",ST,LF,25/BX: Brand: MEDLINE INDUSTRIES, INC.

## (undated) DEVICE — GLOVE ORANGE PI 8   MSG9080

## (undated) DEVICE — DRAPE,U/SHT,SPLIT,FILM,60X84,STERILE: Brand: MEDLINE

## (undated) DEVICE — GLOVE ORANGE PI 8 1/2   MSG9085

## (undated) DEVICE — POOLE SUCTION HANDLE: Brand: CARDINAL HEALTH

## (undated) DEVICE — POSITIONER HD W8XH4XL8.5IN RASPBERRY FOAM SLT

## (undated) DEVICE — 3M™ WARMING BLANKET, UPPER BODY, 10 PER CASE, 42268: Brand: BAIR HUGGER™

## (undated) DEVICE — SET LNR RED GRN W/ BASE CLEANASCOPE